# Patient Record
Sex: MALE | Race: WHITE | Employment: OTHER
[De-identification: names, ages, dates, MRNs, and addresses within clinical notes are randomized per-mention and may not be internally consistent; named-entity substitution may affect disease eponyms.]

---

## 2017-02-20 ENCOUNTER — OFFICE VISIT (OUTPATIENT)
Dept: FAMILY MEDICINE CLINIC | Facility: CLINIC | Age: 62
End: 2017-02-20

## 2017-02-20 VITALS
RESPIRATION RATE: 16 BRPM | SYSTOLIC BLOOD PRESSURE: 110 MMHG | BODY MASS INDEX: 39.49 KG/M2 | HEIGHT: 73 IN | HEART RATE: 64 BPM | WEIGHT: 298 LBS | DIASTOLIC BLOOD PRESSURE: 78 MMHG

## 2017-02-20 DIAGNOSIS — I10 ESSENTIAL HYPERTENSION: ICD-10-CM

## 2017-02-20 DIAGNOSIS — J44.9 CHRONIC OBSTRUCTIVE PULMONARY DISEASE, UNSPECIFIED COPD TYPE (HCC): ICD-10-CM

## 2017-02-20 DIAGNOSIS — E11.9 TYPE 2 DIABETES MELLITUS WITHOUT COMPLICATION, WITHOUT LONG-TERM CURRENT USE OF INSULIN (HCC): Primary | ICD-10-CM

## 2017-02-20 DIAGNOSIS — E78.2 MIXED HYPERLIPIDEMIA: ICD-10-CM

## 2017-02-20 DIAGNOSIS — M1A.9XX0 CHRONIC GOUT INVOLVING TOE OF RIGHT FOOT, UNSPECIFIED CAUSE: ICD-10-CM

## 2017-02-20 DIAGNOSIS — L30.9 ECZEMA OF FACE: ICD-10-CM

## 2017-02-20 DIAGNOSIS — E03.2 HYPOTHYROIDISM DUE TO MEDICATION: ICD-10-CM

## 2017-02-20 DIAGNOSIS — F34.1 DYSTHYMIA (OR DEPRESSIVE NEUROSIS): ICD-10-CM

## 2017-02-20 LAB — HBA1C MFR BLD: 5.3 %

## 2017-02-20 PROCEDURE — 99213 OFFICE O/P EST LOW 20 MIN: CPT | Performed by: FAMILY MEDICINE

## 2017-02-20 PROCEDURE — 83036 HEMOGLOBIN GLYCOSYLATED A1C: CPT | Performed by: FAMILY MEDICINE

## 2017-02-20 RX ORDER — ALLOPURINOL 300 MG/1
300 TABLET ORAL DAILY
Qty: 90 TABLET | Refills: 1 | Status: SHIPPED | OUTPATIENT
Start: 2017-02-20 | End: 2017-07-12 | Stop reason: SDUPTHER

## 2017-02-20 RX ORDER — LOSARTAN POTASSIUM 25 MG/1
12.5 TABLET ORAL DAILY
Qty: 90 TABLET | Refills: 1 | Status: CANCELLED | OUTPATIENT
Start: 2017-02-20

## 2017-02-20 RX ORDER — FLUTICASONE FUROATE AND VILANTEROL 100; 25 UG/1; UG/1
1 POWDER RESPIRATORY (INHALATION) DAILY
Qty: 3 EACH | Refills: 5 | Status: SHIPPED | OUTPATIENT
Start: 2017-02-20 | End: 2017-10-26

## 2017-02-20 RX ORDER — METOPROLOL SUCCINATE 50 MG/1
25 TABLET, EXTENDED RELEASE ORAL DAILY
Qty: 90 TABLET | Refills: 1 | Status: CANCELLED | OUTPATIENT
Start: 2017-02-20

## 2017-02-20 RX ORDER — METOPROLOL SUCCINATE 50 MG/1
25 TABLET, EXTENDED RELEASE ORAL DAILY
Qty: 90 TABLET | Refills: 1 | Status: SHIPPED | OUTPATIENT
Start: 2017-02-20 | End: 2017-07-12 | Stop reason: SDUPTHER

## 2017-02-20 RX ORDER — MOMETASONE FUROATE 1 MG/G
CREAM TOPICAL
Qty: 1 TUBE | Refills: 3 | Status: SHIPPED | OUTPATIENT
Start: 2017-02-20

## 2017-02-20 RX ORDER — PAROXETINE HYDROCHLORIDE 20 MG/1
TABLET, FILM COATED ORAL
Qty: 180 TABLET | Refills: 1 | Status: SHIPPED | OUTPATIENT
Start: 2017-02-20 | End: 2017-07-12 | Stop reason: SDUPTHER

## 2017-02-20 RX ORDER — LOSARTAN POTASSIUM 25 MG/1
12.5 TABLET ORAL DAILY
Qty: 90 TABLET | Refills: 1 | Status: SHIPPED | OUTPATIENT
Start: 2017-02-20 | End: 2017-07-12 | Stop reason: SDUPTHER

## 2017-02-20 RX ORDER — LEVOTHYROXINE SODIUM 175 UG/1
TABLET ORAL
Qty: 90 TABLET | Refills: 1 | Status: SHIPPED | OUTPATIENT
Start: 2017-02-20 | End: 2017-07-12 | Stop reason: SDUPTHER

## 2017-02-20 RX ORDER — LOVASTATIN 40 MG/1
40 TABLET ORAL DAILY
Qty: 90 TABLET | Refills: 1 | Status: SHIPPED | OUTPATIENT
Start: 2017-02-20 | End: 2017-07-12 | Stop reason: SDUPTHER

## 2017-02-20 ASSESSMENT — ENCOUNTER SYMPTOMS
EYE DISCHARGE: 0
RHINORRHEA: 0
VOICE CHANGE: 0
FACIAL SWELLING: 0
BLOOD IN STOOL: 0
RECTAL PAIN: 0
WHEEZING: 0
CHEST TIGHTNESS: 0
APNEA: 0
EYE ITCHING: 0
NAUSEA: 0
ALLERGIC/IMMUNOLOGIC NEGATIVE: 1
STRIDOR: 0
COUGH: 0
EYE PAIN: 0
SORE THROAT: 0
DIARRHEA: 0
EYES NEGATIVE: 1
GASTROINTESTINAL NEGATIVE: 1
TROUBLE SWALLOWING: 0
RESPIRATORY NEGATIVE: 1
PHOTOPHOBIA: 0
BACK PAIN: 0
ABDOMINAL DISTENTION: 0
SINUS PRESSURE: 0
COLOR CHANGE: 0
SHORTNESS OF BREATH: 0
ANAL BLEEDING: 0
VOMITING: 0
EYE REDNESS: 0
ABDOMINAL PAIN: 0
CHOKING: 0
CONSTIPATION: 0

## 2017-02-21 ENCOUNTER — TELEPHONE (OUTPATIENT)
Dept: FAMILY MEDICINE CLINIC | Facility: CLINIC | Age: 62
End: 2017-02-21

## 2017-02-21 DIAGNOSIS — F32.5 DEPRESSION, MAJOR, IN REMISSION (HCC): Primary | ICD-10-CM

## 2017-02-21 RX ORDER — FLUOXETINE HYDROCHLORIDE 40 MG/1
40 CAPSULE ORAL DAILY
Qty: 60 CAPSULE | Refills: 5 | Status: SHIPPED | OUTPATIENT
Start: 2017-02-21 | End: 2017-07-12

## 2017-06-15 ENCOUNTER — HOSPITAL ENCOUNTER (EMERGENCY)
Age: 62
Discharge: HOME | End: 2017-06-15
Attending: EMERGENCY MEDICINE
Payer: COMMERCIAL

## 2017-06-15 VITALS
SYSTOLIC BLOOD PRESSURE: 167 MMHG | OXYGEN SATURATION: 96 % | TEMPERATURE: 97.5 F | HEART RATE: 64 BPM | RESPIRATION RATE: 18 BRPM | DIASTOLIC BLOOD PRESSURE: 84 MMHG

## 2017-06-15 DIAGNOSIS — M10.9 ACUTE GOUT INVOLVING TOE OF RIGHT FOOT, UNSPECIFIED CAUSE: Primary | ICD-10-CM

## 2017-06-15 PROCEDURE — 99283 EMERGENCY DEPT VISIT LOW MDM: CPT

## 2017-06-15 RX ORDER — HYDROCODONE BITARTRATE AND ACETAMINOPHEN 5; 325 MG/1; MG/1
1 TABLET ORAL EVERY 6 HOURS PRN
Qty: 12 TABLET | Refills: 0 | Status: SHIPPED | OUTPATIENT
Start: 2017-06-15 | End: 2017-06-22

## 2017-06-15 RX ORDER — PREDNISONE 20 MG/1
TABLET ORAL
Qty: 18 TABLET | Refills: 0 | Status: SHIPPED | OUTPATIENT
Start: 2017-06-15 | End: 2017-10-23

## 2017-06-15 ASSESSMENT — PAIN DESCRIPTION - DESCRIPTORS: DESCRIPTORS: SHARP

## 2017-06-15 ASSESSMENT — PAIN SCALES - GENERAL: PAINLEVEL_OUTOF10: 8

## 2017-06-15 ASSESSMENT — PAIN DESCRIPTION - ORIENTATION: ORIENTATION: RIGHT

## 2017-06-15 ASSESSMENT — PAIN DESCRIPTION - FREQUENCY: FREQUENCY: CONTINUOUS

## 2017-06-15 ASSESSMENT — PAIN DESCRIPTION - LOCATION: LOCATION: TOE (COMMENT WHICH ONE)

## 2017-06-15 ASSESSMENT — PAIN DESCRIPTION - PAIN TYPE: TYPE: ACUTE PAIN

## 2017-07-12 ENCOUNTER — OFFICE VISIT (OUTPATIENT)
Dept: FAMILY MEDICINE CLINIC | Age: 62
End: 2017-07-12
Payer: COMMERCIAL

## 2017-07-12 ENCOUNTER — TELEPHONE (OUTPATIENT)
Dept: FAMILY MEDICINE CLINIC | Age: 62
End: 2017-07-12

## 2017-07-12 VITALS
HEIGHT: 73 IN | DIASTOLIC BLOOD PRESSURE: 72 MMHG | SYSTOLIC BLOOD PRESSURE: 122 MMHG | BODY MASS INDEX: 39.76 KG/M2 | WEIGHT: 300 LBS

## 2017-07-12 DIAGNOSIS — I10 ESSENTIAL HYPERTENSION: ICD-10-CM

## 2017-07-12 DIAGNOSIS — M10.9 ACUTE GOUT OF LEFT FOOT, UNSPECIFIED CAUSE: ICD-10-CM

## 2017-07-12 DIAGNOSIS — E03.9 ACQUIRED HYPOTHYROIDISM: ICD-10-CM

## 2017-07-12 DIAGNOSIS — F34.1 DYSTHYMIA (OR DEPRESSIVE NEUROSIS): ICD-10-CM

## 2017-07-12 DIAGNOSIS — R73.03 PRE-DIABETES: ICD-10-CM

## 2017-07-12 DIAGNOSIS — M10.9 ACUTE GOUT OF LEFT FOOT, UNSPECIFIED CAUSE: Primary | ICD-10-CM

## 2017-07-12 DIAGNOSIS — E78.2 MIXED HYPERLIPIDEMIA: ICD-10-CM

## 2017-07-12 DIAGNOSIS — Z23 NEED FOR PNEUMOCOCCAL VACCINATION: ICD-10-CM

## 2017-07-12 PROBLEM — M1A.9XX0 CHRONIC GOUT INVOLVING TOE: Status: ACTIVE | Noted: 2017-07-12

## 2017-07-12 PROCEDURE — 90670 PCV13 VACCINE IM: CPT | Performed by: FAMILY MEDICINE

## 2017-07-12 PROCEDURE — 99213 OFFICE O/P EST LOW 20 MIN: CPT | Performed by: FAMILY MEDICINE

## 2017-07-12 PROCEDURE — 90471 IMMUNIZATION ADMIN: CPT | Performed by: FAMILY MEDICINE

## 2017-07-12 RX ORDER — PAROXETINE HYDROCHLORIDE 20 MG/1
TABLET, FILM COATED ORAL
Qty: 180 TABLET | Refills: 1 | Status: SHIPPED | OUTPATIENT
Start: 2017-07-12 | End: 2018-01-09 | Stop reason: SDUPTHER

## 2017-07-12 RX ORDER — LOSARTAN POTASSIUM 25 MG/1
12.5 TABLET ORAL DAILY
Qty: 90 TABLET | Refills: 1 | Status: SHIPPED | OUTPATIENT
Start: 2017-07-12 | End: 2017-08-21 | Stop reason: SDUPTHER

## 2017-07-12 RX ORDER — INDOMETHACIN 50 MG/1
50 CAPSULE ORAL 3 TIMES DAILY
Qty: 30 CAPSULE | Refills: 3 | Status: SHIPPED | OUTPATIENT
Start: 2017-07-12 | End: 2017-10-23

## 2017-07-12 RX ORDER — COLCHICINE 0.6 MG/1
TABLET ORAL
Qty: 30 TABLET | Refills: 3 | Status: SHIPPED | OUTPATIENT
Start: 2017-07-12 | End: 2017-07-13 | Stop reason: SDUPTHER

## 2017-07-12 RX ORDER — METOPROLOL SUCCINATE 50 MG/1
25 TABLET, EXTENDED RELEASE ORAL DAILY
Qty: 90 TABLET | Refills: 1 | Status: SHIPPED | OUTPATIENT
Start: 2017-07-12 | End: 2017-08-21 | Stop reason: SDUPTHER

## 2017-07-12 RX ORDER — LEVOTHYROXINE SODIUM 175 UG/1
TABLET ORAL
Qty: 90 TABLET | Refills: 1 | Status: SHIPPED | OUTPATIENT
Start: 2017-07-12 | End: 2018-01-03 | Stop reason: SDUPTHER

## 2017-07-12 RX ORDER — LOVASTATIN 40 MG/1
40 TABLET ORAL DAILY
Qty: 90 TABLET | Refills: 1 | Status: SHIPPED | OUTPATIENT
Start: 2017-07-12 | End: 2018-01-09 | Stop reason: SDUPTHER

## 2017-07-12 RX ORDER — ALLOPURINOL 300 MG/1
300 TABLET ORAL DAILY
Qty: 90 TABLET | Refills: 1 | Status: SHIPPED | OUTPATIENT
Start: 2017-07-12 | End: 2018-01-09 | Stop reason: SDUPTHER

## 2017-07-12 ASSESSMENT — ENCOUNTER SYMPTOMS
EYE PAIN: 0
DIARRHEA: 0
EYE DISCHARGE: 0
STRIDOR: 0
WHEEZING: 0
BLURRED VISION: 0
ANAL BLEEDING: 0
ABDOMINAL DISTENTION: 0
VOMITING: 0
COUGH: 0
VOICE CHANGE: 0
SHORTNESS OF BREATH: 0
ORTHOPNEA: 0
GASTROINTESTINAL NEGATIVE: 1
COLOR CHANGE: 0
ABDOMINAL PAIN: 0
EYE ITCHING: 0
NAUSEA: 0
CONSTIPATION: 0
RESPIRATORY NEGATIVE: 1
SINUS PRESSURE: 0
RECTAL PAIN: 0
BLOOD IN STOOL: 0
RHINORRHEA: 0
CHOKING: 0
CHEST TIGHTNESS: 0
FACIAL SWELLING: 0
BACK PAIN: 0
EYE REDNESS: 0
SORE THROAT: 0
PHOTOPHOBIA: 0
EYES NEGATIVE: 1
APNEA: 0
TROUBLE SWALLOWING: 0
ALLERGIC/IMMUNOLOGIC NEGATIVE: 1

## 2017-07-12 ASSESSMENT — PATIENT HEALTH QUESTIONNAIRE - PHQ9
SUM OF ALL RESPONSES TO PHQ9 QUESTIONS 1 & 2: 1
1. LITTLE INTEREST OR PLEASURE IN DOING THINGS: 0
2. FEELING DOWN, DEPRESSED OR HOPELESS: 1
SUM OF ALL RESPONSES TO PHQ QUESTIONS 1-9: 1

## 2017-07-13 RX ORDER — COLCHICINE 0.6 MG/1
TABLET ORAL
Qty: 90 TABLET | Refills: 3 | Status: SHIPPED | OUTPATIENT
Start: 2017-07-13 | End: 2017-10-23

## 2017-08-21 DIAGNOSIS — I10 ESSENTIAL HYPERTENSION: ICD-10-CM

## 2017-08-22 RX ORDER — LOSARTAN POTASSIUM 25 MG/1
25 TABLET ORAL DAILY
Qty: 90 TABLET | Refills: 2 | Status: SHIPPED | OUTPATIENT
Start: 2017-08-22 | End: 2018-01-12 | Stop reason: SDUPTHER

## 2017-08-22 RX ORDER — METOPROLOL SUCCINATE 50 MG/1
50 TABLET, EXTENDED RELEASE ORAL DAILY
Qty: 90 TABLET | Refills: 2 | Status: SHIPPED | OUTPATIENT
Start: 2017-08-22 | End: 2018-01-12 | Stop reason: SDUPTHER

## 2017-08-25 ENCOUNTER — TELEPHONE (OUTPATIENT)
Dept: CARDIOLOGY CLINIC | Age: 62
End: 2017-08-25

## 2017-08-25 DIAGNOSIS — E55.9 VITAMIN D DEFICIENCY: ICD-10-CM

## 2017-08-25 DIAGNOSIS — I10 ESSENTIAL HYPERTENSION: ICD-10-CM

## 2017-08-25 DIAGNOSIS — I25.10 CORONARY ARTERY DISEASE INVOLVING NATIVE CORONARY ARTERY OF NATIVE HEART WITHOUT ANGINA PECTORIS: ICD-10-CM

## 2017-08-25 DIAGNOSIS — E03.9 ACQUIRED HYPOTHYROIDISM: ICD-10-CM

## 2017-08-25 DIAGNOSIS — E78.2 MIXED HYPERLIPIDEMIA: ICD-10-CM

## 2017-08-25 DIAGNOSIS — Z95.1 S/P CABG X 3: Primary | ICD-10-CM

## 2017-10-20 ENCOUNTER — HOSPITAL ENCOUNTER (OUTPATIENT)
Age: 62
Discharge: HOME | End: 2017-10-20
Payer: MEDICARE

## 2017-10-20 ENCOUNTER — HOSPITAL ENCOUNTER (OUTPATIENT)
Dept: GENERAL RADIOLOGY | Age: 62
Discharge: HOME | End: 2017-10-20
Payer: MEDICARE

## 2017-10-20 DIAGNOSIS — E78.2 MIXED HYPERLIPIDEMIA: ICD-10-CM

## 2017-10-20 DIAGNOSIS — I25.10 CORONARY ARTERY DISEASE INVOLVING NATIVE CORONARY ARTERY OF NATIVE HEART WITHOUT ANGINA PECTORIS: ICD-10-CM

## 2017-10-20 DIAGNOSIS — I10 ESSENTIAL HYPERTENSION: ICD-10-CM

## 2017-10-20 DIAGNOSIS — E03.9 ACQUIRED HYPOTHYROIDISM: ICD-10-CM

## 2017-10-20 DIAGNOSIS — Z95.1 S/P CABG X 3: ICD-10-CM

## 2017-10-20 DIAGNOSIS — E55.9 VITAMIN D DEFICIENCY: ICD-10-CM

## 2017-10-20 LAB
ABSOLUTE EOS #: 0.3 K/UL (ref 0–0.4)
ABSOLUTE IMMATURE GRANULOCYTE: NORMAL K/UL (ref 0–0.3)
ABSOLUTE LYMPH #: 2.2 K/UL (ref 1–4.8)
ABSOLUTE MONO #: 0.6 K/UL (ref 0–1)
ALBUMIN SERPL-MCNC: 4.3 G/DL (ref 3.5–5.2)
ALBUMIN/GLOBULIN RATIO: ABNORMAL (ref 1–2.5)
ALP BLD-CCNC: 70 U/L (ref 40–129)
ALT SERPL-CCNC: 21 U/L (ref 5–41)
ANION GAP SERPL CALCULATED.3IONS-SCNC: 11 MMOL/L (ref 9–17)
AST SERPL-CCNC: 22 U/L
BASOPHILS # BLD: 1 %
BASOPHILS ABSOLUTE: 0.1 K/UL (ref 0–0.2)
BILIRUB SERPL-MCNC: 0.29 MG/DL (ref 0.3–1.2)
BUN BLDV-MCNC: 9 MG/DL (ref 8–23)
BUN/CREAT BLD: 11 (ref 9–20)
CALCIUM SERPL-MCNC: 9.3 MG/DL (ref 8.6–10.4)
CHLORIDE BLD-SCNC: 102 MMOL/L (ref 98–107)
CHOLESTEROL/HDL RATIO: 5
CHOLESTEROL: 165 MG/DL
CO2: 27 MMOL/L (ref 20–31)
CREAT SERPL-MCNC: 0.81 MG/DL (ref 0.7–1.2)
DIFFERENTIAL TYPE: YES
EKG ATRIAL RATE: 52 BPM
EKG P AXIS: 55 DEGREES
EKG P-R INTERVAL: 178 MS
EKG Q-T INTERVAL: 440 MS
EKG QRS DURATION: 88 MS
EKG QTC CALCULATION (BAZETT): 409 MS
EKG R AXIS: 38 DEGREES
EKG T AXIS: 74 DEGREES
EKG VENTRICULAR RATE: 52 BPM
EOSINOPHILS RELATIVE PERCENT: 4 %
GFR AFRICAN AMERICAN: >60 ML/MIN
GFR NON-AFRICAN AMERICAN: >60 ML/MIN
GFR SERPL CREATININE-BSD FRML MDRD: ABNORMAL ML/MIN/{1.73_M2}
GFR SERPL CREATININE-BSD FRML MDRD: ABNORMAL ML/MIN/{1.73_M2}
GLUCOSE BLD-MCNC: 123 MG/DL (ref 70–99)
HCT VFR BLD CALC: 51.2 % (ref 41–53)
HDLC SERPL-MCNC: 33 MG/DL
HEMOGLOBIN: 16.6 G/DL (ref 13.5–17.5)
IMMATURE GRANULOCYTES: NORMAL %
LDL CHOLESTEROL: 57 MG/DL (ref 0–130)
LYMPHOCYTES # BLD: 29 %
MAGNESIUM: 2.2 MG/DL (ref 1.6–2.6)
MCH RBC QN AUTO: 30.6 PG (ref 26–34)
MCHC RBC AUTO-ENTMCNC: 32.4 G/DL (ref 31–37)
MCV RBC AUTO: 94.4 FL (ref 80–100)
MONOCYTES # BLD: 8 %
PATIENT FASTING?: YES
PDW BLD-RTO: 14.4 % (ref 12.1–15.2)
PLATELET # BLD: 229 K/UL (ref 140–450)
PLATELET ESTIMATE: NORMAL
PMV BLD AUTO: NORMAL FL (ref 6–12)
POTASSIUM SERPL-SCNC: 4.4 MMOL/L (ref 3.7–5.3)
RBC # BLD: 5.43 M/UL (ref 4.5–5.9)
RBC # BLD: NORMAL 10*6/UL
SEG NEUTROPHILS: 58 %
SEGMENTED NEUTROPHILS ABSOLUTE COUNT: 4.3 K/UL (ref 2.1–6.5)
SODIUM BLD-SCNC: 140 MMOL/L (ref 135–144)
TOTAL PROTEIN: 7.4 G/DL (ref 6.4–8.3)
TRIGL SERPL-MCNC: 373 MG/DL
TSH SERPL DL<=0.05 MIU/L-ACNC: 4.22 MIU/L (ref 0.3–5)
VITAMIN D 25-HYDROXY: 29.1 NG/ML (ref 30–100)
VLDLC SERPL CALC-MCNC: ABNORMAL MG/DL (ref 1–30)
WBC # BLD: 7.5 K/UL (ref 3.5–11)
WBC # BLD: NORMAL 10*3/UL

## 2017-10-20 PROCEDURE — 93005 ELECTROCARDIOGRAM TRACING: CPT

## 2017-10-20 PROCEDURE — 83036 HEMOGLOBIN GLYCOSYLATED A1C: CPT

## 2017-10-20 PROCEDURE — 80061 LIPID PANEL: CPT

## 2017-10-20 PROCEDURE — 71020 XR CHEST STANDARD TWO VW: CPT

## 2017-10-20 PROCEDURE — 84443 ASSAY THYROID STIM HORMONE: CPT

## 2017-10-20 PROCEDURE — 83735 ASSAY OF MAGNESIUM: CPT

## 2017-10-20 PROCEDURE — 80053 COMPREHEN METABOLIC PANEL: CPT

## 2017-10-20 PROCEDURE — 82306 VITAMIN D 25 HYDROXY: CPT

## 2017-10-20 PROCEDURE — 85025 COMPLETE CBC W/AUTO DIFF WBC: CPT

## 2017-10-20 PROCEDURE — 36415 COLL VENOUS BLD VENIPUNCTURE: CPT

## 2017-10-23 ENCOUNTER — OFFICE VISIT (OUTPATIENT)
Dept: CARDIOLOGY CLINIC | Age: 62
End: 2017-10-23
Payer: COMMERCIAL

## 2017-10-23 VITALS
SYSTOLIC BLOOD PRESSURE: 110 MMHG | WEIGHT: 294 LBS | HEART RATE: 68 BPM | OXYGEN SATURATION: 95 % | BODY MASS INDEX: 38.79 KG/M2 | DIASTOLIC BLOOD PRESSURE: 80 MMHG

## 2017-10-23 DIAGNOSIS — R94.31 ABNORMAL EKG: ICD-10-CM

## 2017-10-23 DIAGNOSIS — I10 ESSENTIAL HYPERTENSION: ICD-10-CM

## 2017-10-23 DIAGNOSIS — I25.10 CORONARY ARTERY DISEASE INVOLVING NATIVE CORONARY ARTERY OF NATIVE HEART WITHOUT ANGINA PECTORIS: ICD-10-CM

## 2017-10-23 DIAGNOSIS — Z95.1 S/P CABG X 3: ICD-10-CM

## 2017-10-23 DIAGNOSIS — R06.02 SOB (SHORTNESS OF BREATH): ICD-10-CM

## 2017-10-23 DIAGNOSIS — E78.2 MIXED HYPERLIPIDEMIA: ICD-10-CM

## 2017-10-23 DIAGNOSIS — E55.9 VITAMIN D DEFICIENCY DISEASE: ICD-10-CM

## 2017-10-23 DIAGNOSIS — R73.09 ELEVATED GLUCOSE: Primary | ICD-10-CM

## 2017-10-23 LAB
ESTIMATED AVERAGE GLUCOSE: 108 MG/DL
HBA1C MFR BLD: 5.4 % (ref 4.8–5.9)

## 2017-10-23 PROCEDURE — 99214 OFFICE O/P EST MOD 30 MIN: CPT | Performed by: INTERNAL MEDICINE

## 2017-10-23 RX ORDER — FENOFIBRATE 145 MG/1
145 TABLET, COATED ORAL DAILY
Qty: 30 TABLET | Refills: 11 | Status: SHIPPED | OUTPATIENT
Start: 2017-10-23 | End: 2018-05-29 | Stop reason: SDUPTHER

## 2017-10-23 NOTE — LETTER
Marshall Nicholas M.D. [address] [place] [address] Rd, [place] [address]  [phone]          2017          Candis Cain MD  [facility]  Jose Enrique, [address]      RE:  Nanci Huitron   : 1955    Dear Dr. José Miguel Cain:    CHIEF COMPLAINT:  Shortness of breath, loss of energy with some chest discomfort. HISTORY OF PRESENT ILLNESS:  I had the pleasure of seeing Mr. Forrest Miller in our office on 10/23/2017. He is a pleasant 72-year-old gentleman who had shortness of breath and chest pain in . He had a Cardiolite stress test on 2014, which did not show any ischemia; however, because of his severe shortness of breath and chest discomfort, I did a catheterization on 2015 at [facility] that showed 80% distal left main trunk in the ostium of the LAD, 60% circumflex, 40% RCA with an EF of 60%. He had open heart surgery by Dr. Luisa Vu on 2015 with LIMA to the LAD and a vein graft to the circumflex. He has done well over the past year. He did have an episode of gout, which was treated for which he is doing better. He has noticed an increase in his shortness of breath with activity. He works out 3 days a week at home gym doing treadmill for 20 minutes and a bike for 20 minutes and lifting weights. He has noticed that it is more difficult to walk his treadmill because of his shortness of breath. His energy level is also markedly decreased. He has some chest pain, although it is not necessarily occurring only with activity. He is on disability for back discomfort, which makes treadmill somewhat difficult, but he is still able to do a treadmill for 20 to 30 minutes at 3-1/2 to 4 miles per hour. He has had no syncope or near syncope. No lightheadedness or dizziness. His daughter is with him named Kanu Houston, who also states that he is exercising.   She has noticed loss of energy and more shortness of breath GENERAL:  He is a pleasant 73-year-old gentleman. Denied pain. He was oriented to person, place and time. Answered questions appropriately. SKIN:  No unusual skin changes. HEENT:  The pupils are equally round and reactive to light and accommodation. Extraocular movements were intact. Mucous membranes were dry. NECK:  No JVD. Good carotid pulses. No carotid bruits. No lymphadenopathy or thyromegaly. CARDIOVASCULAR EXAM:  S1 and S2 were normal.  No S3 or S4. Soft systolic blowing type murmur. No diastolic murmur. PMI was normal.  No lift, thrust, or pericardial friction rub. LUNGS:  Quite clear to auscultation and percussion. ABDOMEN:  Soft and nontender. Good bowel sounds. The aorta was not enlarged. No hepatomegaly, splenomegaly. EXTREMITIES:  Good femoral pulses. Good pedal pulses. No pedal edema. Skin was warm and dry. No calf tenderness. Nail beds pink. Good cap refill. PULSES:  Bilateral symmetrical radial, brachial and carotid pulses. No carotid bruits. Good femoral and pedal pulses. NEUROLOGIC EXAM:  Within normal limits. PSYCHIATRIC EXAM:  Within normal limits. LABORATORY DATA:  From 10/20/2017, sodium 140, potassium 4.4, BUN 9, creatinine 0.81, GFR greater than 60, magnesium 2.2, calcium 9.3. His cholesterol was 165 with an HDL of 33, LDL 57, triglycerides 273. ALT was 21, AST was 22. Hemoglobin A1c is pending. TSH was 4.22. Vitamin D is 29.1. White count 7.5, hemoglobin 16.6 with a platelet count of 806,121. EKG showed sinus bradycardia at a rate of 52 beats per minute with nonspecific ST changes with no change from his previous EKG, but it was abnormal.      Chest x-ray was unremarkable with no acute abnormalities found. IMPRESSION:  1. Increased shortness of breath with exertion as well as decreased energy level and chest pain, consider angina equivalent. 2.  History of severe CAD.   3.  Status post cardiac catheterization on 01/21/2015, showing 80% left main trunk proximal LAD with 60% circumflex, mild disease in the right coronary artery, EF of 60%. 4.  Open heart surgery by Dr. Soco Leiva on 02/04/2015 with LIMA to the LAD and a vein graft to the OM branch of the circumflex. 5.  Non-insulin-dependent diabetes with a hemoglobin A1c pending, but his last hemoglobin A1c was 5.3.  6.  COPD. 7.  Sleep apnea, on a CPAP mask, which he uses nightly. 8.  Hypertension, well controlled. 9.  Hyperlipidemia, well controlled except for his triglycerides, which are markedly elevated at 373, when they were 148 last year on Tricor (currently off Tricor). 10.  Status post partial thyroidectomy with TSH at goal.  11.  Abnormal EKG. 12.  Bradycardia on EKG with a heart rate of 52. PLAN:  1. Exercise Myoview stress test to evaluate his chronotropic response to exercise and also to see if he has any significant ischemia, which could result in angina-equivalent symptoms. 2.  Echocardiogram because of his shortness of breath with exertion. 3.  If the above tests are unremarkable, we will call him with the results. 4.  Start Tricor for his hyperlipidemia with his triglycerides markedly elevated. DISCUSSION:  Mr. Eileen Page overall is doing relatively well. He has developed marked fatigue along with shortness of breath with exertion and now chest pain. His symptoms are somewhat similar to what he had prior to his bypass surgery. This may be the result of general aging. However, these were exactly the symptoms of angina that he had prior to his bypass surgery. Therefore, I will do an exercise Myoview stress test.  He may not be able to complete the exercise portion, but we will be able to see whether he has an adequate chronotropic response. If he has an inadequate chronotropic response, then we would decrease his Lopressor to 1/2 a tablet at 25 mg daily.     If the stress test is unremarkable, then I will call him and I will see

## 2017-10-23 NOTE — PROGRESS NOTES
Ov DR Norma Rivero  Energy level down past year  Tries exercise but more sob  30 min treadmill at 3, 25min bike  Lift weights all home  No chest pain  No hospitalizations or procedures  Since seen  Uses cpap  Seen DR José Miguel Cain 2 mths ago  occ lightheadedness   Daughter here Lance Ibarra has 3and 10year olds  Will do A1C today  Will schedule myoview stress test and echo  Unsure why stopped tricor  Will send in new script  Call with results  See pt in 1 year

## 2017-10-26 ENCOUNTER — HOSPITAL ENCOUNTER (OUTPATIENT)
Dept: NON INVASIVE DIAGNOSTICS | Age: 62
Discharge: HOME | End: 2017-10-26
Payer: MEDICARE

## 2017-10-26 ENCOUNTER — HOSPITAL ENCOUNTER (OUTPATIENT)
Dept: NUCLEAR MEDICINE | Age: 62
Discharge: HOME | End: 2017-10-26
Payer: MEDICARE

## 2017-10-26 ENCOUNTER — TELEPHONE (OUTPATIENT)
Dept: CARDIOLOGY CLINIC | Age: 62
End: 2017-10-26

## 2017-10-26 VITALS — DIASTOLIC BLOOD PRESSURE: 78 MMHG | HEART RATE: 83 BPM | SYSTOLIC BLOOD PRESSURE: 112 MMHG

## 2017-10-26 DIAGNOSIS — R06.02 SOB (SHORTNESS OF BREATH): ICD-10-CM

## 2017-10-26 DIAGNOSIS — R94.31 ABNORMAL EKG: ICD-10-CM

## 2017-10-26 LAB
LV EF: 55 %
LVEF MODALITY: NORMAL

## 2017-10-26 PROCEDURE — 3430000000 HC RX DIAGNOSTIC RADIOPHARMACEUTICAL: Performed by: INTERNAL MEDICINE

## 2017-10-26 PROCEDURE — 78452 HT MUSCLE IMAGE SPECT MULT: CPT

## 2017-10-26 PROCEDURE — 6360000002 HC RX W HCPCS: Performed by: INTERNAL MEDICINE

## 2017-10-26 PROCEDURE — A9500 TC99M SESTAMIBI: HCPCS | Performed by: INTERNAL MEDICINE

## 2017-10-26 PROCEDURE — 93306 TTE W/DOPPLER COMPLETE: CPT

## 2017-10-26 PROCEDURE — 93017 CV STRESS TEST TRACING ONLY: CPT

## 2017-10-26 PROCEDURE — 2580000003 HC RX 258: Performed by: INTERNAL MEDICINE

## 2017-10-26 RX ORDER — AMINOPHYLLINE DIHYDRATE 25 MG/ML
75 INJECTION, SOLUTION INTRAVENOUS
Status: DISPENSED | OUTPATIENT
Start: 2017-10-26 | End: 2017-10-26

## 2017-10-26 RX ORDER — SODIUM CHLORIDE 0.9 % (FLUSH) 0.9 %
10 SYRINGE (ML) INJECTION PRN
Status: DISCONTINUED | OUTPATIENT
Start: 2017-10-26 | End: 2017-10-27 | Stop reason: HOSPADM

## 2017-10-26 RX ADMIN — REGADENOSON 0.4 MG: 0.08 INJECTION, SOLUTION INTRAVENOUS at 09:35

## 2017-10-26 RX ADMIN — TETRAKIS(2-METHOXYISOBUTYLISOCYANIDE)COPPER(I) TETRAFLUOROBORATE 11.5 MILLICURIE: 1 INJECTION, POWDER, LYOPHILIZED, FOR SOLUTION INTRAVENOUS at 07:45

## 2017-10-26 RX ADMIN — Medication 10 ML: at 09:35

## 2017-10-26 RX ADMIN — TETRAKIS(2-METHOXYISOBUTYLISOCYANIDE)COPPER(I) TETRAFLUOROBORATE 31.1 MILLICURIE: 1 INJECTION, POWDER, LYOPHILIZED, FOR SOLUTION INTRAVENOUS at 09:35

## 2017-10-26 NOTE — PROGRESS NOTES
Cyndee Harrington M.D. [address] [place] [address] Rd, [place] [address]  [phone]          2017          Paddy Mclaughlin. Gulshan Hector MD  [facility]  Jose Enrique, [address]      RE:  Kira Sykes   : 1955    Dear Dr. Gulshan Hector:    CHIEF COMPLAINT:  Shortness of breath, loss of energy with some chest discomfort. HISTORY OF PRESENT ILLNESS:  I had the pleasure of seeing Mr. Moiz Barnhart in our office on 10/23/2017. He is a pleasant 65-year-old gentleman who had shortness of breath and chest pain in . He had a Cardiolite stress test on 2014, which did not show any ischemia; however, because of his severe shortness of breath and chest discomfort, I did a catheterization on 2015 at [facility] that showed 80% distal left main trunk in the ostium of the LAD, 60% circumflex, 40% RCA with an EF of 60%. He had open heart surgery by Dr. Conor Szymanski on 2015 with LIMA to the LAD and a vein graft to the circumflex. He has done well over the past year. He did have an episode of gout, which was treated for which he is doing better. He has noticed an increase in his shortness of breath with activity. He works out 3 days a week at home gym doing treadmill for 20 minutes and a bike for 20 minutes and lifting weights. He has noticed that it is more difficult to walk his treadmill because of his shortness of breath. His energy level is also markedly decreased. He has some chest pain, although it is not necessarily occurring only with activity. He is on disability for back discomfort, which makes treadmill somewhat difficult, but he is still able to do a treadmill for 20 to 30 minutes at 3-1/2 to 4 miles per hour. He has had no syncope or near syncope. No lightheadedness or dizziness. His daughter is with him named Troy Ceja, who also states that he is exercising.   She has noticed loss of energy and more shortness of breath with activity. CARDIAC RISK FACTORS:  Hypertension:  Positive. Hyperlipidemia:  Positive. Other Family Members:  Positive. Smoking:  Negative. Diabetes:  Positive. Peripheral Vascular Disease:  Negative. MEDICATIONS AT HOME:  He is on Proventil inhaler 2 puffs q.4h p.r.n., Zyloprim 300 mg daily, aspirin 81 mg daily, Breo Ellipta daily, Synthroid 175 mcg daily, Cozaar 25 mg daily, Mevacor 40 mg daily, Toprol-XL 50 mg daily, Paxil 20 mg b.i.d. PAST MEDICAL HISTORY:  He has hypothyroidism since , on replacement. He has mild COPD on treatment. Status post thyroid lobectomy, is on thyroid replacement. Spine surgery many years ago. Chronic back pain, on disability. Non-insulin-dependent diabetes. Bypass surgery as stated previously. FAMILY HISTORY:  Mother alive at 78 with MI. Father  of MI.    SOCIAL HISTORY:  He is 63years old, . Quit smoking in 2014. He does not drink alcohol. He has been on disability since . He has 5 children with his daughter, Carlos Castelan with him today. She has 2 children ages 3 and 10. Mr. Lydia Gamez exercises 3 days a week for 30 minutes on a treadmill, 25 minutes on a bike and lifts weights afterwards, all at home. REVIEW OF SYSTEMS:  Cardiac as above. Other 10 systems reviewed, including neurologic, psychiatric, pulmonary, cardiac, GI, , renal, and musculoskeletal.  He had an episode of gout approximately 2 months ago, which has resolved. He does have severe sleep apnea, on a CPAP mask. He has had no syncope. He has occasional lightheadedness. He does have a history of hypertriglyceridemia. He was on Tricor last year, which was stopped as I believe it was too expensive, although he is not sure of the reason. He has had no PND or orthopnea. No fevers, sweats, or chills. PHYSICAL EXAMINATION:  VITAL SIGNS:  His blood pressure was 120/80 with a heart rate of 68 and regular. Respiratory rate 18. O2 saturation 95%.   Weight 294 80% left main trunk proximal LAD with 60% circumflex, mild disease in the right coronary artery, EF of 60%. 4.  Open heart surgery by Dr. Ashok David on 02/04/2015 with LIMA to the LAD and a vein graft to the OM branch of the circumflex. 5.  Non-insulin-dependent diabetes with a hemoglobin A1c pending, but his last hemoglobin A1c was 5.3.  6.  COPD. 7.  Sleep apnea, on a CPAP mask, which he uses nightly. 8.  Hypertension, well controlled. 9.  Hyperlipidemia, well controlled except for his triglycerides, which are markedly elevated at 373, when they were 148 last year on Tricor (currently off Tricor). 10.  Status post partial thyroidectomy with TSH at goal.  11.  Abnormal EKG. 12.  Bradycardia on EKG with a heart rate of 52. PLAN:  1. Exercise Myoview stress test to evaluate his chronotropic response to exercise and also to see if he has any significant ischemia, which could result in angina-equivalent symptoms. 2.  Echocardiogram because of his shortness of breath with exertion. 3.  If the above tests are unremarkable, we will call him with the results. 4.  Start Tricor for his hyperlipidemia with his triglycerides markedly elevated. DISCUSSION:  Mr. Margery Boas overall is doing relatively well. He has developed marked fatigue along with shortness of breath with exertion and now chest pain. His symptoms are somewhat similar to what he had prior to his bypass surgery. This may be the result of general aging. However, these were exactly the symptoms of angina that he had prior to his bypass surgery. Therefore, I will do an exercise Myoview stress test.  He may not be able to complete the exercise portion, but we will be able to see whether he has an adequate chronotropic response. If he has an inadequate chronotropic response, then we would decrease his Lopressor to 1/2 a tablet at 25 mg daily. If the stress test is unremarkable, then I will call him and I will see him in a year.   If there is

## 2017-10-26 NOTE — TELEPHONE ENCOUNTER
Pt daughter called wanting to know if pt can exercise had stress test done today talked with DR Norma Rivero to hold off until we get all the results, daughter informed

## 2017-10-26 NOTE — PROGRESS NOTES
Pt walked on treadmill. Dr. Shanelle Sampson in room. Pt unable to walk long enough to get HR where Dr. Shanelle Sampson needed for Targeted HR. Dr. Shanelle Sampson switched  to  [facility]. Nurse supervisor called and pharmacy.

## 2017-10-27 ENCOUNTER — TELEPHONE (OUTPATIENT)
Dept: CARDIOLOGY CLINIC | Age: 62
End: 2017-10-27

## 2017-10-27 NOTE — PROCEDURES
[facility]                           [address], [place] [address]                             CARDIAC STRESS TEST    PATIENT NAME: Jennifer Alejandra                      :             1955  MED REC NO:   [id number]                               ROOM:  ACCOUNT NO:   [de-identified]                            ADMISSION DATE:  10/26/2017  PROVIDER:     Juliano Albert    2017      Chidi Mckenzie, [address] [facility], [address] [facility]      RE:   Dave Soto  :  1955      Dear Dr. Bell Petite:     Stable TEST:    IMPRESSION:  1. We gave 0.4 mg of Lexiscan intravenously. 2.  This was followed in 20 seconds by Cardiolite infusion. 3.  There was no chest pain. 4.  There was no ST depression. 5.  It was an overall negative Lexiscan stress test.  6.  Cardiolite to follow. We attempted to do the stress test on a treadmill. However, he became  short of breath when his heart rate was 70% of maximum predicted heart  rate. Therefore, I felt that he could not complete the stress test  and we changed to Ria Colon. Cardiolite to follow.         Reese Almaraz    D: 10/27/2017 5:49:25       T: 10/27/2017 6:51:41     [id number]  Job#: [id number]     Doc#: [id number]
coronary artery disease. Depending on the patient's symptoms and level of clinical suspicion,  aggressive medical management vs.  additional testing by coronary  angiography may be indicated. The results of this test were discussed with Dr. Tian Ortega on 10/26/2017  at 14:30. CASSIE VENTURA    D: 10/27/2017 12:31:03       T: 10/27/2017 12:36:43     [email or web address]_EDIT  Job#: [id number]    Doc#: Unknown    CC:  Arian Velásquez  [facility]

## 2017-11-07 ENCOUNTER — OFFICE VISIT (OUTPATIENT)
Dept: CARDIOLOGY CLINIC | Age: 62
End: 2017-11-07
Payer: COMMERCIAL

## 2017-11-07 DIAGNOSIS — R94.39 ABNORMAL STRESS TEST: Primary | ICD-10-CM

## 2017-11-07 DIAGNOSIS — I10 ESSENTIAL HYPERTENSION: ICD-10-CM

## 2017-11-07 DIAGNOSIS — E78.2 MIXED HYPERLIPIDEMIA: ICD-10-CM

## 2017-11-07 DIAGNOSIS — I25.10 CORONARY ARTERY DISEASE INVOLVING NATIVE CORONARY ARTERY OF NATIVE HEART WITHOUT ANGINA PECTORIS: ICD-10-CM

## 2017-11-07 PROCEDURE — 99213 OFFICE O/P EST LOW 20 MIN: CPT | Performed by: INTERNAL MEDICINE

## 2017-11-07 RX ORDER — AMLODIPINE BESYLATE 2.5 MG/1
2.5 TABLET ORAL DAILY
COMMUNITY
End: 2017-11-07 | Stop reason: SDUPTHER

## 2017-11-07 RX ORDER — AMLODIPINE BESYLATE 2.5 MG/1
2.5 TABLET ORAL DAILY
Qty: 30 TABLET | Refills: 11 | Status: SHIPPED | OUTPATIENT
Start: 2017-11-07 | End: 2018-01-12 | Stop reason: SDUPTHER

## 2017-11-07 NOTE — LETTER
with him already being in full medical therapy. He and his daughters wished to proceed with cardiac catheterization to define his anatomy. He understands risks and benefits. I went over also angioplasty and the need to be on dual antiplatelet therapy. They understand and are agreeable to proceed with catheterization. We will schedule and proceed as discussed. Thank you very much for allowing me the privilege of seeing Mr. Army Dyer. If you have any questions on my thoughts, please do not hesitate to contact me.     Sincerely,        Honey Shaw    D: 11/09/2017 4:05:08       T: 11/09/2017 8:19:55     [id number]  Job#: [id number]     Doc#: [id number]

## 2017-11-13 NOTE — PROGRESS NOTES
Ov Dr. Isa Hooper for follow up  Here today with two dtg  Ruth Reynolds and Kathryn  Here to rev stress test  C/o still having some chest pain  With sob  Worse when walking and/or exercising   C/o dizziness and lightheadedness   Even when just walking to car or house   Prior to open heart had sob  Now about the same    Will add Norvasc 2.5mg one daily   Will proceed with heart cath on Nov 29
with him already being in full medical therapy. He and his daughters wished to proceed with cardiac catheterization to define his anatomy. He understands risks and benefits. I went over also angioplasty and the need to be on dual antiplatelet therapy. They understand and are agreeable to proceed with catheterization. We will schedule and proceed as discussed. Thank you very much for allowing me the privilege of seeing Mr. Jagdish Rosales. If you have any questions on my thoughts, please do not hesitate to contact me.     Sincerely,        Montse Cleveland    D: 11/09/2017 4:05:08       T: 11/09/2017 8:19:55     [id number]  Job#: [id number]     Doc#: [id number]

## 2017-11-22 ENCOUNTER — HOSPITAL ENCOUNTER (OUTPATIENT)
Age: 62
Discharge: HOME | End: 2017-11-22
Payer: MEDICARE

## 2017-11-22 ENCOUNTER — HOSPITAL ENCOUNTER (EMERGENCY)
Age: 62
Discharge: HOME | End: 2017-11-22
Attending: EMERGENCY MEDICINE
Payer: MEDICARE

## 2017-11-22 VITALS
OXYGEN SATURATION: 95 % | DIASTOLIC BLOOD PRESSURE: 59 MMHG | RESPIRATION RATE: 20 BRPM | SYSTOLIC BLOOD PRESSURE: 107 MMHG | BODY MASS INDEX: 38.52 KG/M2 | TEMPERATURE: 98.1 F | HEART RATE: 58 BPM | WEIGHT: 292 LBS

## 2017-11-22 DIAGNOSIS — E78.2 MIXED HYPERLIPIDEMIA: ICD-10-CM

## 2017-11-22 DIAGNOSIS — R94.39 ABNORMAL STRESS TEST: ICD-10-CM

## 2017-11-22 DIAGNOSIS — I10 ESSENTIAL HYPERTENSION: ICD-10-CM

## 2017-11-22 DIAGNOSIS — H81.10 BENIGN PAROXYSMAL POSITIONAL VERTIGO, UNSPECIFIED LATERALITY: Primary | ICD-10-CM

## 2017-11-22 DIAGNOSIS — I25.10 CORONARY ARTERY DISEASE INVOLVING NATIVE CORONARY ARTERY OF NATIVE HEART WITHOUT ANGINA PECTORIS: ICD-10-CM

## 2017-11-22 LAB
ABSOLUTE EOS #: 0.3 K/UL (ref 0–0.4)
ABSOLUTE IMMATURE GRANULOCYTE: ABNORMAL K/UL (ref 0–0.3)
ABSOLUTE LYMPH #: 2.2 K/UL (ref 1–4.8)
ABSOLUTE MONO #: 0.4 K/UL (ref 0–1)
ALBUMIN SERPL-MCNC: 4.2 G/DL (ref 3.5–5.2)
ALBUMIN/GLOBULIN RATIO: ABNORMAL (ref 1–2.5)
ALP BLD-CCNC: 51 U/L (ref 40–129)
ALT SERPL-CCNC: 29 U/L (ref 5–41)
ANION GAP SERPL CALCULATED.3IONS-SCNC: 15 MMOL/L (ref 9–17)
AST SERPL-CCNC: 31 U/L
BASOPHILS # BLD: 0 % (ref 0–2)
BASOPHILS ABSOLUTE: 0 K/UL (ref 0–0.2)
BILIRUB SERPL-MCNC: 0.55 MG/DL (ref 0.3–1.2)
BUN BLDV-MCNC: 13 MG/DL (ref 8–23)
BUN/CREAT BLD: 14 (ref 9–20)
CALCIUM SERPL-MCNC: 9.9 MG/DL (ref 8.6–10.4)
CHLORIDE BLD-SCNC: 102 MMOL/L (ref 98–107)
CHOLESTEROL/HDL RATIO: 4.7
CHOLESTEROL: 174 MG/DL
CO2: 20 MMOL/L (ref 20–31)
CREAT SERPL-MCNC: 0.96 MG/DL (ref 0.7–1.2)
DIFFERENTIAL TYPE: YES
EKG ATRIAL RATE: 59 BPM
EKG ATRIAL RATE: 64 BPM
EKG P AXIS: 56 DEGREES
EKG P AXIS: 60 DEGREES
EKG P-R INTERVAL: 164 MS
EKG P-R INTERVAL: 166 MS
EKG Q-T INTERVAL: 398 MS
EKG Q-T INTERVAL: 414 MS
EKG QRS DURATION: 92 MS
EKG QRS DURATION: 94 MS
EKG QTC CALCULATION (BAZETT): 394 MS
EKG QTC CALCULATION (BAZETT): 427 MS
EKG R AXIS: 44 DEGREES
EKG R AXIS: 51 DEGREES
EKG T AXIS: 82 DEGREES
EKG T AXIS: 83 DEGREES
EKG VENTRICULAR RATE: 59 BPM
EKG VENTRICULAR RATE: 64 BPM
EOSINOPHILS RELATIVE PERCENT: 3 % (ref 0–5)
GFR AFRICAN AMERICAN: >60 ML/MIN
GFR NON-AFRICAN AMERICAN: >60 ML/MIN
GFR SERPL CREATININE-BSD FRML MDRD: ABNORMAL ML/MIN/{1.73_M2}
GFR SERPL CREATININE-BSD FRML MDRD: ABNORMAL ML/MIN/{1.73_M2}
GLUCOSE BLD-MCNC: 115 MG/DL (ref 70–99)
HCT VFR BLD CALC: 53.2 % (ref 41–53)
HDLC SERPL-MCNC: 37 MG/DL
HEMOGLOBIN: 17.5 G/DL (ref 13.5–17.5)
IMMATURE GRANULOCYTES: ABNORMAL %
LDL CHOLESTEROL: 102 MG/DL (ref 0–130)
LYMPHOCYTES # BLD: 23 % (ref 13–44)
MCH RBC QN AUTO: 31.1 PG (ref 26–34)
MCHC RBC AUTO-ENTMCNC: 32.9 G/DL (ref 31–37)
MCV RBC AUTO: 94.8 FL (ref 80–100)
MONOCYTES # BLD: 4 % (ref 5–9)
PATIENT FASTING?: YES
PDW BLD-RTO: 14 % (ref 12.1–15.2)
PLATELET # BLD: 159 K/UL (ref 140–450)
PLATELET ESTIMATE: ABNORMAL
PMV BLD AUTO: ABNORMAL FL (ref 6–12)
POTASSIUM SERPL-SCNC: 4.5 MMOL/L (ref 3.7–5.3)
RBC # BLD: 5.61 M/UL (ref 4.5–5.9)
RBC # BLD: ABNORMAL 10*6/UL
SEG NEUTROPHILS: 70 % (ref 39–75)
SEGMENTED NEUTROPHILS ABSOLUTE COUNT: 6.7 K/UL (ref 2.1–6.5)
SODIUM BLD-SCNC: 137 MMOL/L (ref 135–144)
TOTAL PROTEIN: 7.3 G/DL (ref 6.4–8.3)
TRIGL SERPL-MCNC: 173 MG/DL
VLDLC SERPL CALC-MCNC: ABNORMAL MG/DL (ref 1–30)
WBC # BLD: 9.5 K/UL (ref 3.5–11)
WBC # BLD: ABNORMAL 10*3/UL

## 2017-11-22 PROCEDURE — 93005 ELECTROCARDIOGRAM TRACING: CPT

## 2017-11-22 PROCEDURE — 80053 COMPREHEN METABOLIC PANEL: CPT

## 2017-11-22 PROCEDURE — 36415 COLL VENOUS BLD VENIPUNCTURE: CPT

## 2017-11-22 PROCEDURE — 85025 COMPLETE CBC W/AUTO DIFF WBC: CPT

## 2017-11-22 PROCEDURE — 6370000000 HC RX 637 (ALT 250 FOR IP): Performed by: EMERGENCY MEDICINE

## 2017-11-22 PROCEDURE — 80061 LIPID PANEL: CPT

## 2017-11-22 PROCEDURE — 99284 EMERGENCY DEPT VISIT MOD MDM: CPT

## 2017-11-22 RX ORDER — MECLIZINE HYDROCHLORIDE 25 MG/1
25 TABLET ORAL 3 TIMES DAILY PRN
Qty: 15 TABLET | Refills: 0 | Status: SHIPPED | OUTPATIENT
Start: 2017-11-22 | End: 2017-11-28 | Stop reason: SDUPTHER

## 2017-11-22 RX ORDER — MECLIZINE HCL 12.5 MG/1
50 TABLET ORAL ONCE
Status: COMPLETED | OUTPATIENT
Start: 2017-11-22 | End: 2017-11-22

## 2017-11-22 RX ORDER — MECLIZINE HCL 12.5 MG/1
25 TABLET ORAL 3 TIMES DAILY PRN
Status: DISCONTINUED | OUTPATIENT
Start: 2017-11-22 | End: 2017-11-22 | Stop reason: HOSPADM

## 2017-11-22 RX ADMIN — MECLIZINE 50 MG: 12.5 TABLET ORAL at 19:02

## 2017-11-22 RX ADMIN — MECLIZINE 25 MG: 12.5 TABLET ORAL at 20:12

## 2017-11-22 NOTE — ED PROVIDER NOTES
eMERGENCY dEPARTMENT eNCOUnter        [address] [facility]    Chief Complaint   Patient presents with    Dizziness     x 2 days, worse if laying down \"everything spins\" and nausea, denies CP is in process of pre op for heart cath       HPI    Temo Pena is a 63 y.o. male who presents to ED from home. By car. With complaint of Dizziness, vertigo. Onset 2 days ago. Intensity of symptoms moderate. Patient states that he gets dizzy when he lays down. The dizziness gets better when she sits up. Patient denies chest pain denies shortness of breath denies lightheadedness. The symptoms appear when he lays down. Patient has some nausea no vomiting no ringing in the ears. Patient denies fever denies nasal congestion. Patient is able to walk without assistance. Patient had an EKG earlier today for preop, patient is scheduled for cardiac catheterization. Patient denies chest pain. REVIEW OF SYSTEMS    All systems reviewed and positives are in the HPI. PAST MEDICAL HISTORY    Past Medical History:   Diagnosis Date    CAD (coronary artery disease)     COPD (chronic obstructive pulmonary disease) ([facility] 8/12/2011    Depression 8/12/2011    Diabetes mellitus ([facility]     GERD (gastroesophageal reflux disease) 8/12/2011    Hyperlipidemia 8/12/2011    Hypertension     Hypothyroid 8/12/2011    Nicotine addiction 8/12/2011       SURGICAL HISTORY    Past Surgical History:   Procedure Laterality Date    CARDIAC CATHETERIZATION Left 01/21/15    Severe left main trunk disease extending into the ostium of the left anterior descending of 80% involving the ostium of the intermediate & atrioventricular branch of the circumflex. 60% disease in the atrioventricular branch of the circumflex in the midportion. 40% disease in the right coronary artery.   Normal left ventricular function, ejection fraction of 60%    CORONARY ARTERY BYPASS GRAFT  02/04/15    3 vessel CABG using left internal mammary artery to the left History     Social History    Marital status:      Spouse name: N/A    Number of children: N/A    Years of education: N/A     Social History Main Topics    Smoking status: Former Smoker     Packs/day: 1.00     Years: 45.00     Types: Cigarettes     Quit date: 12/1/2014    Smokeless tobacco: Never Used    Alcohol use No    Drug use: No    Sexual activity: Not Asked     Other Topics Concern    None     Social History Narrative    None       PHYSICAL EXAM    VITAL SIGNS: BP (!) 107/59   Pulse 58   Temp 98.1 °F (36.7 °C) (Oral)   Resp 20 Comment: audible wheeze, states he has COPD  Wt 292 lb (132.5 kg)   SpO2 95%   BMI 38.52 kg/m²    Constitutional:  Well developed, well nourished, no acute distress   HENT:  Atraumatic, external ears normal, nose normal, oropharynx moist. Neck- supple   Respiratory:  Lungs are clear to auscultation bilaterally Cardiovascular:  Heart is with regular rhythm   GI:  Soft, nondistended, normal bowel sounds, nontender, no organomegaly, no mass, no rebound, no guarding   Musculoskeletal:  No edema, no tenderness, no deformities. Back- no tenderness   Integument:  Well hydrated, no rash   Neurologic:  Alert & oriented x 3, no focal deficits noted no nystagmus. EKG    Sinus rhythm with some ST changes    RADIOLOGY/PROCEDURES    . wet    Labs  Labs Reviewed - No data to display        Summation      Patient Course: Patient was observed in ED. Patient is given meclizine 50 mg by mouth ×1. The symptoms improved. Patient is sent home with meclizine. The warning signs were discussed. The symptoms appear consistent with benign positional vertigo. For any of the warning signs and return to ED.     ED Medications administered this visit:    Medications   meclizine (ANTIVERT) tablet 50 mg (50 mg Oral Given 11/22/17 1902)       New Prescriptions from this visit:    Discharge Medication List as of 11/22/2017  8:13 PM          Follow-up:  [facility] ED  [address] [facility] [address] [place] [address]  [phone]    As needed, If symptoms worsen        Final Impression:   1.  Benign paroxysmal positional vertigo, unspecified laterality               (Please note that portions of this note were completed with a voice recognition program.  Efforts were made to edit the dictations but occasionally words are mis-transcribed.)        Ana Robins MD  11/23/17 0141

## 2017-11-27 ENCOUNTER — TELEPHONE (OUTPATIENT)
Dept: CARDIOLOGY CLINIC | Age: 62
End: 2017-11-27

## 2017-11-27 NOTE — TELEPHONE ENCOUNTER
Patient is on schedule for cath 11/29 and was recently seen in [facility] ED for Vertigo concerned that he should not have the procedure now. Please call and address his concern.

## 2017-11-28 ENCOUNTER — OFFICE VISIT (OUTPATIENT)
Dept: FAMILY MEDICINE CLINIC | Age: 62
End: 2017-11-28
Payer: COMMERCIAL

## 2017-11-28 VITALS
DIASTOLIC BLOOD PRESSURE: 80 MMHG | RESPIRATION RATE: 18 BRPM | SYSTOLIC BLOOD PRESSURE: 130 MMHG | HEART RATE: 68 BPM | WEIGHT: 300 LBS | BODY MASS INDEX: 39.76 KG/M2 | HEIGHT: 73 IN

## 2017-11-28 DIAGNOSIS — I10 ESSENTIAL HYPERTENSION: ICD-10-CM

## 2017-11-28 DIAGNOSIS — R42 DIZZINESS: Primary | ICD-10-CM

## 2017-11-28 DIAGNOSIS — I25.10 CORONARY ARTERY DISEASE INVOLVING NATIVE CORONARY ARTERY OF NATIVE HEART WITHOUT ANGINA PECTORIS: ICD-10-CM

## 2017-11-28 PROBLEM — R73.03 PRE-DIABETES: Status: RESOLVED | Noted: 2017-07-12 | Resolved: 2017-11-28

## 2017-11-28 PROCEDURE — 99213 OFFICE O/P EST LOW 20 MIN: CPT | Performed by: INTERNAL MEDICINE

## 2017-11-28 RX ORDER — MECLIZINE HYDROCHLORIDE 25 MG/1
25 TABLET ORAL 3 TIMES DAILY PRN
Qty: 15 TABLET | Refills: 0 | Status: SHIPPED | OUTPATIENT
Start: 2017-11-28 | End: 2017-11-30 | Stop reason: SDUPTHER

## 2017-11-28 ASSESSMENT — ENCOUNTER SYMPTOMS
BLURRED VISION: 0
ABDOMINAL PAIN: 0
SORE THROAT: 0
VOMITING: 0
NAUSEA: 1
HEARTBURN: 0
WHEEZING: 0
COUGH: 0
VISUAL CHANGE: 1
SHORTNESS OF BREATH: 1

## 2017-11-28 NOTE — PROGRESS NOTES
tablet Take 1 tablet by mouth daily  Patient taking differently: Take 12.5 mg by mouth daily  8/22/17  Yes Isa Rascon MD   metoprolol succinate (TOPROL XL) 50 MG extended release tablet Take 1 tablet by mouth daily 8/22/17  Yes Isa Rascon MD   allopurinol (ZYLOPRIM) 300 MG tablet Take 1 tablet by mouth daily 7/12/17  Yes Isa Rascon MD   lovastatin (MEVACOR) 40 MG tablet Take 1 tablet by mouth daily 7/12/17  Yes Isa Rascon MD   levothyroxine (SYNTHROID) 175 MCG tablet TAKE 1 TABLET BY MOUTH DAILY 7/12/17  Yes Isa Rascon MD   PARoxetine (PAXIL) 20 MG tablet TAKE 1 TABLET BY MOUTH 2 TIMES DAILY 7/12/17  Yes Isa Rascon MD   mometasone (ELOCON) 0.1 % cream Apply topically daily. 2/20/17  Yes Isa Rascon MD   albuterol (PROVENTIL) (2.5 MG/3ML) 0.083% nebulizer solution Take 3 mLs by nebulization 4 times daily 12/9/16  Yes Isa Rascon MD   aspirin 81 MG tablet Take 81 mg by mouth daily. Yes Historical Provider, MD   fluticasone-vilanterol (BREO ELLIPTA) 100-25 MCG/INH AEPB inhaler Inhale 2 puffs into the lungs daily for 28 days  Patient taking differently: Inhale 2 puffs into the lungs daily Still uses 2 puffs daily 2/20/17 3/20/17  Isa Rascon MD        Allergies:       Review of patient's allergies indicates no known allergies. Social History:     Tobacco:    reports that he quit smoking about 2 years ago. His smoking use included Cigarettes. He has a 45.00 pack-year smoking history. He has never used smokeless tobacco.  Alcohol:      reports that he does not drink alcohol. Drug Use:  reports that he does not use drugs. Family History:     Family History   Problem Relation Age of Onset    Diabetes Mother     Heart Disease Mother     Diabetes Father     Heart Disease Father        Review of Systems:         Review of Systems   Constitutional: Positive for fatigue. Negative for chills, diaphoresis and fever.    HENT: Negative for congestion and sore throat. Eyes: Negative for blurred vision. Respiratory: Positive for shortness of breath. Negative for cough and wheezing. Cardiovascular: Positive for chest pain and leg swelling. Negative for palpitations. Gastrointestinal: Positive for nausea. Negative for abdominal pain, anorexia, heartburn and vomiting. Genitourinary: Negative for dysuria. Skin: Negative for rash. Neurological: Positive for dizziness, vertigo, weakness and headaches. Psychiatric/Behavioral: Negative for depression. The patient is not nervous/anxious. Physical Exam:     Vitals:  /80 (Site: Left Arm, Position: Sitting, Cuff Size: Large Adult)   Pulse 68   Resp 18   Ht 6' 1\" (1.854 m)   Wt 300 lb (136.1 kg)   BMI 39.58 kg/m²       Physical Exam   Constitutional: He is oriented to person, place, and time. He appears well-developed and well-nourished. No distress. HENT:   Head: Normocephalic and atraumatic. Neck: No thyromegaly present. Cardiovascular: Normal rate, regular rhythm and normal heart sounds. No murmur heard. Pulmonary/Chest: Effort normal and breath sounds normal. He has no wheezes. He has no rales. Abdominal: Soft. Bowel sounds are normal. He exhibits no distension and no mass. There is no tenderness. Musculoskeletal: Normal range of motion. Lymphadenopathy:     He has no cervical adenopathy. Neurological: He is alert and oriented to person, place, and time. Skin: Skin is warm and dry. No rash noted. Psychiatric: He has a normal mood and affect. His behavior is normal. Judgment normal.   Vitals reviewed.             Data:     Lab Results   Component Value Date     11/22/2017    K 4.5 11/22/2017     11/22/2017    CO2 20 11/22/2017    BUN 13 11/22/2017    CREATININE 0.96 11/22/2017    GLUCOSE 115 11/22/2017    GLUCOSE 111 10/14/2011    PROT 7.3 11/22/2017    LABALBU 4.2 11/22/2017    BILITOT 0.55 11/22/2017    ALKPHOS 51 11/22/2017    AST 31 11/22/2017    ALT 29 11/22/2017     Lab Results   Component Value Date    WBC 9.5 11/22/2017    RBC 5.61 11/22/2017    HGB 17.5 11/22/2017    HCT 53.2 11/22/2017    MCV 94.8 11/22/2017    MCH 31.1 11/22/2017    MCHC 32.9 11/22/2017    RDW 14.0 11/22/2017     11/22/2017    MPV NOT REPORTED 11/22/2017     Lab Results   Component Value Date    TSH 4.22 10/20/2017     Lab Results   Component Value Date    CHOL 174 11/22/2017    HDL 37 11/22/2017    LABA1C 5.4 10/20/2017          Assessment & Plan       1. Dizziness  Possibly due to benign vertigo. Concerned about cardiac ethiology as well. Will discuss with Dr. Naya Rosales. May need neurological evaluation if symptoms persist.  Continue prn Antivert for now meclizine (ANTIVERT) 25 MG tablet   2. Essential hypertension   BP controlled, continue on current meds    3. Coronary artery disease involving native coronary artery of native heart without angina pectoris   Follow up with Dr. Naya Rosales. Anticipating cardiac cath                    Completed Refills   Requested Prescriptions     Signed Prescriptions Disp Refills    meclizine (ANTIVERT) 25 MG tablet 15 tablet 0     Sig: Take 1 tablet by mouth 3 times daily as needed for Dizziness     Return in about 6 months (around 5/28/2018) for HTN. Orders Placed This Encounter   Medications    meclizine (ANTIVERT) 25 MG tablet     Sig: Take 1 tablet by mouth 3 times daily as needed for Dizziness     Dispense:  15 tablet     Refill:  0     No orders of the defined types were placed in this encounter.       Electronically signed by Bc Bobby MD on 11/28/2017 at 3:13 PM            Completed Refills   Requested Prescriptions     Signed Prescriptions Disp Refills    meclizine (ANTIVERT) 25 MG tablet 15 tablet 0     Sig: Take 1 tablet by mouth 3 times daily as needed for Dizziness           Justice received counseling on the following healthy behaviors: nutrition  Reviewed prior labs and health maintenance  Continue current medications, diet and exercise. Discussed use, benefit, and side effects of prescribed medications. Barriers to medication compliance addressed. Patient given educational materials - see patient instructions  Was a self-tracking handout given in paper form or via [employer]? No:    Requested Prescriptions     Signed Prescriptions Disp Refills    meclizine (ANTIVERT) 25 MG tablet 15 tablet 0     Sig: Take 1 tablet by mouth 3 times daily as needed for Dizziness       All patient questions answered. Patient voiced understanding. Quality Measures    Body mass index is 39.58 kg/m². Elevated. Weight control planned discussed Healthy diet and regular exercise. BP: 130/80 Blood pressure is normal. Treatment plan consists of DASH Eating Plan and No treatment change needed.     Lab Results   Component Value Date    LDLCHOLESTEROL 102 11/22/2017    (goal LDL reduction with dx if diabetes is 50% LDL reduction)      PHQ Scores 7/12/2017   PHQ2 Score 1   PHQ9 Score 1     Interpretation of Total Score Depression Severity: 1-4 = Minimal depression, 5-9 = Mild depression, 10-14 = Moderate depression, 15-19 = Moderately severe depression, 20-27 = Severe depression

## 2017-11-29 ENCOUNTER — HOSPITAL ENCOUNTER (OUTPATIENT)
Dept: CT IMAGING | Age: 62
Discharge: HOME | End: 2017-11-29
Payer: MEDICARE

## 2017-11-29 ENCOUNTER — TELEPHONE (OUTPATIENT)
Dept: CARDIOLOGY CLINIC | Age: 62
End: 2017-11-29

## 2017-11-29 DIAGNOSIS — H53.9 VISUAL CHANGES: ICD-10-CM

## 2017-11-29 DIAGNOSIS — R42 DIZZINESS: ICD-10-CM

## 2017-11-29 DIAGNOSIS — R53.1 WEAKNESS: ICD-10-CM

## 2017-11-29 DIAGNOSIS — R42 DIZZINESS: Primary | ICD-10-CM

## 2017-11-29 PROCEDURE — 70470 CT HEAD/BRAIN W/O & W/DYE: CPT

## 2017-11-29 PROCEDURE — 6360000004 HC RX CONTRAST MEDICATION: Performed by: INTERNAL MEDICINE

## 2017-11-29 RX ADMIN — IOVERSOL 50 ML: 678 INJECTION INTRA-ARTERIAL; INTRAVENOUS at 14:56

## 2017-11-30 ENCOUNTER — OFFICE VISIT (OUTPATIENT)
Dept: CARDIOLOGY CLINIC | Age: 62
End: 2017-11-30
Payer: COMMERCIAL

## 2017-11-30 VITALS
OXYGEN SATURATION: 94 % | SYSTOLIC BLOOD PRESSURE: 130 MMHG | WEIGHT: 296 LBS | DIASTOLIC BLOOD PRESSURE: 80 MMHG | BODY MASS INDEX: 39.05 KG/M2 | HEART RATE: 76 BPM

## 2017-11-30 DIAGNOSIS — R42 DIZZINESS: ICD-10-CM

## 2017-11-30 PROCEDURE — 99213 OFFICE O/P EST LOW 20 MIN: CPT | Performed by: INTERNAL MEDICINE

## 2017-11-30 RX ORDER — MECLIZINE HYDROCHLORIDE 25 MG/1
25 TABLET ORAL 4 TIMES DAILY
Qty: 40 TABLET | Refills: 0 | Status: SHIPPED | OUTPATIENT
Start: 2017-11-30 | End: 2017-12-10

## 2017-12-05 NOTE — PROGRESS NOTES
Isak Mauricio M.D. [address] [place] [address]  [phone]          2017          Angel Vega MD  [facility], [address] [place] [address]    RE:  Uma Cardoza  : 1955    Dear Dr. Gurwinder Guillen:    CHIEF COMPLAINT:  Dizziness. HISTORY OF PRESENT ILLNESS:  Mr. Tati Berg is a pleasant 44-year-old gentleman who has chest pain consistent with class III angina. He had an abnormal Cardiolite stress test with a defect in the inferior region and a catheterization is pending. He was on the schedule to do a catheterization on ; however, he came to the emergency room on  with severe dizziness. It was felt that he possibly had benign paroxysmal positional vertigo. He saw Dr. Gurwinder Guillen on  and dizziness has continued. He has been on Antivert 25 mg t.i.d.  I was asked to see him in followup. His dizziness is fairly typical of BPPV. He has it mainly when he first lays down on his back. If he turns to either side, his dizziness will subside. He has no dizziness when standing or sitting. He denies any syncope or near syncope. I did do a CT scan yesterday of his head, which was negative for any acute event. I wanted to do an MRI, but he was unable to do the MRI because of claustrophobia. When I see him today, he is doing well. He denies chest pain. He has had no lightheadedness or dizziness unless he lays down on his back. MEDICATIONS:  His medications are albuterol p.r.n., Zyloprim 300 mg daily, Norvasc 2.5 mg daily, aspirin 81 mg daily, TriCor 145 mg daily, Breo Ellipta 2 puffs daily, Synthroid 175 mcg daily, Cozaar 25 mg half a tablet daily, Mevacor 40 mg daily, Antivert 25 mg t.i.d., Toprol-XL 50 mg daily, Paxil 20 mg b.i.d. PHYSICAL EXAMINATION:  VITAL SIGNS:  His blood pressure was 130/80 with no orthostatic changes. His heart rate was 76. O2 saturation was 94%.   Weight 296 pounds. HEENT:  The pupils are equally round and reactive to light and accommodation. Extraocular movements were intact. Mucous membranes were dry. NECK:  No JVD. Good carotid pulses. No carotid bruits. No lymphadenopathy or thyromegaly. CARDIOVASCULAR EXAM:  S1 and S2 were normal.  No S3 or S4. Soft systolic blowing type murmur. No diastolic murmur. PMI was normal.  No lifts, thrusts or pericardial friction rub. LUNGS:  Quite clear to auscultation and percussion. ABDOMEN:  Soft and nontender. Good bowel sounds. EXTREMITIES:  Good femoral pulses. Good pedal pulses. Mild pedal edema. Skin was warm and dry. No calf tenderness. Nail beds pink. Good cap refill. I did the Darren-Hallpike maneuver. He had mild lightheadedness when I laid him back down, but no nystagmus was elicited and no severe dizziness. Of note, he had taken an Antivert possibly an hour before I saw him today. He did have some dizziness when I laid him supine. IMPRESSION:  Benign paroxysmal positional vertigo, fairly typical with him developing his dizziness when he first lays down with marked vertigo, which will resolve if he turns on either side. PLAN:  1.  I had raised his Antivert to 4 times a day with 1 at breakfast, lunch and dinner and 2 an hour before he goes to bed. 2.  Instructed them on the Epley maneuver to do at home. 3.  We will plan on a cardiac catheterization on 12/20, when hopefully his dizziness will have resolved. 4.  If he would develop any weakness of either arm or had progression of his dizziness, then would have Neurology see him prior to the catheterization. DISCUSSION:  Mr. Emmanuel Garvey has fairly classic BPPV. We did elicit some of his dizziness with a Darren-Hallpike maneuver, although it was not severe and I think part of the reason was because he took Antivert an hour before I saw him today. I do not see any acute neurologic event.   There is no weakness of either arm or either leg and a CT scan was negative for any abnormalities. I instructed them on the Epley maneuver to be done at home. His daughters were with him today and they will ensure that he has done this. I have increased his Antivert to 2 before bedtime to help with his dizziness when he lies flat. I do want his dizziness to resolve before I do the catheterization to better monitor for any neurologic event during the cardiac catheterization. His angina appears stable at this point on full medical therapy. He is limiting his activity until we can do the heart cath. He is to call me if he has any worsening dizziness or any other neurologic symptoms. Thank you very much for allowing me the privilege of seeing Mr. Fly Husain. Any questions on my thoughts, please do not hesitate to contact me.     Sincerely,        Cecil Elizabeth    D: 12/01/2017 4:41:37       T: 12/02/2017 1:14:31     [email or web address]  Job#: [id number]     Doc#: [id number]

## 2017-12-21 ENCOUNTER — TELEPHONE (OUTPATIENT)
Dept: CARDIOLOGY CLINIC | Age: 62
End: 2017-12-21

## 2017-12-21 DIAGNOSIS — I20.9 ANGINA, CLASS III (HCC): Primary | ICD-10-CM

## 2018-01-03 DIAGNOSIS — E03.9 ACQUIRED HYPOTHYROIDISM: ICD-10-CM

## 2018-01-03 RX ORDER — LEVOTHYROXINE SODIUM 175 UG/1
TABLET ORAL
Qty: 90 TABLET | Refills: 1 | Status: SHIPPED | OUTPATIENT
Start: 2018-01-03 | End: 2018-01-09 | Stop reason: SDUPTHER

## 2018-01-09 DIAGNOSIS — M10.9 ACUTE GOUT OF LEFT FOOT, UNSPECIFIED CAUSE: ICD-10-CM

## 2018-01-09 DIAGNOSIS — E03.9 ACQUIRED HYPOTHYROIDISM: ICD-10-CM

## 2018-01-09 DIAGNOSIS — E78.2 MIXED HYPERLIPIDEMIA: ICD-10-CM

## 2018-01-09 DIAGNOSIS — F34.1 DYSTHYMIA (OR DEPRESSIVE NEUROSIS): ICD-10-CM

## 2018-01-09 RX ORDER — PAROXETINE HYDROCHLORIDE 20 MG/1
TABLET, FILM COATED ORAL
Qty: 180 TABLET | Refills: 1 | Status: SHIPPED | OUTPATIENT
Start: 2018-01-09 | End: 2018-01-12 | Stop reason: SDUPTHER

## 2018-01-09 RX ORDER — LEVOTHYROXINE SODIUM 175 UG/1
TABLET ORAL
Qty: 90 TABLET | Refills: 1 | Status: SHIPPED | OUTPATIENT
Start: 2018-01-09 | End: 2018-01-12 | Stop reason: SDUPTHER

## 2018-01-09 RX ORDER — ALLOPURINOL 300 MG/1
300 TABLET ORAL DAILY
Qty: 90 TABLET | Refills: 1 | Status: SHIPPED | OUTPATIENT
Start: 2018-01-09 | End: 2018-01-12 | Stop reason: SDUPTHER

## 2018-01-09 RX ORDER — LOVASTATIN 40 MG/1
40 TABLET ORAL DAILY
Qty: 90 TABLET | Refills: 1 | Status: SHIPPED | OUTPATIENT
Start: 2018-01-09 | End: 2018-01-12 | Stop reason: SDUPTHER

## 2018-01-12 ENCOUNTER — OFFICE VISIT (OUTPATIENT)
Dept: FAMILY MEDICINE CLINIC | Age: 63
End: 2018-01-12
Payer: COMMERCIAL

## 2018-01-12 VITALS
BODY MASS INDEX: 40.29 KG/M2 | OXYGEN SATURATION: 93 % | WEIGHT: 304 LBS | SYSTOLIC BLOOD PRESSURE: 120 MMHG | TEMPERATURE: 96.5 F | RESPIRATION RATE: 20 BRPM | HEIGHT: 73 IN | DIASTOLIC BLOOD PRESSURE: 76 MMHG | HEART RATE: 83 BPM

## 2018-01-12 DIAGNOSIS — F34.1 DYSTHYMIA (OR DEPRESSIVE NEUROSIS): ICD-10-CM

## 2018-01-12 DIAGNOSIS — M10.9 ACUTE GOUT OF LEFT FOOT, UNSPECIFIED CAUSE: ICD-10-CM

## 2018-01-12 DIAGNOSIS — E78.2 MIXED HYPERLIPIDEMIA: ICD-10-CM

## 2018-01-12 DIAGNOSIS — I10 ESSENTIAL HYPERTENSION: ICD-10-CM

## 2018-01-12 DIAGNOSIS — J44.1 COPD EXACERBATION (HCC): ICD-10-CM

## 2018-01-12 DIAGNOSIS — J20.9 ACUTE BRONCHITIS, UNSPECIFIED ORGANISM: Primary | ICD-10-CM

## 2018-01-12 DIAGNOSIS — E03.9 ACQUIRED HYPOTHYROIDISM: ICD-10-CM

## 2018-01-12 PROCEDURE — 99213 OFFICE O/P EST LOW 20 MIN: CPT | Performed by: INTERNAL MEDICINE

## 2018-01-12 RX ORDER — LOSARTAN POTASSIUM 25 MG/1
25 TABLET ORAL DAILY
Qty: 90 TABLET | Refills: 1 | Status: SHIPPED | OUTPATIENT
Start: 2018-01-12 | End: 2018-05-29 | Stop reason: SDUPTHER

## 2018-01-12 RX ORDER — PREDNISONE 20 MG/1
TABLET ORAL
Qty: 18 TABLET | Refills: 0 | Status: SHIPPED | OUTPATIENT
Start: 2018-01-12 | End: 2018-02-26 | Stop reason: CLARIF

## 2018-01-12 RX ORDER — LEVOTHYROXINE SODIUM 175 UG/1
TABLET ORAL
Qty: 90 TABLET | Refills: 1 | Status: SHIPPED | OUTPATIENT
Start: 2018-01-12 | End: 2018-05-29 | Stop reason: SDUPTHER

## 2018-01-12 RX ORDER — LOVASTATIN 40 MG/1
40 TABLET ORAL DAILY
Qty: 90 TABLET | Refills: 1 | Status: SHIPPED | OUTPATIENT
Start: 2018-01-12 | End: 2018-05-29 | Stop reason: SDUPTHER

## 2018-01-12 RX ORDER — ALLOPURINOL 300 MG/1
300 TABLET ORAL DAILY
Qty: 90 TABLET | Refills: 1 | Status: SHIPPED | OUTPATIENT
Start: 2018-01-12 | End: 2018-05-29 | Stop reason: SDUPTHER

## 2018-01-12 RX ORDER — PAROXETINE HYDROCHLORIDE 20 MG/1
TABLET, FILM COATED ORAL
Qty: 180 TABLET | Refills: 1 | Status: SHIPPED | OUTPATIENT
Start: 2018-01-12 | End: 2018-05-29 | Stop reason: SDUPTHER

## 2018-01-12 RX ORDER — LEVOFLOXACIN 500 MG/1
500 TABLET, FILM COATED ORAL DAILY
Qty: 5 TABLET | Refills: 0 | Status: SHIPPED | OUTPATIENT
Start: 2018-01-12 | End: 2018-01-17

## 2018-01-12 RX ORDER — METOPROLOL SUCCINATE 50 MG/1
50 TABLET, EXTENDED RELEASE ORAL DAILY
Qty: 90 TABLET | Refills: 1 | Status: SHIPPED | OUTPATIENT
Start: 2018-01-12 | End: 2018-05-29 | Stop reason: SDUPTHER

## 2018-01-12 RX ORDER — AMLODIPINE BESYLATE 2.5 MG/1
2.5 TABLET ORAL DAILY
Qty: 90 TABLET | Refills: 1 | Status: SHIPPED | OUTPATIENT
Start: 2018-01-12 | End: 2018-05-29 | Stop reason: SDUPTHER

## 2018-01-12 ASSESSMENT — ENCOUNTER SYMPTOMS
WHEEZING: 1
BLURRED VISION: 0
HEARTBURN: 0
NAUSEA: 0
COUGH: 1
SORE THROAT: 0
SHORTNESS OF BREATH: 1
ABDOMINAL PAIN: 0
RHINORRHEA: 1
HEMOPTYSIS: 0

## 2018-01-12 NOTE — PROGRESS NOTES
levofloxacin (LEVAQUIN) 500 MG tablet 5 tablet 0     Sig: Take 1 tablet by mouth daily for 5 days    PARoxetine (PAXIL) 20 MG tablet 180 tablet 1     Sig: TAKE 1 TABLET BY MOUTH 2 TIMES DAILY     No Follow-up on file. Orders Placed This Encounter   Medications    levothyroxine (SYNTHROID) 175 MCG tablet     Sig: TAKE 1 TABLET BY MOUTH DAILY     Dispense:  90 tablet     Refill:  1    allopurinol (ZYLOPRIM) 300 MG tablet     Sig: Take 1 tablet by mouth daily     Dispense:  90 tablet     Refill:  1    lovastatin (MEVACOR) 40 MG tablet     Sig: Take 1 tablet by mouth daily     Dispense:  90 tablet     Refill:  1    amLODIPine (NORVASC) 2.5 MG tablet     Sig: Take 1 tablet by mouth daily     Dispense:  90 tablet     Refill:  1    losartan (COZAAR) 25 MG tablet     Sig: Take 1 tablet by mouth daily     Dispense:  90 tablet     Refill:  1    metoprolol succinate (TOPROL XL) 50 MG extended release tablet     Sig: Take 1 tablet by mouth daily     Dispense:  90 tablet     Refill:  1    predniSONE (DELTASONE) 20 MG tablet     Sig: 3 tabs po daily x 3 days, 2 tabs x 3 days, 1 tab x 3 days     Dispense:  18 tablet     Refill:  0    levofloxacin (LEVAQUIN) 500 MG tablet     Sig: Take 1 tablet by mouth daily for 5 days     Dispense:  5 tablet     Refill:  0    PARoxetine (PAXIL) 20 MG tablet     Sig: TAKE 1 TABLET BY MOUTH 2 TIMES DAILY     Dispense:  180 tablet     Refill:  1     No orders of the defined types were placed in this encounter. There are no Patient Instructions on file for this visit.     Electronically signed by Angel Mercado MD on 1/12/2018 at 2:20 PM           Completed Refills   Requested Prescriptions     Signed Prescriptions Disp Refills    levothyroxine (SYNTHROID) 175 MCG tablet 90 tablet 1     Sig: TAKE 1 TABLET BY MOUTH DAILY    allopurinol (ZYLOPRIM) 300 MG tablet 90 tablet 1     Sig: Take 1 tablet by mouth daily    lovastatin (MEVACOR) 40 MG tablet 90 tablet 1     Sig: Take 1 tablet by mouth daily    amLODIPine (NORVASC) 2.5 MG tablet 90 tablet 1     Sig: Take 1 tablet by mouth daily    losartan (COZAAR) 25 MG tablet 90 tablet 1     Sig: Take 1 tablet by mouth daily    metoprolol succinate (TOPROL XL) 50 MG extended release tablet 90 tablet 1     Sig: Take 1 tablet by mouth daily    predniSONE (DELTASONE) 20 MG tablet 18 tablet 0     Sig: 3 tabs po daily x 3 days, 2 tabs x 3 days, 1 tab x 3 days    levofloxacin (LEVAQUIN) 500 MG tablet 5 tablet 0     Sig: Take 1 tablet by mouth daily for 5 days    PARoxetine (PAXIL) 20 MG tablet 180 tablet 1     Sig: TAKE 1 TABLET BY MOUTH 2 TIMES DAILY

## 2018-01-16 ENCOUNTER — HOSPITAL ENCOUNTER (OUTPATIENT)
Dept: CARDIAC REHAB | Age: 63
Setting detail: THERAPIES SERIES
Discharge: HOME | End: 2018-01-16
Payer: MEDICARE

## 2018-01-16 VITALS
OXYGEN SATURATION: 96 % | BODY MASS INDEX: 39.99 KG/M2 | SYSTOLIC BLOOD PRESSURE: 112 MMHG | WEIGHT: 301.7 LBS | HEIGHT: 73 IN | DIASTOLIC BLOOD PRESSURE: 60 MMHG

## 2018-01-16 NOTE — PROGRESS NOTES
THYROID LOBECTOMY         Family History  Family History   Problem Relation Age of Onset    Diabetes Mother     Heart Disease Mother     Diabetes Father     Heart Disease Father     Heart Disease Brother          Symptoms:  1. Angina   [] None   [x] Tightness   [x] Shortness of Breath   [] Pressure    [] Nausea   [] Sharp, Stabbing  [] Pallor   [] Indigestion, Heartburn [] Sweaty    Where was discomfort located? Middle of chest  Precipitating Factors? Cold weather  Relieved by: get out of cold, rest, inhalers    2. Arrhythmia   [x] None   [] Irregular Beats (skips) [] Pacer    [] Atrial Fibrillation  [] AICD    On any Medications? TOPROL XL 50 MG, ONCE DAILY      3. Congestive Heart Failure   [x] None   [] Pedal Edema  [] Unusual weight gain   [] SOB with mild exertion [] Fatigue    4. Vascular   [x] None   [] Carotid Narrowing  [] R [] L   [] Peripheral claudication [] R [] L    5. Musculoskeletal    [] None   [x] Back Pain  Where? lumbar   [] Joint discomfort Where? Socio-Economic    Marital Status:     Nutrition    Appetite:  [x]  Too Good    []  Good  []  Poor    Diet: \"MOST OF THE TIME\" EATING WELL  Eating out 0 times/wk  Alcohol Consumption: [] Yes [x] No   Type:  Frequency:    Caffeine: [x] Yes [] No  Type: TEA  Amount: 3 CUPS PER DAY    Water intake per day: 8-10 BOTTLES  Vitamins/Natural herbal products: NO    Psychological    [] Depression  [] Tearful  [] Fearful  [x] Cheerful  [] Anxious  [x] Motivated  [] Overwhelmed    Treatment: PAXIL 20 MG QD    Diabetes    [x] Yes  [] No  How lon-6 YR  Latest BS: 104  Frequency of Checks: 3 TIMES DAILY  Medication: \"NONE NOW--DIET CONTROLLED\"    Stress    Source: ILLNESS  Relaxation techniques: WORK ON CARS  Hobbies: SAME AS ABOVE    Level of Education    [] 8th Grade  [] Associates  [] Masters  [] High School [] Bachelor  [x]  Other: TO GRADE 11    Depression Screening:  [] Have you been feeling sad. ..down in the dumps?   [] Have you lost

## 2018-01-17 ENCOUNTER — HOSPITAL ENCOUNTER (OUTPATIENT)
Dept: CARDIAC REHAB | Age: 63
Setting detail: THERAPIES SERIES
Discharge: HOME | End: 2018-01-17
Payer: MEDICARE

## 2018-01-17 PROCEDURE — 93798 PHYS/QHP OP CAR RHAB W/ECG: CPT

## 2018-01-19 ENCOUNTER — HOSPITAL ENCOUNTER (OUTPATIENT)
Dept: CARDIAC REHAB | Age: 63
Setting detail: THERAPIES SERIES
Discharge: HOME | End: 2018-01-19
Payer: MEDICARE

## 2018-01-19 PROCEDURE — 93798 PHYS/QHP OP CAR RHAB W/ECG: CPT

## 2018-01-22 ENCOUNTER — HOSPITAL ENCOUNTER (OUTPATIENT)
Dept: CARDIAC REHAB | Age: 63
Setting detail: THERAPIES SERIES
Discharge: HOME | End: 2018-01-22
Payer: MEDICARE

## 2018-01-22 PROCEDURE — 93798 PHYS/QHP OP CAR RHAB W/ECG: CPT

## 2018-01-24 ENCOUNTER — HOSPITAL ENCOUNTER (OUTPATIENT)
Dept: CARDIAC REHAB | Age: 63
Setting detail: THERAPIES SERIES
Discharge: HOME | End: 2018-01-24
Payer: MEDICARE

## 2018-01-24 PROCEDURE — 93798 PHYS/QHP OP CAR RHAB W/ECG: CPT

## 2018-01-26 ENCOUNTER — HOSPITAL ENCOUNTER (OUTPATIENT)
Dept: CARDIAC REHAB | Age: 63
Setting detail: THERAPIES SERIES
Discharge: HOME | End: 2018-01-26
Payer: MEDICARE

## 2018-01-26 PROCEDURE — 93798 PHYS/QHP OP CAR RHAB W/ECG: CPT

## 2018-01-29 ENCOUNTER — HOSPITAL ENCOUNTER (OUTPATIENT)
Dept: CARDIAC REHAB | Age: 63
Setting detail: THERAPIES SERIES
Discharge: HOME | End: 2018-01-29
Payer: MEDICARE

## 2018-01-29 PROCEDURE — 93798 PHYS/QHP OP CAR RHAB W/ECG: CPT

## 2018-01-31 ENCOUNTER — HOSPITAL ENCOUNTER (OUTPATIENT)
Dept: CARDIAC REHAB | Age: 63
Setting detail: THERAPIES SERIES
Discharge: HOME | End: 2018-01-31
Payer: MEDICARE

## 2018-01-31 PROCEDURE — 93798 PHYS/QHP OP CAR RHAB W/ECG: CPT

## 2018-02-02 ENCOUNTER — HOSPITAL ENCOUNTER (OUTPATIENT)
Dept: CARDIAC REHAB | Age: 63
Setting detail: THERAPIES SERIES
Discharge: HOME | End: 2018-02-02
Payer: MEDICARE

## 2018-02-02 PROCEDURE — 93798 PHYS/QHP OP CAR RHAB W/ECG: CPT

## 2018-02-05 ENCOUNTER — HOSPITAL ENCOUNTER (OUTPATIENT)
Dept: CARDIAC REHAB | Age: 63
Setting detail: THERAPIES SERIES
Discharge: HOME | End: 2018-02-05
Payer: MEDICARE

## 2018-02-05 PROCEDURE — 93798 PHYS/QHP OP CAR RHAB W/ECG: CPT

## 2018-02-07 ENCOUNTER — HOSPITAL ENCOUNTER (OUTPATIENT)
Dept: CARDIAC REHAB | Age: 63
Setting detail: THERAPIES SERIES
Discharge: HOME | End: 2018-02-07
Payer: MEDICARE

## 2018-02-07 PROCEDURE — 93798 PHYS/QHP OP CAR RHAB W/ECG: CPT

## 2018-02-08 ENCOUNTER — HOSPITAL ENCOUNTER (OUTPATIENT)
Age: 63
Discharge: HOME | End: 2018-02-10
Payer: MEDICARE

## 2018-02-08 ENCOUNTER — OFFICE VISIT (OUTPATIENT)
Dept: CARDIOLOGY CLINIC | Age: 63
End: 2018-02-08
Payer: COMMERCIAL

## 2018-02-08 ENCOUNTER — HOSPITAL ENCOUNTER (OUTPATIENT)
Dept: GENERAL RADIOLOGY | Age: 63
Discharge: HOME | End: 2018-02-10
Payer: MEDICARE

## 2018-02-08 VITALS
OXYGEN SATURATION: 96 % | BODY MASS INDEX: 40.37 KG/M2 | HEART RATE: 86 BPM | SYSTOLIC BLOOD PRESSURE: 120 MMHG | WEIGHT: 306 LBS | DIASTOLIC BLOOD PRESSURE: 80 MMHG

## 2018-02-08 DIAGNOSIS — R06.02 SOB (SHORTNESS OF BREATH): Primary | ICD-10-CM

## 2018-02-08 DIAGNOSIS — R06.02 SOB (SHORTNESS OF BREATH): ICD-10-CM

## 2018-02-08 PROCEDURE — 71046 X-RAY EXAM CHEST 2 VIEWS: CPT

## 2018-02-08 PROCEDURE — 99213 OFFICE O/P EST LOW 20 MIN: CPT | Performed by: INTERNAL MEDICINE

## 2018-02-08 RX ORDER — BUDESONIDE AND FORMOTEROL FUMARATE DIHYDRATE 160; 4.5 UG/1; UG/1
2 AEROSOL RESPIRATORY (INHALATION) 2 TIMES DAILY
Qty: 1 INHALER | Refills: 3
Start: 2018-02-08 | End: 2018-08-28 | Stop reason: SDUPTHER

## 2018-02-08 RX ORDER — BUDESONIDE AND FORMOTEROL FUMARATE DIHYDRATE 160; 4.5 UG/1; UG/1
2 AEROSOL RESPIRATORY (INHALATION) 2 TIMES DAILY
Qty: 1 INHALER | Refills: 3 | Status: SHIPPED | OUTPATIENT
Start: 2018-02-08 | End: 2018-02-08 | Stop reason: SDUPTHER

## 2018-02-08 NOTE — PROGRESS NOTES
Ov Dr. Jason Jones for follow up  S/p cath   Currently in rehab  C/o sob is worse  Especially when walking  And carrying things  Even after getting a shower  Will have to sit down to get  His breath   No chest pain heaviness   No palpitations  C/o dizziness and lightheadedness  No cough  Was on steroid about three weeks ago  Helped a little  Has not been back to Dr. Belen Uribe since  Being put on steroid  No edema    Will order PFT-will stop Advair/albuterol inhaler and nebulizer  Three days prior to PFT  Will order CXR today   Will have CPAP checked to make sure working good  Continue rehab   Will start ADVAIR 500/50 twice daily    Pt called back advair is still 300.00   Called in symbicort and still 300  He called Dr. Belen Uribe and they have  Samples and they are going to give  Him symbicort sample in am.

## 2018-02-09 ENCOUNTER — HOSPITAL ENCOUNTER (OUTPATIENT)
Dept: CARDIAC REHAB | Age: 63
Setting detail: THERAPIES SERIES
Discharge: HOME | End: 2018-02-09
Payer: MEDICARE

## 2018-02-09 PROCEDURE — 93798 PHYS/QHP OP CAR RHAB W/ECG: CPT

## 2018-02-12 ENCOUNTER — HOSPITAL ENCOUNTER (OUTPATIENT)
Dept: CARDIAC REHAB | Age: 63
Setting detail: THERAPIES SERIES
Discharge: HOME | End: 2018-02-12
Payer: MEDICARE

## 2018-02-12 PROCEDURE — 93798 PHYS/QHP OP CAR RHAB W/ECG: CPT

## 2018-02-14 ENCOUNTER — HOSPITAL ENCOUNTER (OUTPATIENT)
Dept: CARDIAC REHAB | Age: 63
Setting detail: THERAPIES SERIES
Discharge: HOME | End: 2018-02-14
Payer: MEDICARE

## 2018-02-14 PROCEDURE — 93798 PHYS/QHP OP CAR RHAB W/ECG: CPT

## 2018-02-15 ENCOUNTER — HOSPITAL ENCOUNTER (OUTPATIENT)
Dept: PULMONOLOGY | Age: 63
Discharge: HOME | End: 2018-02-15
Payer: COMMERCIAL

## 2018-02-15 VITALS — RESPIRATION RATE: 16 BRPM | OXYGEN SATURATION: 97 %

## 2018-02-15 DIAGNOSIS — R06.02 SOB (SHORTNESS OF BREATH): ICD-10-CM

## 2018-02-15 PROCEDURE — 94729 DIFFUSING CAPACITY: CPT

## 2018-02-15 PROCEDURE — 94060 EVALUATION OF WHEEZING: CPT

## 2018-02-15 PROCEDURE — 6360000002 HC RX W HCPCS: Performed by: INTERNAL MEDICINE

## 2018-02-15 PROCEDURE — 94726 PLETHYSMOGRAPHY LUNG VOLUMES: CPT

## 2018-02-15 RX ORDER — ALBUTEROL SULFATE 2.5 MG/3ML
2.5 SOLUTION RESPIRATORY (INHALATION) ONCE
Status: COMPLETED | OUTPATIENT
Start: 2018-02-15 | End: 2018-02-15

## 2018-02-15 RX ADMIN — ALBUTEROL SULFATE 2.5 MG: 2.5 SOLUTION RESPIRATORY (INHALATION) at 12:52

## 2018-02-16 ENCOUNTER — HOSPITAL ENCOUNTER (OUTPATIENT)
Dept: CARDIAC REHAB | Age: 63
Setting detail: THERAPIES SERIES
Discharge: HOME | End: 2018-02-16
Payer: MEDICARE

## 2018-02-16 ENCOUNTER — TELEPHONE (OUTPATIENT)
Dept: CARDIOLOGY CLINIC | Age: 63
End: 2018-02-16

## 2018-02-16 DIAGNOSIS — R42 DIZZINESS: Primary | ICD-10-CM

## 2018-02-16 PROCEDURE — 93798 PHYS/QHP OP CAR RHAB W/ECG: CPT

## 2018-02-16 RX ORDER — MECLIZINE HCL 12.5 MG/1
12.5 TABLET ORAL 3 TIMES DAILY PRN
Qty: 90 TABLET | Refills: 0 | Status: SHIPPED | OUTPATIENT
Start: 2018-02-16 | End: 2018-03-18

## 2018-02-19 ENCOUNTER — HOSPITAL ENCOUNTER (OUTPATIENT)
Dept: CARDIAC REHAB | Age: 63
Setting detail: THERAPIES SERIES
Discharge: HOME | End: 2018-02-19
Payer: MEDICARE

## 2018-02-19 PROCEDURE — 93798 PHYS/QHP OP CAR RHAB W/ECG: CPT

## 2018-02-21 ENCOUNTER — HOSPITAL ENCOUNTER (OUTPATIENT)
Dept: CARDIAC REHAB | Age: 63
Setting detail: THERAPIES SERIES
Discharge: HOME | End: 2018-02-21
Payer: MEDICARE

## 2018-02-21 PROCEDURE — 93798 PHYS/QHP OP CAR RHAB W/ECG: CPT

## 2018-02-23 ENCOUNTER — HOSPITAL ENCOUNTER (OUTPATIENT)
Dept: CARDIAC REHAB | Age: 63
Setting detail: THERAPIES SERIES
Discharge: HOME | End: 2018-02-23
Payer: MEDICARE

## 2018-02-23 PROCEDURE — 93798 PHYS/QHP OP CAR RHAB W/ECG: CPT

## 2018-02-26 ENCOUNTER — HOSPITAL ENCOUNTER (OUTPATIENT)
Dept: CARDIAC REHAB | Age: 63
Setting detail: THERAPIES SERIES
Discharge: HOME | End: 2018-02-26
Payer: MEDICARE

## 2018-02-26 ENCOUNTER — TELEPHONE (OUTPATIENT)
Dept: CARDIOLOGY CLINIC | Age: 63
End: 2018-02-26

## 2018-02-26 ENCOUNTER — OFFICE VISIT (OUTPATIENT)
Dept: CARDIOLOGY CLINIC | Age: 63
End: 2018-02-26
Payer: COMMERCIAL

## 2018-02-26 VITALS
DIASTOLIC BLOOD PRESSURE: 80 MMHG | BODY MASS INDEX: 40.11 KG/M2 | OXYGEN SATURATION: 97 % | SYSTOLIC BLOOD PRESSURE: 120 MMHG | WEIGHT: 304 LBS | HEART RATE: 80 BPM

## 2018-02-26 DIAGNOSIS — E55.9 VITAMIN D DEFICIENCY DISEASE: ICD-10-CM

## 2018-02-26 DIAGNOSIS — I25.10 CORONARY ARTERY DISEASE INVOLVING NATIVE CORONARY ARTERY OF NATIVE HEART WITHOUT ANGINA PECTORIS: ICD-10-CM

## 2018-02-26 DIAGNOSIS — E78.2 MIXED HYPERTRIGLYCERIDEMIA: ICD-10-CM

## 2018-02-26 DIAGNOSIS — R06.02 SOB (SHORTNESS OF BREATH): Primary | ICD-10-CM

## 2018-02-26 PROCEDURE — 99213 OFFICE O/P EST LOW 20 MIN: CPT | Performed by: INTERNAL MEDICINE

## 2018-02-26 PROCEDURE — 93798 PHYS/QHP OP CAR RHAB W/ECG: CPT

## 2018-02-26 NOTE — LETTER
1.  We will have him check the cost of both Advair Diskus and Breo Ellipta and let us know which one he can afford, which we will prescribe. 2.  We will see him in six months in followup. 3.  He will finish cardiac rehab and continue exercising in a home program.    DISCUSSION:  Mr. Jose Cervantes overall is doing well. He is in cardiac rehab and I think an exercise program is important for him long-term, which he understands. It appears that there was a significant pulmonary component to his shortness of breath. The Symbicort has helped him as far as his exercise capacity. I am delighted on his compliance with cardiac rehab and hopefully, he can continue exercising at home. Again, he will check the cost of both Breo Ellipta and Advair Diskus and let us know, which one he can afford, which we will prescribe. I will see him in 6 months unless a problem would develop. If he is doing well at that time, then I will see him on a yearly basis. Thank you very much for allowing me the privilege of seeing Mr. Jose Cervantes. If you have any questions on my thoughts, please do not hesitate to contact me.     Sincerely,          Hussain Borden    D: 02/26/2018 11:08:11       T: 02/26/2018 22:44:23     [email or web address]  Job#: [id number]     Doc#: [id number]

## 2018-02-26 NOTE — TELEPHONE ENCOUNTER
Jen Reynoso called questioning Jacinto  Pt called ran out had pt check bottle   Does have refill informed to get it refilled  Until we see pt in March

## 2018-02-28 ENCOUNTER — HOSPITAL ENCOUNTER (OUTPATIENT)
Dept: CARDIAC REHAB | Age: 63
Setting detail: THERAPIES SERIES
Discharge: HOME | End: 2018-02-28
Payer: MEDICARE

## 2018-02-28 PROCEDURE — 93798 PHYS/QHP OP CAR RHAB W/ECG: CPT

## 2018-02-28 NOTE — PROGRESS NOTES
Breo Ellipta and let us know which one he can afford, which we will prescribe. 2.  We will see him in six months in followup. 3.  He will finish cardiac rehab and continue exercising in a home program.    DISCUSSION:  Mr. Marianne Bowers overall is doing well. He is in cardiac rehab and I think an exercise program is important for him long-term, which he understands. It appears that there was a significant pulmonary component to his shortness of breath. The Symbicort has helped him as far as his exercise capacity. I am delighted on his compliance with cardiac rehab and hopefully, he can continue exercising at home. Again, he will check the cost of both Breo Ellipta and Advair Diskus and let us know, which one he can afford, which we will prescribe. I will see him in 6 months unless a problem would develop. If he is doing well at that time, then I will see him on a yearly basis. Thank you very much for allowing me the privilege of seeing Mr. Marianne Bowers. If you have any questions on my thoughts, please do not hesitate to contact me.     Sincerely,          Plainmark Crimes    D: 02/26/2018 11:08:11       T: 02/26/2018 22:44:23     [email or web address]  Job#: [id number]     Doc#: [id number]

## 2018-03-02 ENCOUNTER — HOSPITAL ENCOUNTER (OUTPATIENT)
Dept: CARDIAC REHAB | Age: 63
Setting detail: THERAPIES SERIES
Discharge: HOME | End: 2018-03-02
Payer: MEDICARE

## 2018-03-02 PROCEDURE — 93798 PHYS/QHP OP CAR RHAB W/ECG: CPT

## 2018-03-05 ENCOUNTER — HOSPITAL ENCOUNTER (OUTPATIENT)
Dept: CARDIAC REHAB | Age: 63
Setting detail: THERAPIES SERIES
Discharge: HOME | End: 2018-03-05
Payer: MEDICARE

## 2018-03-05 PROCEDURE — 93798 PHYS/QHP OP CAR RHAB W/ECG: CPT

## 2018-03-07 ENCOUNTER — HOSPITAL ENCOUNTER (OUTPATIENT)
Dept: CARDIAC REHAB | Age: 63
Setting detail: THERAPIES SERIES
Discharge: HOME | End: 2018-03-07
Payer: MEDICARE

## 2018-03-07 PROCEDURE — 93798 PHYS/QHP OP CAR RHAB W/ECG: CPT

## 2018-03-09 ENCOUNTER — HOSPITAL ENCOUNTER (OUTPATIENT)
Dept: CARDIAC REHAB | Age: 63
Setting detail: THERAPIES SERIES
Discharge: HOME | End: 2018-03-09
Payer: MEDICARE

## 2018-03-09 PROCEDURE — 93798 PHYS/QHP OP CAR RHAB W/ECG: CPT

## 2018-03-12 ENCOUNTER — HOSPITAL ENCOUNTER (OUTPATIENT)
Dept: CARDIAC REHAB | Age: 63
Setting detail: THERAPIES SERIES
Discharge: HOME | End: 2018-03-12
Payer: MEDICARE

## 2018-03-12 PROCEDURE — 93798 PHYS/QHP OP CAR RHAB W/ECG: CPT

## 2018-03-14 ENCOUNTER — HOSPITAL ENCOUNTER (OUTPATIENT)
Dept: CARDIAC REHAB | Age: 63
Setting detail: THERAPIES SERIES
Discharge: HOME | End: 2018-03-14
Payer: MEDICARE

## 2018-03-14 PROCEDURE — 93798 PHYS/QHP OP CAR RHAB W/ECG: CPT

## 2018-03-16 ENCOUNTER — HOSPITAL ENCOUNTER (OUTPATIENT)
Dept: CARDIAC REHAB | Age: 63
Setting detail: THERAPIES SERIES
Discharge: HOME | End: 2018-03-16
Payer: MEDICARE

## 2018-03-16 PROCEDURE — 93798 PHYS/QHP OP CAR RHAB W/ECG: CPT

## 2018-03-19 ENCOUNTER — HOSPITAL ENCOUNTER (OUTPATIENT)
Dept: CARDIAC REHAB | Age: 63
Setting detail: THERAPIES SERIES
Discharge: HOME | End: 2018-03-19
Payer: MEDICARE

## 2018-03-19 PROCEDURE — 93798 PHYS/QHP OP CAR RHAB W/ECG: CPT

## 2018-03-21 ENCOUNTER — HOSPITAL ENCOUNTER (OUTPATIENT)
Dept: CARDIAC REHAB | Age: 63
Setting detail: THERAPIES SERIES
Discharge: HOME | End: 2018-03-21
Payer: MEDICARE

## 2018-03-21 PROCEDURE — 93798 PHYS/QHP OP CAR RHAB W/ECG: CPT

## 2018-03-23 ENCOUNTER — HOSPITAL ENCOUNTER (OUTPATIENT)
Dept: CARDIAC REHAB | Age: 63
Setting detail: THERAPIES SERIES
Discharge: HOME | End: 2018-03-23
Payer: MEDICARE

## 2018-03-23 PROCEDURE — 93798 PHYS/QHP OP CAR RHAB W/ECG: CPT

## 2018-03-26 ENCOUNTER — HOSPITAL ENCOUNTER (OUTPATIENT)
Dept: CARDIAC REHAB | Age: 63
Setting detail: THERAPIES SERIES
Discharge: HOME | End: 2018-03-26
Payer: MEDICARE

## 2018-03-26 PROCEDURE — 93798 PHYS/QHP OP CAR RHAB W/ECG: CPT

## 2018-03-28 ENCOUNTER — HOSPITAL ENCOUNTER (OUTPATIENT)
Dept: CARDIAC REHAB | Age: 63
Setting detail: THERAPIES SERIES
Discharge: HOME | End: 2018-03-28
Payer: MEDICARE

## 2018-03-28 PROCEDURE — 93798 PHYS/QHP OP CAR RHAB W/ECG: CPT

## 2018-03-30 ENCOUNTER — HOSPITAL ENCOUNTER (OUTPATIENT)
Dept: CARDIAC REHAB | Age: 63
Setting detail: THERAPIES SERIES
Discharge: HOME | End: 2018-03-30
Payer: MEDICARE

## 2018-03-30 PROCEDURE — 93798 PHYS/QHP OP CAR RHAB W/ECG: CPT

## 2018-04-04 ENCOUNTER — HOSPITAL ENCOUNTER (OUTPATIENT)
Dept: CARDIAC REHAB | Age: 63
Setting detail: THERAPIES SERIES
Discharge: HOME | End: 2018-04-04
Payer: MEDICARE

## 2018-04-04 PROCEDURE — 93798 PHYS/QHP OP CAR RHAB W/ECG: CPT

## 2018-04-06 ENCOUNTER — HOSPITAL ENCOUNTER (OUTPATIENT)
Dept: CARDIAC REHAB | Age: 63
Setting detail: THERAPIES SERIES
Discharge: HOME | End: 2018-04-06
Payer: MEDICARE

## 2018-04-06 PROCEDURE — 93798 PHYS/QHP OP CAR RHAB W/ECG: CPT

## 2018-04-09 ENCOUNTER — HOSPITAL ENCOUNTER (OUTPATIENT)
Dept: CARDIAC REHAB | Age: 63
Setting detail: THERAPIES SERIES
Discharge: HOME | End: 2018-04-09
Payer: MEDICARE

## 2018-04-09 PROCEDURE — 93798 PHYS/QHP OP CAR RHAB W/ECG: CPT

## 2018-04-11 ENCOUNTER — HOSPITAL ENCOUNTER (OUTPATIENT)
Dept: CARDIAC REHAB | Age: 63
Setting detail: THERAPIES SERIES
Discharge: HOME | End: 2018-04-11
Payer: MEDICARE

## 2018-04-11 PROCEDURE — 93798 PHYS/QHP OP CAR RHAB W/ECG: CPT

## 2018-04-13 ENCOUNTER — APPOINTMENT (OUTPATIENT)
Dept: CARDIAC REHAB | Age: 63
End: 2018-04-13
Payer: MEDICARE

## 2018-05-29 ENCOUNTER — OFFICE VISIT (OUTPATIENT)
Dept: FAMILY MEDICINE CLINIC | Age: 63
End: 2018-05-29
Payer: COMMERCIAL

## 2018-05-29 VITALS
DIASTOLIC BLOOD PRESSURE: 74 MMHG | BODY MASS INDEX: 41.35 KG/M2 | SYSTOLIC BLOOD PRESSURE: 120 MMHG | HEART RATE: 65 BPM | WEIGHT: 312 LBS | HEIGHT: 73 IN | OXYGEN SATURATION: 96 % | RESPIRATION RATE: 20 BRPM

## 2018-05-29 DIAGNOSIS — Z99.89 OSA ON CPAP: ICD-10-CM

## 2018-05-29 DIAGNOSIS — I10 ESSENTIAL HYPERTENSION: ICD-10-CM

## 2018-05-29 DIAGNOSIS — M10.9 ACUTE GOUT OF LEFT FOOT, UNSPECIFIED CAUSE: ICD-10-CM

## 2018-05-29 DIAGNOSIS — E78.2 MIXED HYPERLIPIDEMIA: ICD-10-CM

## 2018-05-29 DIAGNOSIS — E03.9 ACQUIRED HYPOTHYROIDISM: ICD-10-CM

## 2018-05-29 DIAGNOSIS — G89.29 CHRONIC LOW BACK PAIN WITH LEFT-SIDED SCIATICA, UNSPECIFIED BACK PAIN LATERALITY: Primary | ICD-10-CM

## 2018-05-29 DIAGNOSIS — J44.9 CHRONIC OBSTRUCTIVE PULMONARY DISEASE, UNSPECIFIED COPD TYPE (HCC): ICD-10-CM

## 2018-05-29 DIAGNOSIS — G47.33 OSA ON CPAP: ICD-10-CM

## 2018-05-29 DIAGNOSIS — M54.42 CHRONIC LOW BACK PAIN WITH LEFT-SIDED SCIATICA, UNSPECIFIED BACK PAIN LATERALITY: Primary | ICD-10-CM

## 2018-05-29 DIAGNOSIS — F34.1 DYSTHYMIA (OR DEPRESSIVE NEUROSIS): ICD-10-CM

## 2018-05-29 PROBLEM — R06.02 SOB (SHORTNESS OF BREATH): Status: RESOLVED | Noted: 2018-02-08 | Resolved: 2018-05-29

## 2018-05-29 PROCEDURE — 99214 OFFICE O/P EST MOD 30 MIN: CPT | Performed by: INTERNAL MEDICINE

## 2018-05-29 RX ORDER — PAROXETINE HYDROCHLORIDE 20 MG/1
TABLET, FILM COATED ORAL
Qty: 180 TABLET | Refills: 1 | Status: SHIPPED | OUTPATIENT
Start: 2018-05-29 | End: 2018-08-13 | Stop reason: SDUPTHER

## 2018-05-29 RX ORDER — AMLODIPINE BESYLATE 2.5 MG/1
2.5 TABLET ORAL DAILY
Qty: 90 TABLET | Refills: 1 | Status: SHIPPED | OUTPATIENT
Start: 2018-05-29 | End: 2018-10-08 | Stop reason: SDUPTHER

## 2018-05-29 RX ORDER — LOSARTAN POTASSIUM 25 MG/1
25 TABLET ORAL DAILY
Qty: 90 TABLET | Refills: 1 | Status: SHIPPED | OUTPATIENT
Start: 2018-05-29 | End: 2018-10-08 | Stop reason: SDUPTHER

## 2018-05-29 RX ORDER — LEVOTHYROXINE SODIUM 175 UG/1
TABLET ORAL
Qty: 90 TABLET | Refills: 1 | Status: SHIPPED | OUTPATIENT
Start: 2018-05-29 | End: 2018-12-04 | Stop reason: SDUPTHER

## 2018-05-29 RX ORDER — FENOFIBRATE 145 MG/1
145 TABLET, COATED ORAL DAILY
Qty: 30 TABLET | Refills: 11 | Status: SHIPPED | OUTPATIENT
Start: 2018-05-29 | End: 2018-12-10 | Stop reason: SDUPTHER

## 2018-05-29 RX ORDER — LOVASTATIN 40 MG/1
40 TABLET ORAL DAILY
Qty: 90 TABLET | Refills: 1 | Status: SHIPPED | OUTPATIENT
Start: 2018-05-29 | End: 2018-10-08 | Stop reason: SDUPTHER

## 2018-05-29 RX ORDER — METOPROLOL SUCCINATE 50 MG/1
50 TABLET, EXTENDED RELEASE ORAL DAILY
Qty: 90 TABLET | Refills: 1 | Status: SHIPPED | OUTPATIENT
Start: 2018-05-29 | End: 2018-10-08 | Stop reason: SDUPTHER

## 2018-05-29 RX ORDER — FLUTICASONE FUROATE AND VILANTEROL 100; 25 UG/1; UG/1
POWDER RESPIRATORY (INHALATION)
Qty: 1 EACH | Refills: 3
Start: 2018-05-29 | End: 2018-08-28 | Stop reason: ALTCHOICE

## 2018-05-29 RX ORDER — ALLOPURINOL 300 MG/1
300 TABLET ORAL DAILY
Qty: 90 TABLET | Refills: 1 | Status: SHIPPED | OUTPATIENT
Start: 2018-05-29 | End: 2018-10-08 | Stop reason: SDUPTHER

## 2018-05-29 ASSESSMENT — ENCOUNTER SYMPTOMS
NAUSEA: 0
SHORTNESS OF BREATH: 0
BACK PAIN: 1
COUGH: 1
BOWEL INCONTINENCE: 0
VOMITING: 0
BLURRED VISION: 0
ABDOMINAL PAIN: 0
WHEEZING: 1

## 2018-05-29 ASSESSMENT — COPD QUESTIONNAIRES: COPD: 1

## 2018-08-13 DIAGNOSIS — F34.1 DYSTHYMIA (OR DEPRESSIVE NEUROSIS): ICD-10-CM

## 2018-08-13 RX ORDER — PAROXETINE HYDROCHLORIDE 20 MG/1
TABLET, FILM COATED ORAL
Qty: 180 TABLET | Refills: 1 | Status: SHIPPED | OUTPATIENT
Start: 2018-08-13 | End: 2018-12-10 | Stop reason: SDUPTHER

## 2018-08-28 ENCOUNTER — OFFICE VISIT (OUTPATIENT)
Dept: FAMILY MEDICINE CLINIC | Age: 63
End: 2018-08-28
Payer: COMMERCIAL

## 2018-08-28 VITALS
DIASTOLIC BLOOD PRESSURE: 78 MMHG | BODY MASS INDEX: 41.75 KG/M2 | SYSTOLIC BLOOD PRESSURE: 128 MMHG | WEIGHT: 315 LBS | HEIGHT: 73 IN

## 2018-08-28 DIAGNOSIS — I10 ESSENTIAL HYPERTENSION: ICD-10-CM

## 2018-08-28 DIAGNOSIS — G47.33 OSA ON CPAP: ICD-10-CM

## 2018-08-28 DIAGNOSIS — E78.2 MIXED HYPERLIPIDEMIA: ICD-10-CM

## 2018-08-28 DIAGNOSIS — Z99.89 OSA ON CPAP: ICD-10-CM

## 2018-08-28 DIAGNOSIS — E03.9 ACQUIRED HYPOTHYROIDISM: ICD-10-CM

## 2018-08-28 DIAGNOSIS — J44.9 CHRONIC OBSTRUCTIVE PULMONARY DISEASE, UNSPECIFIED COPD TYPE (HCC): Primary | ICD-10-CM

## 2018-08-28 DIAGNOSIS — Z12.11 SCREENING FOR COLON CANCER: ICD-10-CM

## 2018-08-28 PROCEDURE — 99214 OFFICE O/P EST MOD 30 MIN: CPT | Performed by: INTERNAL MEDICINE

## 2018-08-28 RX ORDER — ALBUTEROL SULFATE 90 UG/1
2 AEROSOL, METERED RESPIRATORY (INHALATION) EVERY 6 HOURS PRN
Qty: 1 INHALER | Refills: 3 | Status: SHIPPED | OUTPATIENT
Start: 2018-08-28 | End: 2018-12-10 | Stop reason: SDUPTHER

## 2018-08-28 RX ORDER — BUDESONIDE AND FORMOTEROL FUMARATE DIHYDRATE 160; 4.5 UG/1; UG/1
2 AEROSOL RESPIRATORY (INHALATION) 2 TIMES DAILY
Qty: 1 INHALER | Refills: 3 | Status: SHIPPED | OUTPATIENT
Start: 2018-08-28 | End: 2018-12-10 | Stop reason: SDUPTHER

## 2018-08-28 ASSESSMENT — ENCOUNTER SYMPTOMS
SPUTUM PRODUCTION: 0
ORTHOPNEA: 0
WHEEZING: 1
NAUSEA: 0
SHORTNESS OF BREATH: 1
HEARTBURN: 0
SORE THROAT: 0
ABDOMINAL PAIN: 0
DIFFICULTY BREATHING: 1
CHEST TIGHTNESS: 0
TROUBLE SWALLOWING: 0
COUGH: 1
BLURRED VISION: 0

## 2018-08-28 ASSESSMENT — COPD QUESTIONNAIRES: COPD: 1

## 2018-08-28 NOTE — PROGRESS NOTES
significant for COPD. Hyperlipidemia   This is a chronic problem. The current episode started more than 1 year ago. The problem is controlled. Recent lipid tests were reviewed and are variable. Exacerbating diseases include hypothyroidism and obesity. He has no history of diabetes. Factors aggravating his hyperlipidemia include beta blockers and fatty foods. Associated symptoms include shortness of breath. Pertinent negatives include no chest pain or myalgias. Current antihyperlipidemic treatment includes statins and fibric acid derivatives. The current treatment provides significant improvement of lipids. There are no compliance problems. Risk factors for coronary artery disease include dyslipidemia, family history, male sex, obesity and hypertension. Past Medical History:     Past Medical History:   Diagnosis Date    Asthma     Bronchitis     CAD (coronary artery disease)     COPD (chronic obstructive pulmonary disease) ([facility] [address] 8/12/2011    Depression 8/12/2011    Diabetes mellitus ([facility] [address]     GERD (gastroesophageal reflux disease) 8/12/2011    Hyperlipidemia 8/12/2011    Hypertension     Hypothyroid 8/12/2011    Lower back pain     Nicotine addiction 8/12/2011      Reviewed all health maintenance requirements and ordered appropriate tests  Health Maintenance Due   Topic Date Due    DTaP/Tdap/Td vaccine (1 - Tdap) 07/29/1976    Shingles Vaccine (1 of 2 - 2 Dose Series) 07/29/2007    Low dose CT lung screening  07/29/2012    Colon Cancer Screen FIT/FOBT  07/27/2017       Past Surgical History:     Past Surgical History:   Procedure Laterality Date    CARDIAC CATHETERIZATION Left 01/21/15    Severe left main trunk disease extending into the ostium of the left anterior descending of 80% involving the ostium of the intermediate & atrioventricular branch of the circumflex. 60% disease in the atrioventricular branch of the circumflex in the midportion.   40% disease in the right coronary artery. Normal left ventricular function, ejection fraction of 60%    CARDIAC CATHETERIZATION Left 12/20/2017    Dr. Gerald White @ [facility]--70% left main trunck & ostial LAD & high lateral circumflex disease. Patent LIMA to the LAD. Patent vein graft to the high lateral circumflex. 60% disease in the AV branch of the circumflex that terminates in a large posterolateral branch with an FFR of 1.0, indicating no significant stenosis. 40% disease in the right coronary artery. Ejection fraction 50-55%.  CORONARY ARTERY BYPASS GRAFT  02/04/15    3 vessel CABG using left internal mammary artery to the left anterior descending coronary artery with a reverse saphenous vein aortocoronary sequential graft side to side to the ramus intermedius coronary artery & end to side to the first obtuse marginal coronary artery.  OTHER SURGICAL HISTORY  03-21-16    I & D- cyst- posterior neck    SPINE SURGERY      THYROID LOBECTOMY          Medications:       Prior to Admission medications    Medication Sig Start Date End Date Taking?  Authorizing Provider   budesonide-formoterol (SYMBICORT) 160-4.5 MCG/ACT AERO Inhale 2 puffs into the lungs 2 times daily Sample given to patient-Lot [id number]  Exp 11/2018 8/28/18  Yes Linda Gruber MD   albuterol sulfate  (90 Base) MCG/ACT inhaler Inhale 2 puffs into the lungs every 6 hours as needed for Wheezing 8/28/18  Yes Linda Gruber MD   PARoxetine (PAXIL) 20 MG tablet TAKE 1 TABLET BY MOUTH 2 TIMES DAILY 8/13/18   Linda Gruber MD   levothyroxine (SYNTHROID) 175 MCG tablet TAKE 1 TABLET BY MOUTH DAILY 5/29/18   Linda Gruber MD   allopurinol (ZYLOPRIM) 300 MG tablet Take 1 tablet by mouth daily 5/29/18   Linda Gruber MD   lovastatin (MEVACOR) 40 MG tablet Take 1 tablet by mouth daily 5/29/18   Linda Gruber MD   amLODIPine (NORVASC) 2.5 MG tablet Take 1 tablet by mouth daily 5/29/18   Linda Gruber MD   losartan (COZAAR) 25 MG tablet Take 1 tablet by

## 2018-09-04 ENCOUNTER — TELEPHONE (OUTPATIENT)
Dept: FAMILY MEDICINE CLINIC | Age: 63
End: 2018-09-04

## 2018-09-04 DIAGNOSIS — Z12.11 SCREENING FOR COLON CANCER: Primary | ICD-10-CM

## 2018-09-04 LAB
CONTROL: PRESENT
HEMOCCULT STL QL: NORMAL

## 2018-09-04 PROCEDURE — 82274 ASSAY TEST FOR BLOOD FECAL: CPT | Performed by: INTERNAL MEDICINE

## 2018-10-08 DIAGNOSIS — M10.9 ACUTE GOUT OF LEFT FOOT, UNSPECIFIED CAUSE: ICD-10-CM

## 2018-10-08 DIAGNOSIS — I10 ESSENTIAL HYPERTENSION: ICD-10-CM

## 2018-10-08 DIAGNOSIS — E78.2 MIXED HYPERLIPIDEMIA: ICD-10-CM

## 2018-10-09 RX ORDER — LOSARTAN POTASSIUM 25 MG/1
TABLET ORAL
Qty: 90 TABLET | Refills: 1 | Status: SHIPPED | OUTPATIENT
Start: 2018-10-09 | End: 2019-02-25 | Stop reason: SDUPTHER

## 2018-10-09 RX ORDER — LOVASTATIN 40 MG/1
TABLET ORAL
Qty: 90 TABLET | Refills: 1 | Status: SHIPPED | OUTPATIENT
Start: 2018-10-09 | End: 2018-10-22 | Stop reason: SDUPTHER

## 2018-10-09 RX ORDER — METOPROLOL SUCCINATE 50 MG/1
TABLET, EXTENDED RELEASE ORAL
Qty: 90 TABLET | Refills: 1 | Status: SHIPPED | OUTPATIENT
Start: 2018-10-09 | End: 2019-02-25 | Stop reason: SDUPTHER

## 2018-10-09 RX ORDER — AMLODIPINE BESYLATE 2.5 MG/1
TABLET ORAL
Qty: 90 TABLET | Refills: 1 | Status: SHIPPED | OUTPATIENT
Start: 2018-10-09 | End: 2019-02-25 | Stop reason: SDUPTHER

## 2018-10-09 RX ORDER — ALLOPURINOL 300 MG/1
TABLET ORAL
Qty: 90 TABLET | Refills: 1 | Status: SHIPPED | OUTPATIENT
Start: 2018-10-09 | End: 2019-02-25 | Stop reason: SDUPTHER

## 2018-10-22 ENCOUNTER — OFFICE VISIT (OUTPATIENT)
Dept: CARDIOLOGY CLINIC | Age: 63
End: 2018-10-22
Payer: COMMERCIAL

## 2018-10-22 ENCOUNTER — HOSPITAL ENCOUNTER (OUTPATIENT)
Age: 63
Discharge: HOME | End: 2018-10-22
Payer: MEDICARE

## 2018-10-22 VITALS
SYSTOLIC BLOOD PRESSURE: 120 MMHG | HEART RATE: 73 BPM | OXYGEN SATURATION: 94 % | WEIGHT: 315 LBS | BODY MASS INDEX: 42.09 KG/M2 | DIASTOLIC BLOOD PRESSURE: 80 MMHG

## 2018-10-22 DIAGNOSIS — E78.2 MIXED HYPERTRIGLYCERIDEMIA: ICD-10-CM

## 2018-10-22 DIAGNOSIS — E55.9 VITAMIN D DEFICIENCY DISEASE: ICD-10-CM

## 2018-10-22 DIAGNOSIS — I25.10 CORONARY ARTERY DISEASE INVOLVING NATIVE CORONARY ARTERY OF NATIVE HEART WITHOUT ANGINA PECTORIS: ICD-10-CM

## 2018-10-22 DIAGNOSIS — E03.9 ACQUIRED HYPOTHYROIDISM: ICD-10-CM

## 2018-10-22 DIAGNOSIS — I10 ESSENTIAL HYPERTENSION: ICD-10-CM

## 2018-10-22 DIAGNOSIS — E78.2 MIXED HYPERLIPIDEMIA: ICD-10-CM

## 2018-10-22 DIAGNOSIS — R06.02 SOB (SHORTNESS OF BREATH): ICD-10-CM

## 2018-10-22 DIAGNOSIS — I25.10 CORONARY ARTERY DISEASE INVOLVING NATIVE CORONARY ARTERY OF NATIVE HEART WITHOUT ANGINA PECTORIS: Primary | ICD-10-CM

## 2018-10-22 LAB
ABSOLUTE BANDS #: 0.06 K/UL (ref 0–1)
ABSOLUTE EOS #: 0.19 K/UL (ref 0–0.4)
ABSOLUTE IMMATURE GRANULOCYTE: NORMAL K/UL (ref 0–0.3)
ABSOLUTE LYMPH #: 1.7 K/UL (ref 1–4.8)
ABSOLUTE MONO #: 0.44 K/UL (ref 0–1)
ALBUMIN SERPL-MCNC: 4.4 G/DL (ref 3.5–5.2)
ALBUMIN/GLOBULIN RATIO: ABNORMAL (ref 1–2.5)
ALP BLD-CCNC: 47 U/L (ref 40–129)
ALT SERPL-CCNC: 39 U/L (ref 5–41)
ANION GAP SERPL CALCULATED.3IONS-SCNC: 11 MMOL/L (ref 9–17)
AST SERPL-CCNC: 37 U/L
BANDS: 1 % (ref 0–10)
BASOPHILS # BLD: NORMAL % (ref 0–2)
BASOPHILS ABSOLUTE: NORMAL K/UL (ref 0–0.2)
BILIRUB SERPL-MCNC: 0.32 MG/DL (ref 0.3–1.2)
BUN BLDV-MCNC: 10 MG/DL (ref 8–23)
BUN/CREAT BLD: 11 (ref 9–20)
CALCIUM SERPL-MCNC: 9.4 MG/DL (ref 8.6–10.4)
CHLORIDE BLD-SCNC: 102 MMOL/L (ref 98–107)
CHOLESTEROL/HDL RATIO: 4.6
CHOLESTEROL: 160 MG/DL
CO2: 23 MMOL/L (ref 20–31)
CREAT SERPL-MCNC: 0.89 MG/DL (ref 0.7–1.2)
DIFFERENTIAL TYPE: NORMAL
EKG ATRIAL RATE: 54 BPM
EKG P AXIS: 46 DEGREES
EKG P-R INTERVAL: 160 MS
EKG Q-T INTERVAL: 408 MS
EKG QRS DURATION: 90 MS
EKG QTC CALCULATION (BAZETT): 386 MS
EKG R AXIS: 42 DEGREES
EKG T AXIS: 81 DEGREES
EKG VENTRICULAR RATE: 54 BPM
EOSINOPHILS RELATIVE PERCENT: 3 % (ref 0–5)
GFR AFRICAN AMERICAN: >60 ML/MIN
GFR NON-AFRICAN AMERICAN: >60 ML/MIN
GFR SERPL CREATININE-BSD FRML MDRD: ABNORMAL ML/MIN/{1.73_M2}
GFR SERPL CREATININE-BSD FRML MDRD: ABNORMAL ML/MIN/{1.73_M2}
GLUCOSE BLD-MCNC: 160 MG/DL (ref 70–99)
HCT VFR BLD CALC: 44.7 % (ref 41–53)
HDLC SERPL-MCNC: 35 MG/DL
HEMOGLOBIN: 14.6 G/DL (ref 13.5–17.5)
IMMATURE GRANULOCYTES: NORMAL %
LDL CHOLESTEROL: 79 MG/DL (ref 0–130)
LYMPHOCYTES # BLD: 27 % (ref 13–44)
MAGNESIUM: 2.1 MG/DL (ref 1.6–2.6)
MCH RBC QN AUTO: 32 PG (ref 26–34)
MCHC RBC AUTO-ENTMCNC: 32.7 G/DL (ref 31–37)
MCV RBC AUTO: 98 FL (ref 80–100)
MONOCYTES # BLD: 7 % (ref 5–9)
MORPHOLOGY: NORMAL
NRBC AUTOMATED: NORMAL PER 100 WBC
PATIENT FASTING?: YES
PDW BLD-RTO: 12.4 % (ref 12.1–15.2)
PLATELET # BLD: 215 K/UL (ref 140–450)
PLATELET ESTIMATE: NORMAL
PMV BLD AUTO: NORMAL FL
POTASSIUM SERPL-SCNC: 4.3 MMOL/L (ref 3.7–5.3)
RBC # BLD: 4.56 M/UL (ref 4.5–5.9)
RBC # BLD: NORMAL 10*6/UL
SEG NEUTROPHILS: 62 % (ref 39–75)
SEGMENTED NEUTROPHILS ABSOLUTE COUNT: 3.91 K/UL (ref 2.1–6.5)
SODIUM BLD-SCNC: 136 MMOL/L (ref 135–144)
TOTAL PROTEIN: 7 G/DL (ref 6.4–8.3)
TRIGL SERPL-MCNC: 230 MG/DL
TSH SERPL DL<=0.05 MIU/L-ACNC: 3.08 MIU/L (ref 0.3–5)
VITAMIN D 25-HYDROXY: 32.7 NG/ML (ref 30–100)
VLDLC SERPL CALC-MCNC: ABNORMAL MG/DL (ref 1–30)
WBC # BLD: 6.3 K/UL (ref 3.5–11)
WBC # BLD: NORMAL 10*3/UL

## 2018-10-22 PROCEDURE — 83735 ASSAY OF MAGNESIUM: CPT

## 2018-10-22 PROCEDURE — 80061 LIPID PANEL: CPT

## 2018-10-22 PROCEDURE — 99214 OFFICE O/P EST MOD 30 MIN: CPT | Performed by: INTERNAL MEDICINE

## 2018-10-22 PROCEDURE — 80053 COMPREHEN METABOLIC PANEL: CPT

## 2018-10-22 PROCEDURE — 93005 ELECTROCARDIOGRAM TRACING: CPT

## 2018-10-22 PROCEDURE — 82306 VITAMIN D 25 HYDROXY: CPT

## 2018-10-22 PROCEDURE — 84443 ASSAY THYROID STIM HORMONE: CPT

## 2018-10-22 PROCEDURE — 36415 COLL VENOUS BLD VENIPUNCTURE: CPT

## 2018-10-22 PROCEDURE — 85025 COMPLETE CBC W/AUTO DIFF WBC: CPT

## 2018-10-22 RX ORDER — LOVASTATIN 40 MG/1
TABLET ORAL
Qty: 180 TABLET | Refills: 3 | Status: SHIPPED | OUTPATIENT
Start: 2018-10-22 | End: 2019-08-26 | Stop reason: SDUPTHER

## 2018-10-22 NOTE — LETTER
enlarged. No hepatomegaly, splenomegaly. EXTREMITIES:  Good femoral pulses. Good pedal pulses. No pedal edema. Skin was warm and dry. No calf tenderness. Nail beds pink. Good cap refill. PULSES:  Bilateral symmetrical radial, brachial and carotid pulses. No carotid bruits. Good femoral and pedal pulses. NEUROLOGIC EXAM:  Within normal limits. PSYCHIATRIC EXAM:  Within normal limits. LABORATORY DATA:  Pending. IMPRESSION:  1. Coronary artery disease. 2.  History of severe CAD. 3.  Catheterization on 01/21/2015, showed 80% left main trunk and proximal LAD with 60% circumflex, mild disease in the right coronary artery, EF of 60%. 4.  Open heart surgery by Dr. Nadine Mata on 02/04/2015, with a LIMA to the LAD and a vein graft to the OM branch of the circumflex. 5.  Last cardiac catheterization on 12/20/2017, after he developed more chest discomfort, which showed a patent LIMA to the LAD and a patent vein graft to the high lateral branch of the circumflex. He had 60% disease in the AV branch of the circumflex with an FFR of 1.0 indicating no significant obstruction and 40% disease of the large dominant right coronary artery with an EF of 50% to 55% with medical therapy recommended. 6.  COPD with significant reversibility, consistent with asthma for which he is on treatment. 7.  Hypertension, well controlled. 8.  Non-insulin-dependent diabetes. 9.  Sleep apnea, on a CPAP mask. 10.  Hyperlipidemia, well controlled with repeat blood work pending. 11.  Status post partial thyroidectomy in 2001 on thyroid replacement. PLAN:  1. Increase Mevacor to 80 mg daily. 2.  Have him draw his labs on his way out today and we will call him with the results. 3.  We will see him in 1 year in followup. DISCUSSION:  Mr. Storm Duggan overall is doing well. His catheterization look good with patent bypass grafts.   His native AV branch of the circumflex had a 60% lesion, but did not have any significant flow-limiting obstruction. Right coronary artery is very large and only has 40% disease. Therefore, we will continue medical therapy with aggressive risk modification. He is on 40 mg of Mevacor. He evidently had a problem with Lipitor before. I have increased his Mevacor to 80 mg daily to try to drive his LDL as low as possible. I will see him in 1 year in followup. I would be glad to see him earlier if deemed necessary, but at this point he is doing very well with his home exercise program and his risk modification. Thank you very much for allowing me the privilege of seeing Mr. Keenan Baugh. If you have any questions on my thoughts, please do not hesitate to contact me.      Sincerely,        Viktor Chol    D: 10/22/2018 9:29:54     T: 10/23/2018 3:41:23     [email or web address]  Job#: [id number]   Doc#: [id number]

## 2018-11-05 NOTE — PROGRESS NOTES
did excellent in the cardiac rehab, extending his exercise capacity. He still had shortness of breath. Therefore, we ordered pulmonary function tests done on 02/20/2018. This showed mild obstructive disease, more severe in the small airways with a significant reversible defect. His DLCO was severely decreased. He was placed on bronchodilators. He finished phase 2 cardiac rehab and has gone to phase 3 cardiac rehab, where he goes 3 days a week for 1-hour sessions. He denies any chest pain. He has good days and bad days, but in general, feels his exercise capacity is fairly good. He is having some difficulty with weight gain and is wondering whether he has a thyroid issue. He unfortunately did not do his blood test yet before I saw him. He is getting his blood test on his way out today. He denies any syncope or near syncope. Denies any palpitations. He has had no hospitalizations. He did have ablation because of back pain done in [facility] within the last month. CARDIAC RISK FACTORS:  Known CAD:  Positive. Bypass Surgery:  Positive. Hypertension:  Positive. Hyperlipidemia:  Positive. Other Family Members:  Positive. Smoking:  Negative. Diabetes:  Positive. MEDICATIONS AT HOME:  He is currently on Proventil inhaler q.4 hours p.r.n., Zyloprim 300 mg daily, Norvasc 2.5 mg daily, aspirin 81 mg daily, Symbicort 2 puffs b.i.d., TriCor 145 mg daily, Synthroid 175 mcg daily, Cozaar 25 mg daily, Mevacor 40 mg daily, Toprol-XL 50 mg daily, Paxil 20 mg b.i.d. PAST MEDICAL HISTORY:  1. He is status post thyroid lobectomy since 2001 and is on thyroid replacement. 2.  Spine surgery many years ago. 3.  Chronic back pain, on disability. 4.  Non-insulin-dependent diabetes. 5.  Bypass surgery as stated previously. 6.  He has had a history of continued lower back pain. He had ablation approximately a month ago. 7.  He has a history of depression.     FAMILY HISTORY:  Mother alive at [de-identified], had an MI.  Father  of MI.    SOCIAL HISTORY:  He is 66years old, been  twice. He has 5 children. Quit smoking in 2014. Does not drink alcohol. Been on disability since . 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 He exercises 3 days a week on equipment at home. His back limits his activity somewhat. He does have a [employer] [place], which he gets to ride during the summer. REVIEW OF SYSTEMS:  Cardiac as above. Other systems reviewed including constitutional, eyes, ears, nose and throat, cardiovascular, respiratory, GI, , musculoskeletal, integumentary, neurologic, psychiatric, endocrine, hematologic and allergic/immunologic are negative except for what is described above. He has had a slight increase in his weight. He has been treated for hypothyroidism, although he did not get his blood work today. He will get blood work on his way out. He does have a history of sleep apnea and is on a CPAP mask. PHYSICAL EXAMINATION:   VITAL SIGNS:  His blood pressure was 120/80, with a heart rate of 70 and regular. Respiratory rate 18. O2 saturation 94%. Weight 319 pounds. GENERAL:  He is a pleasant 60-year-old gentleman. Denied pain. He was oriented to person, place and time. Answered questions appropriately. SKIN:  No unusual skin changes. HEENT:  The pupils are equally round and intact. Mucous membranes were dry. NECK:  No JVD. Good carotid pulses. No carotid bruits. No lymphadenopathy or thyromegaly. CARDIOVASCULAR EXAM:  S1 and S2 were normal.  No S3 or S4. Soft systolic blowing type murmur. No diastolic murmur. PMI was normal.  No lift, thrust, or pericardial friction rub. LUNGS:  Quite clear to auscultation and percussion. ABDOMEN:  Soft and nontender. Good bowel sounds. The aorta was not enlarged. No hepatomegaly, splenomegaly. EXTREMITIES:  Good femoral pulses.   Good pedal disease. Therefore, we will continue medical therapy with aggressive risk modification. He is on 40 mg of Mevacor. He evidently had a problem with Lipitor before. I have increased his Mevacor to 80 mg daily to try to drive his LDL as low as possible. I will see him in 1 year in followup. I would be glad to see him earlier if deemed necessary, but at this point he is doing very well with his home exercise program and his risk modification. Thank you very much for allowing me the privilege of seeing Mr. Rina Doe. If you have any questions on my thoughts, please do not hesitate to contact me.      Sincerely,        Courtney Amato    D: 10/22/2018 9:29:54     T: 10/23/2018 3:41:23     [email or web address]  Job#: [id number]   Doc#: [id number]

## 2018-12-04 DIAGNOSIS — E03.9 ACQUIRED HYPOTHYROIDISM: ICD-10-CM

## 2018-12-05 RX ORDER — LEVOTHYROXINE SODIUM 175 UG/1
TABLET ORAL
Qty: 90 TABLET | Refills: 1 | Status: SHIPPED | OUTPATIENT
Start: 2018-12-05 | End: 2019-02-19 | Stop reason: SDUPTHER

## 2018-12-10 ENCOUNTER — OFFICE VISIT (OUTPATIENT)
Dept: FAMILY MEDICINE CLINIC | Age: 63
End: 2018-12-10
Payer: MEDICARE

## 2018-12-10 VITALS
BODY MASS INDEX: 41.75 KG/M2 | SYSTOLIC BLOOD PRESSURE: 130 MMHG | WEIGHT: 315 LBS | HEIGHT: 73 IN | DIASTOLIC BLOOD PRESSURE: 84 MMHG

## 2018-12-10 DIAGNOSIS — E78.2 MIXED HYPERLIPIDEMIA: ICD-10-CM

## 2018-12-10 DIAGNOSIS — R35.0 BENIGN PROSTATIC HYPERPLASIA WITH URINARY FREQUENCY: Primary | ICD-10-CM

## 2018-12-10 DIAGNOSIS — N40.1 BENIGN PROSTATIC HYPERPLASIA WITH URINARY FREQUENCY: Primary | ICD-10-CM

## 2018-12-10 DIAGNOSIS — F34.1 DYSTHYMIA (OR DEPRESSIVE NEUROSIS): ICD-10-CM

## 2018-12-10 DIAGNOSIS — Z12.5 PROSTATE CANCER SCREENING: ICD-10-CM

## 2018-12-10 DIAGNOSIS — J44.9 CHRONIC OBSTRUCTIVE PULMONARY DISEASE, UNSPECIFIED COPD TYPE (HCC): ICD-10-CM

## 2018-12-10 DIAGNOSIS — K21.9 GASTROESOPHAGEAL REFLUX DISEASE WITHOUT ESOPHAGITIS: ICD-10-CM

## 2018-12-10 PROCEDURE — G8598 ASA/ANTIPLAT THER USED: HCPCS | Performed by: INTERNAL MEDICINE

## 2018-12-10 PROCEDURE — 1036F TOBACCO NON-USER: CPT | Performed by: INTERNAL MEDICINE

## 2018-12-10 PROCEDURE — 3023F SPIROM DOC REV: CPT | Performed by: INTERNAL MEDICINE

## 2018-12-10 PROCEDURE — 99214 OFFICE O/P EST MOD 30 MIN: CPT | Performed by: INTERNAL MEDICINE

## 2018-12-10 PROCEDURE — 81003 URINALYSIS AUTO W/O SCOPE: CPT | Performed by: INTERNAL MEDICINE

## 2018-12-10 PROCEDURE — G8417 CALC BMI ABV UP PARAM F/U: HCPCS | Performed by: INTERNAL MEDICINE

## 2018-12-10 PROCEDURE — G8427 DOCREV CUR MEDS BY ELIG CLIN: HCPCS | Performed by: INTERNAL MEDICINE

## 2018-12-10 PROCEDURE — G8926 SPIRO NO PERF OR DOC: HCPCS | Performed by: INTERNAL MEDICINE

## 2018-12-10 PROCEDURE — 3017F COLORECTAL CA SCREEN DOC REV: CPT | Performed by: INTERNAL MEDICINE

## 2018-12-10 PROCEDURE — G8484 FLU IMMUNIZE NO ADMIN: HCPCS | Performed by: INTERNAL MEDICINE

## 2018-12-10 RX ORDER — TAMSULOSIN HYDROCHLORIDE 0.4 MG/1
0.4 CAPSULE ORAL DAILY
Qty: 90 CAPSULE | Refills: 1 | Status: ON HOLD | OUTPATIENT
Start: 2018-12-10 | End: 2019-03-05

## 2018-12-10 RX ORDER — RANITIDINE 150 MG/1
150 TABLET ORAL 2 TIMES DAILY
Qty: 60 TABLET | Refills: 3 | Status: SHIPPED | OUTPATIENT
Start: 2018-12-10 | End: 2019-03-16 | Stop reason: ALTCHOICE

## 2018-12-10 RX ORDER — BUDESONIDE AND FORMOTEROL FUMARATE DIHYDRATE 160; 4.5 UG/1; UG/1
2 AEROSOL RESPIRATORY (INHALATION) 2 TIMES DAILY
Qty: 1 INHALER | Refills: 3 | Status: SHIPPED | OUTPATIENT
Start: 2018-12-10 | End: 2019-11-12

## 2018-12-10 RX ORDER — PAROXETINE HYDROCHLORIDE 20 MG/1
TABLET, FILM COATED ORAL
Qty: 180 TABLET | Refills: 1 | Status: SHIPPED | OUTPATIENT
Start: 2018-12-10 | End: 2019-05-13 | Stop reason: SDUPTHER

## 2018-12-10 RX ORDER — FENOFIBRATE 145 MG/1
145 TABLET, COATED ORAL DAILY
Qty: 90 TABLET | Refills: 1 | Status: SHIPPED | OUTPATIENT
Start: 2018-12-10 | End: 2019-03-15

## 2018-12-10 RX ORDER — ALBUTEROL SULFATE 90 UG/1
2 AEROSOL, METERED RESPIRATORY (INHALATION) EVERY 6 HOURS PRN
Qty: 1 INHALER | Refills: 5 | Status: SHIPPED | OUTPATIENT
Start: 2018-12-10 | End: 2019-03-15

## 2018-12-10 ASSESSMENT — ENCOUNTER SYMPTOMS
SHORTNESS OF BREATH: 0
HEMATOCHEZIA: 0
ALLERGIC/IMMUNOLOGIC NEGATIVE: 1
ABDOMINAL PAIN: 1
BELCHING: 1
NAUSEA: 0
DIARRHEA: 0
BACK PAIN: 1
CONSTIPATION: 0
SORE THROAT: 0
WHEEZING: 0
BLOOD IN STOOL: 0
VOMITING: 0
COUGH: 0

## 2018-12-10 NOTE — PROGRESS NOTES
Visit Information    Have you changed or started any medications since your last visit including any over-the-counter medicines, vitamins, or herbal medicines? no   Are you having any side effects from any of your medications? -  no  Have you stopped taking any of your medications? Is so, why? -  no    Have you seen any other physician or provider since your last visit? No  Have you had any other diagnostic tests since your last visit? No  Have you been seen in the emergency room and/or had an admission to a hospital since we last saw you? No  Have you had your routine dental cleaning in the past 6 months? no    Have you activated your [employer] account? If not, what are your barriers?  Yes     Patient Care Team:  Karime Hooks MD as PCP - [facility])    Medical History Review  Past Medical, Family, and Social History reviewed and does contribute to the patient presenting condition    Health Maintenance   Topic Date Due    DTaP/Tdap/Td vaccine (1 - Tdap) 07/29/1976    Shingles Vaccine (1 of 2 - 2 Dose Series) 07/29/2007    Low dose CT lung screening  07/29/2012    Colon Cancer Screen FIT/FOBT  09/04/2019    TSH testing  10/22/2019    Potassium monitoring  10/22/2019    Creatinine monitoring  10/22/2019    Diabetes screen  10/20/2020    Lipid screen  10/22/2023    Flu vaccine  Completed    Pneumococcal med risk  Completed    Hepatitis C screen  Completed    HIV screen  Completed

## 2018-12-10 NOTE — PROGRESS NOTES
 Hyperlipidemia 8/12/2011    Hypertension     Hypothyroid 8/12/2011    Lower back pain     Nicotine addiction 8/12/2011      Reviewed all health maintenance requirements and orderedappropriate tests  Health Maintenance Due   Topic Date Due    DTaP/Tdap/Td vaccine (1 - Tdap) 07/29/1976    Shingles Vaccine (1 of 2 - 2 Dose Series) 07/29/2007    Low dose CT lung screening  07/29/2012       Past Surgical History:     Past Surgical History:   Procedure Laterality Date    CARDIAC CATHETERIZATION Left 01/21/15    Severe left main trunk disease extending into the ostium of the left anterior descending of 80% involving the ostium of the intermediate & atrioventricular branch of the circumflex. 60% disease in the atrioventricular branch of the circumflex in the midportion. 40% disease in the right coronary artery. Normal left ventricular function, ejection fraction of 60%    CARDIAC CATHETERIZATION Left 12/20/2017    Dr. Priti Araujo @ [facility]--70% left main trunck & ostial LAD & high lateral circumflex disease. Patent LIMA to the LAD. Patent vein graft to the high lateral circumflex. 60% disease in the AV branch of the circumflex that terminates in a large posterolateral branch with an FFR of 1.0, indicating no significant stenosis. 40% disease in the right coronary artery. Ejection fraction 50-55%.  CORONARY ARTERY BYPASS GRAFT  02/04/15    3 vessel CABG using left internal mammary artery to the left anterior descending coronary artery with a reverse saphenous vein aortocoronary sequential graft side to side to the ramus intermedius coronary artery & end to side to the first obtuse marginal coronary artery.  OTHER SURGICAL HISTORY  03-21-16    I & D- cyst- posterior neck    SPINE SURGERY      THYROID LOBECTOMY          Medications:       Prior to Admission medications    Medication Sig Start Date End Date Taking?  Authorizing Provider   tamsulosin (FLOMAX) 0.4 MG capsule Take 1 capsule by mouth Refill:  5     Orders Placed This Encounter   Procedures    PSA Screening     Standing Status:   Future     Standing Expiration Date:   12/10/2019    Urinalysis         Patient Instructions     SURVEY:    You may be receiving a survey from [employer] regarding your visit today. Please complete the survey to enable us to provide the highest quality of care to you and your family. If you cannot score us a very good on any question, please call the office to discuss how we could have made your experience a very good one. Thank you.       Electronically signed by Cheyanne Diaz MD on 12/10/2018 at 9:25 AM           Completed Refills   Requested Prescriptions     Signed Prescriptions Disp Refills    tamsulosin (FLOMAX) 0.4 MG capsule 90 capsule 1     Sig: Take 1 capsule by mouth daily    ranitidine (ZANTAC) 150 MG tablet 60 tablet 3     Sig: Take 1 tablet by mouth 2 times daily    fenofibrate (TRICOR) 145 MG tablet 90 tablet 1     Sig: Take 1 tablet by mouth daily    PARoxetine (PAXIL) 20 MG tablet 180 tablet 1     Sig: TAKE 1 TABLET BY MOUTH 2 TIMES DAILY    budesonide-formoterol (SYMBICORT) 160-4.5 MCG/ACT AERO 1 Inhaler 3     Sig: Inhale 2 puffs into the lungs 2 times daily Sample given to patient-Lot [id number]  Exp 11/2018    albuterol sulfate  (90 Base) MCG/ACT inhaler 1 Inhaler 5     Sig: Inhale 2 puffs into the lungs every 6 hours as needed for Wheezing

## 2018-12-17 ENCOUNTER — HOSPITAL ENCOUNTER (OUTPATIENT)
Age: 63
Discharge: HOME | End: 2018-12-17
Payer: MEDICARE

## 2018-12-17 DIAGNOSIS — Z12.5 PROSTATE CANCER SCREENING: ICD-10-CM

## 2018-12-17 LAB — PROSTATE SPECIFIC ANTIGEN: 0.35 UG/L

## 2018-12-17 PROCEDURE — 36415 COLL VENOUS BLD VENIPUNCTURE: CPT

## 2018-12-17 PROCEDURE — G0103 PSA SCREENING: HCPCS

## 2019-01-15 ENCOUNTER — OFFICE VISIT (OUTPATIENT)
Dept: FAMILY MEDICINE CLINIC | Age: 64
End: 2019-01-15
Payer: MEDICARE

## 2019-01-15 VITALS
WEIGHT: 315 LBS | DIASTOLIC BLOOD PRESSURE: 76 MMHG | BODY MASS INDEX: 41.75 KG/M2 | HEIGHT: 73 IN | SYSTOLIC BLOOD PRESSURE: 126 MMHG | HEART RATE: 82 BPM

## 2019-01-15 DIAGNOSIS — J44.9 CHRONIC OBSTRUCTIVE PULMONARY DISEASE, UNSPECIFIED COPD TYPE (HCC): ICD-10-CM

## 2019-01-15 DIAGNOSIS — J20.9 ACUTE BRONCHITIS, UNSPECIFIED ORGANISM: Primary | ICD-10-CM

## 2019-01-15 PROCEDURE — G8417 CALC BMI ABV UP PARAM F/U: HCPCS | Performed by: INTERNAL MEDICINE

## 2019-01-15 PROCEDURE — 1036F TOBACCO NON-USER: CPT | Performed by: INTERNAL MEDICINE

## 2019-01-15 PROCEDURE — G8926 SPIRO NO PERF OR DOC: HCPCS | Performed by: INTERNAL MEDICINE

## 2019-01-15 PROCEDURE — G8598 ASA/ANTIPLAT THER USED: HCPCS | Performed by: INTERNAL MEDICINE

## 2019-01-15 PROCEDURE — 3023F SPIROM DOC REV: CPT | Performed by: INTERNAL MEDICINE

## 2019-01-15 PROCEDURE — 99213 OFFICE O/P EST LOW 20 MIN: CPT | Performed by: INTERNAL MEDICINE

## 2019-01-15 PROCEDURE — G8427 DOCREV CUR MEDS BY ELIG CLIN: HCPCS | Performed by: INTERNAL MEDICINE

## 2019-01-15 PROCEDURE — 3017F COLORECTAL CA SCREEN DOC REV: CPT | Performed by: INTERNAL MEDICINE

## 2019-01-15 PROCEDURE — G8484 FLU IMMUNIZE NO ADMIN: HCPCS | Performed by: INTERNAL MEDICINE

## 2019-01-15 RX ORDER — LEVOFLOXACIN 750 MG/1
750 TABLET ORAL DAILY
Qty: 5 TABLET | Refills: 0 | Status: SHIPPED | OUTPATIENT
Start: 2019-01-15 | End: 2019-01-15 | Stop reason: CLARIF

## 2019-01-15 RX ORDER — PREDNISONE 20 MG/1
40 TABLET ORAL DAILY
Qty: 10 TABLET | Refills: 0 | Status: SHIPPED | OUTPATIENT
Start: 2019-01-15 | End: 2019-01-20

## 2019-01-15 RX ORDER — LEVOFLOXACIN 500 MG/1
500 TABLET, FILM COATED ORAL DAILY
Qty: 5 TABLET | Refills: 0 | Status: SHIPPED | OUTPATIENT
Start: 2019-01-15 | End: 2019-01-20

## 2019-01-15 ASSESSMENT — ENCOUNTER SYMPTOMS
NAUSEA: 0
ALLERGIC/IMMUNOLOGIC NEGATIVE: 1
SHORTNESS OF BREATH: 0
CONSTIPATION: 0
BLOOD IN STOOL: 0
ABDOMINAL PAIN: 0
WHEEZING: 0
SORE THROAT: 0
COUGH: 1

## 2019-01-15 ASSESSMENT — PATIENT HEALTH QUESTIONNAIRE - PHQ9
SUM OF ALL RESPONSES TO PHQ QUESTIONS 1-9: 2
2. FEELING DOWN, DEPRESSED OR HOPELESS: 1
SUM OF ALL RESPONSES TO PHQ QUESTIONS 1-9: 2
1. LITTLE INTEREST OR PLEASURE IN DOING THINGS: 1
SUM OF ALL RESPONSES TO PHQ9 QUESTIONS 1 & 2: 2

## 2019-01-22 ENCOUNTER — OFFICE VISIT (OUTPATIENT)
Dept: FAMILY MEDICINE CLINIC | Age: 64
End: 2019-01-22
Payer: MEDICARE

## 2019-01-22 VITALS
SYSTOLIC BLOOD PRESSURE: 126 MMHG | BODY MASS INDEX: 41.75 KG/M2 | DIASTOLIC BLOOD PRESSURE: 76 MMHG | HEART RATE: 68 BPM | WEIGHT: 315 LBS | HEIGHT: 73 IN

## 2019-01-22 DIAGNOSIS — R10.9 ABDOMINAL PAIN, UNSPECIFIED ABDOMINAL LOCATION: Primary | ICD-10-CM

## 2019-01-22 PROCEDURE — 3017F COLORECTAL CA SCREEN DOC REV: CPT | Performed by: INTERNAL MEDICINE

## 2019-01-22 PROCEDURE — G8484 FLU IMMUNIZE NO ADMIN: HCPCS | Performed by: INTERNAL MEDICINE

## 2019-01-22 PROCEDURE — G8427 DOCREV CUR MEDS BY ELIG CLIN: HCPCS | Performed by: INTERNAL MEDICINE

## 2019-01-22 PROCEDURE — 99213 OFFICE O/P EST LOW 20 MIN: CPT | Performed by: INTERNAL MEDICINE

## 2019-01-22 PROCEDURE — G8598 ASA/ANTIPLAT THER USED: HCPCS | Performed by: INTERNAL MEDICINE

## 2019-01-22 PROCEDURE — G8417 CALC BMI ABV UP PARAM F/U: HCPCS | Performed by: INTERNAL MEDICINE

## 2019-01-22 PROCEDURE — 1036F TOBACCO NON-USER: CPT | Performed by: INTERNAL MEDICINE

## 2019-01-22 ASSESSMENT — ENCOUNTER SYMPTOMS
DIARRHEA: 0
BLOOD IN STOOL: 0
HEMATOCHEZIA: 0
CONSTIPATION: 0
ABDOMINAL PAIN: 1
BELCHING: 0
WHEEZING: 1
NAUSEA: 0
BACK PAIN: 1
COUGH: 1
FLATUS: 0
ALLERGIC/IMMUNOLOGIC NEGATIVE: 1
SHORTNESS OF BREATH: 0
SORE THROAT: 0
VOMITING: 0

## 2019-01-23 ENCOUNTER — HOSPITAL ENCOUNTER (OUTPATIENT)
Age: 64
Discharge: HOME | End: 2019-01-23
Payer: MEDICARE

## 2019-01-23 DIAGNOSIS — I25.10 CORONARY ARTERY DISEASE INVOLVING NATIVE CORONARY ARTERY OF NATIVE HEART WITHOUT ANGINA PECTORIS: ICD-10-CM

## 2019-01-23 DIAGNOSIS — E55.9 VITAMIN D DEFICIENCY DISEASE: ICD-10-CM

## 2019-01-23 DIAGNOSIS — E78.2 MIXED HYPERLIPIDEMIA: ICD-10-CM

## 2019-01-23 DIAGNOSIS — R10.9 ABDOMINAL PAIN, UNSPECIFIED ABDOMINAL LOCATION: ICD-10-CM

## 2019-01-23 DIAGNOSIS — I10 ESSENTIAL HYPERTENSION: ICD-10-CM

## 2019-01-23 LAB
ABSOLUTE EOS #: 0.2 K/UL (ref 0–0.4)
ABSOLUTE IMMATURE GRANULOCYTE: ABNORMAL K/UL (ref 0–0.3)
ABSOLUTE LYMPH #: 3 K/UL (ref 1–4.8)
ABSOLUTE MONO #: 0.7 K/UL (ref 0–1)
ALBUMIN SERPL-MCNC: 4.4 G/DL (ref 3.5–5.2)
ALBUMIN/GLOBULIN RATIO: NORMAL (ref 1–2.5)
ALP BLD-CCNC: 50 U/L (ref 40–129)
ALT SERPL-CCNC: 36 U/L (ref 5–41)
ANION GAP SERPL CALCULATED.3IONS-SCNC: 14 MMOL/L (ref 9–17)
AST SERPL-CCNC: 34 U/L
BASOPHILS # BLD: 1 % (ref 0–2)
BASOPHILS ABSOLUTE: 0.1 K/UL (ref 0–0.2)
BILIRUB SERPL-MCNC: 0.43 MG/DL (ref 0.3–1.2)
BILIRUBIN DIRECT: <0.08 MG/DL
BILIRUBIN, INDIRECT: NORMAL MG/DL (ref 0–1)
BUN BLDV-MCNC: 14 MG/DL (ref 8–23)
BUN/CREAT BLD: 13 (ref 9–20)
CALCIUM SERPL-MCNC: 10 MG/DL (ref 8.6–10.4)
CHLORIDE BLD-SCNC: 102 MMOL/L (ref 98–107)
CO2: 24 MMOL/L (ref 20–31)
CREAT SERPL-MCNC: 1.08 MG/DL (ref 0.7–1.2)
DIFFERENTIAL TYPE: YES
EOSINOPHILS RELATIVE PERCENT: 2 % (ref 0–5)
GFR AFRICAN AMERICAN: >60 ML/MIN
GFR NON-AFRICAN AMERICAN: >60 ML/MIN
GFR SERPL CREATININE-BSD FRML MDRD: ABNORMAL ML/MIN/{1.73_M2}
GFR SERPL CREATININE-BSD FRML MDRD: ABNORMAL ML/MIN/{1.73_M2}
GLOBULIN: NORMAL G/DL (ref 1.5–3.8)
GLUCOSE BLD-MCNC: 140 MG/DL (ref 70–99)
HCT VFR BLD CALC: 51.7 % (ref 41–53)
HEMOGLOBIN: 16.6 G/DL (ref 13.5–17.5)
IMMATURE GRANULOCYTES: ABNORMAL %
LIPASE: 72 U/L (ref 13–60)
LYMPHOCYTES # BLD: 27 % (ref 13–44)
MCH RBC QN AUTO: 30.9 PG (ref 26–34)
MCHC RBC AUTO-ENTMCNC: 32.2 G/DL (ref 31–37)
MCV RBC AUTO: 96 FL (ref 80–100)
MONOCYTES # BLD: 7 % (ref 5–9)
NRBC AUTOMATED: ABNORMAL PER 100 WBC
PDW BLD-RTO: 14.3 % (ref 12.1–15.2)
PLATELET # BLD: 293 K/UL (ref 140–450)
PLATELET ESTIMATE: ABNORMAL
PMV BLD AUTO: ABNORMAL FL (ref 6–12)
POTASSIUM SERPL-SCNC: 4.6 MMOL/L (ref 3.7–5.3)
RBC # BLD: 5.38 M/UL (ref 4.5–5.9)
RBC # BLD: ABNORMAL 10*6/UL
SEG NEUTROPHILS: 63 % (ref 39–75)
SEGMENTED NEUTROPHILS ABSOLUTE COUNT: 7 K/UL (ref 2.1–6.5)
SODIUM BLD-SCNC: 140 MMOL/L (ref 135–144)
TOTAL PROTEIN: 7.3 G/DL (ref 6.4–8.3)
WBC # BLD: 11.1 K/UL (ref 3.5–11)
WBC # BLD: ABNORMAL 10*3/UL

## 2019-01-23 PROCEDURE — 80076 HEPATIC FUNCTION PANEL: CPT

## 2019-01-23 PROCEDURE — 83690 ASSAY OF LIPASE: CPT

## 2019-01-23 PROCEDURE — 85025 COMPLETE CBC W/AUTO DIFF WBC: CPT

## 2019-01-23 PROCEDURE — 36415 COLL VENOUS BLD VENIPUNCTURE: CPT

## 2019-01-23 PROCEDURE — 80048 BASIC METABOLIC PNL TOTAL CA: CPT

## 2019-01-25 ENCOUNTER — HOSPITAL ENCOUNTER (OUTPATIENT)
Dept: CT IMAGING | Age: 64
Discharge: HOME | End: 2019-01-27
Payer: MEDICARE

## 2019-01-25 DIAGNOSIS — R10.9 ABDOMINAL PAIN, UNSPECIFIED ABDOMINAL LOCATION: ICD-10-CM

## 2019-01-25 PROCEDURE — 74177 CT ABD & PELVIS W/CONTRAST: CPT

## 2019-01-25 PROCEDURE — 6360000004 HC RX CONTRAST MEDICATION: Performed by: INTERNAL MEDICINE

## 2019-01-25 RX ADMIN — IOPAMIDOL 75 ML: 755 INJECTION, SOLUTION INTRAVENOUS at 13:48

## 2019-01-25 RX ADMIN — IOHEXOL 25 ML: 240 INJECTION, SOLUTION INTRATHECAL; INTRAVASCULAR; INTRAVENOUS; ORAL at 13:48

## 2019-01-28 DIAGNOSIS — R10.9 ABDOMINAL PAIN, UNSPECIFIED ABDOMINAL LOCATION: Primary | ICD-10-CM

## 2019-02-05 ENCOUNTER — OFFICE VISIT (OUTPATIENT)
Dept: FAMILY MEDICINE CLINIC | Age: 64
End: 2019-02-05
Payer: MEDICARE

## 2019-02-05 VITALS
SYSTOLIC BLOOD PRESSURE: 118 MMHG | HEIGHT: 73 IN | OXYGEN SATURATION: 96 % | BODY MASS INDEX: 41.75 KG/M2 | WEIGHT: 315 LBS | DIASTOLIC BLOOD PRESSURE: 86 MMHG | HEART RATE: 64 BPM

## 2019-02-05 DIAGNOSIS — E66.01 CLASS 3 SEVERE OBESITY DUE TO EXCESS CALORIES WITHOUT SERIOUS COMORBIDITY WITH BODY MASS INDEX (BMI) OF 40.0 TO 44.9 IN ADULT (HCC): ICD-10-CM

## 2019-02-05 DIAGNOSIS — R10.10 PAIN OF UPPER ABDOMEN: Primary | ICD-10-CM

## 2019-02-05 PROBLEM — E66.813 CLASS 3 SEVERE OBESITY DUE TO EXCESS CALORIES WITHOUT SERIOUS COMORBIDITY WITH BODY MASS INDEX (BMI) OF 40.0 TO 44.9 IN ADULT: Status: ACTIVE | Noted: 2019-02-05

## 2019-02-05 PROCEDURE — G8427 DOCREV CUR MEDS BY ELIG CLIN: HCPCS | Performed by: INTERNAL MEDICINE

## 2019-02-05 PROCEDURE — 3017F COLORECTAL CA SCREEN DOC REV: CPT | Performed by: INTERNAL MEDICINE

## 2019-02-05 PROCEDURE — 99213 OFFICE O/P EST LOW 20 MIN: CPT | Performed by: INTERNAL MEDICINE

## 2019-02-05 PROCEDURE — G8484 FLU IMMUNIZE NO ADMIN: HCPCS | Performed by: INTERNAL MEDICINE

## 2019-02-05 PROCEDURE — G8417 CALC BMI ABV UP PARAM F/U: HCPCS | Performed by: INTERNAL MEDICINE

## 2019-02-05 PROCEDURE — 1036F TOBACCO NON-USER: CPT | Performed by: INTERNAL MEDICINE

## 2019-02-05 PROCEDURE — G8598 ASA/ANTIPLAT THER USED: HCPCS | Performed by: INTERNAL MEDICINE

## 2019-02-05 ASSESSMENT — ENCOUNTER SYMPTOMS
NAUSEA: 0
SHORTNESS OF BREATH: 0
SORE THROAT: 0
ABDOMINAL PAIN: 0
COUGH: 0

## 2019-02-07 ENCOUNTER — OFFICE VISIT (OUTPATIENT)
Dept: SURGERY | Age: 64
End: 2019-02-07
Payer: MEDICARE

## 2019-02-07 VITALS
BODY MASS INDEX: 41.75 KG/M2 | WEIGHT: 315 LBS | RESPIRATION RATE: 20 BRPM | SYSTOLIC BLOOD PRESSURE: 120 MMHG | DIASTOLIC BLOOD PRESSURE: 84 MMHG | HEART RATE: 90 BPM | HEIGHT: 73 IN

## 2019-02-07 DIAGNOSIS — R10.12 ABDOMINAL PAIN, LUQ: Primary | ICD-10-CM

## 2019-02-07 DIAGNOSIS — R14.0 ABDOMINAL BLOATING: ICD-10-CM

## 2019-02-07 DIAGNOSIS — Z01.818 PREOPERATIVE CLEARANCE: Primary | ICD-10-CM

## 2019-02-07 PROCEDURE — G8484 FLU IMMUNIZE NO ADMIN: HCPCS | Performed by: SURGERY

## 2019-02-07 PROCEDURE — 3017F COLORECTAL CA SCREEN DOC REV: CPT | Performed by: SURGERY

## 2019-02-07 PROCEDURE — G8427 DOCREV CUR MEDS BY ELIG CLIN: HCPCS | Performed by: SURGERY

## 2019-02-07 PROCEDURE — 99203 OFFICE O/P NEW LOW 30 MIN: CPT | Performed by: SURGERY

## 2019-02-07 PROCEDURE — G8417 CALC BMI ABV UP PARAM F/U: HCPCS | Performed by: SURGERY

## 2019-02-07 PROCEDURE — G8598 ASA/ANTIPLAT THER USED: HCPCS | Performed by: SURGERY

## 2019-02-07 PROCEDURE — 1036F TOBACCO NON-USER: CPT | Performed by: SURGERY

## 2019-02-07 RX ORDER — SODIUM, POTASSIUM,MAG SULFATES 17.5-3.13G
1 SOLUTION, RECONSTITUTED, ORAL ORAL ONCE
Qty: 2 BOTTLE | Refills: 0 | Status: SHIPPED | OUTPATIENT
Start: 2019-02-07 | End: 2019-02-07

## 2019-02-14 ENCOUNTER — HOSPITAL ENCOUNTER (OUTPATIENT)
Dept: ULTRASOUND IMAGING | Age: 64
Discharge: HOME | End: 2019-02-16
Payer: MEDICARE

## 2019-02-14 DIAGNOSIS — R10.12 ABDOMINAL PAIN, LUQ: ICD-10-CM

## 2019-02-14 DIAGNOSIS — R14.0 ABDOMINAL BLOATING: ICD-10-CM

## 2019-02-14 PROCEDURE — 76705 ECHO EXAM OF ABDOMEN: CPT

## 2019-02-17 ASSESSMENT — ENCOUNTER SYMPTOMS
ABDOMINAL DISTENTION: 1
ABDOMINAL PAIN: 1
TROUBLE SWALLOWING: 0
CHOKING: 0
BLOOD IN STOOL: 0
SHORTNESS OF BREATH: 1
VOMITING: 0
COUGH: 0
SORE THROAT: 0
NAUSEA: 0
BACK PAIN: 0

## 2019-02-19 DIAGNOSIS — E03.9 ACQUIRED HYPOTHYROIDISM: ICD-10-CM

## 2019-02-20 RX ORDER — LEVOTHYROXINE SODIUM 175 UG/1
TABLET ORAL
Qty: 90 TABLET | Refills: 1 | Status: SHIPPED | OUTPATIENT
Start: 2019-02-20 | End: 2019-09-02 | Stop reason: SDUPTHER

## 2019-02-25 DIAGNOSIS — I10 ESSENTIAL HYPERTENSION: ICD-10-CM

## 2019-02-25 DIAGNOSIS — M10.9 ACUTE GOUT OF LEFT FOOT, UNSPECIFIED CAUSE: ICD-10-CM

## 2019-02-26 RX ORDER — LOSARTAN POTASSIUM 25 MG/1
TABLET ORAL
Qty: 90 TABLET | Refills: 1 | Status: SHIPPED | OUTPATIENT
Start: 2019-02-26 | End: 2019-07-15 | Stop reason: SDUPTHER

## 2019-02-26 RX ORDER — METOPROLOL SUCCINATE 50 MG/1
TABLET, EXTENDED RELEASE ORAL
Qty: 90 TABLET | Refills: 1 | Status: SHIPPED | OUTPATIENT
Start: 2019-02-26 | End: 2019-07-15 | Stop reason: SDUPTHER

## 2019-02-26 RX ORDER — AMLODIPINE BESYLATE 2.5 MG/1
TABLET ORAL
Qty: 90 TABLET | Refills: 1 | Status: SHIPPED | OUTPATIENT
Start: 2019-02-26 | End: 2019-07-15 | Stop reason: SDUPTHER

## 2019-02-26 RX ORDER — ALLOPURINOL 300 MG/1
TABLET ORAL
Qty: 90 TABLET | Refills: 1 | Status: SHIPPED | OUTPATIENT
Start: 2019-02-26 | End: 2019-03-16 | Stop reason: ALTCHOICE

## 2019-03-05 ENCOUNTER — ANESTHESIA (OUTPATIENT)
Dept: OPERATING ROOM | Age: 64
End: 2019-03-05
Payer: MEDICARE

## 2019-03-05 ENCOUNTER — HOSPITAL ENCOUNTER (OUTPATIENT)
Age: 64
Setting detail: OUTPATIENT SURGERY
Discharge: HOME | End: 2019-03-05
Attending: SURGERY | Admitting: SURGERY
Payer: MEDICARE

## 2019-03-05 ENCOUNTER — ANESTHESIA EVENT (OUTPATIENT)
Dept: OPERATING ROOM | Age: 64
End: 2019-03-05
Payer: MEDICARE

## 2019-03-05 VITALS
BODY MASS INDEX: 41.75 KG/M2 | HEART RATE: 53 BPM | SYSTOLIC BLOOD PRESSURE: 118 MMHG | OXYGEN SATURATION: 99 % | HEIGHT: 73 IN | WEIGHT: 315 LBS | RESPIRATION RATE: 20 BRPM | TEMPERATURE: 98.2 F | DIASTOLIC BLOOD PRESSURE: 84 MMHG

## 2019-03-05 VITALS — SYSTOLIC BLOOD PRESSURE: 100 MMHG | OXYGEN SATURATION: 97 % | TEMPERATURE: 97.7 F | DIASTOLIC BLOOD PRESSURE: 64 MMHG

## 2019-03-05 PROBLEM — R14.0 ABDOMINAL BLOATING: Status: ACTIVE | Noted: 2019-03-05

## 2019-03-05 PROBLEM — R10.32 LLQ PAIN: Status: ACTIVE | Noted: 2019-03-05

## 2019-03-05 LAB — GLUCOSE BLD-MCNC: 122 MG/DL (ref 65–99)

## 2019-03-05 PROCEDURE — 6360000002 HC RX W HCPCS: Performed by: NURSE ANESTHETIST, CERTIFIED REGISTERED

## 2019-03-05 PROCEDURE — 2500000003 HC RX 250 WO HCPCS: Performed by: NURSE ANESTHETIST, CERTIFIED REGISTERED

## 2019-03-05 PROCEDURE — 3700000000 HC ANESTHESIA ATTENDED CARE: Performed by: SURGERY

## 2019-03-05 PROCEDURE — 3609012400 HC EGD TRANSORAL BIOPSY SINGLE/MULTIPLE: Performed by: SURGERY

## 2019-03-05 PROCEDURE — 2580000003 HC RX 258: Performed by: SURGERY

## 2019-03-05 PROCEDURE — 87077 CULTURE AEROBIC IDENTIFY: CPT

## 2019-03-05 PROCEDURE — 82947 ASSAY GLUCOSE BLOOD QUANT: CPT

## 2019-03-05 PROCEDURE — C1773 RET DEV, INSERTABLE: HCPCS | Performed by: SURGERY

## 2019-03-05 PROCEDURE — 7100000011 HC PHASE II RECOVERY - ADDTL 15 MIN: Performed by: SURGERY

## 2019-03-05 PROCEDURE — 2709999900 HC NON-CHARGEABLE SUPPLY: Performed by: SURGERY

## 2019-03-05 PROCEDURE — 3609010400 HC COLONOSCOPY POLYPECTOMY HOT BIOPSY: Performed by: SURGERY

## 2019-03-05 PROCEDURE — 88305 TISSUE EXAM BY PATHOLOGIST: CPT

## 2019-03-05 PROCEDURE — 3700000001 HC ADD 15 MINUTES (ANESTHESIA): Performed by: SURGERY

## 2019-03-05 PROCEDURE — 6370000000 HC RX 637 (ALT 250 FOR IP): Performed by: NURSE ANESTHETIST, CERTIFIED REGISTERED

## 2019-03-05 PROCEDURE — 7100000010 HC PHASE II RECOVERY - FIRST 15 MIN: Performed by: SURGERY

## 2019-03-05 RX ORDER — LIDOCAINE HYDROCHLORIDE 10 MG/ML
INJECTION, SOLUTION EPIDURAL; INFILTRATION; INTRACAUDAL; PERINEURAL PRN
Status: DISCONTINUED | OUTPATIENT
Start: 2019-03-05 | End: 2019-03-05 | Stop reason: SDUPTHER

## 2019-03-05 RX ORDER — SODIUM CHLORIDE, SODIUM LACTATE, POTASSIUM CHLORIDE, CALCIUM CHLORIDE 600; 310; 30; 20 MG/100ML; MG/100ML; MG/100ML; MG/100ML
INJECTION, SOLUTION INTRAVENOUS CONTINUOUS
Status: DISCONTINUED | OUTPATIENT
Start: 2019-03-05 | End: 2019-03-05 | Stop reason: HOSPADM

## 2019-03-05 RX ORDER — GLYCOPYRROLATE 1 MG/5 ML
SYRINGE (ML) INTRAVENOUS PRN
Status: DISCONTINUED | OUTPATIENT
Start: 2019-03-05 | End: 2019-03-05 | Stop reason: SDUPTHER

## 2019-03-05 RX ORDER — 0.9 % SODIUM CHLORIDE 0.9 %
10 VIAL (ML) INJECTION PRN
Status: DISCONTINUED | OUTPATIENT
Start: 2019-03-05 | End: 2019-03-05 | Stop reason: HOSPADM

## 2019-03-05 RX ORDER — SODIUM CHLORIDE 0.9 % (FLUSH) 0.9 %
10 SYRINGE (ML) INJECTION PRN
Status: DISCONTINUED | OUTPATIENT
Start: 2019-03-05 | End: 2019-03-05 | Stop reason: HOSPADM

## 2019-03-05 RX ORDER — PANTOPRAZOLE SODIUM 40 MG/1
40 TABLET, DELAYED RELEASE ORAL DAILY
Qty: 30 TABLET | Refills: 3 | Status: SHIPPED | OUTPATIENT
Start: 2019-03-05 | End: 2019-10-17 | Stop reason: CLARIF

## 2019-03-05 RX ORDER — MIDAZOLAM HYDROCHLORIDE 1 MG/ML
INJECTION INTRAMUSCULAR; INTRAVENOUS PRN
Status: DISCONTINUED | OUTPATIENT
Start: 2019-03-05 | End: 2019-03-05 | Stop reason: SDUPTHER

## 2019-03-05 RX ORDER — PROPOFOL 10 MG/ML
INJECTION, EMULSION INTRAVENOUS CONTINUOUS PRN
Status: DISCONTINUED | OUTPATIENT
Start: 2019-03-05 | End: 2019-03-05 | Stop reason: SDUPTHER

## 2019-03-05 RX ORDER — FENTANYL CITRATE 50 UG/ML
INJECTION, SOLUTION INTRAMUSCULAR; INTRAVENOUS PRN
Status: DISCONTINUED | OUTPATIENT
Start: 2019-03-05 | End: 2019-03-05 | Stop reason: SDUPTHER

## 2019-03-05 RX ORDER — 0.9 % SODIUM CHLORIDE 0.9 %
10 VIAL (ML) INJECTION EVERY 12 HOURS SCHEDULED
Status: DISCONTINUED | OUTPATIENT
Start: 2019-03-05 | End: 2019-03-05 | Stop reason: HOSPADM

## 2019-03-05 RX ORDER — SODIUM CHLORIDE 0.9 % (FLUSH) 0.9 %
10 SYRINGE (ML) INJECTION EVERY 12 HOURS SCHEDULED
Status: DISCONTINUED | OUTPATIENT
Start: 2019-03-05 | End: 2019-03-05 | Stop reason: HOSPADM

## 2019-03-05 RX ORDER — ONDANSETRON 2 MG/ML
4 INJECTION INTRAMUSCULAR; INTRAVENOUS EVERY 6 HOURS PRN
Status: DISCONTINUED | OUTPATIENT
Start: 2019-03-05 | End: 2019-03-05 | Stop reason: HOSPADM

## 2019-03-05 RX ORDER — SODIUM CHLORIDE, SODIUM LACTATE, POTASSIUM CHLORIDE, CALCIUM CHLORIDE 600; 310; 30; 20 MG/100ML; MG/100ML; MG/100ML; MG/100ML
INJECTION, SOLUTION INTRAVENOUS CONTINUOUS
Status: DISCONTINUED | OUTPATIENT
Start: 2019-03-05 | End: 2019-03-05 | Stop reason: SDUPTHER

## 2019-03-05 RX ORDER — SUCRALFATE ORAL 1 G/10ML
1 SUSPENSION ORAL 4 TIMES DAILY
Qty: 420 ML | Refills: 1 | Status: SHIPPED | OUTPATIENT
Start: 2019-03-05 | End: 2019-10-17 | Stop reason: CLARIF

## 2019-03-05 RX ADMIN — Medication 0.2 MG: at 08:57

## 2019-03-05 RX ADMIN — SODIUM CHLORIDE, POTASSIUM CHLORIDE, SODIUM LACTATE AND CALCIUM CHLORIDE: 600; 310; 30; 20 INJECTION, SOLUTION INTRAVENOUS at 07:49

## 2019-03-05 RX ADMIN — PROPOFOL 75 MCG/KG/MIN: 10 INJECTION, EMULSION INTRAVENOUS at 09:09

## 2019-03-05 RX ADMIN — MIDAZOLAM HYDROCHLORIDE 2 MG: 2 INJECTION, SOLUTION INTRAMUSCULAR; INTRAVENOUS at 08:57

## 2019-03-05 RX ADMIN — LIDOCAINE HYDROCHLORIDE 5 ML: 10 INJECTION, SOLUTION EPIDURAL; INFILTRATION; INTRACAUDAL; PERINEURAL at 09:07

## 2019-03-05 RX ADMIN — LIDOCAINE HYDROCHLORIDE 15 ML: 20 SOLUTION ORAL; TOPICAL at 09:05

## 2019-03-05 RX ADMIN — SODIUM CHLORIDE, POTASSIUM CHLORIDE, SODIUM LACTATE AND CALCIUM CHLORIDE: 600; 310; 30; 20 INJECTION, SOLUTION INTRAVENOUS at 09:48

## 2019-03-05 RX ADMIN — FENTANYL CITRATE 50 MCG: 50 INJECTION INTRAMUSCULAR; INTRAVENOUS at 09:07

## 2019-03-05 ASSESSMENT — PAIN SCALES - GENERAL
PAINLEVEL_OUTOF10: 0
PAINLEVEL_OUTOF10: 0

## 2019-03-05 ASSESSMENT — PAIN - FUNCTIONAL ASSESSMENT: PAIN_FUNCTIONAL_ASSESSMENT: 0-10

## 2019-03-05 ASSESSMENT — COPD QUESTIONNAIRES: CAT_SEVERITY: MODERATE

## 2019-03-06 LAB
DIRECT EXAM: NEGATIVE
Lab: NORMAL
SPECIMEN DESCRIPTION: NORMAL
SURGICAL PATHOLOGY REPORT: NORMAL

## 2019-03-14 ENCOUNTER — OFFICE VISIT (OUTPATIENT)
Dept: SURGERY | Age: 64
End: 2019-03-14
Payer: MEDICARE

## 2019-03-14 VITALS
RESPIRATION RATE: 20 BRPM | BODY MASS INDEX: 41.75 KG/M2 | SYSTOLIC BLOOD PRESSURE: 123 MMHG | WEIGHT: 315 LBS | HEIGHT: 73 IN | DIASTOLIC BLOOD PRESSURE: 73 MMHG | HEART RATE: 60 BPM

## 2019-03-14 DIAGNOSIS — K80.20 CHOLELITHIASIS WITHOUT CHOLECYSTITIS: ICD-10-CM

## 2019-03-14 DIAGNOSIS — Z98.890 S/P COLONOSCOPY WITH POLYPECTOMY: Primary | ICD-10-CM

## 2019-03-14 DIAGNOSIS — K29.90 GASTRITIS AND DUODENITIS: ICD-10-CM

## 2019-03-14 DIAGNOSIS — D12.3 ADENOMATOUS POLYP OF TRANSVERSE COLON: ICD-10-CM

## 2019-03-14 PROCEDURE — G8427 DOCREV CUR MEDS BY ELIG CLIN: HCPCS | Performed by: SURGERY

## 2019-03-14 PROCEDURE — G8484 FLU IMMUNIZE NO ADMIN: HCPCS | Performed by: SURGERY

## 2019-03-14 PROCEDURE — 99213 OFFICE O/P EST LOW 20 MIN: CPT | Performed by: SURGERY

## 2019-03-14 PROCEDURE — 1036F TOBACCO NON-USER: CPT | Performed by: SURGERY

## 2019-03-14 PROCEDURE — G8598 ASA/ANTIPLAT THER USED: HCPCS | Performed by: SURGERY

## 2019-03-14 PROCEDURE — G8417 CALC BMI ABV UP PARAM F/U: HCPCS | Performed by: SURGERY

## 2019-03-14 PROCEDURE — 3017F COLORECTAL CA SCREEN DOC REV: CPT | Performed by: SURGERY

## 2019-03-15 ENCOUNTER — OFFICE VISIT (OUTPATIENT)
Dept: PRIMARY CARE CLINIC | Age: 64
End: 2019-03-15
Payer: MEDICARE

## 2019-03-15 VITALS
DIASTOLIC BLOOD PRESSURE: 65 MMHG | TEMPERATURE: 98.6 F | HEART RATE: 67 BPM | BODY MASS INDEX: 43.14 KG/M2 | SYSTOLIC BLOOD PRESSURE: 112 MMHG | OXYGEN SATURATION: 95 % | WEIGHT: 315 LBS

## 2019-03-15 DIAGNOSIS — J44.0 COPD WITH ACUTE BRONCHITIS (HCC): Primary | ICD-10-CM

## 2019-03-15 DIAGNOSIS — R05.9 COUGH: ICD-10-CM

## 2019-03-15 DIAGNOSIS — J20.9 COPD WITH ACUTE BRONCHITIS (HCC): Primary | ICD-10-CM

## 2019-03-15 LAB
INFLUENZA A ANTIBODY: NORMAL
INFLUENZA B ANTIBODY: NORMAL

## 2019-03-15 PROCEDURE — 87804 INFLUENZA ASSAY W/OPTIC: CPT | Performed by: NURSE PRACTITIONER

## 2019-03-15 PROCEDURE — G8484 FLU IMMUNIZE NO ADMIN: HCPCS | Performed by: NURSE PRACTITIONER

## 2019-03-15 PROCEDURE — 3023F SPIROM DOC REV: CPT | Performed by: NURSE PRACTITIONER

## 2019-03-15 PROCEDURE — G8926 SPIRO NO PERF OR DOC: HCPCS | Performed by: NURSE PRACTITIONER

## 2019-03-15 PROCEDURE — G8417 CALC BMI ABV UP PARAM F/U: HCPCS | Performed by: NURSE PRACTITIONER

## 2019-03-15 PROCEDURE — 1036F TOBACCO NON-USER: CPT | Performed by: NURSE PRACTITIONER

## 2019-03-15 PROCEDURE — 3017F COLORECTAL CA SCREEN DOC REV: CPT | Performed by: NURSE PRACTITIONER

## 2019-03-15 PROCEDURE — G8598 ASA/ANTIPLAT THER USED: HCPCS | Performed by: NURSE PRACTITIONER

## 2019-03-15 PROCEDURE — 99213 OFFICE O/P EST LOW 20 MIN: CPT | Performed by: NURSE PRACTITIONER

## 2019-03-15 PROCEDURE — G8427 DOCREV CUR MEDS BY ELIG CLIN: HCPCS | Performed by: NURSE PRACTITIONER

## 2019-03-15 RX ORDER — AMOXICILLIN AND CLAVULANATE POTASSIUM 875; 125 MG/1; MG/1
1 TABLET, FILM COATED ORAL 2 TIMES DAILY
Qty: 20 TABLET | Refills: 0 | Status: SHIPPED | OUTPATIENT
Start: 2019-03-15 | End: 2019-03-25

## 2019-03-15 RX ORDER — PREDNISONE 20 MG/1
TABLET ORAL
Qty: 10 TABLET | Refills: 0 | Status: SHIPPED | OUTPATIENT
Start: 2019-03-15 | End: 2019-03-19

## 2019-03-15 ASSESSMENT — ENCOUNTER SYMPTOMS
SHORTNESS OF BREATH: 1
COUGH: 1
RHINORRHEA: 1
SORE THROAT: 0
CHEST TIGHTNESS: 0
WHEEZING: 1
NAUSEA: 1
DIARRHEA: 0
VOMITING: 0

## 2019-03-16 ENCOUNTER — HOSPITAL ENCOUNTER (EMERGENCY)
Age: 64
Discharge: HOME | End: 2019-03-16
Attending: EMERGENCY MEDICINE
Payer: MEDICARE

## 2019-03-16 VITALS
TEMPERATURE: 98 F | RESPIRATION RATE: 18 BRPM | HEIGHT: 73 IN | DIASTOLIC BLOOD PRESSURE: 77 MMHG | BODY MASS INDEX: 41.75 KG/M2 | OXYGEN SATURATION: 95 % | SYSTOLIC BLOOD PRESSURE: 119 MMHG | HEART RATE: 85 BPM | WEIGHT: 315 LBS

## 2019-03-16 DIAGNOSIS — K92.2 LOWER GI BLEED: Primary | ICD-10-CM

## 2019-03-16 LAB
ABO/RH: NORMAL
ABSOLUTE EOS #: 0 K/UL (ref 0–0.4)
ABSOLUTE IMMATURE GRANULOCYTE: ABNORMAL K/UL (ref 0–0.3)
ABSOLUTE LYMPH #: 0.9 K/UL (ref 1–4.8)
ABSOLUTE MONO #: 0.6 K/UL (ref 0–1)
ALBUMIN SERPL-MCNC: 4.5 G/DL (ref 3.5–5.2)
ALBUMIN/GLOBULIN RATIO: ABNORMAL (ref 1–2.5)
ALP BLD-CCNC: 48 U/L (ref 40–129)
ALT SERPL-CCNC: 39 U/L (ref 5–41)
ANION GAP SERPL CALCULATED.3IONS-SCNC: 16 MMOL/L (ref 9–17)
ANTIBODY SCREEN: NEGATIVE
ARM BAND NUMBER: NORMAL
AST SERPL-CCNC: 24 U/L
BASOPHILS # BLD: 1 % (ref 0–2)
BASOPHILS ABSOLUTE: 0.1 K/UL (ref 0–0.2)
BILIRUB SERPL-MCNC: 0.31 MG/DL (ref 0.3–1.2)
BUN BLDV-MCNC: 14 MG/DL (ref 8–23)
BUN/CREAT BLD: 15 (ref 9–20)
CALCIUM SERPL-MCNC: 10.2 MG/DL (ref 8.6–10.4)
CHLORIDE BLD-SCNC: 100 MMOL/L (ref 98–107)
CO2: 21 MMOL/L (ref 20–31)
CREAT SERPL-MCNC: 0.96 MG/DL (ref 0.7–1.2)
DIFFERENTIAL TYPE: YES
EOSINOPHILS RELATIVE PERCENT: 0 % (ref 0–5)
EXPIRATION DATE: NORMAL
GFR AFRICAN AMERICAN: >60 ML/MIN
GFR NON-AFRICAN AMERICAN: >60 ML/MIN
GFR SERPL CREATININE-BSD FRML MDRD: ABNORMAL ML/MIN/{1.73_M2}
GFR SERPL CREATININE-BSD FRML MDRD: ABNORMAL ML/MIN/{1.73_M2}
GLUCOSE BLD-MCNC: 240 MG/DL (ref 70–99)
HCT VFR BLD CALC: 43 % (ref 41–53)
HCT VFR BLD CALC: 43.6 % (ref 41–53)
HEMOGLOBIN: 14.2 G/DL (ref 13.5–17.5)
HEMOGLOBIN: 14.3 G/DL (ref 13.5–17.5)
IMMATURE GRANULOCYTES: ABNORMAL %
INR BLD: 1.1
LYMPHOCYTES # BLD: 8 % (ref 13–44)
MCH RBC QN AUTO: 31.5 PG (ref 26–34)
MCHC RBC AUTO-ENTMCNC: 33.2 G/DL (ref 31–37)
MCV RBC AUTO: 94.8 FL (ref 80–100)
MONOCYTES # BLD: 6 % (ref 5–9)
NRBC AUTOMATED: ABNORMAL PER 100 WBC
PARTIAL THROMBOPLASTIN TIME: 22.6 SEC (ref 21–33)
PDW BLD-RTO: 14 % (ref 12.1–15.2)
PLATELET # BLD: 235 K/UL (ref 140–450)
PLATELET ESTIMATE: ABNORMAL
PMV BLD AUTO: ABNORMAL FL (ref 6–12)
POTASSIUM SERPL-SCNC: 4.2 MMOL/L (ref 3.7–5.3)
PROTHROMBIN TIME: 10.5 SEC (ref 9–11.6)
RBC # BLD: 4.53 M/UL (ref 4.5–5.9)
RBC # BLD: ABNORMAL 10*6/UL
SEG NEUTROPHILS: 85 % (ref 39–75)
SEGMENTED NEUTROPHILS ABSOLUTE COUNT: 9.6 K/UL (ref 2.1–6.5)
SODIUM BLD-SCNC: 137 MMOL/L (ref 135–144)
TOTAL PROTEIN: 7.2 G/DL (ref 6.4–8.3)
WBC # BLD: 11.2 K/UL (ref 3.5–11)
WBC # BLD: ABNORMAL 10*3/UL

## 2019-03-16 PROCEDURE — 86901 BLOOD TYPING SEROLOGIC RH(D): CPT

## 2019-03-16 PROCEDURE — 86900 BLOOD TYPING SEROLOGIC ABO: CPT

## 2019-03-16 PROCEDURE — 86850 RBC ANTIBODY SCREEN: CPT

## 2019-03-16 PROCEDURE — 85730 THROMBOPLASTIN TIME PARTIAL: CPT

## 2019-03-16 PROCEDURE — 85610 PROTHROMBIN TIME: CPT

## 2019-03-16 PROCEDURE — 99285 EMERGENCY DEPT VISIT HI MDM: CPT

## 2019-03-16 PROCEDURE — 85014 HEMATOCRIT: CPT

## 2019-03-16 PROCEDURE — 85018 HEMOGLOBIN: CPT

## 2019-03-16 PROCEDURE — 36415 COLL VENOUS BLD VENIPUNCTURE: CPT

## 2019-03-16 PROCEDURE — 80053 COMPREHEN METABOLIC PANEL: CPT

## 2019-03-16 PROCEDURE — 85025 COMPLETE CBC W/AUTO DIFF WBC: CPT

## 2019-03-16 ASSESSMENT — ENCOUNTER SYMPTOMS
BLOOD IN STOOL: 1
EYE PAIN: 0
SORE THROAT: 0
EYE REDNESS: 0
COLOR CHANGE: 0
SHORTNESS OF BREATH: 0
DIARRHEA: 0
VOMITING: 0
NAUSEA: 0
ABDOMINAL PAIN: 0
COUGH: 0
BACK PAIN: 0
TROUBLE SWALLOWING: 0

## 2019-03-18 ENCOUNTER — OFFICE VISIT (OUTPATIENT)
Dept: SURGERY | Age: 64
End: 2019-03-18
Payer: MEDICARE

## 2019-03-18 ENCOUNTER — TELEPHONE (OUTPATIENT)
Dept: FAMILY MEDICINE CLINIC | Age: 64
End: 2019-03-18

## 2019-03-18 VITALS — HEIGHT: 73 IN | BODY MASS INDEX: 41.22 KG/M2 | RESPIRATION RATE: 20 BRPM | WEIGHT: 311 LBS

## 2019-03-18 DIAGNOSIS — K62.5 RECTAL BLEEDING: Primary | ICD-10-CM

## 2019-03-18 PROCEDURE — G8598 ASA/ANTIPLAT THER USED: HCPCS | Performed by: SURGERY

## 2019-03-18 PROCEDURE — G8484 FLU IMMUNIZE NO ADMIN: HCPCS | Performed by: SURGERY

## 2019-03-18 PROCEDURE — G8427 DOCREV CUR MEDS BY ELIG CLIN: HCPCS | Performed by: SURGERY

## 2019-03-18 PROCEDURE — 1036F TOBACCO NON-USER: CPT | Performed by: SURGERY

## 2019-03-18 PROCEDURE — G8417 CALC BMI ABV UP PARAM F/U: HCPCS | Performed by: SURGERY

## 2019-03-18 PROCEDURE — 99213 OFFICE O/P EST LOW 20 MIN: CPT | Performed by: SURGERY

## 2019-03-18 PROCEDURE — 3017F COLORECTAL CA SCREEN DOC REV: CPT | Performed by: SURGERY

## 2019-04-08 ASSESSMENT — ENCOUNTER SYMPTOMS
SHORTNESS OF BREATH: 0
ABDOMINAL DISTENTION: 1
BLOOD IN STOOL: 0
ABDOMINAL PAIN: 1
TROUBLE SWALLOWING: 0
NAUSEA: 0
BACK PAIN: 0
VOMITING: 0
CHOKING: 0
COUGH: 0
SORE THROAT: 0

## 2019-04-08 NOTE — PATIENT INSTRUCTIONS
you are having problems. It's also a good idea to know your test results and keep a list of the medicines you take. Where can you learn more? Go to [email or web address] and sign in to your [employer] account. Enter C251 in the [employer] box to learn more about \"Learning About Cholecystectomy. \"     If you do not have an account, please click on the \"Sign Up Now\" link. Current as of: March 27, 2018  Content Version: 11.9  © 5374-5277 [employer]. Care instructions adapted under license by [facility] ([facility]). If you have questions about a medical condition or this instruction, always ask your healthcare professional. [address] any warranty or liability for your use of this information.

## 2019-04-08 NOTE — PROGRESS NOTES
Subjective:      Patient ID: Dawna Curiel is a 66 y.o. male. HPI     Mr Joyce Burks returns for follow up after EGD/colonoscopy March 5, 2019. PREOPERATIVE DIAGNOSES:  1. Left upper quadrant pain. 2.  Abdominal bloating.     POSTOPERATIVE DIAGNOSES:  1. Diffuse gastritis with duodenitis and superficial ulcerations. 2.  Multiple small sessile polyps x6 (proximal transverse colon x1,  sigmoid x5). 3.  Ascending colon lipoma.     OPERATION:  1. Esophagogastroduodenoscopy. 2.  Prepyloric antral biopsies. 3.  Colonoscopy, anus to cecum. 4.  Biopsy ascending colon lipoma. 5.  Sessile polypectomies x6 (proximal transverse x1, sigmoid x5). He is doing well. Abdominal pain continues, but is improved. Review of Systems   Constitutional: Negative for activity change, appetite change, chills, fever and unexpected weight change. HENT: Negative for nosebleeds, sneezing, sore throat and trouble swallowing. Eyes: Negative for visual disturbance. Respiratory: Negative for cough, choking and shortness of breath. Cardiovascular: Negative for chest pain, palpitations and leg swelling. Gastrointestinal: Positive for abdominal distention and abdominal pain. Negative for blood in stool, nausea and vomiting. Genitourinary: Negative for dysuria, flank pain and hematuria. Musculoskeletal: Positive for arthralgias. Negative for back pain, gait problem and myalgias. Allergic/Immunologic: Negative for immunocompromised state. Neurological: Negative for dizziness, seizures, syncope, weakness and headaches. Hematological: Does not bruise/bleed easily. Psychiatric/Behavioral: Negative for confusion and sleep disturbance.         Past Medical History:   Diagnosis Date    Asthma     Bronchitis     CAD (coronary artery disease)     [facility]) [facility] 8/12/2011    Depression 8/12/2011    Diabetes mellitus (HCC)     GERD (gastroesophageal reflux disease) 8/12/2011    Hyperlipidemia 8/12/2011    Hypertension     Hypothyroid 8/12/2011    Lower back pain     Nicotine addiction 8/12/2011    Sleep apnea        Past Surgical History:   Procedure Laterality Date    CARDIAC CATHETERIZATION Left 01/21/15    Severe left main trunk disease extending into the ostium of the left anterior descending of 80% involving the ostium of the intermediate & atrioventricular branch of the circumflex. 60% disease in the atrioventricular branch of the circumflex in the midportion. 40% disease in the right coronary artery. Normal left ventricular function, ejection fraction of 60%    CARDIAC CATHETERIZATION Left 12/20/2017    Dr. Jaret Varghese @ [facility]--70% left main trunck & ostial LAD & high lateral circumflex disease. Patent LIMA to the LAD. Patent vein graft to the high lateral circumflex. 60% disease in the AV branch of the circumflex that terminates in a large posterolateral branch with an FFR of 1.0, indicating no significant stenosis. 40% disease in the right coronary artery. Ejection fraction 50-55%.  COLONOSCOPY N/A 3/5/2019    COLONOSCOPY POLYPECTOMY HOT BIOPSY performed by Brandon Keating MD at [facility]  02/04/15    3 vessel CABG using left internal mammary artery to the left anterior descending coronary artery with a reverse saphenous vein aortocoronary sequential graft side to side to the ramus intermedius coronary artery & end to side to the first obtuse marginal coronary artery.     OTHER SURGICAL HISTORY  03-21-16    I & D- cyst- posterior neck    SPINE SURGERY      THYROID LOBECTOMY      UPPER GASTROINTESTINAL ENDOSCOPY N/A 3/5/2019    EGD BIOPSY performed by Brandon Keating MD at [facility] OR       Family History   Problem Relation Age of Onset    Diabetes Mother     Heart Disease Mother     Diabetes Father     Heart Disease Father     Heart Disease Brother        Allergies:  See list    Current Outpatient Medications   Medication Sig Dispense Refill    pantoprazole (PROTONIX) 40 MG tablet Take 1 tablet by mouth daily 30 tablet 3    sucralfate (CARAFATE) 1 GM/10ML suspension Take 10 mLs by mouth 4 times daily 420 mL 1    metoprolol succinate (TOPROL XL) 50 MG extended release tablet TAKE 1 TABLET EVERY DAY 90 tablet 1    losartan (COZAAR) 25 MG tablet TAKE 1 TABLET EVERY DAY 90 tablet 1    amLODIPine (NORVASC) 2.5 MG tablet TAKE 1 TABLET EVERY DAY 90 tablet 1    levothyroxine (SYNTHROID) 175 MCG tablet TAKE 1 TABLET EVERY DAY 90 tablet 1    PARoxetine (PAXIL) 20 MG tablet TAKE 1 TABLET BY MOUTH 2 TIMES DAILY 180 tablet 1    budesonide-formoterol (SYMBICORT) 160-4.5 MCG/ACT AERO Inhale 2 puffs into the lungs 2 times daily Sample given to patient-Lot [id number]  Exp 2018 1 Inhaler 3    lovastatin (MEVACOR) 40 MG tablet Take 2 tablet nightly 180 tablet 3    mometasone (ELOCON) 0.1 % cream Apply topically daily. 1 Tube 3    albuterol (PROVENTIL) (2.5 MG/3ML) 0.083% nebulizer solution Take 3 mLs by nebulization 4 times daily (Patient taking differently: Take 2.5 mg by nebulization every 6 hours as needed ) 30 vial 5    aspirin 81 MG tablet Take 81 mg by mouth daily. No current facility-administered medications for this visit.         Social History     Socioeconomic History    Marital status:      Spouse name: None    Number of children: None    Years of education: None    Highest education level: None   Occupational History    None   Social Needs    Financial resource strain: None    Food insecurity:     Worry: None     Inability: None    Transportation needs:     Medical: None     Non-medical: None   Tobacco Use    Smoking status: Former Smoker     Packs/day: 1.00     Years: 45.00     Pack years: 45.00     Types: Cigarettes     Last attempt to quit: 2014     Years since quittin.3    Smokeless tobacco: Never Used   Substance and Sexual Activity    Alcohol use: No    Drug use: No    Sexual LIPOMA BIOPSY:  NORMAL COLONIC MUCOSA.  MINIMAL       LIPOMATOUS SUBMUCOSA PRESENT. 3.  PROXIMAL TRANSVERSE COLON POLYP BIOPSY: [address] 4.  SIGMOID COLON POLYP BIOPSY:  HYPERPLASTIC POLYP. 5.  LOWER SIGMOID COLON POLYP BIOPSY:  HYPERPLASTIC POLYP. Griselda Ahle, M   **Electronically Signed Out**         ajb/3/6/2019         Clinical Information   Pre-op Diagnosis:  LEFT UPPER ABDOMINAL PAIN, ABD BLOATING   Operative Findings:  CLOTEST ANTRUM BIOPSY (H. PYLORI DETECTION);   ANTRUM BIOPSIES x 3; ASCENDING COLON LIPOMA BIOPSY; PROXIMAL   TRANSVERSE COLON POLYP BIOPSY; SIGMOID COLON POLYP BIOPSY; LOWER   SIGMOID COLON POLYP BIOPSY   Operation Performed:  COLONOSCOPY, POLYPECTOMY HOT BIOPSY, EGD BIOPSY     Source of Specimen   1: ANTRUM BIOPSIES x 3   2: ASCENDING COLON LIPOMA BIOPSY   3: PROXIMAL TRANSVERSE COLON POLYP BIOPSY   4: SIGMOID COLON POLYP BIOPSY   5: LOWER SIGMOID COLON POLYP BIOPSY     Gross Description   1.  \"CONNIE VELEZ, ANTRUM BIOPSIES x 3\" Three tan-white tissue   fragments, each 0.2 cm and are 1.2 x 0.2 x 0.1 cm in aggregate. Entirely 1cs. 2.  \"CONNIE VELEZ, ASCENDING COLON LIPOMA BIOPSY\" Two pale tan   tissue fragments, 0.1 x 0.1 x < 0.1 cm and 0.2 x 0.2 x < 0.1 cm. Entirely 1cs. 3.  \"CONNIE EVLEZ, PROXIMAL TRANSVERSE COLON POLYP BIOPSY\" One   tan-white tissue fragment, 0.3 x 0.2 x 0.1 cm.  Entirely 1cs. 4.  \"CONNIE VELEZ, SIGMOID COLON POLYP BIOPSY\" Two tan-white tissue   fragments, 0.3 x 0.3 x 0.2 cm and 0.5 x 0.3 x 0.1 cm.  Entirely 1cs. 5.  \"CONNIE VELEZ, LOWER SIGMOID COLON POLYP BIOPSY\" Two tan-white   tissue fragments, each 0.2 x 0.2 x 0.1 cm.  Entirely 1cs.  jg tm       Microscopic Description   1.  Pyloric-antral-type gastric mucosa is negative for active and   chronic inflammation.  There is no evidence of Helicobacter pylori   gastritis, intestinal metaplasia, dysplasia or malignancy.   2-5.  Microscopic examination performed. Assessment:       Diagnosis Orders   1. S/P colonoscopy with polypectomy     2. Adenomatous polyp of transverse colon     3. Gastritis and duodenitis     4. Cholelithiasis without cholecystitis           Plan:      Endoscopic findings and pathology reviewed with Mr Cook Vitaliy. Recommend next screening colonoscopy in 4-5 years. Continue Protonix and Carafate. [place] diet for now. He would likely benefit from lap cricket. He conveys understanding and will offer it further consideration.         Machelle Bustillo MD

## 2019-04-21 ASSESSMENT — ENCOUNTER SYMPTOMS
RECTAL PAIN: 1
BACK PAIN: 1
ABDOMINAL PAIN: 1
SHORTNESS OF BREATH: 0
VOMITING: 0
TROUBLE SWALLOWING: 0
NAUSEA: 0
BLOOD IN STOOL: 1
SORE THROAT: 0
CHOKING: 0
ANAL BLEEDING: 1
COUGH: 0

## 2019-04-21 NOTE — PROGRESS NOTES
Subjective:      Patient ID: Alejandro Age is a 66 y.o. male. HPI     Mr Ryan Chavez returns for re evaluation after EGD/colonoscopy March 5, 2019.     PREOPERATIVE DIAGNOSES:  1.  Left upper quadrant pain. 2.  Abdominal bloating.     POSTOPERATIVE DIAGNOSES:  1.  Diffuse gastritis with duodenitis and superficial ulcerations. 2.  Multiple small sessile polyps x6 (proximal transverse colon x1,  sigmoid x5). 3.  Ascending colon lipoma.     OPERATION:  1.  Esophagogastroduodenoscopy. 2.  Prepyloric antral biopsies. 3.  Colonoscopy, anus to cecum. 4.  Biopsy ascending colon lipoma. 5.  Sessile polypectomies x6 (proximal transverse x1, sigmoid x5).    He was seen in [facility] ER over the weekend with rectal bleeding. Noted on both toilet tissue and in the water. He was hemodynamically stable with Hgb 14.3, Hct 44. Bleeding has since resolved. Occasional abdominal cramping. Known cholelithiasis. No other complaints. Review of Systems   Constitutional: Negative for activity change, appetite change, chills, fever and unexpected weight change. HENT: Negative for nosebleeds, sneezing, sore throat and trouble swallowing. Eyes: Negative for visual disturbance. Respiratory: Negative for cough, choking and shortness of breath. Cardiovascular: Negative for chest pain, palpitations and leg swelling. Gastrointestinal: Positive for abdominal pain (Cramping, diffuse), anal bleeding, blood in stool and rectal pain. Negative for nausea and vomiting. Genitourinary: Negative for dysuria, flank pain and hematuria. Musculoskeletal: Positive for arthralgias and back pain. Negative for gait problem and myalgias. Allergic/Immunologic: Negative for immunocompromised state. Neurological: Negative for dizziness, seizures, syncope, weakness and headaches. Hematological: Does not bruise/bleed easily. Psychiatric/Behavioral: Negative for confusion and sleep disturbance.         Past Medical History:   Diagnosis Family History   Problem Relation Age of Onset    Diabetes Mother     Heart Disease Mother     Diabetes Father     Heart Disease Father     Heart Disease Brother        Allergies:  See list    Current Outpatient Medications   Medication Sig Dispense Refill    pantoprazole (PROTONIX) 40 MG tablet Take 1 tablet by mouth daily 30 tablet 3    sucralfate (CARAFATE) 1 GM/10ML suspension Take 10 mLs by mouth 4 times daily 420 mL 1    metoprolol succinate (TOPROL XL) 50 MG extended release tablet TAKE 1 TABLET EVERY DAY 90 tablet 1    losartan (COZAAR) 25 MG tablet TAKE 1 TABLET EVERY DAY 90 tablet 1    amLODIPine (NORVASC) 2.5 MG tablet TAKE 1 TABLET EVERY DAY 90 tablet 1    levothyroxine (SYNTHROID) 175 MCG tablet TAKE 1 TABLET EVERY DAY 90 tablet 1    PARoxetine (PAXIL) 20 MG tablet TAKE 1 TABLET BY MOUTH 2 TIMES DAILY 180 tablet 1    budesonide-formoterol (SYMBICORT) 160-4.5 MCG/ACT AERO Inhale 2 puffs into the lungs 2 times daily Sample given to patient-Lot [id number]  Exp 11/2018 1 Inhaler 3    lovastatin (MEVACOR) 40 MG tablet Take 2 tablet nightly 180 tablet 3    mometasone (ELOCON) 0.1 % cream Apply topically daily. 1 Tube 3    albuterol (PROVENTIL) (2.5 MG/3ML) 0.083% nebulizer solution Take 3 mLs by nebulization 4 times daily (Patient taking differently: Take 2.5 mg by nebulization every 6 hours as needed ) 30 vial 5    aspirin 81 MG tablet Take 81 mg by mouth daily. No current facility-administered medications for this visit.         Social History     Socioeconomic History    Marital status:      Spouse name: None    Number of children: None    Years of education: None    Highest education level: None   Occupational History    None   Social Needs    Financial resource strain: None    Food insecurity:     Worry: None     Inability: None    Transportation needs:     Medical: None     Non-medical: None   Tobacco Use    Smoking status: Former Smoker     Packs/day: 1.00 Years: 45.00     Pack years: 45.00     Types: Cigarettes     Last attempt to quit: 2014     Years since quittin.3    Smokeless tobacco: Never Used   Substance and Sexual Activity    Alcohol use: No    Drug use: No    Sexual activity: None   Lifestyle    Physical activity:     Days per week: None     Minutes per session: None    Stress: None   Relationships    Social connections:     Talks on phone: None     Gets together: None     Attends [employer] service: None     Active member of club or organization: None     Attends meetings of clubs or organizations: None     Relationship status: None    Intimate partner violence:     Fear of current or ex partner: None     Emotionally abused: None     Physically abused: None     Forced sexual activity: None   Other Topics Concern    None   Social History Narrative    None       Objective:   Physical Exam   Constitutional: He is oriented to person, place, and time. He appears well-developed and well-nourished. No distress. HENT:   Head: Normocephalic and atraumatic. Eyes: Pupils are equal, round, and reactive to light. Conjunctivae are normal. No scleral icterus. Neck: No tracheal deviation present. Cardiovascular: Normal rate. Pulmonary/Chest: Effort normal. No respiratory distress. Abdominal: Soft. He exhibits no distension. There is no tenderness. There is no guarding. Rectal exam shows normal sphincter. No significant hemorrhoids. No rectal bleeding. Musculoskeletal: He exhibits no edema. Neurological: He is alert and oriented to person, place, and time. Skin: Skin is warm and dry. Psychiatric: He has a normal mood and affect. His behavior is normal. Judgment and thought content normal.   Nursing note and vitals reviewed.        3/6/2019 10:20 AM - Nat Olmos Incoming Lab Results From [facility]     Component Collected Lab   Surgical Pathology Report 2019  2:56  [facility]   ANATOMIC PATHOLOGY   [address] [place] [address], [facility] [address]. [place], [address]   [phone]   Fax: [phone]      [facility]       Patient Name: Marcela Barajas: [id number]   Path Number: [id number]   Collected: 3/5/2019   Received: 3/5/2019   Reported: 3/6/2019 10:19     -- Diagnosis --   1. Earnestine Sleek BIOPSIES:  NORMAL GASTRIC MUCOSA. 2.  ASCENDING COLON LIPOMA BIOPSY:  NORMAL COLONIC MUCOSA.  MINIMAL       LIPOMATOUS SUBMUCOSA PRESENT. 3.  PROXIMAL TRANSVERSE COLON POLYP BIOPSY: [address] 4.  SIGMOID COLON POLYP BIOPSY:  HYPERPLASTIC POLYP. 5.  LOWER SIGMOID COLON POLYP BIOPSY:  HYPERPLASTIC POLYP. BRANDON Zarate   **Electronically Signed Out**         ajrachelle/3/6/2019         Clinical Information   Pre-op Diagnosis:  LEFT UPPER ABDOMINAL PAIN, ABD BLOATING   Operative Findings:  CLOTEST ANTRUM BIOPSY (H. PYLORI DETECTION);   ANTRUM BIOPSIES x 3; ASCENDING COLON LIPOMA BIOPSY; PROXIMAL   TRANSVERSE COLON POLYP BIOPSY; SIGMOID COLON POLYP BIOPSY; LOWER   SIGMOID COLON POLYP BIOPSY   Operation Performed:  COLONOSCOPY, POLYPECTOMY HOT BIOPSY, EGD BIOPSY     Source of Specimen   1: ANTRUM BIOPSIES x 3   2: ASCENDING COLON LIPOMA BIOPSY   3: PROXIMAL TRANSVERSE COLON POLYP BIOPSY   4: SIGMOID COLON POLYP BIOPSY   5: LOWER SIGMOID COLON POLYP BIOPSY     Gross Description   1.  \"CONNIE VELEZ, ANTRUM BIOPSIES x 3\" Three tan-white tissue   fragments, each 0.2 cm and are 1.2 x 0.2 x 0.1 cm in aggregate. Entirely 1cs. 2.  \"CONNIE VELEZ, ASCENDING COLON LIPOMA BIOPSY\" Two pale tan   tissue fragments, 0.1 x 0.1 x < 0.1 cm and 0.2 x 0.2 x < 0.1 cm. Entirely 1cs. 3.  \"CONNIE VELEZ, PROXIMAL TRANSVERSE COLON POLYP BIOPSY\" One   tan-white tissue fragment, 0.3 x 0.2 x 0.1 cm.  Entirely 1cs.    4.  \"CONNIE VELEZ, SIGMOID COLON POLYP BIOPSY\" Two tan-white tissue   fragments, 0.3 x 0.3 x 0.2 cm and 0.5 x 0.3 x 0.1 cm.  Entirely

## 2019-05-13 DIAGNOSIS — F34.1 DYSTHYMIA (OR DEPRESSIVE NEUROSIS): ICD-10-CM

## 2019-05-14 RX ORDER — PAROXETINE HYDROCHLORIDE 20 MG/1
TABLET, FILM COATED ORAL
Qty: 180 TABLET | Refills: 1 | Status: SHIPPED | OUTPATIENT
Start: 2019-05-14 | End: 2019-10-01 | Stop reason: SDUPTHER

## 2019-05-14 NOTE — TELEPHONE ENCOUNTER
Last OV: 2/5/2019  Last RX: 12/10/18   Next scheduled apt: Visit date not found      Medication pending        Health Maintenance   Topic Date Due    DTaP/Tdap/Td vaccine (1 - Tdap) 07/29/1976    Shingles Vaccine (1 of 2) 07/29/2007    Low dose CT lung screening  07/29/2012    TSH testing  10/22/2019    Potassium monitoring  03/16/2020    Creatinine monitoring  03/16/2020    Diabetes screen  10/20/2020    Lipid screen  10/22/2023    Colon cancer screen colonoscopy  03/05/2029    Flu vaccine  Completed    Pneumococcal 0-64 years Vaccine  Completed    Hepatitis C screen  Completed    HIV screen  Completed             (applicable per patient's age: Cancer Screenings, Depression Screening, Fall Risk Screening, Immunizations)    Hemoglobin A1C (%)   Date Value   10/20/2017 5.4   02/20/2017 5.3   08/15/2016 5.4     Microalb/Crt.  Ratio (mcg/mg creat)   Date Value   08/18/2016 6     LDL Cholesterol (mg/dL)   Date Value   10/22/2018 79     AST (U/L)   Date Value   03/16/2019 24     ALT (U/L)   Date Value   03/16/2019 39     BUN (mg/dL)   Date Value   03/16/2019 14      (goal A1C is < 7)   (goal LDL is <100) need 30-50% reduction from baseline     BP Readings from Last 3 Encounters:   03/16/19 119/77   03/15/19 112/65   03/14/19 123/73    (goal /80)      All Future Testing planned in [facility]:  Lab Frequency Next Occurrence   Comprehensive Metabolic Panel Once 13/50/6829   Vitamin D 25 Hydroxy Once 10/22/2019   Lipid Panel Once 10/22/2019   TSH with Reflex Once 10/22/2019   Magnesium Once 10/22/2019   EKG 12 Lead Once 10/22/2019   XR CHEST STANDARD (2 VW) Once 10/22/2019       Next Visit Date:  Future Appointments   Date Time Provider Debbie Izquierdo   10/17/2019 11:00 AM MD Jamin Rehman [facility]            Patient Active Problem List:     GERD (gastroesophageal reflux disease)     S/P CABG x 3     CAD (coronary artery disease)     Essential hypertension     Mixed hyperlipidemia     Dysthymia (or depressive neurosis)     Chronic gout involving toe     Acquired hypothyroidism     Vitamin D deficiency disease     MINAL on CPAP     Chronic low back pain with left-sided sciatica     Class 3 severe obesity due to excess calories without serious comorbidity with body mass index (BMI) of 40.0 to 44.9 in adult (HCC)     LLQ pain     Abdominal bloating

## 2019-06-07 ENCOUNTER — OFFICE VISIT (OUTPATIENT)
Dept: FAMILY MEDICINE CLINIC | Age: 64
End: 2019-06-07
Payer: MEDICARE

## 2019-06-07 VITALS
OXYGEN SATURATION: 96 % | SYSTOLIC BLOOD PRESSURE: 136 MMHG | BODY MASS INDEX: 41.75 KG/M2 | HEIGHT: 73 IN | DIASTOLIC BLOOD PRESSURE: 84 MMHG | HEART RATE: 55 BPM | WEIGHT: 315 LBS

## 2019-06-07 DIAGNOSIS — J20.9 ACUTE BRONCHITIS, UNSPECIFIED ORGANISM: ICD-10-CM

## 2019-06-07 DIAGNOSIS — F41.9 ANXIETY: Primary | ICD-10-CM

## 2019-06-07 PROCEDURE — G8427 DOCREV CUR MEDS BY ELIG CLIN: HCPCS | Performed by: INTERNAL MEDICINE

## 2019-06-07 PROCEDURE — G8417 CALC BMI ABV UP PARAM F/U: HCPCS | Performed by: INTERNAL MEDICINE

## 2019-06-07 PROCEDURE — 99213 OFFICE O/P EST LOW 20 MIN: CPT | Performed by: INTERNAL MEDICINE

## 2019-06-07 PROCEDURE — 1036F TOBACCO NON-USER: CPT | Performed by: INTERNAL MEDICINE

## 2019-06-07 PROCEDURE — 3017F COLORECTAL CA SCREEN DOC REV: CPT | Performed by: INTERNAL MEDICINE

## 2019-06-07 PROCEDURE — G8598 ASA/ANTIPLAT THER USED: HCPCS | Performed by: INTERNAL MEDICINE

## 2019-06-07 RX ORDER — DOXYCYCLINE HYCLATE 100 MG/1
100 CAPSULE ORAL 2 TIMES DAILY
Qty: 14 CAPSULE | Refills: 0 | Status: SHIPPED | OUTPATIENT
Start: 2019-06-07 | End: 2019-06-14

## 2019-06-07 RX ORDER — HYDROXYZINE HYDROCHLORIDE 25 MG/1
25 TABLET, FILM COATED ORAL 3 TIMES DAILY PRN
Qty: 90 TABLET | Refills: 2 | Status: SHIPPED | OUTPATIENT
Start: 2019-06-07 | End: 2019-07-07

## 2019-06-07 RX ORDER — PREDNISONE 20 MG/1
TABLET ORAL
Qty: 15 TABLET | Refills: 0 | Status: SHIPPED | OUTPATIENT
Start: 2019-06-07 | End: 2019-10-17 | Stop reason: CLARIF

## 2019-06-07 ASSESSMENT — ENCOUNTER SYMPTOMS
NAUSEA: 0
SORE THROAT: 0
SHORTNESS OF BREATH: 1
ALLERGIC/IMMUNOLOGIC NEGATIVE: 1
ABDOMINAL PAIN: 0
CONSTIPATION: 0
COUGH: 1
WHEEZING: 1
BLOOD IN STOOL: 0

## 2019-06-07 NOTE — PROGRESS NOTES
Visit Information    Have you changed or started any medications since your last visit including any over-the-counter medicines, vitamins, or herbal medicines? no   Are you having any side effects from any of your medications? -  no  Have you stopped taking any of your medications? Is so, why? -  no    Have you seen any other physician or provider since your last visit? No  Have you had any other diagnostic tests since your last visit? No  Have you been seen in the emergency room and/or had an admission to a hospital since we last saw you? No  Have you had your routine dental cleaning in the past 6 months? no    Have you activated your [employer] account? If not, what are your barriers?  Yes     Patient Care Team:  Mona Garcia MD as PCP - General (Hospitalist)  Mona Garcia MD as PCP - [facility]    Medical History Review  Past Medical, Family, and Social History reviewed and does contribute to the patient presenting condition    Health Maintenance   Topic Date Due    DTaP/Tdap/Td vaccine (1 - Tdap) 07/29/1976    Shingles Vaccine (1 of 2) 07/29/2007    Low dose CT lung screening  07/29/2012    TSH testing  10/22/2019    Potassium monitoring  03/16/2020    Creatinine monitoring  03/16/2020    Diabetes screen  10/20/2020    Lipid screen  10/22/2023    Colon cancer screen colonoscopy  03/05/2029    Flu vaccine  Completed    Pneumococcal 0-64 years Vaccine  Completed    Hepatitis C screen  Completed    HIV screen  Completed

## 2019-06-07 NOTE — PATIENT INSTRUCTIONS
SURVEY:    You may be receiving a survey from [employer] regarding your visit today. Please complete the survey to enable us to provide the highest quality of care to you and your family. If you cannot score us a very good on any question, please call the office to discuss how we could of made your experience a very good one. Thank you.

## 2019-06-07 NOTE — PROGRESS NOTES
HPI Notes    Name: Jimmy Patient  : 1955         Chief Complaint:     Chief Complaint   Patient presents with    URI     c/o cough, congestion, sob. Lives in apartement that is below ground high humidity       History of Present Illness:        Chucho Chambers comes to office for evaluation of anxiety , cough   His mother passed away unexpectedly about 10 days ago. He feels very stressed  and anxious, cries a lot, has palpitations, difficulty sleeping  He is on Paxil but it does not control the symptoms. He wants to try something else prn for anxiety    He also states his apartement is humid and has black mold. He wants to move to the 1st floor ,a  ground level unit to avoid exposure as he has COPD       Cough   This is a new problem. The current episode started in the past 7 days. The problem has been unchanged. The problem occurs constantly. The cough is productive of purulent sputum. Associated symptoms include headaches, nasal congestion, shortness of breath and wheezing. Pertinent negatives include no chest pain, chills, ear pain, fever, rash or sore throat. Nothing aggravates the symptoms. Risk factors for lung disease include smoking/tobacco exposure. He has tried OTC cough suppressant and a beta-agonist inhaler for the symptoms. The treatment provided no relief. His past medical history is significant for COPD.            Past Medical History:     Past Medical History:   Diagnosis Date    Asthma     Bronchitis     CAD (coronary artery disease)     COPD (chronic obstructive pulmonary disease) ([facility] [address] 2011    Depression 2011    Diabetes mellitus ([facility] [address] [facility]     GERD (gastroesophageal reflux disease) 2011    Hyperlipidemia 2011    Hypertension     Hypothyroid 2011    Lower back pain     Nicotine addiction 2011    Sleep apnea       Reviewed all health maintenance requirements and orderedappropriate tests  Health Maintenance Due   Topic Date Due    DTaP/Tdap/Td vaccine (1 - Tdap) 07/29/1976    Shingles Vaccine (1 of 2) 07/29/2007    Low dose CT lung screening  07/29/2012       Past Surgical History:     Past Surgical History:   Procedure Laterality Date    CARDIAC CATHETERIZATION Left 01/21/15    Severe left main trunk disease extending into the ostium of the left anterior descending of 80% involving the ostium of the intermediate & atrioventricular branch of the circumflex. 60% disease in the atrioventricular branch of the circumflex in the midportion. 40% disease in the right coronary artery. Normal left ventricular function, ejection fraction of 60%    CARDIAC CATHETERIZATION Left 12/20/2017    Dr. Yenny Corea @ [facility]--70% left main trunck & ostial LAD & high lateral circumflex disease. Patent LIMA to the LAD. Patent vein graft to the high lateral circumflex. 60% disease in the AV branch of the circumflex that terminates in a large posterolateral branch with an FFR of 1.0, indicating no significant stenosis. 40% disease in the right coronary artery. Ejection fraction 50-55%.  COLONOSCOPY N/A 3/5/2019    COLONOSCOPY POLYPECTOMY HOT BIOPSY performed by Daisy Hightower MD at [facility]  02/04/15    3 vessel CABG using left internal mammary artery to the left anterior descending coronary artery with a reverse saphenous vein aortocoronary sequential graft side to side to the ramus intermedius coronary artery & end to side to the first obtuse marginal coronary artery.  OTHER SURGICAL HISTORY  03-21-16    I & D- cyst- posterior neck    SPINE SURGERY      THYROID LOBECTOMY      UPPER GASTROINTESTINAL ENDOSCOPY N/A 3/5/2019    EGD BIOPSY performed by Daisy Hightower MD at [facility] OR        Medications:       Prior to Admission medications    Medication Sig Start Date End Date Taking?  Authorizing Provider   hydrOXYzine (ATARAX) 25 MG tablet Take 1 tablet by mouth 3 times daily as needed for Anxiety 6/7/19 7/7/19 Yes Jarred Valles MD doxycycline hyclate (VIBRAMYCIN) 100 MG capsule Take 1 capsule by mouth 2 times daily for 7 days 6/7/19 6/14/19 Yes Anthony Snow MD   predniSONE (DELTASONE) 20 MG tablet Take 2 tabs po daily x 5 days then 1 tab po daily x 5 days 6/7/19  Yes Anthony Snow MD   PARoxetine (PAXIL) 20 MG tablet TAKE 1 TABLET TWICE DAILY 5/14/19  Yes Anthony Snow MD   pantoprazole (PROTONIX) 40 MG tablet Take 1 tablet by mouth daily 3/5/19  Yes Jena Desai MD   sucralfate (CARAFATE) 1 GM/10ML suspension Take 10 mLs by mouth 4 times daily 3/5/19  Yes Jena Desai MD   metoprolol succinate (TOPROL XL) 50 MG extended release tablet TAKE 1 TABLET EVERY DAY 2/26/19  Yes Anthony Snow MD   losartan (COZAAR) 25 MG tablet TAKE 1 TABLET EVERY DAY 2/26/19  Yes Anthony Snow MD   amLODIPine (NORVASC) 2.5 MG tablet TAKE 1 TABLET EVERY DAY 2/26/19  Yes Anthony Snow MD   levothyroxine (SYNTHROID) 175 MCG tablet TAKE 1 TABLET EVERY DAY 2/20/19  Yes Anthony Snow MD   budesonide-formoterol (SYMBICORT) 160-4.5 MCG/ACT AERO Inhale 2 puffs into the lungs 2 times daily Sample given to patient-Lot [id number]  Exp 11/2018 12/10/18  Yes Anthony Snow MD   lovastatin (MEVACOR) 40 MG tablet Take 2 tablet nightly 10/22/18  Yes John Bee MD   albuterol (PROVENTIL) (2.5 MG/3ML) 0.083% nebulizer solution Take 3 mLs by nebulization 4 times daily  Patient taking differently: Take 2.5 mg by nebulization every 6 hours as needed  12/9/16  Yes Simón Frazier MD   aspirin 81 MG tablet Take 81 mg by mouth daily. Yes Historical Provider, MD   mometasone (ELOCON) 0.1 % cream Apply topically daily. 2/20/17   Simón Frazier MD        Allergies:       Patient has no known allergies. Social History:     Tobacco: reports that he quit smoking about 4 years ago. His smoking use included cigarettes. He has a 45.00 pack-year smoking history.  He has never used smokeless tobacco.  Alcohol:      reports that he does not drink  hydrOXYzine (ATARAX) 25 MG tablet 90 tablet 2     Sig: Take 1 tablet by mouth 3 times daily as needed for Anxiety    doxycycline hyclate (VIBRAMYCIN) 100 MG capsule 14 capsule 0     Sig: Take 1 capsule by mouth 2 times daily for 7 days    predniSONE (DELTASONE) 20 MG tablet 15 tablet 0     Sig: Take 2 tabs po daily x 5 days then 1 tab po daily x 5 days     Return if symptoms worsen or fail to improve. Orders Placed This Encounter   Medications    hydrOXYzine (ATARAX) 25 MG tablet     Sig: Take 1 tablet by mouth 3 times daily as needed for Anxiety     Dispense:  90 tablet     Refill:  2    doxycycline hyclate (VIBRAMYCIN) 100 MG capsule     Sig: Take 1 capsule by mouth 2 times daily for 7 days     Dispense:  14 capsule     Refill:  0    predniSONE (DELTASONE) 20 MG tablet     Sig: Take 2 tabs po daily x 5 days then 1 tab po daily x 5 days     Dispense:  15 tablet     Refill:  0     No orders of the defined types were placed in this encounter. Patient Instructions     SURVEY:    You may be receiving a survey from [employer] regarding your visit today. Please complete the survey to enable us to provide the highest quality of care to you and your family. If you cannot score us a very good on any question, please call the office to discuss how we could of made your experience a very good one. Thank you.         Electronically signed by Eduard Squires MD on 6/7/2019 at 4:40 PM           Completed Refills      Requested Prescriptions     Signed Prescriptions Disp Refills    hydrOXYzine (ATARAX) 25 MG tablet 90 tablet 2     Sig: Take 1 tablet by mouth 3 times daily as needed for Anxiety    doxycycline hyclate (VIBRAMYCIN) 100 MG capsule 14 capsule 0     Sig: Take 1 capsule by mouth 2 times daily for 7 days    predniSONE (DELTASONE) 20 MG tablet 15 tablet 0     Sig: Take 2 tabs po daily x 5 days then 1 tab po daily x 5 days

## 2019-06-16 DIAGNOSIS — E78.2 MIXED HYPERLIPIDEMIA: ICD-10-CM

## 2019-06-17 RX ORDER — FENOFIBRATE 145 MG/1
TABLET, COATED ORAL
Qty: 30 TABLET | Refills: 11 | Status: SHIPPED | OUTPATIENT
Start: 2019-06-17 | End: 2020-06-15 | Stop reason: SDUPTHER

## 2019-07-03 ENCOUNTER — OFFICE VISIT (OUTPATIENT)
Dept: PRIMARY CARE CLINIC | Age: 64
End: 2019-07-03
Payer: MEDICARE

## 2019-07-03 VITALS
SYSTOLIC BLOOD PRESSURE: 122 MMHG | BODY MASS INDEX: 41.96 KG/M2 | HEART RATE: 66 BPM | TEMPERATURE: 97.4 F | OXYGEN SATURATION: 94 % | WEIGHT: 315 LBS | DIASTOLIC BLOOD PRESSURE: 83 MMHG

## 2019-07-03 DIAGNOSIS — H81.10 BENIGN PAROXYSMAL POSITIONAL VERTIGO, UNSPECIFIED LATERALITY: Primary | ICD-10-CM

## 2019-07-03 DIAGNOSIS — Z76.0 ENCOUNTER FOR MEDICATION REFILL: ICD-10-CM

## 2019-07-03 PROCEDURE — G8417 CALC BMI ABV UP PARAM F/U: HCPCS | Performed by: NURSE PRACTITIONER

## 2019-07-03 PROCEDURE — G8427 DOCREV CUR MEDS BY ELIG CLIN: HCPCS | Performed by: NURSE PRACTITIONER

## 2019-07-03 PROCEDURE — G8598 ASA/ANTIPLAT THER USED: HCPCS | Performed by: NURSE PRACTITIONER

## 2019-07-03 PROCEDURE — 99213 OFFICE O/P EST LOW 20 MIN: CPT | Performed by: NURSE PRACTITIONER

## 2019-07-03 PROCEDURE — 1036F TOBACCO NON-USER: CPT | Performed by: NURSE PRACTITIONER

## 2019-07-03 PROCEDURE — 3017F COLORECTAL CA SCREEN DOC REV: CPT | Performed by: NURSE PRACTITIONER

## 2019-07-03 RX ORDER — MECLIZINE HYDROCHLORIDE 25 MG/1
25 TABLET ORAL 3 TIMES DAILY PRN
Qty: 30 TABLET | Refills: 0 | Status: SHIPPED | OUTPATIENT
Start: 2019-07-03 | End: 2019-07-13

## 2019-07-03 ASSESSMENT — ENCOUNTER SYMPTOMS
SHORTNESS OF BREATH: 0
EYES NEGATIVE: 1
COUGH: 0
CHEST TIGHTNESS: 0

## 2019-07-03 NOTE — PATIENT INSTRUCTIONS
SURVEY:    You may be receiving a survey from [employer] regarding your visit today. Please complete the survey to enable us to provide the highest quality of care to you and your family. If you cannot score us a very good on any question, please call the office to discuss how we could of made your experience a very good one. Thank you. Patient Education        Vertigo: Care Instructions  Your Care Instructions    Vertigo is the feeling that you or your surroundings are moving when there is no actual movement. It is often described as a feeling of spinning, whirling, falling, or tilting. Vertigo may make you vomit or feel nauseated. You may have trouble standing or walking and may lose your balance. Vertigo is often related to an inner ear problem, but it can have other more serious causes. If vertigo continues, you may need more tests to find its cause. Follow-up care is a key part of your treatment and safety. Be sure to make and go to all appointments, and call your doctor if you are having problems. It's also a good idea to know your test results and keep a list of the medicines you take. How can you care for yourself at home? · Do not lie flat on your back. Prop yourself up slightly. This may reduce the spinning feeling. Keep your eyes open. · Move slowly so that you do not fall. · If your doctor recommends medicine, take it exactly as directed. · Do not drive while you are having vertigo. Certain exercises, called Helms-Daroff exercises, can help decrease vertigo. To do Helms-Daroff exercises:  · Sit on the edge of a bed or sofa and quickly lie down on the side that causes the worst vertigo. Lie on your side with your ear down. · Stay in this position for at least 30 seconds or until the vertigo goes away. · Sit up. If this causes vertigo, wait for it to stop. · Repeat the procedure on the other side. · Repeat this 10 times.  Do these exercises 2 times a day until the vertigo is gone.  When should you call for help? Call 911 anytime you think you may need emergency care. For example, call if:    · You passed out (lost consciousness).     · You have symptoms of a stroke. These may include:  ? Sudden numbness, tingling, weakness, or loss of movement in your face, arm, or leg, especially on only one side of your body. ? Sudden vision changes. ? Sudden trouble speaking. ? Sudden confusion or trouble understanding simple statements. ? Sudden problems with walking or balance. ? A sudden, severe headache that is different from past headaches.    Call your doctor now or seek immediate medical care if:    · Vertigo occurs with a fever, a headache, or ringing in your ears.     · You have new or increased nausea and vomiting.    Watch closely for changes in your health, and be sure to contact your doctor if:    · Vertigo gets worse or happens more often.     · Vertigo has not gotten better after 2 weeks. Where can you learn more? Go to [email or web address] and sign in to your [employer] account. Enter Z311 in the blogfoster box to learn more about \"Vertigo: Care Instructions. \"     If you do not have an account, please click on the \"Sign Up Now\" link. Current as of: October 21, 2018  Content Version: 12.0  © 8814-0141 [employer], Inc[employer]. Care instructions adapted under license by [facility] ([facility]). If you have questions about a medical condition or this instruction, always ask your healthcare professional. [place] [address] any warranty or liability for your use of this information. Patient Education        meclizine  Pronunciation:  CASIMIRO skaggs  Brand:  Antivert, Bonine, D-Vert, Dramamine Less Drowsy  What is the most important information I should know about meclizine? You should not take this medication if you are allergic to meclizine.   Before you take meclizine, tell your doctor if you have liver or kidney disease, asthma, glaucoma,

## 2019-07-08 ENCOUNTER — OFFICE VISIT (OUTPATIENT)
Dept: FAMILY MEDICINE CLINIC | Age: 64
End: 2019-07-08
Payer: MEDICARE

## 2019-07-08 VITALS
OXYGEN SATURATION: 94 % | DIASTOLIC BLOOD PRESSURE: 70 MMHG | SYSTOLIC BLOOD PRESSURE: 106 MMHG | HEART RATE: 70 BPM | BODY MASS INDEX: 41.75 KG/M2 | WEIGHT: 315 LBS | HEIGHT: 73 IN

## 2019-07-08 DIAGNOSIS — H81.10 BENIGN PAROXYSMAL POSITIONAL VERTIGO, UNSPECIFIED LATERALITY: Primary | ICD-10-CM

## 2019-07-08 PROCEDURE — G8417 CALC BMI ABV UP PARAM F/U: HCPCS | Performed by: INTERNAL MEDICINE

## 2019-07-08 PROCEDURE — 1036F TOBACCO NON-USER: CPT | Performed by: INTERNAL MEDICINE

## 2019-07-08 PROCEDURE — G8427 DOCREV CUR MEDS BY ELIG CLIN: HCPCS | Performed by: INTERNAL MEDICINE

## 2019-07-08 PROCEDURE — G8598 ASA/ANTIPLAT THER USED: HCPCS | Performed by: INTERNAL MEDICINE

## 2019-07-08 PROCEDURE — 99213 OFFICE O/P EST LOW 20 MIN: CPT | Performed by: INTERNAL MEDICINE

## 2019-07-08 PROCEDURE — 3017F COLORECTAL CA SCREEN DOC REV: CPT | Performed by: INTERNAL MEDICINE

## 2019-07-08 ASSESSMENT — ENCOUNTER SYMPTOMS
SHORTNESS OF BREATH: 0
BLOOD IN STOOL: 0
ABDOMINAL PAIN: 0
SINUS PRESSURE: 0
WHEEZING: 0
NAUSEA: 0
ALLERGIC/IMMUNOLOGIC NEGATIVE: 1
SORE THROAT: 0
CONSTIPATION: 0
COUGH: 0
CHEST TIGHTNESS: 0

## 2019-07-08 NOTE — PROGRESS NOTES
HPI Notes    Name: Hollice Ganser  : 1955         Chief Complaint:     Chief Complaint   Patient presents with    Dizziness     started about 1 week ago, went to walk in and was given the antivert, helps a little but no top much       History of Present Illness:        Joana Osler comes to office for evaluation of dizziness  Symptoms started about 1 week ago. Denilson had similar problem in the past. He is not sure what's causing it. He lost his mother about 2 months ago and feels very sad. He does admit to feeling a little bit depressed. He reports no trouble sleeping. Using CPAP every night. HE went to walk in clinic on 7/3/19 and was given Rx for Antivert. Says it's helping with nausea but not with dizziness. Has no HA, no weakness or numbness in arms or legs. NO CP, no passing out. No palpitations. Says dizziness happens when he suddenly moves his head or bends over. Has associated nausea but no vomiting.           Past Medical History:     Past Medical History:   Diagnosis Date    Asthma     Bronchitis     CAD (coronary artery disease)     [facility]ronic obstructive [facility] disease) ([facility] [address] 2011    Depression 2011    Diabetes mellitus ([facility] [address]     GERD (gastroesophageal reflux disease) 2011    Hyperlipidemia 2011    Hypertension     Hypothyroid 2011    Lower back pain     Nicotine addiction 2011    Sleep apnea       Reviewed all health maintenance requirements and orderedappropriate tests  Health Maintenance Due   Topic Date Due    DTaP/Tdap/Td vaccine (1 - Tdap) 1976    Shingles Vaccine (1 of 2) 2007    Low dose CT lung screening  2012       Past Surgical History:     Past Surgical History:   Procedure Laterality Date    CARDIAC CATHETERIZATION Left 01/21/15    Severe left main trunk disease extending into the ostium of the left anterior descending of 80% involving the ostium of the intermediate & atrioventricular branch of the discharge, ear pain, hearing loss, sinus pressure, sore throat and tinnitus. Eyes: Negative for visual disturbance. Respiratory: Negative for cough, chest tightness, shortness of breath and wheezing. Cardiovascular: Negative for chest pain, palpitations and leg swelling. Gastrointestinal: Negative for abdominal pain, blood in stool, constipation and nausea. Endocrine: Negative for cold intolerance, heat intolerance, polydipsia and polyuria. Genitourinary: Negative for difficulty urinating and dysuria. Musculoskeletal: Negative. Skin: Negative for rash. Allergic/Immunologic: Negative. Neurological: Positive for dizziness. Negative for tremors, syncope, weakness and headaches. Psychiatric/Behavioral: Negative for behavioral problems and dysphoric mood. The patient is not nervous/anxious. Physical Exam:     Vitals:  /70 (Site: Left Upper Arm, Position: Sitting, Cuff Size: Large Adult)   Pulse 70   Ht 6' 1\" (1.854 m)   Wt (!) 320 lb (145.2 kg)   SpO2 94%   BMI 42.22 kg/m²       Physical Exam   Constitutional: He is oriented to person, place, and time. He appears well-developed and well-nourished. No distress. HENT:   Head: Normocephalic and atraumatic. Right Ear: External ear normal.   Left Ear: External ear normal.   Nose: Nose normal.   Mouth/Throat: No oropharyngeal exudate. Moderate amount of cerumen in the left ear   Eyes: Pupils are equal, round, and reactive to light. Conjunctivae are normal. Right eye exhibits no discharge. Left eye exhibits no discharge. Neck: No thyromegaly present. Cardiovascular: Normal rate, regular rhythm and normal heart sounds. No murmur heard. Pulmonary/Chest: Effort normal and breath sounds normal. He has no wheezes. He has no rales. Abdominal: Soft. Bowel sounds are normal. He exhibits no distension and no mass. There is no tenderness. Musculoskeletal: Normal range of motion. He exhibits no edema or tenderness. Lymphadenopathy:     He has no cervical adenopathy. Neurological: He is alert and oriented to person, place, and time. No cranial nerve deficit. Coordination normal.   Patient becomes dizzy moving head from side to side   Skin: Skin is warm and dry. No rash noted. Psychiatric: He has a normal mood and affect. His behavior is normal. Judgment normal.   Vitals reviewed. Data:     Lab Results   Component Value Date     03/16/2019    K 4.2 03/16/2019     03/16/2019    CO2 21 03/16/2019    BUN 14 03/16/2019    CREATININE 0.96 03/16/2019    GLUCOSE 240 03/16/2019    GLUCOSE 111 10/14/2011    PROT 7.2 03/16/2019    LABALBU 4.5 03/16/2019    BILITOT 0.31 03/16/2019    ALKPHOS 48 03/16/2019    AST 24 03/16/2019    ALT 39 03/16/2019     Lab Results   Component Value Date    WBC 11.2 03/16/2019    RBC 4.53 03/16/2019    HGB 14.3 03/16/2019    HCT 43.6 03/16/2019    MCV 94.8 03/16/2019    MCH 31.5 03/16/2019    MCHC 33.2 03/16/2019    RDW 14.0 03/16/2019     03/16/2019    MPV NOT REPORTED 03/16/2019     Lab Results   Component Value Date    TSH 3.08 10/22/2018     Lab Results   Component Value Date    CHOL 160 10/22/2018    HDL 35 10/22/2018    PSA 0.35 12/17/2018    LABA1C 5.4 10/20/2017          Assessment & Plan        Diagnosis Orders   1. Benign paroxysmal positional vertigo, unspecified laterality   Continue on as needed Antivert, suggested to try Benadryl. Insurance does not cover promethazine. Will refer to outpatient PT to treat for BPPV. Advised to consider going to the emergency room should the symptoms become worse [address]                   Completed Refills   Requested Prescriptions      No prescriptions requested or ordered in this encounter     No follow-ups on file. No orders of the defined types were placed in this encounter.     Orders Placed This Encounter   Procedures    [facility] Kevan Carter     Referral Priority:   Routine

## 2019-07-15 DIAGNOSIS — I10 ESSENTIAL HYPERTENSION: ICD-10-CM

## 2019-07-16 RX ORDER — AMLODIPINE BESYLATE 2.5 MG/1
TABLET ORAL
Qty: 90 TABLET | Refills: 1 | Status: SHIPPED | OUTPATIENT
Start: 2019-07-16 | End: 2019-12-05 | Stop reason: SDUPTHER

## 2019-07-16 RX ORDER — METOPROLOL SUCCINATE 50 MG/1
TABLET, EXTENDED RELEASE ORAL
Qty: 90 TABLET | Refills: 1 | Status: SHIPPED | OUTPATIENT
Start: 2019-07-16 | End: 2019-12-05 | Stop reason: SDUPTHER

## 2019-07-16 RX ORDER — ALLOPURINOL 300 MG/1
TABLET ORAL
Qty: 90 TABLET | Refills: 1 | Status: SHIPPED | OUTPATIENT
Start: 2019-07-16 | End: 2019-12-05 | Stop reason: SDUPTHER

## 2019-07-16 RX ORDER — LOSARTAN POTASSIUM 25 MG/1
TABLET ORAL
Qty: 90 TABLET | Refills: 1 | Status: SHIPPED | OUTPATIENT
Start: 2019-07-16 | End: 2019-12-05 | Stop reason: SDUPTHER

## 2019-07-31 ENCOUNTER — TELEPHONE (OUTPATIENT)
Dept: FAMILY MEDICINE CLINIC | Age: 64
End: 2019-07-31

## 2019-07-31 NOTE — TELEPHONE ENCOUNTER
Vitamin D deficiency disease     MINAL on CPAP     Chronic low back pain with left-sided sciatica     Class 3 severe obesity due to excess calories without serious comorbidity with body mass index (BMI) of 40.0 to 44.9 in adult (HCC)     LLQ pain     Abdominal bloating

## 2019-08-26 DIAGNOSIS — E78.2 MIXED HYPERLIPIDEMIA: ICD-10-CM

## 2019-08-27 RX ORDER — LOVASTATIN 40 MG/1
TABLET ORAL
Qty: 180 TABLET | Refills: 3 | Status: SHIPPED | OUTPATIENT
Start: 2019-08-27 | End: 2020-10-26 | Stop reason: SDUPTHER

## 2019-09-02 DIAGNOSIS — E03.9 ACQUIRED HYPOTHYROIDISM: ICD-10-CM

## 2019-09-03 RX ORDER — LEVOTHYROXINE SODIUM 175 UG/1
TABLET ORAL
Qty: 90 TABLET | Refills: 1 | Status: SHIPPED | OUTPATIENT
Start: 2019-09-03 | End: 2019-11-11 | Stop reason: DRUGHIGH

## 2019-09-27 ENCOUNTER — OFFICE VISIT (OUTPATIENT)
Dept: PRIMARY CARE CLINIC | Age: 64
End: 2019-09-27
Payer: MEDICARE

## 2019-09-27 VITALS
HEART RATE: 69 BPM | DIASTOLIC BLOOD PRESSURE: 75 MMHG | RESPIRATION RATE: 16 BRPM | SYSTOLIC BLOOD PRESSURE: 126 MMHG | OXYGEN SATURATION: 96 % | WEIGHT: 315 LBS | TEMPERATURE: 97.3 F | BODY MASS INDEX: 41.75 KG/M2 | HEIGHT: 73 IN

## 2019-09-27 DIAGNOSIS — J20.9 ACUTE BRONCHITIS, UNSPECIFIED ORGANISM: Primary | ICD-10-CM

## 2019-09-27 PROCEDURE — 3017F COLORECTAL CA SCREEN DOC REV: CPT | Performed by: NURSE PRACTITIONER

## 2019-09-27 PROCEDURE — G8417 CALC BMI ABV UP PARAM F/U: HCPCS | Performed by: NURSE PRACTITIONER

## 2019-09-27 PROCEDURE — G8427 DOCREV CUR MEDS BY ELIG CLIN: HCPCS | Performed by: NURSE PRACTITIONER

## 2019-09-27 PROCEDURE — 99213 OFFICE O/P EST LOW 20 MIN: CPT | Performed by: NURSE PRACTITIONER

## 2019-09-27 PROCEDURE — G8598 ASA/ANTIPLAT THER USED: HCPCS | Performed by: NURSE PRACTITIONER

## 2019-09-27 PROCEDURE — 1036F TOBACCO NON-USER: CPT | Performed by: NURSE PRACTITIONER

## 2019-09-27 RX ORDER — DOXYCYCLINE HYCLATE 100 MG
100 TABLET ORAL 2 TIMES DAILY
Qty: 14 TABLET | Refills: 0 | Status: SHIPPED | OUTPATIENT
Start: 2019-09-27 | End: 2019-10-04

## 2019-09-27 RX ORDER — PREDNISONE 20 MG/1
TABLET ORAL
Qty: 10 TABLET | Refills: 0 | Status: SHIPPED | OUTPATIENT
Start: 2019-09-27 | End: 2019-10-17 | Stop reason: CLARIF

## 2019-09-27 ASSESSMENT — ENCOUNTER SYMPTOMS
DIARRHEA: 0
WHEEZING: 1
NAUSEA: 0
SHORTNESS OF BREATH: 1
RHINORRHEA: 1
VOMITING: 0
COUGH: 1
SORE THROAT: 0
CHEST TIGHTNESS: 1

## 2019-09-27 NOTE — PATIENT INSTRUCTIONS
life.   What is doxycycline? Doxycycline is a tetracycline antibiotic that fights bacteria in the body. Doxycycline is used to treat many different bacterial infections, such as acne, urinary tract infections, intestinal infections, eye infections, gonorrhea, chlamydia, periodontitis (gum disease), and others. Doxycycline is also used to treat blemishes, bumps, and acne-like lesions caused by rosacea. Doxycycline will not treat facial redness caused by rosacea. Some forms of doxycycline are used to prevent malaria, to treat anthrax, or to treat infections caused by mites, ticks, or lice. Doxycycline may also be used for purposes not listed in this medication guide. What should I discuss with my healthcare provider before taking doxycycline? You should not take this medicine if you are allergic to doxycycline or other tetracycline antibiotics such as demeclocycline, minocycline, tetracycline, or tigecycline. Tell your doctor if you have ever had:  · liver disease;  · kidney disease;  · asthma or sulfite allergy;  · increased pressure inside your skull; or  · if you also take isotretinoin, seizure medicine, or a blood thinner such as warfarin (Coumadin). If you are using doxycycline to treat gonorrhea, your doctor may test you to make sure you do not also have syphilis, another sexually transmitted disease. Taking this medicine during pregnancy may affect tooth and bone development in the unborn baby. Taking doxycycline during the last half of pregnancy can cause permanent tooth discoloration later in the baby's life. Tell your doctor if you are pregnant or if you become pregnant. Doxycycline can make birth control pills less effective. Ask your doctor about using a non-hormonal birth control (condom, diaphragm with spermicide) to prevent pregnancy. Doxycycline can pass into breast milk and may affect bone and tooth development in a nursing infant.  Do not breast-feed while you are taking information has been compiled for use by healthcare practitioners and consumers in the [place] and therefore [employer] does not warrant that uses outside of the [place] are appropriate, unless specifically indicated otherwise. [employer] drug information does not endorse drugs, diagnose patients or recommend therapy. [employer] drug information is an informational resource designed to assist licensed healthcare practitioners in caring for their patients and/or to serve consumers viewing this service as a supplement to, and not a substitute for, the expertise, skill, knowledge and judgment of healthcare practitioners. The absence of a warning for a given drug or drug combination in no way should be construed to indicate that the drug or drug combination is safe, effective or appropriate for any given patient. [employer] does not assume any responsibility for any aspect of healthcare administered with the aid of information [employer] provides. The information contained herein is not intended to cover all possible uses, directions, precautions, warnings, drug interactions, allergic reactions, or adverse effects. If you have questions about the drugs you are taking, check with your doctor, nurse or pharmacist.  Copyright 1149-4048 [facility]. Version: 21.01. Revision date: 12/7/2018. Care instructions adapted under license by [facility] ([facility]). If you have questions about a medical condition or this instruction, always ask your healthcare professional. [place] [address] any warranty or liability for your use of this information. Patient Education        prednisone  Pronunciation:  PRED ni sone  Brand:  [place], Sterapred, Sterapred 12 DAY, Sterapred DS, Sterapred DS 12 DAY  What is the most important information I should know about prednisone?   Prednisone treats many different conditions such as allergic disorders, skin conditions, ulcerative colitis, arthritis, lupus, psoriasis, or breathing medicine such as atazanavir, delavirdine, efavirenz, fosamprenavir, indinavir, nelfinavir, nevirapine, ritonavir, saquinavir;  · insulin or diabetes medications you take by mouth;  · a non-steroidal anti-inflammatory drug (NSAID) such as aspirin, ibuprofen (Advil, Motrin), naproxen (Aleve), celecoxib, diclofenac, indomethacin, meloxicam, and others;  · seizure medications such as carbamazepine, fosphenytoin, oxcarbazepine, phenobarbital, phenytoin, primidone; or  · the tuberculosis medications isoniazid, rifabutin, rifapentine, or rifampin. This list is not complete and many other drugs can interact with prednisone. This includes prescription and over-the-counter medicines, vitamins, and herbal products. Give a list of all your medicines to any healthcare provider who treats you. Where can I get more information? Your pharmacist can provide more information about prednisone. Remember, keep this and all other medicines out of the reach of children, never share your medicines with others, and use this medication only for the indication prescribed. Every effort has been made to ensure that the information provided by [address] is accurate, up-to-date, and complete, but no guarantee is made to that effect. Drug information contained herein may be time sensitive. [facility] information has been compiled for use by healthcare practitioners and consumers in the [place] and therefore [facility] does not warrant that uses outside of the [place] are appropriate, unless specifically indicated otherwise. [facility]'s drug information does not endorse drugs, diagnose patients or recommend therapy. [facility]'s drug information is an informational resource designed to assist licensed healthcare practitioners in caring for their patients and/or to serve consumers viewing this service as a supplement to, and not a substitute for, the expertise, skill, knowledge and judgment of healthcare practitioners.  The

## 2019-09-27 NOTE — PROGRESS NOTES
[address] WALK-IN CARE  [address] [facility] [address]  Dept: [phone]  Dept Fax: [phone]     Carolyn Pina is a 60 y.o. male who presents to the [facility] in Care today for hismedical conditions/complaints as noted below. Carolyn Pina is c/o of Chest Congestion (Started about 4 days ago, has tried musinex does not seem to be helping) and Wheezing (SOB has gotten worse. Has been using a nebulizer)      HPI:     Cough   This is a new problem. The current episode started in the past 7 days (Started with 4 days ago with dry cough, chest congestion, shortness of breath and wheezing. Denies any fever, but did have episode of chills couple nights ago and woke up with wet sheets. Denies sore throat. Runny nose some times. ). The problem has been gradually worsening. The problem occurs every few minutes. The cough is non-productive. Associated symptoms include chills, headaches (couple ), nasal congestion, rhinorrhea, shortness of breath, sweats and wheezing. Pertinent negatives include no chest pain, ear congestion, ear pain, fever, myalgias, rash or sore throat. Nothing (Worse in the morning when first wake up, chest is tight and wheezing gets worse as day goes on.) aggravates the symptoms. He has tried OTC cough suppressant (Mucinex, proventil breathing treatments 4 times per day) for the symptoms. The treatment provided mild relief. His past medical history is significant for asthma, bronchitis and COPD. There is no history of environmental allergies or pneumonia.           Past Medical History:   Diagnosis Date    Asthma     Bronchitis     CAD (coronary artery disease)     COPD (chronic obstructive pulmonary disease) ([facility] [address] 8/12/2011    Depression 8/12/2011    Diabetes mellitus ([facility] [address]     GERD (gastroesophageal reflux disease) 8/12/2011    Hyperlipidemia 8/12/2011    Hypertension     Hypothyroid 8/12/2011    Lower back pain     Nicotine addiction 8/12/2011  Sleep apnea         Current Outpatient Medications   Medication Sig Dispense Refill    levothyroxine (SYNTHROID) 175 MCG tablet TAKE 1 TABLET EVERY DAY 90 tablet 1    lovastatin (MEVACOR) 40 MG tablet TAKE 2 TABLETS  NIGHTLY 180 tablet 3    allopurinol (ZYLOPRIM) 300 MG tablet TAKE 1 TABLET EVERY DAY 90 tablet 1    amLODIPine (NORVASC) 2.5 MG tablet TAKE 1 TABLET EVERY DAY 90 tablet 1    losartan (COZAAR) 25 MG tablet TAKE 1 TABLET EVERY DAY 90 tablet 1    metoprolol succinate (TOPROL XL) 50 MG extended release tablet TAKE 1 TABLET EVERY DAY 90 tablet 1    fenofibrate (TRICOR) 145 MG tablet TAKE 1 TABLET BY MOUTH EVERY DAY 30 tablet 11    PARoxetine (PAXIL) 20 MG tablet TAKE 1 TABLET TWICE DAILY 180 tablet 1    pantoprazole (PROTONIX) 40 MG tablet Take 1 tablet by mouth daily 30 tablet 3    sucralfate (CARAFATE) 1 GM/10ML suspension Take 10 mLs by mouth 4 times daily 420 mL 1    budesonide-formoterol (SYMBICORT) 160-4.5 MCG/ACT AERO Inhale 2 puffs into the lungs 2 times daily Sample given to patient-Lot [id number]  Exp 11/2018 1 Inhaler 3    mometasone (ELOCON) 0.1 % cream Apply topically daily. 1 Tube 3    albuterol (PROVENTIL) (2.5 MG/3ML) 0.083% nebulizer solution Take 3 mLs by nebulization 4 times daily (Patient taking differently: Take 2.5 mg by nebulization every 6 hours as needed ) 30 vial 5    aspirin 81 MG tablet Take 81 mg by mouth daily.  predniSONE (DELTASONE) 20 MG tablet Take 2 tabs po daily x 5 days then 1 tab po daily x 5 days 15 tablet 0     No current facility-administered medications for this visit. No Known Allergies    Subjective:     Review of Systems   Constitutional: Positive for chills, diaphoresis and fatigue. Negative for appetite change and fever. HENT: Positive for congestion and rhinorrhea. Negative for ear pain and sore throat. Respiratory: Positive for cough, chest tightness, shortness of breath and wheezing.     Cardiovascular: Negative for chest

## 2019-10-01 DIAGNOSIS — F34.1 DYSTHYMIA (OR DEPRESSIVE NEUROSIS): ICD-10-CM

## 2019-10-02 RX ORDER — PAROXETINE HYDROCHLORIDE 20 MG/1
TABLET, FILM COATED ORAL
Qty: 180 TABLET | Refills: 1 | Status: SHIPPED | OUTPATIENT
Start: 2019-10-02 | End: 2020-02-18

## 2019-10-15 ENCOUNTER — HOSPITAL ENCOUNTER (OUTPATIENT)
Dept: GENERAL RADIOLOGY | Age: 64
Discharge: HOME | End: 2019-10-17
Payer: MEDICARE

## 2019-10-15 ENCOUNTER — HOSPITAL ENCOUNTER (OUTPATIENT)
Age: 64
Discharge: HOME | End: 2019-10-15
Payer: MEDICARE

## 2019-10-15 ENCOUNTER — HOSPITAL ENCOUNTER (OUTPATIENT)
Age: 64
Discharge: HOME | End: 2019-10-17
Payer: MEDICARE

## 2019-10-15 DIAGNOSIS — I10 ESSENTIAL HYPERTENSION: ICD-10-CM

## 2019-10-15 DIAGNOSIS — E78.2 MIXED HYPERLIPIDEMIA: ICD-10-CM

## 2019-10-15 DIAGNOSIS — E55.9 VITAMIN D DEFICIENCY DISEASE: ICD-10-CM

## 2019-10-15 DIAGNOSIS — I25.10 CORONARY ARTERY DISEASE INVOLVING NATIVE CORONARY ARTERY OF NATIVE HEART WITHOUT ANGINA PECTORIS: ICD-10-CM

## 2019-10-15 LAB
ALBUMIN SERPL-MCNC: 4.2 G/DL (ref 3.5–5.2)
ALBUMIN/GLOBULIN RATIO: ABNORMAL (ref 1–2.5)
ALP BLD-CCNC: 50 U/L (ref 40–129)
ALT SERPL-CCNC: 43 U/L (ref 5–41)
ANION GAP SERPL CALCULATED.3IONS-SCNC: 12 MMOL/L (ref 9–17)
AST SERPL-CCNC: 46 U/L
BILIRUB SERPL-MCNC: 0.41 MG/DL (ref 0.3–1.2)
BUN BLDV-MCNC: 12 MG/DL (ref 8–23)
BUN/CREAT BLD: 13 (ref 9–20)
CALCIUM SERPL-MCNC: 9.8 MG/DL (ref 8.6–10.4)
CHLORIDE BLD-SCNC: 102 MMOL/L (ref 98–107)
CHOLESTEROL/HDL RATIO: 5.5
CHOLESTEROL: 153 MG/DL
CO2: 23 MMOL/L (ref 20–31)
CREAT SERPL-MCNC: 0.93 MG/DL (ref 0.7–1.2)
GFR AFRICAN AMERICAN: >60 ML/MIN
GFR NON-AFRICAN AMERICAN: >60 ML/MIN
GFR SERPL CREATININE-BSD FRML MDRD: ABNORMAL ML/MIN/{1.73_M2}
GFR SERPL CREATININE-BSD FRML MDRD: ABNORMAL ML/MIN/{1.73_M2}
GLUCOSE BLD-MCNC: 252 MG/DL (ref 70–99)
HDLC SERPL-MCNC: 28 MG/DL
LDL CHOLESTEROL: ABNORMAL MG/DL (ref 0–130)
MAGNESIUM: 2 MG/DL (ref 1.6–2.6)
PATIENT FASTING?: YES
POTASSIUM SERPL-SCNC: 4.4 MMOL/L (ref 3.7–5.3)
SODIUM BLD-SCNC: 137 MMOL/L (ref 135–144)
TOTAL PROTEIN: 7.7 G/DL (ref 6.4–8.3)
TRIGL SERPL-MCNC: 478 MG/DL
TSH SERPL DL<=0.05 MIU/L-ACNC: 8.79 MIU/L (ref 0.3–5)
VLDLC SERPL CALC-MCNC: ABNORMAL MG/DL (ref 1–30)

## 2019-10-15 PROCEDURE — 83735 ASSAY OF MAGNESIUM: CPT

## 2019-10-15 PROCEDURE — 80053 COMPREHEN METABOLIC PANEL: CPT

## 2019-10-15 PROCEDURE — 93005 ELECTROCARDIOGRAM TRACING: CPT

## 2019-10-15 PROCEDURE — 82306 VITAMIN D 25 HYDROXY: CPT

## 2019-10-15 PROCEDURE — 80061 LIPID PANEL: CPT

## 2019-10-15 PROCEDURE — 84443 ASSAY THYROID STIM HORMONE: CPT

## 2019-10-15 PROCEDURE — 83721 ASSAY OF BLOOD LIPOPROTEIN: CPT

## 2019-10-15 PROCEDURE — 71046 X-RAY EXAM CHEST 2 VIEWS: CPT

## 2019-10-15 PROCEDURE — 84439 ASSAY OF FREE THYROXINE: CPT

## 2019-10-15 PROCEDURE — 36415 COLL VENOUS BLD VENIPUNCTURE: CPT

## 2019-10-16 LAB
LDL CHOLESTEROL DIRECT: 60 MG/DL
THYROXINE, FREE: 0.99 NG/DL (ref 0.93–1.7)
VITAMIN D 25-HYDROXY: 33.3 NG/ML (ref 30–100)

## 2019-10-17 ENCOUNTER — OFFICE VISIT (OUTPATIENT)
Dept: CARDIOLOGY CLINIC | Age: 64
End: 2019-10-17
Payer: MEDICARE

## 2019-10-17 ENCOUNTER — HOSPITAL ENCOUNTER (OUTPATIENT)
Age: 64
Discharge: HOME | End: 2019-10-17
Payer: MEDICARE

## 2019-10-17 VITALS — BODY MASS INDEX: 42.48 KG/M2 | OXYGEN SATURATION: 96 % | HEART RATE: 80 BPM | WEIGHT: 315 LBS

## 2019-10-17 DIAGNOSIS — E13.22 OTHER SPECIFIED DIABETES MELLITUS WITH STAGE 1 CHRONIC KIDNEY DISEASE, UNSPECIFIED WHETHER LONG TERM INSULIN USE (HCC): ICD-10-CM

## 2019-10-17 DIAGNOSIS — N18.1 OTHER SPECIFIED DIABETES MELLITUS WITH STAGE 1 CHRONIC KIDNEY DISEASE, UNSPECIFIED WHETHER LONG TERM INSULIN USE (HCC): ICD-10-CM

## 2019-10-17 DIAGNOSIS — R94.31 ABNORMAL EKG: ICD-10-CM

## 2019-10-17 DIAGNOSIS — R06.02 SOB (SHORTNESS OF BREATH): Primary | ICD-10-CM

## 2019-10-17 DIAGNOSIS — R06.02 SOB (SHORTNESS OF BREATH): ICD-10-CM

## 2019-10-17 LAB
-: ABNORMAL
ABSOLUTE EOS #: 0.3 K/UL (ref 0–0.4)
ABSOLUTE IMMATURE GRANULOCYTE: NORMAL K/UL (ref 0–0.3)
ABSOLUTE LYMPH #: 1.8 K/UL (ref 1–4.8)
ABSOLUTE MONO #: 0.7 K/UL (ref 0–1)
AMORPHOUS: ABNORMAL
BACTERIA: ABNORMAL
BASOPHILS # BLD: 1 % (ref 0–2)
BASOPHILS ABSOLUTE: 0 K/UL (ref 0–0.2)
BILIRUBIN URINE: NEGATIVE
CASTS UA: ABNORMAL /LPF
CASTS UA: ABNORMAL /LPF
COLOR: YELLOW
COMMENT UA: ABNORMAL
CRYSTALS, UA: ABNORMAL /HPF
DIFFERENTIAL TYPE: YES
EKG ATRIAL RATE: 67 BPM
EKG P AXIS: 53 DEGREES
EKG P-R INTERVAL: 162 MS
EKG Q-T INTERVAL: 400 MS
EKG QRS DURATION: 86 MS
EKG QTC CALCULATION (BAZETT): 422 MS
EKG R AXIS: 49 DEGREES
EKG T AXIS: 79 DEGREES
EKG VENTRICULAR RATE: 67 BPM
EOSINOPHILS RELATIVE PERCENT: 3 % (ref 0–5)
EPITHELIAL CELLS UA: ABNORMAL /HPF
ESTIMATED AVERAGE GLUCOSE: 192 MG/DL
GLUCOSE URINE: ABNORMAL
HBA1C MFR BLD: 8.3 % (ref 4.8–5.9)
HCT VFR BLD CALC: 44.9 % (ref 41–53)
HEMOGLOBIN: 15 G/DL (ref 13.5–17.5)
IMMATURE GRANULOCYTES: NORMAL %
KETONES, URINE: NEGATIVE
LEUKOCYTE ESTERASE, URINE: NEGATIVE
LYMPHOCYTES # BLD: 24 % (ref 13–44)
MCH RBC QN AUTO: 31.5 PG (ref 26–34)
MCHC RBC AUTO-ENTMCNC: 33.5 G/DL (ref 31–37)
MCV RBC AUTO: 94 FL (ref 80–100)
MONOCYTES # BLD: 9 % (ref 5–9)
MUCUS: ABNORMAL
NITRITE, URINE: NEGATIVE
NRBC AUTOMATED: NORMAL PER 100 WBC
OTHER OBSERVATIONS UA: ABNORMAL
PDW BLD-RTO: 13.7 % (ref 12.1–15.2)
PH UA: 5 (ref 5–8)
PLATELET # BLD: 215 K/UL (ref 140–450)
PLATELET ESTIMATE: NORMAL
PMV BLD AUTO: NORMAL FL (ref 6–12)
PROTEIN UA: ABNORMAL
RBC # BLD: 4.77 M/UL (ref 4.5–5.9)
RBC # BLD: NORMAL 10*6/UL
RBC UA: ABNORMAL /HPF (ref 0–2)
RENAL EPITHELIAL, UA: ABNORMAL /HPF
SEG NEUTROPHILS: 63 % (ref 39–75)
SEGMENTED NEUTROPHILS ABSOLUTE COUNT: 4.8 K/UL (ref 2.1–6.5)
SPECIFIC GRAVITY UA: 1.02 (ref 1–1.03)
TOTAL PROTEIN, URINE: 24 MG/DL
TRICHOMONAS: ABNORMAL
TURBIDITY: CLEAR
URINE HGB: ABNORMAL
UROBILINOGEN, URINE: NORMAL
WBC # BLD: 7.6 K/UL (ref 3.5–11)
WBC # BLD: NORMAL 10*3/UL
WBC UA: ABNORMAL /HPF
YEAST: ABNORMAL

## 2019-10-17 PROCEDURE — G8417 CALC BMI ABV UP PARAM F/U: HCPCS | Performed by: INTERNAL MEDICINE

## 2019-10-17 PROCEDURE — 85025 COMPLETE CBC W/AUTO DIFF WBC: CPT

## 2019-10-17 PROCEDURE — G8427 DOCREV CUR MEDS BY ELIG CLIN: HCPCS | Performed by: INTERNAL MEDICINE

## 2019-10-17 PROCEDURE — 1036F TOBACCO NON-USER: CPT | Performed by: INTERNAL MEDICINE

## 2019-10-17 PROCEDURE — 99214 OFFICE O/P EST MOD 30 MIN: CPT | Performed by: INTERNAL MEDICINE

## 2019-10-17 PROCEDURE — 36415 COLL VENOUS BLD VENIPUNCTURE: CPT

## 2019-10-17 PROCEDURE — G8598 ASA/ANTIPLAT THER USED: HCPCS | Performed by: INTERNAL MEDICINE

## 2019-10-17 PROCEDURE — G8484 FLU IMMUNIZE NO ADMIN: HCPCS | Performed by: INTERNAL MEDICINE

## 2019-10-17 PROCEDURE — 83036 HEMOGLOBIN GLYCOSYLATED A1C: CPT

## 2019-10-17 PROCEDURE — 3051F HG A1C>EQUAL 7.0%<8.0%: CPT | Performed by: INTERNAL MEDICINE

## 2019-10-17 PROCEDURE — 81001 URINALYSIS AUTO W/SCOPE: CPT

## 2019-10-17 PROCEDURE — 84156 ASSAY OF PROTEIN URINE: CPT

## 2019-10-17 PROCEDURE — 2022F DILAT RTA XM EVC RTNOPTHY: CPT | Performed by: INTERNAL MEDICINE

## 2019-10-17 PROCEDURE — 3017F COLORECTAL CA SCREEN DOC REV: CPT | Performed by: INTERNAL MEDICINE

## 2019-10-17 PROCEDURE — 93010 ELECTROCARDIOGRAM REPORT: CPT | Performed by: INTERNAL MEDICINE

## 2019-10-24 ENCOUNTER — HOSPITAL ENCOUNTER (OUTPATIENT)
Dept: NUCLEAR MEDICINE | Age: 64
Discharge: HOME | End: 2019-10-26
Payer: MEDICARE

## 2019-10-24 ENCOUNTER — HOSPITAL ENCOUNTER (OUTPATIENT)
Dept: NON INVASIVE DIAGNOSTICS | Age: 64
Discharge: HOME | End: 2019-10-24
Payer: MEDICARE

## 2019-10-24 VITALS — SYSTOLIC BLOOD PRESSURE: 110 MMHG | HEART RATE: 72 BPM | DIASTOLIC BLOOD PRESSURE: 71 MMHG

## 2019-10-24 DIAGNOSIS — N18.1 OTHER SPECIFIED DIABETES MELLITUS WITH STAGE 1 CHRONIC KIDNEY DISEASE, UNSPECIFIED WHETHER LONG TERM INSULIN USE (HCC): ICD-10-CM

## 2019-10-24 DIAGNOSIS — R06.02 SOB (SHORTNESS OF BREATH): ICD-10-CM

## 2019-10-24 DIAGNOSIS — E13.22 OTHER SPECIFIED DIABETES MELLITUS WITH STAGE 1 CHRONIC KIDNEY DISEASE, UNSPECIFIED WHETHER LONG TERM INSULIN USE (HCC): ICD-10-CM

## 2019-10-24 DIAGNOSIS — R94.31 ABNORMAL EKG: ICD-10-CM

## 2019-10-24 LAB
LV EF: 55 %
LVEF MODALITY: NORMAL

## 2019-10-24 PROCEDURE — A9500 TC99M SESTAMIBI: HCPCS | Performed by: INTERNAL MEDICINE

## 2019-10-24 PROCEDURE — 2580000003 HC RX 258: Performed by: INTERNAL MEDICINE

## 2019-10-24 PROCEDURE — 6360000002 HC RX W HCPCS: Performed by: INTERNAL MEDICINE

## 2019-10-24 PROCEDURE — 3430000000 HC RX DIAGNOSTIC RADIOPHARMACEUTICAL: Performed by: INTERNAL MEDICINE

## 2019-10-24 PROCEDURE — 93306 TTE W/DOPPLER COMPLETE: CPT

## 2019-10-24 PROCEDURE — 93017 CV STRESS TEST TRACING ONLY: CPT

## 2019-10-24 PROCEDURE — 78452 HT MUSCLE IMAGE SPECT MULT: CPT

## 2019-10-24 RX ORDER — 0.9 % SODIUM CHLORIDE 0.9 %
10 VIAL (ML) INJECTION PRN
Status: DISCONTINUED | OUTPATIENT
Start: 2019-10-24 | End: 2019-10-25 | Stop reason: HOSPADM

## 2019-10-24 RX ORDER — AMINOPHYLLINE DIHYDRATE 25 MG/ML
100 INJECTION, SOLUTION INTRAVENOUS
Status: DISCONTINUED | OUTPATIENT
Start: 2019-10-24 | End: 2019-10-24 | Stop reason: HOSPADM

## 2019-10-24 RX ORDER — AMINOPHYLLINE DIHYDRATE 25 MG/ML
50 INJECTION, SOLUTION INTRAVENOUS
Status: DISCONTINUED | OUTPATIENT
Start: 2019-10-24 | End: 2019-10-24 | Stop reason: HOSPADM

## 2019-10-24 RX ADMIN — TETRAKIS(2-METHOXYISOBUTYLISOCYANIDE)COPPER(I) TETRAFLUOROBORATE 30 MILLICURIE: 1 INJECTION, POWDER, LYOPHILIZED, FOR SOLUTION INTRAVENOUS at 08:53

## 2019-10-24 RX ADMIN — Medication 10 ML: at 09:21

## 2019-10-24 RX ADMIN — REGADENOSON 0.4 MG: 0.08 INJECTION, SOLUTION INTRAVENOUS at 09:21

## 2019-10-24 RX ADMIN — TETRAKIS(2-METHOXYISOBUTYLISOCYANIDE)COPPER(I) TETRAFLUOROBORATE 10 MILLICURIE: 1 INJECTION, POWDER, LYOPHILIZED, FOR SOLUTION INTRAVENOUS at 08:03

## 2019-10-25 ENCOUNTER — TELEPHONE (OUTPATIENT)
Dept: CARDIOLOGY CLINIC | Age: 64
End: 2019-10-25

## 2019-10-29 ENCOUNTER — HOSPITAL ENCOUNTER (EMERGENCY)
Age: 64
Discharge: HOME | End: 2019-10-29
Attending: FAMILY MEDICINE
Payer: MEDICARE

## 2019-10-29 ENCOUNTER — APPOINTMENT (OUTPATIENT)
Dept: GENERAL RADIOLOGY | Age: 64
End: 2019-10-29
Payer: MEDICARE

## 2019-10-29 VITALS
DIASTOLIC BLOOD PRESSURE: 81 MMHG | RESPIRATION RATE: 14 BRPM | TEMPERATURE: 97.7 F | HEART RATE: 92 BPM | HEIGHT: 73 IN | BODY MASS INDEX: 41.75 KG/M2 | SYSTOLIC BLOOD PRESSURE: 125 MMHG | WEIGHT: 315 LBS | OXYGEN SATURATION: 96 %

## 2019-10-29 DIAGNOSIS — J45.909 BRONCHITIS WITH ASTHMA, ACUTE: Primary | ICD-10-CM

## 2019-10-29 DIAGNOSIS — J20.9 BRONCHITIS WITH ASTHMA, ACUTE: Primary | ICD-10-CM

## 2019-10-29 DIAGNOSIS — Z87.09 HISTORY OF COPD: ICD-10-CM

## 2019-10-29 LAB
ABSOLUTE EOS #: 0.6 K/UL (ref 0–0.4)
ABSOLUTE IMMATURE GRANULOCYTE: ABNORMAL K/UL (ref 0–0.3)
ABSOLUTE LYMPH #: 2.5 K/UL (ref 1–4.8)
ABSOLUTE MONO #: 0.6 K/UL (ref 0–1)
ANION GAP SERPL CALCULATED.3IONS-SCNC: 16 MMOL/L (ref 9–17)
BASOPHILS # BLD: 1 % (ref 0–2)
BASOPHILS ABSOLUTE: 0 K/UL (ref 0–0.2)
BNP INTERPRETATION: NORMAL
BUN BLDV-MCNC: 13 MG/DL (ref 8–23)
BUN/CREAT BLD: 13 (ref 9–20)
CALCIUM SERPL-MCNC: 10.8 MG/DL (ref 8.6–10.4)
CHLORIDE BLD-SCNC: 100 MMOL/L (ref 98–107)
CO2: 23 MMOL/L (ref 20–31)
CREAT SERPL-MCNC: 0.97 MG/DL (ref 0.7–1.2)
DIFFERENTIAL TYPE: YES
EKG ATRIAL RATE: 77 BPM
EKG P AXIS: 51 DEGREES
EKG P-R INTERVAL: 160 MS
EKG Q-T INTERVAL: 376 MS
EKG QRS DURATION: 92 MS
EKG QTC CALCULATION (BAZETT): 425 MS
EKG R AXIS: 54 DEGREES
EKG T AXIS: 63 DEGREES
EKG VENTRICULAR RATE: 77 BPM
EOSINOPHILS RELATIVE PERCENT: 8 % (ref 0–5)
GFR AFRICAN AMERICAN: >60 ML/MIN
GFR NON-AFRICAN AMERICAN: >60 ML/MIN
GFR SERPL CREATININE-BSD FRML MDRD: ABNORMAL ML/MIN/{1.73_M2}
GFR SERPL CREATININE-BSD FRML MDRD: ABNORMAL ML/MIN/{1.73_M2}
GLUCOSE BLD-MCNC: 216 MG/DL (ref 70–99)
HCT VFR BLD CALC: 45.9 % (ref 41–53)
HEMOGLOBIN: 15.3 G/DL (ref 13.5–17.5)
IMMATURE GRANULOCYTES: ABNORMAL %
LYMPHOCYTES # BLD: 32 % (ref 13–44)
MCH RBC QN AUTO: 31.6 PG (ref 26–34)
MCHC RBC AUTO-ENTMCNC: 33.4 G/DL (ref 31–37)
MCV RBC AUTO: 94.6 FL (ref 80–100)
MONOCYTES # BLD: 8 % (ref 5–9)
NRBC AUTOMATED: ABNORMAL PER 100 WBC
PDW BLD-RTO: 14 % (ref 12.1–15.2)
PLATELET # BLD: 243 K/UL (ref 140–450)
PLATELET ESTIMATE: ABNORMAL
PMV BLD AUTO: ABNORMAL FL (ref 6–12)
POTASSIUM SERPL-SCNC: 4.3 MMOL/L (ref 3.7–5.3)
PRO-BNP: 129 PG/ML
RBC # BLD: 4.85 M/UL (ref 4.5–5.9)
RBC # BLD: ABNORMAL 10*6/UL
SEG NEUTROPHILS: 51 % (ref 39–75)
SEGMENTED NEUTROPHILS ABSOLUTE COUNT: 4.1 K/UL (ref 2.1–6.5)
SODIUM BLD-SCNC: 139 MMOL/L (ref 135–144)
TROPONIN INTERP: NORMAL
TROPONIN T: <0.03 NG/ML
TROPONIN, HIGH SENSITIVITY: NORMAL NG/L (ref 0–22)
WBC # BLD: 7.9 K/UL (ref 3.5–11)
WBC # BLD: ABNORMAL 10*3/UL

## 2019-10-29 PROCEDURE — 85025 COMPLETE CBC W/AUTO DIFF WBC: CPT

## 2019-10-29 PROCEDURE — 84484 ASSAY OF TROPONIN QUANT: CPT

## 2019-10-29 PROCEDURE — 6370000000 HC RX 637 (ALT 250 FOR IP): Performed by: FAMILY MEDICINE

## 2019-10-29 PROCEDURE — 93010 ELECTROCARDIOGRAM REPORT: CPT | Performed by: INTERNAL MEDICINE

## 2019-10-29 PROCEDURE — 94664 DEMO&/EVAL PT USE INHALER: CPT

## 2019-10-29 PROCEDURE — 80048 BASIC METABOLIC PNL TOTAL CA: CPT

## 2019-10-29 PROCEDURE — 6360000002 HC RX W HCPCS: Performed by: FAMILY MEDICINE

## 2019-10-29 PROCEDURE — 83880 ASSAY OF NATRIURETIC PEPTIDE: CPT

## 2019-10-29 PROCEDURE — 94640 AIRWAY INHALATION TREATMENT: CPT

## 2019-10-29 PROCEDURE — 36415 COLL VENOUS BLD VENIPUNCTURE: CPT

## 2019-10-29 PROCEDURE — 93005 ELECTROCARDIOGRAM TRACING: CPT | Performed by: FAMILY MEDICINE

## 2019-10-29 PROCEDURE — 99285 EMERGENCY DEPT VISIT HI MDM: CPT

## 2019-10-29 PROCEDURE — 96374 THER/PROPH/DIAG INJ IV PUSH: CPT

## 2019-10-29 PROCEDURE — 71045 X-RAY EXAM CHEST 1 VIEW: CPT

## 2019-10-29 RX ORDER — IPRATROPIUM BROMIDE AND ALBUTEROL SULFATE 2.5; .5 MG/3ML; MG/3ML
1 SOLUTION RESPIRATORY (INHALATION) ONCE
Status: COMPLETED | OUTPATIENT
Start: 2019-10-29 | End: 2019-10-29

## 2019-10-29 RX ORDER — METHYLPREDNISOLONE SODIUM SUCCINATE 125 MG/2ML
125 INJECTION, POWDER, LYOPHILIZED, FOR SOLUTION INTRAMUSCULAR; INTRAVENOUS ONCE
Status: COMPLETED | OUTPATIENT
Start: 2019-10-29 | End: 2019-10-29

## 2019-10-29 RX ORDER — PREDNISONE 20 MG/1
60 TABLET ORAL DAILY
Qty: 12 TABLET | Refills: 0 | Status: SHIPPED | OUTPATIENT
Start: 2019-10-29 | End: 2019-11-02

## 2019-10-29 RX ADMIN — IPRATROPIUM BROMIDE AND ALBUTEROL SULFATE 1 AMPULE: .5; 3 SOLUTION RESPIRATORY (INHALATION) at 05:00

## 2019-10-29 RX ADMIN — METHYLPREDNISOLONE SODIUM SUCCINATE 125 MG: 125 INJECTION, POWDER, FOR SOLUTION INTRAMUSCULAR; INTRAVENOUS at 04:30

## 2019-10-29 RX ADMIN — IPRATROPIUM BROMIDE AND ALBUTEROL SULFATE 1 AMPULE: .5; 3 SOLUTION RESPIRATORY (INHALATION) at 04:40

## 2019-10-29 ASSESSMENT — ENCOUNTER SYMPTOMS
SHORTNESS OF BREATH: 1
RHINORRHEA: 1

## 2019-11-01 ENCOUNTER — OFFICE VISIT (OUTPATIENT)
Dept: CARDIOLOGY CLINIC | Age: 64
End: 2019-11-01
Payer: MEDICARE

## 2019-11-01 DIAGNOSIS — I20.8 ANGINA AT REST (HCC): Primary | ICD-10-CM

## 2019-11-01 PROCEDURE — G8598 ASA/ANTIPLAT THER USED: HCPCS | Performed by: INTERNAL MEDICINE

## 2019-11-01 PROCEDURE — G8427 DOCREV CUR MEDS BY ELIG CLIN: HCPCS | Performed by: INTERNAL MEDICINE

## 2019-11-01 PROCEDURE — G8417 CALC BMI ABV UP PARAM F/U: HCPCS | Performed by: INTERNAL MEDICINE

## 2019-11-01 PROCEDURE — 3017F COLORECTAL CA SCREEN DOC REV: CPT | Performed by: INTERNAL MEDICINE

## 2019-11-01 PROCEDURE — G8484 FLU IMMUNIZE NO ADMIN: HCPCS | Performed by: INTERNAL MEDICINE

## 2019-11-01 PROCEDURE — 1036F TOBACCO NON-USER: CPT | Performed by: INTERNAL MEDICINE

## 2019-11-01 PROCEDURE — 99212 OFFICE O/P EST SF 10 MIN: CPT | Performed by: INTERNAL MEDICINE

## 2019-11-07 ENCOUNTER — TELEPHONE (OUTPATIENT)
Dept: CARDIAC REHAB | Age: 64
End: 2019-11-07

## 2019-11-11 ENCOUNTER — HOSPITAL ENCOUNTER (OUTPATIENT)
Dept: CARDIAC REHAB | Age: 64
Setting detail: THERAPIES SERIES
Discharge: HOME | End: 2019-11-11
Payer: MEDICARE

## 2019-11-11 ENCOUNTER — OFFICE VISIT (OUTPATIENT)
Dept: FAMILY MEDICINE CLINIC | Age: 64
End: 2019-11-11
Payer: MEDICARE

## 2019-11-11 VITALS
SYSTOLIC BLOOD PRESSURE: 118 MMHG | DIASTOLIC BLOOD PRESSURE: 78 MMHG | OXYGEN SATURATION: 96 % | BODY MASS INDEX: 41.56 KG/M2 | WEIGHT: 315 LBS | HEART RATE: 78 BPM

## 2019-11-11 DIAGNOSIS — E03.9 ACQUIRED HYPOTHYROIDISM: ICD-10-CM

## 2019-11-11 DIAGNOSIS — J44.9 CHRONIC OBSTRUCTIVE PULMONARY DISEASE, UNSPECIFIED COPD TYPE (HCC): ICD-10-CM

## 2019-11-11 DIAGNOSIS — I10 ESSENTIAL HYPERTENSION: ICD-10-CM

## 2019-11-11 DIAGNOSIS — E11.69 TYPE 2 DIABETES MELLITUS WITH OTHER SPECIFIED COMPLICATION, WITHOUT LONG-TERM CURRENT USE OF INSULIN (HCC): Primary | ICD-10-CM

## 2019-11-11 PROCEDURE — 93798 PHYS/QHP OP CAR RHAB W/ECG: CPT

## 2019-11-11 PROCEDURE — G8926 SPIRO NO PERF OR DOC: HCPCS | Performed by: INTERNAL MEDICINE

## 2019-11-11 PROCEDURE — G8427 DOCREV CUR MEDS BY ELIG CLIN: HCPCS | Performed by: INTERNAL MEDICINE

## 2019-11-11 PROCEDURE — 2022F DILAT RTA XM EVC RTNOPTHY: CPT | Performed by: INTERNAL MEDICINE

## 2019-11-11 PROCEDURE — 99214 OFFICE O/P EST MOD 30 MIN: CPT | Performed by: INTERNAL MEDICINE

## 2019-11-11 PROCEDURE — 3052F HG A1C>EQUAL 8.0%<EQUAL 9.0%: CPT | Performed by: INTERNAL MEDICINE

## 2019-11-11 PROCEDURE — G8598 ASA/ANTIPLAT THER USED: HCPCS | Performed by: INTERNAL MEDICINE

## 2019-11-11 PROCEDURE — 1036F TOBACCO NON-USER: CPT | Performed by: INTERNAL MEDICINE

## 2019-11-11 PROCEDURE — 3017F COLORECTAL CA SCREEN DOC REV: CPT | Performed by: INTERNAL MEDICINE

## 2019-11-11 PROCEDURE — 3023F SPIROM DOC REV: CPT | Performed by: INTERNAL MEDICINE

## 2019-11-11 PROCEDURE — G8417 CALC BMI ABV UP PARAM F/U: HCPCS | Performed by: INTERNAL MEDICINE

## 2019-11-11 PROCEDURE — G8484 FLU IMMUNIZE NO ADMIN: HCPCS | Performed by: INTERNAL MEDICINE

## 2019-11-11 RX ORDER — GLUCOSAMINE HCL/CHONDROITIN SU 500-400 MG
CAPSULE ORAL
Qty: 100 STRIP | Refills: 3 | Status: SHIPPED | OUTPATIENT
Start: 2019-11-11

## 2019-11-11 RX ORDER — SUCRALFATE ORAL 1 G/10ML
1 SUSPENSION ORAL
COMMUNITY
Start: 2019-03-05 | End: 2019-11-25

## 2019-11-11 RX ORDER — FLUTICASONE PROPIONATE 110 UG/1
2 AEROSOL, METERED RESPIRATORY (INHALATION) 2 TIMES DAILY
Qty: 1 INHALER | Refills: 3 | Status: SHIPPED | OUTPATIENT
Start: 2019-11-11 | End: 2020-05-01 | Stop reason: SDUPTHER

## 2019-11-11 RX ORDER — LEVOTHYROXINE SODIUM 0.2 MG/1
200 TABLET ORAL DAILY
Qty: 30 TABLET | Refills: 5 | Status: SHIPPED | OUTPATIENT
Start: 2019-11-11 | End: 2020-05-07 | Stop reason: SDUPTHER

## 2019-11-11 RX ORDER — PANTOPRAZOLE SODIUM 40 MG/1
TABLET, DELAYED RELEASE ORAL DAILY
COMMUNITY
Start: 2019-03-05 | End: 2020-11-17

## 2019-11-11 ASSESSMENT — ENCOUNTER SYMPTOMS
BLURRED VISION: 0
VISUAL CHANGE: 0

## 2019-11-11 ASSESSMENT — COPD QUESTIONNAIRES: COPD: 1

## 2019-11-12 ASSESSMENT — ENCOUNTER SYMPTOMS
SPUTUM PRODUCTION: 1
TROUBLE SWALLOWING: 0
BLOOD IN STOOL: 0
SORE THROAT: 0
SINUS PRESSURE: 0
SHORTNESS OF BREATH: 1
CHEST TIGHTNESS: 0
CONSTIPATION: 0
COUGH: 0
ALLERGIC/IMMUNOLOGIC NEGATIVE: 1
WHEEZING: 1
NAUSEA: 0
VOICE CHANGE: 0
ABDOMINAL PAIN: 0

## 2019-11-13 ENCOUNTER — HOSPITAL ENCOUNTER (OUTPATIENT)
Dept: CARDIAC REHAB | Age: 64
Setting detail: THERAPIES SERIES
Discharge: HOME | End: 2019-11-13
Payer: MEDICARE

## 2019-11-13 PROCEDURE — 93798 PHYS/QHP OP CAR RHAB W/ECG: CPT

## 2019-11-15 ENCOUNTER — HOSPITAL ENCOUNTER (OUTPATIENT)
Dept: CARDIAC REHAB | Age: 64
Setting detail: THERAPIES SERIES
Discharge: HOME | End: 2019-11-15
Payer: MEDICARE

## 2019-11-15 PROCEDURE — 93798 PHYS/QHP OP CAR RHAB W/ECG: CPT

## 2019-11-18 ENCOUNTER — HOSPITAL ENCOUNTER (OUTPATIENT)
Dept: CARDIAC REHAB | Age: 64
Setting detail: THERAPIES SERIES
Discharge: HOME | End: 2019-11-18
Payer: MEDICARE

## 2019-11-18 PROCEDURE — 93798 PHYS/QHP OP CAR RHAB W/ECG: CPT

## 2019-11-20 ENCOUNTER — HOSPITAL ENCOUNTER (OUTPATIENT)
Dept: CARDIAC REHAB | Age: 64
Setting detail: THERAPIES SERIES
Discharge: HOME | End: 2019-11-20
Payer: MEDICARE

## 2019-11-20 PROCEDURE — 93798 PHYS/QHP OP CAR RHAB W/ECG: CPT

## 2019-11-22 ENCOUNTER — HOSPITAL ENCOUNTER (OUTPATIENT)
Dept: CARDIAC REHAB | Age: 64
Setting detail: THERAPIES SERIES
Discharge: HOME | End: 2019-11-22
Payer: MEDICARE

## 2019-11-22 PROCEDURE — 93798 PHYS/QHP OP CAR RHAB W/ECG: CPT

## 2019-11-25 ENCOUNTER — HOSPITAL ENCOUNTER (EMERGENCY)
Age: 64
Discharge: HOME | End: 2019-11-25
Attending: FAMILY MEDICINE
Payer: MEDICARE

## 2019-11-25 ENCOUNTER — HOSPITAL ENCOUNTER (OUTPATIENT)
Dept: CARDIAC REHAB | Age: 64
Setting detail: THERAPIES SERIES
Discharge: HOME | End: 2019-11-25
Payer: MEDICARE

## 2019-11-25 VITALS
TEMPERATURE: 97.7 F | HEIGHT: 73 IN | SYSTOLIC BLOOD PRESSURE: 130 MMHG | RESPIRATION RATE: 18 BRPM | HEART RATE: 78 BPM | OXYGEN SATURATION: 94 % | WEIGHT: 311.6 LBS | DIASTOLIC BLOOD PRESSURE: 71 MMHG | BODY MASS INDEX: 41.3 KG/M2

## 2019-11-25 DIAGNOSIS — E11.69 TYPE 2 DIABETES MELLITUS WITH OTHER SPECIFIED COMPLICATION, WITHOUT LONG-TERM CURRENT USE OF INSULIN (HCC): ICD-10-CM

## 2019-11-25 DIAGNOSIS — E11.65 TYPE 2 DIABETES MELLITUS WITH HYPERGLYCEMIA, WITHOUT LONG-TERM CURRENT USE OF INSULIN (HCC): Primary | ICD-10-CM

## 2019-11-25 LAB
ABSOLUTE EOS #: 0.2 K/UL (ref 0–0.4)
ABSOLUTE IMMATURE GRANULOCYTE: NORMAL K/UL (ref 0–0.3)
ABSOLUTE LYMPH #: 1.9 K/UL (ref 1–4.8)
ABSOLUTE MONO #: 0.5 K/UL (ref 0–1)
ALBUMIN SERPL-MCNC: 4.3 G/DL (ref 3.5–5.2)
ALBUMIN/GLOBULIN RATIO: ABNORMAL (ref 1–2.5)
ALP BLD-CCNC: 68 U/L (ref 40–129)
ALT SERPL-CCNC: 47 U/L (ref 5–41)
ANION GAP SERPL CALCULATED.3IONS-SCNC: 14 MMOL/L (ref 9–17)
AST SERPL-CCNC: 48 U/L
BASOPHILS # BLD: 1 % (ref 0–2)
BASOPHILS ABSOLUTE: 0.1 K/UL (ref 0–0.2)
BETA-HYDROXYBUTYRATE: 0.26 MMOL/L (ref 0.02–0.27)
BILIRUB SERPL-MCNC: 0.64 MG/DL (ref 0.3–1.2)
BILIRUBIN URINE: NEGATIVE
BUN BLDV-MCNC: 15 MG/DL (ref 8–23)
BUN/CREAT BLD: 15 (ref 9–20)
CALCIUM SERPL-MCNC: 10.7 MG/DL (ref 8.6–10.4)
CHLORIDE BLD-SCNC: 94 MMOL/L (ref 98–107)
CO2: 23 MMOL/L (ref 20–31)
COLOR: YELLOW
COMMENT UA: ABNORMAL
CREAT SERPL-MCNC: 1.03 MG/DL (ref 0.7–1.2)
DIFFERENTIAL TYPE: YES
EOSINOPHILS RELATIVE PERCENT: 3 % (ref 0–5)
GFR AFRICAN AMERICAN: >60 ML/MIN
GFR NON-AFRICAN AMERICAN: >60 ML/MIN
GFR SERPL CREATININE-BSD FRML MDRD: ABNORMAL ML/MIN/{1.73_M2}
GFR SERPL CREATININE-BSD FRML MDRD: ABNORMAL ML/MIN/{1.73_M2}
GLUCOSE BLD-MCNC: 421 MG/DL (ref 65–99)
GLUCOSE BLD-MCNC: 460 MG/DL (ref 70–99)
GLUCOSE URINE: ABNORMAL
HCT VFR BLD CALC: 44.7 % (ref 41–53)
HEMOGLOBIN: 15.1 G/DL (ref 13.5–17.5)
IMMATURE GRANULOCYTES: NORMAL %
KETONES, URINE: NEGATIVE
LEUKOCYTE ESTERASE, URINE: NEGATIVE
LYMPHOCYTES # BLD: 25 % (ref 13–44)
MCH RBC QN AUTO: 31.8 PG (ref 26–34)
MCHC RBC AUTO-ENTMCNC: 33.8 G/DL (ref 31–37)
MCV RBC AUTO: 94 FL (ref 80–100)
MONOCYTES # BLD: 7 % (ref 5–9)
NITRITE, URINE: NEGATIVE
NRBC AUTOMATED: NORMAL PER 100 WBC
PDW BLD-RTO: 14 % (ref 12.1–15.2)
PH UA: 5 (ref 5–8)
PLATELET # BLD: 227 K/UL (ref 140–450)
PLATELET ESTIMATE: NORMAL
PMV BLD AUTO: NORMAL FL (ref 6–12)
POTASSIUM SERPL-SCNC: 4.3 MMOL/L (ref 3.7–5.3)
PROTEIN UA: NEGATIVE
RBC # BLD: 4.75 M/UL (ref 4.5–5.9)
RBC # BLD: NORMAL 10*6/UL
SEG NEUTROPHILS: 64 % (ref 39–75)
SEGMENTED NEUTROPHILS ABSOLUTE COUNT: 4.8 K/UL (ref 2.1–6.5)
SODIUM BLD-SCNC: 131 MMOL/L (ref 135–144)
SPECIFIC GRAVITY UA: 1.02 (ref 1–1.03)
TOTAL PROTEIN: 7.7 G/DL (ref 6.4–8.3)
TURBIDITY: CLEAR
URINE HGB: NEGATIVE
UROBILINOGEN, URINE: NORMAL
WBC # BLD: 7.5 K/UL (ref 3.5–11)
WBC # BLD: NORMAL 10*3/UL

## 2019-11-25 PROCEDURE — 99284 EMERGENCY DEPT VISIT MOD MDM: CPT

## 2019-11-25 PROCEDURE — 82947 ASSAY GLUCOSE BLOOD QUANT: CPT

## 2019-11-25 PROCEDURE — 81003 URINALYSIS AUTO W/O SCOPE: CPT

## 2019-11-25 PROCEDURE — 80053 COMPREHEN METABOLIC PANEL: CPT

## 2019-11-25 PROCEDURE — 85025 COMPLETE CBC W/AUTO DIFF WBC: CPT

## 2019-11-25 PROCEDURE — 82010 KETONE BODYS QUAN: CPT

## 2019-11-25 PROCEDURE — 93798 PHYS/QHP OP CAR RHAB W/ECG: CPT

## 2019-11-25 RX ORDER — GLIPIZIDE 5 MG/1
5 TABLET ORAL
Qty: 60 TABLET | Refills: 0 | Status: SHIPPED | OUTPATIENT
Start: 2019-11-25 | End: 2020-02-07

## 2019-11-27 ENCOUNTER — HOSPITAL ENCOUNTER (OUTPATIENT)
Dept: CARDIAC REHAB | Age: 64
Setting detail: THERAPIES SERIES
Discharge: HOME | End: 2019-11-27
Payer: MEDICARE

## 2019-11-27 PROCEDURE — 93798 PHYS/QHP OP CAR RHAB W/ECG: CPT

## 2019-11-29 ENCOUNTER — HOSPITAL ENCOUNTER (OUTPATIENT)
Dept: CARDIAC REHAB | Age: 64
Setting detail: THERAPIES SERIES
Discharge: HOME | End: 2019-11-29
Payer: MEDICARE

## 2019-11-29 PROCEDURE — 93798 PHYS/QHP OP CAR RHAB W/ECG: CPT

## 2019-12-02 ENCOUNTER — HOSPITAL ENCOUNTER (OUTPATIENT)
Dept: CARDIAC REHAB | Age: 64
Setting detail: THERAPIES SERIES
Discharge: HOME | End: 2019-12-02
Payer: MEDICARE

## 2019-12-02 PROCEDURE — 93798 PHYS/QHP OP CAR RHAB W/ECG: CPT

## 2019-12-04 ENCOUNTER — HOSPITAL ENCOUNTER (OUTPATIENT)
Dept: CARDIAC REHAB | Age: 64
Setting detail: THERAPIES SERIES
Discharge: HOME | End: 2019-12-04
Payer: MEDICARE

## 2019-12-04 PROCEDURE — 93798 PHYS/QHP OP CAR RHAB W/ECG: CPT

## 2019-12-05 DIAGNOSIS — I10 ESSENTIAL HYPERTENSION: ICD-10-CM

## 2019-12-06 ENCOUNTER — HOSPITAL ENCOUNTER (OUTPATIENT)
Dept: CARDIAC REHAB | Age: 64
Setting detail: THERAPIES SERIES
Discharge: HOME | End: 2019-12-06
Payer: MEDICARE

## 2019-12-06 PROCEDURE — 93798 PHYS/QHP OP CAR RHAB W/ECG: CPT

## 2019-12-06 RX ORDER — LOSARTAN POTASSIUM 25 MG/1
TABLET ORAL
Qty: 90 TABLET | Refills: 0 | Status: SHIPPED | OUTPATIENT
Start: 2019-12-06 | End: 2020-10-26 | Stop reason: SDUPTHER

## 2019-12-06 RX ORDER — METOPROLOL SUCCINATE 50 MG/1
TABLET, EXTENDED RELEASE ORAL
Qty: 90 TABLET | Refills: 0 | Status: SHIPPED | OUTPATIENT
Start: 2019-12-06 | End: 2020-10-26 | Stop reason: SDUPTHER

## 2019-12-06 RX ORDER — AMLODIPINE BESYLATE 2.5 MG/1
TABLET ORAL
Qty: 90 TABLET | Refills: 0 | Status: SHIPPED | OUTPATIENT
Start: 2019-12-06 | End: 2020-10-26 | Stop reason: SDUPTHER

## 2019-12-06 RX ORDER — ALLOPURINOL 300 MG/1
TABLET ORAL
Qty: 90 TABLET | Refills: 0 | Status: SHIPPED | OUTPATIENT
Start: 2019-12-06 | End: 2021-02-19 | Stop reason: SDUPTHER

## 2019-12-09 ENCOUNTER — HOSPITAL ENCOUNTER (OUTPATIENT)
Dept: CARDIAC REHAB | Age: 64
Setting detail: THERAPIES SERIES
Discharge: HOME | End: 2019-12-09
Payer: MEDICARE

## 2019-12-09 PROCEDURE — 93798 PHYS/QHP OP CAR RHAB W/ECG: CPT

## 2019-12-11 ENCOUNTER — HOSPITAL ENCOUNTER (OUTPATIENT)
Dept: CARDIAC REHAB | Age: 64
Setting detail: THERAPIES SERIES
Discharge: HOME | End: 2019-12-11
Payer: MEDICARE

## 2019-12-11 PROCEDURE — 93798 PHYS/QHP OP CAR RHAB W/ECG: CPT

## 2019-12-13 ENCOUNTER — HOSPITAL ENCOUNTER (OUTPATIENT)
Dept: CARDIAC REHAB | Age: 64
Setting detail: THERAPIES SERIES
Discharge: HOME | End: 2019-12-13
Payer: MEDICARE

## 2019-12-13 PROCEDURE — 93798 PHYS/QHP OP CAR RHAB W/ECG: CPT

## 2019-12-16 ENCOUNTER — HOSPITAL ENCOUNTER (OUTPATIENT)
Dept: CARDIAC REHAB | Age: 64
Setting detail: THERAPIES SERIES
Discharge: HOME | End: 2019-12-16
Payer: MEDICARE

## 2019-12-16 PROCEDURE — 93798 PHYS/QHP OP CAR RHAB W/ECG: CPT

## 2019-12-18 ENCOUNTER — HOSPITAL ENCOUNTER (OUTPATIENT)
Dept: CARDIAC REHAB | Age: 64
Setting detail: THERAPIES SERIES
Discharge: HOME | End: 2019-12-18
Payer: MEDICARE

## 2019-12-18 PROCEDURE — 93798 PHYS/QHP OP CAR RHAB W/ECG: CPT

## 2019-12-20 ENCOUNTER — APPOINTMENT (OUTPATIENT)
Dept: CARDIAC REHAB | Age: 64
End: 2019-12-20
Payer: MEDICARE

## 2019-12-23 ENCOUNTER — HOSPITAL ENCOUNTER (OUTPATIENT)
Dept: CARDIAC REHAB | Age: 64
Setting detail: THERAPIES SERIES
Discharge: HOME | End: 2019-12-23
Payer: MEDICARE

## 2019-12-23 PROCEDURE — 93798 PHYS/QHP OP CAR RHAB W/ECG: CPT

## 2019-12-27 ENCOUNTER — HOSPITAL ENCOUNTER (OUTPATIENT)
Dept: CARDIAC REHAB | Age: 64
Setting detail: THERAPIES SERIES
End: 2019-12-27
Payer: MEDICARE

## 2019-12-30 ENCOUNTER — HOSPITAL ENCOUNTER (OUTPATIENT)
Dept: CARDIAC REHAB | Age: 64
Setting detail: THERAPIES SERIES
Discharge: HOME | End: 2019-12-30
Payer: MEDICARE

## 2019-12-30 PROCEDURE — 93798 PHYS/QHP OP CAR RHAB W/ECG: CPT

## 2020-01-03 ENCOUNTER — HOSPITAL ENCOUNTER (OUTPATIENT)
Dept: CARDIAC REHAB | Age: 65
Setting detail: THERAPIES SERIES
Discharge: HOME | End: 2020-01-03
Payer: MEDICARE

## 2020-01-03 PROCEDURE — 93798 PHYS/QHP OP CAR RHAB W/ECG: CPT

## 2020-01-06 ENCOUNTER — HOSPITAL ENCOUNTER (OUTPATIENT)
Dept: CARDIAC REHAB | Age: 65
Setting detail: THERAPIES SERIES
Discharge: HOME | End: 2020-01-06
Payer: MEDICARE

## 2020-01-06 PROCEDURE — 93798 PHYS/QHP OP CAR RHAB W/ECG: CPT

## 2020-01-08 ENCOUNTER — HOSPITAL ENCOUNTER (OUTPATIENT)
Dept: CARDIAC REHAB | Age: 65
Setting detail: THERAPIES SERIES
Discharge: HOME | End: 2020-01-08
Payer: MEDICARE

## 2020-01-08 PROCEDURE — 93798 PHYS/QHP OP CAR RHAB W/ECG: CPT

## 2020-01-10 ENCOUNTER — HOSPITAL ENCOUNTER (OUTPATIENT)
Dept: CARDIAC REHAB | Age: 65
Setting detail: THERAPIES SERIES
Discharge: HOME | End: 2020-01-10
Payer: MEDICARE

## 2020-01-10 PROCEDURE — 93798 PHYS/QHP OP CAR RHAB W/ECG: CPT

## 2020-01-13 ENCOUNTER — HOSPITAL ENCOUNTER (OUTPATIENT)
Dept: CARDIAC REHAB | Age: 65
Setting detail: THERAPIES SERIES
Discharge: HOME | End: 2020-01-13
Payer: MEDICARE

## 2020-01-13 PROCEDURE — 93798 PHYS/QHP OP CAR RHAB W/ECG: CPT

## 2020-01-14 NOTE — PROGRESS NOTES
Phase II Cardiac Rehab Individualized Treatment Plan-60 Day      Patient Name: Demian Barahona  ACCOUNT #: [de-identified]   Date of Initial Assessment: 11/11/19  Diagnosis: STABLE ANGINA PECTORIS  Onset Date: 10/24/19  Referring Keisha Reyes MD  Risk [id number]  Session Number: 24   EXERCISE    Stages of Change:   [] pre-contemplation  [x] Action   [] Contemplate   [] Maintainence   [] Prep   [] Relapse          Exercise Prescription:  Mode: [x]? ? TM [x]? ? UBE [x]? ? STP []?? EL [x]? ? R  Frequency: 3 DAYS PER WEEK  Duration: 31-60 MINUTES  Intensity: 5.4 - 6.0 METS  Target HR 90 - 108  Progression: INCREASE DURATION PER ABOVE F.I.T.T. Rx  PARAMETERS ON AVG OF 5-10 MIN / 1-2 WEEKS FOR THE FIRST 4-6 WEEKS.  AFTER 3-4 WEEKS ARE COMPLETED, CONTINUE TO GRADUALLY INCREASE F. I.T. PARAMETERS GRADUALLY UPWARD AT THE ESTABLISHED DURATION ON AVERAGE 0.5-1.0 METS PER 30 DAYS OVER THE COARSE OF REMAINING PROGRAM AS ESTABLISHED BY PT-CENTERED GOALS AND GUIDELINES. Plan/Goal: Increase 1-2 levels/week or 1-2 min/week to achieve target HR and RPE   12-16.   [x]? ? Angina with Exertion         THR:  <120 MSHR   [x]? ? Resistance Training  PROGRESS 8-12 bilateral UE and LE progressive resistance exercises at 1-3 sets per lift, on 2-3 non-consecutive days using weights/ BLUE therabands AND WTS TO 8-24 # for 8-15 reps to progressive overload by increasing resistance once reps progressed to at least 15 reps on at least 2 occasions    Hypertension:  [x] Yes  [] No  Resting BP: 122/68  Peak Exercise BP: 122/70  [] Med change? NO    Intervention:  Home Exercise:  Type: WALKING AND TM  Duration: 20 - 60 MIN  Frequency: AT LEAST 2 NO REHAB DAYS PER WEEK   [x]? ? Resistance Training   PROGRESS 8-12 bilateral UE and LE progressive resistance exercises at 1-3 sets per lift, on 2-3 non-consecutive days using weights/ BLUE therabands AND WTS TO 8-24 # for 8-15 reps to progressive overload by increasing resistance once reps progressed to at least 15 reps on at least 2 occasions      Education:   [x] Equipment [place]  [x] Self pulse   [x] Proper use weights/therabands   [x] S/S to report  [x] Low Na Diet    [x] Warm up/ Cool down  [x] BP Medication    [x] RPE Scale   [x] Understand BP   [x] Ex Safety   [x] Exercise specialist class-Home Exercise       Target Goal:   -Individual Exercise Plan  -Bp<130/80  -Aerobic active 30 + minutes 5-7 days per week    Nutrition    Stages of Change:   [] pre-contemplation  [x] Action   [] Contemplate   [] Maintainence   [] Prep   [] Relapse    Lipids:   NO NEW VALUES  [] Med Change? Diabetes:  [x] Yes  [] No  Random Bs: 285  HbA1c: 8.3 ON 10/17/19  [] Med Change?  NO    Weight Management:  Current Wt 309.6 LB  Wt Goal: 1-2 lbs/wk / LOSS    Intervention:   [] Dietitian Consult       [x] Nurse/Patient Discussion     [x] Diet Class           [] Referred to Diabetes Education     Education:  [x] S&S hypo/hyperglycemia  [x] Low fat/low cholesterol diet  [x] Weight loss methods      [x] Relate Diabetes/CAD     [x] Eating heart healthy handout    Target Goal:  -LDL-C<100 if triglycerides are > 200  -LDL-C < 70 for high risk patients  -HbA1c < 7%  -BMI < 25   Education    Stages of Change:    [] pre-contemplation  [x] Action   [] Contemplate   [] Maintainence   [] Prep   [] Relapse    Family support: [x] Yes  [] No    Tobacco use: [] Yes  [x] No    Intervention:  [] Referred to smoking cessation counselor     [] Individual education and counseling  [] Tobacco adjunct  [] Informed of education class schedule     Education:   [x] Risk Factors/Modifications  [x] Psychological aspects  [x] Angina    [x] Medications  [] CHF      [x] Cardiac A&P    Target Goal:  -Complete cessation of tobacco use (if applicable)  -Continued risk factor modifications  -Recognizing signs/symptoms to report  -Proper use of meds    Psychosocial  Stages of Change:    [] pre-contemplation  [x] Action   [] Contemplate   [] Maintainence   [] Prep   [] Relapse      Intervention:   [] Psych Consult/  [x] Uses stress management skills    [] Physician Referral    [x] Stress management class   [] Med Change? N/A     Education:    [x] Coping Techniques   [x] Relaxation techniques   [x] S/S of Depression    Target Goal:  -Assess presence or absence of depression using a valid screening tool. -Maximize coping skills.  -Positive support system. Preventative Medication:   [x] Aspirin       [x] Beta Blockade      [x] Statin or other lipid lowering agent     [] Clopidogrel   [x] ACE Inhibitor   [] Other anticoagulation medications     Fall Risk assess: [x] Yes  [] No / REMAINS LOW RISK  Assistive Device:   [] Cane  [] Walker [] Wheel Chair  [] Gait belt    Patient/Program goal:     \"WOULD LIKE TO BREATH BETTER AND EAT HEALTHIER\" / PROGRESSING TOWARD GOAL PER PT SELF-REPORT / CONTINUE PROGRESSING GOAL AS WRITTEN THE NEXT 30 DAYS     - PROGRESS stamina/strength to 30-50 total exercise by increasing 1-2 level/wk and 1-2   min/wk  to achieve THR and RPE 12-16 / INCREASE AVG CARDIO FC BY AT LEAST 0.5-1.0 MET IN THE NEXT 30 DAYS AND TOLERATE >31-45 MIN W/IN EX RX TARGETS     - LOSE 2-4 LB BODY WT IN NEXT 30 DAYS / MEETING GOAL THIS PERIOD W/ 2.4 LB WT LOSS / CONTINUE PROGRESSING GOAL AS WRITTEN THE NEXT 30 DAYS     - PROGRESS 8-12 bilateral UE and LE progressive resistance exercises at 1-3 sets per lift, on 2-3 non-consecutive days using weights/ BLUE AND GREY therabands AND WTS TO 8-24 # for 8 -15 reps to progressive overload by increasing resistance once reps progressed to at least 15 reps on at least 2 occasions     - MAINTAIN OPTIMAL.  BP / MEETING GOAL / CONTINUE PROGRESSING GOAL AS WRITTEN THE NEXT 30 DAYS     - Improved cholesterol and  Triglycerides / NO NEW VALUES / CONTINUE PROGRESSING GOAL AS WRITTEN THE NEXT 30 DAYS      - Develop regular exercise 30 min daily, AT LEAST 3-5 DAYS PER WEEK CONSISTENTLY W/IN THE NEXT 30 DAYS / MEETING GOAL / CONTINUE PROGRESSING GOAL AS WRITTEN THE NEXT 30 DAYS     - Lower daily fasting blood glucose score to <131 and random after meal scores to <181 at home / INCONSISTENTLY MEETING GOAL / CONTINUE TO EDUCATE AND REINFORCE SMART GOALS     -IMPROVE A1C TO <7.1 % / NO NEW VALUE / CONTINUE PROGRESSING GOAL AS WRITTEN THE NEXT 30 DAYS     - To reduce self-reported psycho-social feelings of stress in next 30 days / MEETING GOAL / Camryn Lee PROGRESSING GOAL AS WRITTEN THE NEXT 30 DAYS     - To eat at least 2 servings of fruit per day and at least 4-5 servings of vegetables per day / [address] / [facility] NEXT 27 DAYS      Physician Changes/Comments:      Cardiac Rehab Staff:  KATHRYN MASSEY RN, CEP

## 2020-01-15 ENCOUNTER — HOSPITAL ENCOUNTER (OUTPATIENT)
Dept: CARDIAC REHAB | Age: 65
Setting detail: THERAPIES SERIES
Discharge: HOME | End: 2020-01-15
Payer: MEDICARE

## 2020-01-17 ENCOUNTER — HOSPITAL ENCOUNTER (OUTPATIENT)
Dept: CARDIAC REHAB | Age: 65
Setting detail: THERAPIES SERIES
Discharge: HOME | End: 2020-01-17
Payer: MEDICARE

## 2020-01-17 PROCEDURE — 93798 PHYS/QHP OP CAR RHAB W/ECG: CPT

## 2020-01-20 ENCOUNTER — HOSPITAL ENCOUNTER (OUTPATIENT)
Dept: CARDIAC REHAB | Age: 65
Setting detail: THERAPIES SERIES
Discharge: HOME | End: 2020-01-20
Payer: MEDICARE

## 2020-01-20 PROCEDURE — 93798 PHYS/QHP OP CAR RHAB W/ECG: CPT

## 2020-01-21 RX ORDER — LEVOTHYROXINE SODIUM 175 UG/1
TABLET ORAL
Qty: 90 TABLET | Refills: 1 | Status: SHIPPED | OUTPATIENT
Start: 2020-01-21 | End: 2020-02-07

## 2020-01-22 ENCOUNTER — HOSPITAL ENCOUNTER (OUTPATIENT)
Dept: CARDIAC REHAB | Age: 65
Setting detail: THERAPIES SERIES
Discharge: HOME | End: 2020-01-22
Payer: MEDICARE

## 2020-01-22 PROCEDURE — 93798 PHYS/QHP OP CAR RHAB W/ECG: CPT

## 2020-01-24 ENCOUNTER — HOSPITAL ENCOUNTER (OUTPATIENT)
Dept: CARDIAC REHAB | Age: 65
Setting detail: THERAPIES SERIES
Discharge: HOME | End: 2020-01-24
Payer: MEDICARE

## 2020-01-24 PROCEDURE — 93798 PHYS/QHP OP CAR RHAB W/ECG: CPT

## 2020-01-27 ENCOUNTER — HOSPITAL ENCOUNTER (OUTPATIENT)
Dept: CARDIAC REHAB | Age: 65
Setting detail: THERAPIES SERIES
Discharge: HOME | End: 2020-01-27
Payer: MEDICARE

## 2020-01-27 PROCEDURE — 93798 PHYS/QHP OP CAR RHAB W/ECG: CPT

## 2020-01-29 ENCOUNTER — HOSPITAL ENCOUNTER (OUTPATIENT)
Dept: CARDIAC REHAB | Age: 65
Setting detail: THERAPIES SERIES
Discharge: HOME | End: 2020-01-29
Payer: MEDICARE

## 2020-01-29 PROCEDURE — 93798 PHYS/QHP OP CAR RHAB W/ECG: CPT

## 2020-01-31 ENCOUNTER — OFFICE VISIT (OUTPATIENT)
Dept: CARDIOLOGY CLINIC | Age: 65
End: 2020-01-31
Payer: MEDICARE

## 2020-01-31 ENCOUNTER — HOSPITAL ENCOUNTER (OUTPATIENT)
Dept: CARDIAC REHAB | Age: 65
Setting detail: THERAPIES SERIES
Discharge: HOME | End: 2020-01-31
Payer: MEDICARE

## 2020-01-31 VITALS
SYSTOLIC BLOOD PRESSURE: 110 MMHG | WEIGHT: 312 LBS | BODY MASS INDEX: 41.16 KG/M2 | DIASTOLIC BLOOD PRESSURE: 60 MMHG | OXYGEN SATURATION: 96 % | HEART RATE: 70 BPM

## 2020-01-31 PROCEDURE — 99212 OFFICE O/P EST SF 10 MIN: CPT | Performed by: INTERNAL MEDICINE

## 2020-01-31 PROCEDURE — G8417 CALC BMI ABV UP PARAM F/U: HCPCS | Performed by: INTERNAL MEDICINE

## 2020-01-31 PROCEDURE — G8427 DOCREV CUR MEDS BY ELIG CLIN: HCPCS | Performed by: INTERNAL MEDICINE

## 2020-01-31 PROCEDURE — 3017F COLORECTAL CA SCREEN DOC REV: CPT | Performed by: INTERNAL MEDICINE

## 2020-01-31 PROCEDURE — 1036F TOBACCO NON-USER: CPT | Performed by: INTERNAL MEDICINE

## 2020-01-31 PROCEDURE — G8484 FLU IMMUNIZE NO ADMIN: HCPCS | Performed by: INTERNAL MEDICINE

## 2020-01-31 PROCEDURE — 93798 PHYS/QHP OP CAR RHAB W/ECG: CPT

## 2020-01-31 NOTE — PROGRESS NOTES
Ov Dr. Gulshan Maxwell for 3 month follow up   With no testing   Still in cardiac rehab   No chest pain   No sob   When in cardiac rehab   No new issues  Was in er nov for sob/hyperglyceima   Feels back to baseline    No changes  Follow up in 6 months

## 2020-01-31 NOTE — LETTER
Ana Maria Victor M.D. [address] [place], [place] [address]Fatimahjose   [phone]        2020        Aurea Hall MD   [address] [facility], [address] [facility]    RE:   Cathleen Leventhal  :  1955    Dear Dr. Hart Gather:    CHIEF COMPLAINT:  History of angina, which has resolved. HISTORY OF PRESENT ILLNESS:  I met with Mr. Giorgi Neely in our office on 2020. He has a history of bypass surgery and coronary artery disease. He had been struggling with shortness of breath and chest pain and decreased energy level. I had done a stress test and an echocardiogram.  The stress test was abnormal showing a small area of inferior wall ischemia with an intermediate risk of coronary artery disease. I had a long discussion on , with Mr. Giorgi Neely and went over catheterization versus conservative measures with cardiac rehab. We opted for cardiac rehab, which he has been doing. He has actually done very well in cardiac rehab. He has advanced his exercise capacity and feels that he is back to his baseline. He has had no further chest pain or shortness of breath. He really has no complaints as I see him today. MEDICATIONS:  His medications are Proventil inhaler p.r.n., Zyloprim 300 mg daily, Norvasc 2.5 mg daily, aspirin 81 mg daily, TriCor 145 mg daily, Flovent daily, Glucotrol 5 mg b.i.d., Synthroid 175 mcg daily with 200 mcg daily, Cozaar 25 mg daily, Mevacor 40 mg daily, Glucophage 500 mg 2 tablets b.i.d., Toprol-XL 50 mg daily, Protonix 40 mg daily, and Paxil 20 mg b.i.d. PHYSICAL EXAMINATION:   VITAL SIGNS:  His blood pressure was 120/64 with a heart rate of 70 and regular. Respiratory rate 18. O2 saturation 96%. Weight 112 pounds. GENERAL:  He is a pleasant 43-year-old gentleman. Denied pain. He was oriented to person, place and time. Answered questions appropriately. SKIN:  No unusual skin changes.

## 2020-02-03 ENCOUNTER — HOSPITAL ENCOUNTER (OUTPATIENT)
Dept: CARDIAC REHAB | Age: 65
Setting detail: THERAPIES SERIES
Discharge: HOME | End: 2020-02-03
Payer: MEDICARE

## 2020-02-03 PROCEDURE — 93798 PHYS/QHP OP CAR RHAB W/ECG: CPT

## 2020-02-04 ENCOUNTER — TELEPHONE (OUTPATIENT)
Dept: FAMILY MEDICINE CLINIC | Age: 65
End: 2020-02-04

## 2020-02-04 NOTE — TELEPHONE ENCOUNTER
Pt notified and scheduled 2/7/2020
[employer] insurance will need f2f for MINAL to document compliance and improvement with use. Will also need download through whoever his CPAP is through for at least 30 days to prove compliance.
calories without serious comorbidity with body mass index (BMI) of 40.0 to 44.9 in adult (HCC)     LLQ pain     Abdominal bloating

## 2020-02-04 NOTE — PROGRESS NOTES
Bonnie Castro M.D. [address] [place] [address], [place] [address]  [phone]        2020        Brittani Cooney MD   [address] [facility], [address] [facility]    RE:   Daisy Loving  :  1955    Dear Dr. Fouzia Mark:    CHIEF COMPLAINT:  History of angina, which has resolved. HISTORY OF PRESENT ILLNESS:  I met with Mr. Piotr Wu in our office on 2020. He has a history of bypass surgery and coronary artery disease. He had been struggling with shortness of breath and chest pain and decreased energy level. I had done a stress test and an echocardiogram.  The stress test was abnormal showing a small area of inferior wall ischemia with an intermediate risk of coronary artery disease. I had a long discussion on , with Mr. Piotr Wu and went over catheterization versus conservative measures with cardiac rehab. We opted for cardiac rehab, which he has been doing. He has actually done very well in cardiac rehab. He has advanced his exercise capacity and feels that he is back to his baseline. He has had no further chest pain or shortness of breath. He really has no complaints as I see him today. MEDICATIONS:  His medications are Proventil inhaler p.r.n., Zyloprim 300 mg daily, Norvasc 2.5 mg daily, aspirin 81 mg daily, TriCor 145 mg daily, Flovent daily, Glucotrol 5 mg b.i.d., Synthroid 175 mcg daily with 200 mcg daily, Cozaar 25 mg daily, Mevacor 40 mg daily, Glucophage 500 mg 2 tablets b.i.d., Toprol-XL 50 mg daily, Protonix 40 mg daily, and Paxil 20 mg b.i.d. PHYSICAL EXAMINATION:   VITAL SIGNS:  His blood pressure was 120/64 with a heart rate of 70 and regular. Respiratory rate 18. O2 saturation 96%. Weight 112 pounds. GENERAL:  He is a pleasant 42-year-old gentleman. Denied pain. He was oriented to person, place and time. Answered questions appropriately. SKIN:  No unusual skin changes.   HEENT:  The pupils are equally round and intact. Mucous membranes were dry. NECK:  No JVD. Good carotid pulses. No carotid bruits. No lymphadenopathy or thyromegaly. CARDIOVASCULAR EXAM:  S1 and S2 were normal.  No S3 or S4. Soft systolic blowing type murmur. No diastolic murmur. PMI was normal.  No lift, thrust, or pericardial friction rub. LUNGS:  Quite clear to auscultation and percussion. ABDOMEN:  Soft and nontender. Good bowel sounds. EXTREMITIES:  Good femoral pulses. Good pedal pulses. No pedal edema. Skin was warm and dry. No calf tenderness. Nail beds pink. Good cap refill. PULSES:  Bilateral symmetrical radial, brachial and carotid pulses. No carotid bruits. Good femoral and pedal pulses. NEUROLOGIC EXAM:  Within normal limits. PSYCHIATRIC EXAM:  Within normal limits. IMPRESSION:  1. Coronary artery disease, functional class I at this time. 2.  Hypertension, well controlled. DISCUSSION:  Mr. Carlos Sauceda has done excellent in cardiac rehab. His symptoms have resolved and he feels essentially back to his baseline. There is no indication to do any catheterization at this time with him symptom-free. It is reasonable to see him in 6 months. If he starts developing chest pain, shortness of breath or loss of energy again, then I would want to see him and we would proceed with a cardiac catheterization to define his anatomy. Thank you very much for allowing me the privilege of seeing Mr. Carlos Sauceda. If you have any questions on my thoughts, please do not hesitate to contact me.      Sincerely,        Yonis Goss    D: 01/31/2020 9:48:43     T: 02/01/2020 1:51:00     [email or web address]  Job#: [id number]   Doc#: [id number]

## 2020-02-05 ENCOUNTER — HOSPITAL ENCOUNTER (OUTPATIENT)
Dept: CARDIAC REHAB | Age: 65
Setting detail: THERAPIES SERIES
Discharge: HOME | End: 2020-02-05
Payer: MEDICARE

## 2020-02-05 PROCEDURE — 93798 PHYS/QHP OP CAR RHAB W/ECG: CPT

## 2020-02-05 PROCEDURE — 93668 PERIPHERAL VASCULAR REHAB: CPT

## 2020-02-07 ENCOUNTER — OFFICE VISIT (OUTPATIENT)
Dept: FAMILY MEDICINE CLINIC | Age: 65
End: 2020-02-07
Payer: MEDICARE

## 2020-02-07 ENCOUNTER — APPOINTMENT (OUTPATIENT)
Dept: CARDIAC REHAB | Age: 65
End: 2020-02-07
Payer: MEDICARE

## 2020-02-07 VITALS
HEART RATE: 64 BPM | OXYGEN SATURATION: 96 % | DIASTOLIC BLOOD PRESSURE: 80 MMHG | BODY MASS INDEX: 41.62 KG/M2 | WEIGHT: 314 LBS | SYSTOLIC BLOOD PRESSURE: 122 MMHG | HEIGHT: 73 IN

## 2020-02-07 LAB — HBA1C MFR BLD: 6.5 %

## 2020-02-07 PROCEDURE — G8417 CALC BMI ABV UP PARAM F/U: HCPCS | Performed by: INTERNAL MEDICINE

## 2020-02-07 PROCEDURE — 3044F HG A1C LEVEL LT 7.0%: CPT | Performed by: INTERNAL MEDICINE

## 2020-02-07 PROCEDURE — 2022F DILAT RTA XM EVC RTNOPTHY: CPT | Performed by: INTERNAL MEDICINE

## 2020-02-07 PROCEDURE — 99213 OFFICE O/P EST LOW 20 MIN: CPT | Performed by: INTERNAL MEDICINE

## 2020-02-07 PROCEDURE — G8427 DOCREV CUR MEDS BY ELIG CLIN: HCPCS | Performed by: INTERNAL MEDICINE

## 2020-02-07 PROCEDURE — 1036F TOBACCO NON-USER: CPT | Performed by: INTERNAL MEDICINE

## 2020-02-07 PROCEDURE — 83036 HEMOGLOBIN GLYCOSYLATED A1C: CPT | Performed by: INTERNAL MEDICINE

## 2020-02-07 PROCEDURE — 3017F COLORECTAL CA SCREEN DOC REV: CPT | Performed by: INTERNAL MEDICINE

## 2020-02-07 PROCEDURE — G0296 VISIT TO DETERM LDCT ELIG: HCPCS | Performed by: INTERNAL MEDICINE

## 2020-02-07 PROCEDURE — G8484 FLU IMMUNIZE NO ADMIN: HCPCS | Performed by: INTERNAL MEDICINE

## 2020-02-07 ASSESSMENT — ENCOUNTER SYMPTOMS
SHORTNESS OF BREATH: 0
NAUSEA: 0
SORE THROAT: 0
WHEEZING: 0
ALLERGIC/IMMUNOLOGIC NEGATIVE: 1
TROUBLE SWALLOWING: 0
ABDOMINAL PAIN: 0
BLURRED VISION: 0
CHEST TIGHTNESS: 0
ASSOCIATED PULMONARY SYMPTOMS: WHEEZING
BLOOD IN STOOL: 0
CONSTIPATION: 0
COUGH: 0

## 2020-02-07 ASSESSMENT — COPD QUESTIONNAIRES: COPD: 1

## 2020-02-07 NOTE — PROGRESS NOTES
Chronic Disease Visit Information    BP Readings from Last 3 Encounters:   01/31/20 110/60   11/25/19 130/71   11/11/19 118/78          Hemoglobin A1C (%)   Date Value   10/17/2019 8.3 (H)   10/20/2017 5.4   02/20/2017 5.3     Microalb/Crt. Ratio (mcg/mg creat)   Date Value   08/18/2016 6     LDL Cholesterol (mg/dL)   Date Value   10/15/2019          HDL (mg/dL)   Date Value   10/15/2019 28 (L)     BUN (mg/dL)   Date Value   11/25/2019 15     CREATININE (mg/dL)   Date Value   11/25/2019 1.03     Glucose (mg/dL)   Date Value   11/25/2019 460 (HH)   10/14/2011 111 (H)            Have you changed or started any medications since your last visit including any over-the-counter medicines, vitamins, or herbal medicines? no   Are you having any side effects from any of your medications? -  no  Have you stopped taking any of your medications? Is so, why? -  no    Have you seen any other physician or provider since your last visit? No  Have you had any other diagnostic tests since your last visit? No  Have you been seen in the emergency room and/or had an admission to a hospital since we last saw you? No  Have you had your annual diabetic retinal (eye) exam? Yes - Records Requested  Have you had your routine dental cleaning in the past 6 months? no    Have you activated your [employer] account? If not, what are your barriers?  Yes     Patient Care Team:  Sam Hernandez MD as PCP - General (Hospitalist)  Sam Hernandez MD as PCP - [facility]         Medical History Review  Past Medical, Family, and Social History reviewed and does contribute to the patient presenting condition    Health Maintenance   Topic Date Due    Diabetic foot exam  07/29/1967    DTaP/Tdap/Td vaccine (1 - Tdap) 07/29/1968    Hepatitis B vaccine (1 of 3 - Risk 3-dose series) 07/29/1976    Shingles Vaccine (1 of 2) 07/29/2007    Low dose CT lung screening  07/29/2012    Diabetic microalbuminuria test  08/18/2017    Annual Wellness Visit

## 2020-02-07 NOTE — LETTER
[address] [facility] [address]  Phone: [phone]  Fax: [phone]    Emma Arellano MD        February 7, 2020     Patient: Afia Nam   YOB: 1955   Date of Visit: 2/7/2020       To Whom It May Concern: It is my professional opinion that Mr. Afia Nam, who is currently under my care, will benefit greatly  from moving out from his current residence/apartment due to worsening symptoms of COPD related to exposure to black mold. If you have any questions or concerns, please don't hesitate to call.     Sincerely,        Emma Arellano MD

## 2020-02-07 NOTE — PATIENT INSTRUCTIONS
SURVEY:    You may be receiving a survey from [employer] regarding your visit today. Please complete the survey to enable us to provide the highest quality of care to you and your family. If you cannot score us a very good on any question, please call the office to discuss how we could of made your experience a very good one. Thank you. What is lung cancer screening? Lung cancer screening is a way in which doctors check the lungs for early signs of cancer in people who have no symptoms of lung cancer. A low-dose CT scan uses much less radiation than a normal CT scan and shows a more detailed image of the lungs than a standard X-ray. The goal of lung cancer screening is to find cancer early, before it has a chance to grow, spread, or cause problems. One large study found that smokers who were screened with low-dose CT scans were less likely to die of lung cancer than those who were screened with standard X-ray. Below is a summary of the things you need to know regarding screening for lung cancer with low-dose computed tomography (LDCT). This is a screening program that involves routine annual screening with LDCT studies of the lung. The LDCTs are done using low-dose radiation that is not thought to increase your cancer risk. If you have other serious medical conditions (other cancers, congestive heart failure) that limit your life expectancy to less than 10 years, you should not undergo lung cancer screening with LDCT. The chance is 20%-60% that the LDCT result will show abnormalities. This would require additional testing which could include repeat imaging or even invasive procedures. Most (about 95%) of \"abnormal\" LDCT results are false in the sense that no lung cancer is ultimately found. Additionally, some (about 10%) of the cancers found would not affect your life expectancy, even if undetected and untreated.   If you are still smoking, the single most important thing that you can do

## 2020-02-07 NOTE — PROGRESS NOTES
HPI Notes    Name: Sudhir Pope  : 1955         Chief Complaint:     Chief Complaint   Patient presents with    Sleep Apnea     face to face for cpap supplies       History of Present Illness:        Mr. Norman Randle presents to office for Face to face evaluation for CPAP supplies  He also would like to follow up for DM. Last HbA1C was 8.3%5 on 10/17/19. He was started on Metformin 500 mg bid on 19. Rom Aguiar has a h/o MINAL fpr about 6-7 yrs. He has been using CPAP every night. Tolerates well. States some parts of CPAP equipment need to be replaced because they are broke. Needs to replace the Hose, full face mask with the head gear and the filters. Andreas also c/o worsening SOB due to black mold in his appartment         Diabetes   He presents for his follow-up diabetic visit. He has type 2 diabetes mellitus. His disease course has been stable. There are no hypoglycemic associated symptoms. Pertinent negatives for hypoglycemia include no dizziness, headaches or nervousness/anxiousness. There are no diabetic associated symptoms. Pertinent negatives for diabetes include no blurred vision, no chest pain, no fatigue, no polydipsia, no polyuria and no weakness. Symptoms are improving. Diabetic complications include heart disease and peripheral neuropathy. Pertinent negatives for diabetic complications include no CVA or nephropathy. Risk factors for coronary artery disease include diabetes mellitus, male sex, hypertension and obesity. Current diabetic treatment includes oral agent (monotherapy). He is compliant with treatment all of the time. He is following a generally unhealthy diet. His overall blood glucose range is 180-200 mg/dl. An ACE inhibitor/angiotensin II receptor blocker is being taken. Eye exam is current. COPD   There is no cough, shortness of breath or wheezing. This is a chronic problem. The current episode started more than 1 year ago. The problem occurs 2 to 4 times per day.  The ventricular function, ejection fraction of 60%    CARDIAC CATHETERIZATION Left 12/20/2017    Dr. Halle Chun @ [facility]--70% left main trunck & ostial LAD & high lateral circumflex disease. Patent LIMA to the LAD. Patent vein graft to the high lateral circumflex. 60% disease in the AV branch of the circumflex that terminates in a large posterolateral branch with an FFR of 1.0, indicating no significant stenosis. 40% disease in the right coronary artery. Ejection fraction 50-55%.  COLONOSCOPY N/A 3/5/2019    COLONOSCOPY POLYPECTOMY HOT BIOPSY performed by Vikki Tang MD at [facility]  02/04/15    3 vessel CABG using left internal mammary artery to the left anterior descending coronary artery with a reverse saphenous vein aortocoronary sequential graft side to side to the ramus intermedius coronary artery & end to side to the first obtuse marginal coronary artery.  OTHER SURGICAL HISTORY  03-21-16    I & D- cyst- posterior neck    SPINE SURGERY      THYROID LOBECTOMY      UPPER GASTROINTESTINAL ENDOSCOPY N/A 3/5/2019    EGD BIOPSY performed by Vikki Tang MD at [facility] OR        Medications:       Prior to Admission medications    Medication Sig Start Date End Date Taking?  Authorizing Provider   Respiratory Therapy Supplies MISC CPAP full face mask, hose, head gear and  filter 2/7/20  Yes Rob Bender MD   allopurinol (ZYLOPRIM) 300 MG tablet TAKE 1 TABLET EVERY DAY 12/6/19  Yes Rob Bender MD   metoprolol succinate (TOPROL XL) 50 MG extended release tablet TAKE 1 TABLET EVERY DAY 12/6/19  Yes Rob Bender MD   losartan (COZAAR) 25 MG tablet TAKE 1 TABLET EVERY DAY 12/6/19  Yes Rob Bender MD   amLODIPine (NORVASC) 2.5 MG tablet TAKE 1 TABLET EVERY DAY 12/6/19  Yes Rob Bender MD   metFORMIN (GLUCOPHAGE) 500 MG tablet Take 2 tablets by mouth 2 times daily (with meals) 11/25/19  Yes Sally Chi MD   pantoprazole (PROTONIX) 40 MG tablet disturbance. Respiratory: Negative for cough, chest tightness, shortness of breath and wheezing. Cardiovascular: Positive for dyspnea on exertion. Negative for chest pain, palpitations and leg swelling. Gastrointestinal: Negative for abdominal pain, blood in stool, constipation and nausea. Endocrine: Negative for cold intolerance, heat intolerance, polydipsia and polyuria. Genitourinary: Negative for difficulty urinating and dysuria. Musculoskeletal: Negative. Skin: Negative for rash. Allergic/Immunologic: Negative. Neurological: Negative for dizziness, weakness and headaches. Psychiatric/Behavioral: Negative for behavioral problems and dysphoric mood. The patient is not nervous/anxious. Physical Exam:     Vitals:  /80 (Site: Left Upper Arm, Position: Sitting, Cuff Size: Large Adult)   Pulse 64   Ht 6' 1\" (1.854 m)   Wt (!) 314 lb (142.4 kg)   SpO2 96%   BMI 41.43 kg/m²       Physical Exam  Vitals signs reviewed. Constitutional:       General: He is not in acute distress. Appearance: He is well-developed. HENT:      Head: Normocephalic and atraumatic. Right Ear: Tympanic membrane and ear canal normal.      Left Ear: Tympanic membrane and ear canal normal.      Nose: Nose normal. No rhinorrhea. Mouth/Throat:      Mouth: Mucous membranes are moist.      Pharynx: Oropharynx is clear. No posterior oropharyngeal erythema. Eyes:      General: No scleral icterus. Right eye: No discharge. Left eye: No discharge. Conjunctiva/sclera: Conjunctivae normal.   Neck:      Thyroid: No thyromegaly. Cardiovascular:      Rate and Rhythm: Normal rate and regular rhythm. Heart sounds: Normal heart sounds. No murmur. Pulmonary:      Effort: Pulmonary effort is normal.      Breath sounds: Wheezing (few scattered expiratory wheezes bilaterally) present. No rales.       Comments: Diminished breath sounds bilaterally  Abdominal:      General: Bowel sounds are normal. There is no distension. Palpations: Abdomen is soft. There is no mass. Tenderness: There is no abdominal tenderness. Musculoskeletal: Normal range of motion. Right lower leg: No edema. Left lower leg: No edema. Lymphadenopathy:      Cervical: No cervical adenopathy. Skin:     General: Skin is warm and dry. Coloration: Skin is not pale. Findings: No rash. Neurological:      General: No focal deficit present. Mental Status: He is alert and oriented to person, place, and time. Psychiatric:         Behavior: Behavior normal.         Judgment: Judgment normal.         Diabetic Foot Exam    Pulses:  normal,   Edema: negative  Skin: normal    Sensory exam  Monofilament Sensation:  abnormal - mildly impaired  (minimum of 5 random plantar locations tested, avoid callous areas - > 1 area with absence of sensation is + for neuropathy)      Plus one of the following  Pinprick:  Intact        Data:     Lab Results   Component Value Date     11/25/2019    K 4.3 11/25/2019    CL 94 11/25/2019    CO2 23 11/25/2019    BUN 15 11/25/2019    CREATININE 1.03 11/25/2019    GLUCOSE 460 11/25/2019    GLUCOSE 111 10/14/2011    PROT 7.7 11/25/2019    LABALBU 4.3 11/25/2019    BILITOT 0.64 11/25/2019    ALKPHOS 68 11/25/2019    AST 48 11/25/2019    ALT 47 11/25/2019     Lab Results   Component Value Date    WBC 7.5 11/25/2019    RBC 4.75 11/25/2019    HGB 15.1 11/25/2019    HCT 44.7 11/25/2019    MCV 94.0 11/25/2019    MCH 31.8 11/25/2019    MCHC 33.8 11/25/2019    RDW 14.0 11/25/2019     11/25/2019    MPV NOT REPORTED 11/25/2019     Lab Results   Component Value Date    TSH 8.79 10/15/2019     Lab Results   Component Value Date    CHOL 153 10/15/2019    HDL 28 10/15/2019    PSA 0.35 12/17/2018    LABA1C 8.3 10/17/2019          Assessment & Plan        Diagnosis Orders   1. MINAL on CPAP   Patient is compliant with his CPAP and his benefit from using it.   Will order

## 2020-02-10 ENCOUNTER — HOSPITAL ENCOUNTER (OUTPATIENT)
Dept: CARDIAC REHAB | Age: 65
Setting detail: THERAPIES SERIES
Discharge: HOME | End: 2020-02-10
Payer: MEDICARE

## 2020-02-10 PROCEDURE — 93798 PHYS/QHP OP CAR RHAB W/ECG: CPT

## 2020-02-11 NOTE — PROGRESS NOTES
Phase II Cardiac Rehab Individualized Treatment Plan-90 Day      Patient Name: Jacques Pringle #: [de-identified]   Date of Initial Assessment: 11/11/19  Diagnosis: STABLE ANGINA PECTORIS  Onset Date: 10/24/19  Referring Kristine Cope MD  Risk [address] Case [phone]  Session Number: 24    EXERCISE    Stages of Change:   [] pre-contemplation  [x] Action   [] Contemplate   [] Maintainence   [] Prep   [] Relapse          Exercise Prescription:  Mode: [x]??? TM [x]? ?? UBE [x]??? STP []??? EL [x]? ?? R  Frequency: 3 DAYS PER WEEK  Duration: 31-60 MINUTES  Intensity: 5.4 - 6.0 METS  Target HR 90 - 108  Progression: INCREASE DURATION PER ABOVE F.I.T.T. Rx  PARAMETERS ON AVG OF 5-10 MIN / 1-2 WEEKS FOR THE FIRST 4-6 WEEKS.  AFTER 3-4 WEEKS ARE COMPLETED, CONTINUE TO GRADUALLY INCREASE F. I.T. PARAMETERS GRADUALLY UPWARD AT THE ESTABLISHED DURATION ON AVERAGE 0.5-1.0 METS PER 30 DAYS OVER THE COARSE OF REMAINING PROGRAM AS ESTABLISHED BY PT-CENTERED GOALS AND GUIDELINES. Plan/Goal: Increase 1-2 levels/week or 1-2 min/week to achieve target HR and RPE   12-16.   [x]? ?? Angina with Exertion         THR:  <120 MSHR   [x]? ?? Resistance Training  PROGRESS 8-12 bilateral UE and LE progressive resistance exercises at 1-3 sets per lift, on 2-3 non-consecutive days using weights/ BLUE therabands AND WTS TO 8-24 # for 8-15 reps to progressive overload by increasing resistance once reps progressed to at least 15 reps on at least 2 occasions       Hypertension:  [x] Yes  [] No  Resting BP: 120/68  Peak Exercise BP: 142/80  [] Med change? NO    Intervention:  Home Exercise:  Type: WALKING AND TM  Duration: 20 - 60 MIN  Frequency: AT LEAST 2 NO REHAB DAYS PER WEEK   [x]? ?? Resistance Training   PROGRESS 8-12 bilateral UE and LE progressive resistance exercises at 1-3 sets per lift, on 2-3 non-consecutive days using weights/ BLUE therabands AND WTS TO 8-24 # for 8-15 reps to progressive overload by increasing resistance once reps progressed to at least 15 reps on at least 2 occasions    Education:   [x] Equipment [place]  [x] Self pulse   [x] Proper use weights/therabands   [x] S/S to report  [x] Low Na Diet    [x] Warm up/ Cool down  [x] BP Medication    [x] RPE Scale   [x] Understand BP   [x] Ex Safety   [x] Exercise specialist class-Home Exercise       Target Goal:   -Individual Exercise Plan  -Bp<130/80  -Aerobic active 30 + minutes 5-7 days per week    Nutrition    Stages of Change:   [] pre-contemplation  [x] Action   [] Contemplate   [] Maintainence   [] Prep   [] Relapse    Lipids:   NO NEW VALUES  [] Med Change? NO     Diabetes:  [x] Yes  [] No  Random Bs: 188  HbA1c: 6.5 ON 2/7/2020  [] Med Change?  NO    Weight Management:  Current Wt 305.4  Wt Goal: 1-2 lbs/wk / LOSS    Intervention:   [] Dietitian Consult       [x] Nurse/Patient Discussion     [x] Diet Class           [] Referred to Diabetes Education     Education:  [x] S&S hypo/hyperglycemia  [x] Low fat/low cholesterol diet  [x] Weight loss methods      [x] Relate Diabetes/CAD     [x] Eating heart healthy handout    Target Goal:  -LDL-C<100 if triglycerides are > 200  -LDL-C < 70 for high risk patients  -HbA1c < 7%  -BMI < 25   Education    Stages of Change:    [] pre-contemplation  [x] Action   [] Contemplate   [] Maintainence   [] Prep   [] Relapse    Family support: [x] Yes  [] No    Tobacco use: [] Yes  [x] No    Intervention:  [] Referred to smoking cessation counselor     [] Individual education and counseling  [] Tobacco adjunct  [] Informed of education class schedule     Education:   [x] Risk Factors/Modifications  [x] Psychological aspects  [x] Angina    [x] Medications  [] CHF      [x] Cardiac A&P    Target Goal:  -Complete cessation of tobacco use (if applicable)  -Continued risk factor modifications  -Recognizing signs/symptoms to report  -Proper use of meds    Psychosocial  Stages of Change:    [] pre-contemplation  [x] Action   [] Contemplate   [] Maintainence   [] Prep   [] Relapse      Intervention:   [] Psych Consult/  [x] Uses stress management skills    [] Physician Referral    [x] Stress management class   [] Med Change? N/A    Education:    [x] Coping Techniques   [x] Relaxation techniques   [x] S/S of Depression    Target Goal:  -Assess presence or absence of depression using a valid screening tool. -Maximize coping skills.  -Positive support system. Preventative Medication:   [x] Aspirin       [x] Beta Blockade      [x] Statin or other lipid lowering agent     [] Clopidogrel   [x] ACE Inhibitor   [] Other anticoagulation medications     Fall Risk assess: [x] Yes  [] No / REMAINS LOW RISK  Assistive Device:   [] Cane  [] Walker [] Wheel Chair  [] Gait belt    Patient/Program goal:     \"WOULD LIKE TO BREATH BETTER AND EAT HEALTHIER\" / PROGRESSING TOWARD GOAL PER PT SELF-REPORT / CONTINUE PROGRESSING GOAL AS WRITTEN THE NEXT 30 DAYS     - PROGRESS stamina/strength to 30-50 total exercise by increasing 1-2 level/wk and 1-2   min/wk  to achieve THR and RPE 12-16 / INCREASE AVG CARDIO FC BY AT LEAST 0.5-1.0 MET IN THE NEXT 30 DAYS AND TOLERATE >31-45 MIN W/IN EX RX TARGETS / AVG CARDIO FC IMPROVED TO 5.5 METS / CONTINUE PROGRESSING GOAL AS WRITTEN THE NEXT 30 DAYS     - LOSE 2-4 LB BODY WT IN NEXT 30 DAYS / MEETING GOAL THIS PERIOD W/ 2.4 LB WT LOSS / CONTINUE PROGRESSING GOAL AS WRITTEN THE NEXT 30 DAYS     - PROGRESS 8-12 bilateral UE and LE progressive resistance exercises at 1-3 sets per lift, on 2-3 non-consecutive days using weights/ BLUE AND GREY therabands AND WTS TO 8-24 # for 8 -15 reps to progressive overload by increasing resistance once reps progressed to at least 15 reps on at least 2 occasions / Roxy Ramirez / CONTINUE PROGRESSING GOAL AS WRITTEN THE NEXT 30 [address] [facility].  BP / MEETING GOAL / CONTINUE PROGRESSING GOAL AS WRITTEN THE NEXT 30 DAYS     - Improved cholesterol and  Triglycerides / NO NEW VALUES /

## 2020-02-12 ENCOUNTER — TELEPHONE (OUTPATIENT)
Dept: ONCOLOGY | Age: 65
End: 2020-02-12

## 2020-02-12 ENCOUNTER — HOSPITAL ENCOUNTER (OUTPATIENT)
Dept: CARDIAC REHAB | Age: 65
Setting detail: THERAPIES SERIES
Discharge: HOME | End: 2020-02-12
Payer: MEDICARE

## 2020-02-12 PROCEDURE — 93798 PHYS/QHP OP CAR RHAB W/ECG: CPT

## 2020-02-12 NOTE — LETTER
2/12/2020        [address] [place] [address] [address]    Dear Venu Callejas: Your healthcare provider has ordered a low dose CT scan of the chest for lung cancer screening. You will find enclosed, information about CT lung screening. Please review the statement of understanding, you will be asked to sign a copy of this at the time of your CT scan    If you have not already been contacted to make the appointment for your scan, please call our scheduling department at [phone]    Keep in mind that CT lung screening does not take the place of smoking cessation. If you are a current smoker, you will find enclosed smoking cessation resources. Please do not hesitate to contact me if you have any questions or concerns.     [address] [place] [address] Lung Screening Program  [phone]

## 2020-02-14 ENCOUNTER — HOSPITAL ENCOUNTER (OUTPATIENT)
Dept: CARDIAC REHAB | Age: 65
Setting detail: THERAPIES SERIES
Discharge: HOME | End: 2020-02-14
Payer: MEDICARE

## 2020-02-14 PROCEDURE — 93798 PHYS/QHP OP CAR RHAB W/ECG: CPT

## 2020-02-17 ENCOUNTER — HOSPITAL ENCOUNTER (OUTPATIENT)
Dept: CARDIAC REHAB | Age: 65
Setting detail: THERAPIES SERIES
Discharge: HOME | End: 2020-02-17
Payer: MEDICARE

## 2020-02-17 NOTE — PROGRESS NOTES
Phase II Cardiac Rehab Individualized Treatment Plan-Discharge    Patient Name: Anel Quinteros  Date of Initial Assessment: 2/17/2020  ACCOUNT #: [de-identified]  Date of Initial Assessment: 11/11/19  Diagnosis: STABLE ANGINA PECTORIS  Onset Date: 10/24/19  Referring MD Nereida Norton [id number]  Session Number: 24  Session Number: 36  EXERCISE    Stages of Change:   [] pre-contemplation  [x] Action   [] Contemplate   [] Maintainence   [] Prep   [] Relapse          Exercise Prescription:  Mode: [x]???? TM [x]???? UBE [x]???? STP []???? EL [x]???? R  Frequency: 3 DAYS PER WEEK  Duration: 31-60 MINUTES  Intensity: 6.4 METS  Target HR 90 - 108  Progression: INCREASE DURATION PER ABOVE F.I.T.T. Rx  PARAMETERS ON AVG OF 5-10 MIN / 1-2 WEEKS FOR THE FIRST 4-6 WEEKS.  AFTER 3-4 WEEKS ARE COMPLETED, CONTINUE TO GRADUALLY INCREASE F. I.T. PARAMETERS GRADUALLY UPWARD AT THE ESTABLISHED DURATION ON AVERAGE 0.5-1.0 METS PER 30 DAYS OVER THE COARSE OF REMAINING PROGRAM AS ESTABLISHED BY PT-CENTERED GOALS AND GUIDELINES. Plan/Goal: Increase 1-2 levels/week or 1-2 min/week to achieve target HR and RPE   12-16.   [x]? ??? Angina with Exertion         THR:  <120 MSHR   [x]? ??? Resistance Training  PROGRESS 8-12 bilateral UE and LE progressive resistance exercises at 1-3 sets per lift, on 2-3 non-consecutive days using weights/ BLUE therabands AND WTS TO 8-24 # for 8-15 reps to progressive overload by increasing resistance once reps progressed to at least 15 reps on at least 2 occasions     Hypertension:  [x] Yes  [] No  Resting BP: 110/62  Peak Exercise BP: 130/60  [] Med Change? NO    Intervention:  Home Exercise:  Type: WALKING AND TM  Duration: 20 - 60 MIN  Frequency: AT LEAST 2 NO REHAB DAYS PER WEEK   [x]? ??? Resistance Training   PROGRESS 8-12 bilateral UE and LE progressive resistance exercises at 1-3 sets per lift, on 2-3 non-consecutive days using weights/ BLUE therabands AND WTS TO 8-24 # for 8-15 reps to progressive overload by increasing resistance once reps progressed to at least 15 reps on at least 2 occasions    Depression Screening:  PHQ-9 DEPRESSION AND MOOD RATING 10.00 6.00 40%   Interpretation of Total Score Depression Severity: 1-4 = Minimal depression, 5-9 = Mild depression, 10-14 = Moderate depression, 15-19 = Moderately severe depression, 20-27 = Severe depression  IMPROVED 40%    Education:   [x] Education Goals met        Target Goal:   -Individual Exercise Plan  -Bp< 130/80  -Aerobic active 30 + minutes 5-7 days per week    Nutrition    Stages of Change:   [] pre-contemplation  [x] Action   [] Contemplate   [] Maintainenc   [] Prep   [] Relapse    Lipids:   NO NEW VALUES  [] Med Change? NO    Diabetes:  [x] Yes  [] No  FBS: 161  HbA1c: 6.5%  [] Med Change? NO  [] BS in range?   NO    Weight Management:  Weight: 303 LB  Height:  73 IN (3.43 M2)  BMI: 40.2  Wt Goal: 1-2 lbs/wk / LOSS  Special Diet: Special Diet:  1,600 Kcal / day, 2 GM Na+, 30% total fat, <10% saturated fat, 25-35 GM fiber, cholesterol reduced, balanced nutrition plan to be promoted and taught in rehab    Rate My Plate: SCORE 37%  HEALTHY NUTRITION SELF-RATING 39.00 53.00 36%       Intervention:   [] Dietitian Consult       [x] Nurse/Patient Discussion     [x] Diet Class           [] Referred to Diabetes Education     Education:  [x] Education Goals met    Target Goal:  -LDL-C<100 if triglycerides are > 200  -LDL-C < 70 for high risk patients  -HbA1c < 7%  -BMI < 25   Education    Stages of Change:    [] pre-contemplation  [x] Action   [] Contemplate   [] Maintainence   [] Prep   [] Relapse    Knowledge test score: 100%      Family support: [x] Yes  [] No    Tobacco use: [] Yes  [x] No    Intervention:  [] Referred to smoking cessation counselor     [] Individual education and counseling  [] Tobacco adjunct  [] Informed of education class schedule     Education:   [x] Education goals met    Target Goal:  -Complete cessation of tobacco use (if applicable)  -Continued risk factor modifications  -Recognizing signs/symptoms to report  -Proper use of meds    Psychosocial  Stages of Change:    [] pre-contemplation  [x] Action   [] Contemplate   [] Maintainence   [] Prep   [] Relapse    Psychosocial Test:  Tool Used: Chaya Sanchez Quality of Life  Score:   Q.OL. HEALTH & FUNCTIONING RATING 17.70 24.10 36%   Q.O.L. SOCIAL & ECONOMIC RATING 23.60 19.80 -16%   Q.O.L. PSYCHOLOGICAL/SPIRITUAL RATING 22.80 24.60 8%   Q. O.L FAMILY SELF-RATING 26.00 27.50 6%   Q.O.L. OVERALL INDEX SELF-RATING 21.20 23.70 12%       Depression screening score PHQ-9:   PHQ-9 DEPRESSION AND MOOD RATING 10.00 6.00 40%     []Medication change? NONE    Education:    [x] Education Goals met    Target Goal:  -Assess presence or absence of depression using a valid screening tool. -Maximize coping skills.  -Positive support system.     Preventative Medication:   [x] Aspirin       [x] Beta Blockade      [x] Statin or other lipid lowering agent     [] Clopidogrel   [x] ACE Inhibitor   [] Other anticoagulation medications     Fall Risk assess: [x] Yes  [] No / LOW FALL RISK  Assistive Device:   [] Cane  [] Walker [] Wheel Chair  [] Gait belt    Patient/Program goal:     \"WOULD LIKE TO BREATH BETTER AND EAT HEALTHIER\" / GOAL MET PER PT SELF-REPORT AND IMPROVED RATE YOUR PLATE SCORE     - PROGRESS stamina/strength to 30-50 total exercise by increasing 1-2 level/wk and 1-2   min/wk  to achieve THR and RPE 12-16 / INCREASE AVG CARDIO FC BY AT LEAST 0.5-1.0 MET IN THE NEXT 30 DAYS AND TOLERATE >31-45 MIN W/IN EX RX TARGETS /  Date: 11/11/19 2/17/20     PRE-PROGRAM POST-PROGRAM % Improvement   AVERAGE ENDURANCE LEVEL (mets): 5.1 6.2 22%   MAXIMUM ENDURANCE LEVEL (mets): 5.4 6.4 19%          - LOSE 2-4 LB BODY WT IN NEXT 30 DAYS / GOAL MET /   BODY WEIGHT 311.4 303 3%        - PROGRESS 8-12 bilateral UE and LE progressive resistance exercises at 1-3 sets per lift, on 2-3 non-consecutive days using weights/ BLUE AND GREY therabands AND WTS TO 8-24 # for 8 -15 reps to progressive overload by increasing resistance once reps progressed to at least 15 reps on at least 2 occasions / GOAL MET     - MAINTAIN OPTIMAL.  BP / GOAL MET  Date: 11/11/19 2/17/20     PRE-PROGRAM POST-PROGRAM % Improvement   BLOOD PRESSURE-SYSTOLIC (RESTING): 782.76 110.00 N/A   BLOOD PRESSURE-DIASTOLIC (RESTING): 89.95 62.00 N/A       - Improved cholesterol and  Triglycerides / NO NEW VALUES     - Develop regular exercise 30 min daily, AT LEAST 3-5 DAYS PER WEEK CONSISTENTLY W/IN THE NEXT 30 DAYS / GOAL MET     - Lower daily fasting blood glucose score to <131 and random after meal scores to <181 at home / MEETING GOAL W/ A1C 6.5 % ON 2/7/2020      -IMPROVE A1C TO <7.1 % /MEETING GOAL W/ A1C 6.5 % ON 2/7/2020 / CONTINUE PROGRESSING GOAL AS WRITTEN THE NEXT 30 DAYS   - To reduce self-reported psycho-social feelings of stress in next 30 days / Jordan Hogan 112 NEXT 30 DAYS     - To eat at least 2 servings of fruit per day and at least 4-5 servings of vegetables per day / GOAL MET PER RATE YOUR PLATE    Physician Changes/Comments:     Cardiac Rehab Staff:  Pat MASSEY RN, CEP

## 2020-02-18 RX ORDER — PAROXETINE HYDROCHLORIDE 20 MG/1
TABLET, FILM COATED ORAL
Qty: 180 TABLET | Refills: 1 | Status: SHIPPED | OUTPATIENT
Start: 2020-02-18 | End: 2020-10-26 | Stop reason: SDUPTHER

## 2020-02-18 NOTE — TELEPHONE ENCOUNTER
Last OV: 2/7/2020  Last RX: 10/02/19   Next scheduled apt: 5/7/2020    Medication pending. Health Maintenance   Topic Date Due    DTaP/Tdap/Td vaccine (1 - Tdap) 07/29/1968    Hepatitis B vaccine (1 of 3 - Risk 3-dose series) 07/29/1976    Shingles Vaccine (1 of 2) 07/29/2007    Low dose CT lung screening  07/29/2012    Diabetic microalbuminuria test  08/18/2017    Annual Wellness Visit (AWV)  05/29/2019    Lipid screen  10/15/2020    TSH testing  10/15/2020    Diabetic retinal exam  10/29/2020    Potassium monitoring  11/25/2020    Creatinine monitoring  11/25/2020    Diabetic foot exam  02/07/2021    A1C test (Diabetic or Prediabetic)  02/07/2021    Colon cancer screen colonoscopy  03/05/2029    Flu vaccine  Completed    Pneumococcal 0-64 years Vaccine  Completed    Hepatitis C screen  Completed    HIV screen  Completed    Hepatitis A vaccine  Aged Out    Hib vaccine  Aged Out    Meningococcal (ACWY) vaccine  Aged Out             (applicable per patient's age: Cancer Screenings, Depression Screening, Fall Risk Screening, Immunizations)    Hemoglobin A1C (%)   Date Value   02/07/2020 6.5   10/17/2019 8.3 (H)   10/20/2017 5.4     Microalb/Crt.  Ratio (mcg/mg creat)   Date Value   08/18/2016 6     LDL Cholesterol (mg/dL)   Date Value   10/15/2019          AST (U/L)   Date Value   11/25/2019 48 (H)     ALT (U/L)   Date Value   11/25/2019 47 (H)     BUN (mg/dL)   Date Value   11/25/2019 15      (goal A1C is < 7)   (goal LDL is <100) need 30-50% reduction from baseline     BP Readings from Last 3 Encounters:   02/07/20 122/80   01/31/20 110/60   11/25/19 130/71    (goal /80)      All Future Testing planned in [facility]:  Lab Frequency Next Occurrence   CBC Auto Differential Once 07/31/2020   Comprehensive Metabolic Panel Once 77/91/4649   Vitamin D 25 Hydroxy Once 07/31/2020   Lipid Panel Once 07/31/2020   TSH with Reflex Once 07/31/2020   Magnesium Once 07/31/2020   EKG 12 Lead Once 07/31/2020   CT lung screen [Initial/Annual] Once 02/07/2020       Next Visit Date:  Future Appointments   Date Time Provider Debbie Lewisi   2/19/2020  9:30 AM [facility]   5/7/2020  9:00 AM MD Pelon Amaro  [id number]   8/7/2020  8:30 AM MD Liza Calhoun [facility]            Patient Active Problem List:     GERD (gastroesophageal reflux disease)     S/P CABG x 3     CAD (coronary artery disease)     Essential hypertension     Mixed hyperlipidemia     Dysthymia (or depressive neurosis)     Chronic gout involving toe     Acquired hypothyroidism     Vitamin D deficiency disease     MINAL on CPAP     Chronic low back pain with left-sided sciatica     Class 3 severe obesity due to excess calories without serious comorbidity with body mass index (BMI) of 40.0 to 44.9 in adult (HCC)     LLQ pain     Abdominal bloating

## 2020-02-19 ENCOUNTER — APPOINTMENT (OUTPATIENT)
Dept: CARDIAC REHAB | Age: 65
End: 2020-02-19
Payer: MEDICARE

## 2020-02-19 ENCOUNTER — HOSPITAL ENCOUNTER (OUTPATIENT)
Dept: CT IMAGING | Age: 65
Discharge: HOME | End: 2020-02-21
Payer: MEDICARE

## 2020-02-19 PROCEDURE — G0297 LDCT FOR LUNG CA SCREEN: HCPCS

## 2020-02-21 ENCOUNTER — APPOINTMENT (OUTPATIENT)
Dept: CARDIAC REHAB | Age: 65
End: 2020-02-21
Payer: MEDICARE

## 2020-02-24 ENCOUNTER — APPOINTMENT (OUTPATIENT)
Dept: CARDIAC REHAB | Age: 65
End: 2020-02-24
Payer: MEDICARE

## 2020-02-26 ENCOUNTER — APPOINTMENT (OUTPATIENT)
Dept: CARDIAC REHAB | Age: 65
End: 2020-02-26
Payer: MEDICARE

## 2020-02-28 ENCOUNTER — APPOINTMENT (OUTPATIENT)
Dept: CARDIAC REHAB | Age: 65
End: 2020-02-28
Payer: MEDICARE

## 2020-03-16 NOTE — TELEPHONE ENCOUNTER
Last OV: 2/7/2020  Last RX: 11/25/19   Next scheduled apt: 5/7/2020    Medication pending      Lab Results   Component Value Date    LABA1C 6.5 02/07/2020     Lab Results   Component Value Date     10/17/2019           Health Maintenance   Topic Date Due    Hepatitis B vaccine (1 of 3 - Risk 3-dose series) 07/29/1976    DTaP/Tdap/Td vaccine (1 - Tdap) 07/29/1976    Shingles Vaccine (1 of 2) 07/29/2007    Diabetic microalbuminuria test  08/18/2017    Annual Wellness Visit (AWV)  05/29/2019    Lipid screen  10/15/2020    TSH testing  10/15/2020    Diabetic retinal exam  10/29/2020    Potassium monitoring  11/25/2020    Creatinine monitoring  11/25/2020    Diabetic foot exam  02/07/2021    A1C test (Diabetic or Prediabetic)  02/07/2021    Low dose CT lung screening  02/19/2021    Colon cancer screen colonoscopy  03/05/2029    Flu vaccine  Completed    Pneumococcal 0-64 years Vaccine  Completed    Hepatitis C screen  Completed    HIV screen  Completed    Hepatitis A vaccine  Aged Out    Hib vaccine  Aged Out    Meningococcal (ACWY) vaccine  Aged Out             (applicable per patient's age: Cancer Screenings, Depression Screening, Fall Risk Screening, Immunizations)    Hemoglobin A1C (%)   Date Value   02/07/2020 6.5   10/17/2019 8.3 (H)   10/20/2017 5.4     Microalb/Crt.  Ratio (mcg/mg creat)   Date Value   08/18/2016 6     LDL Cholesterol (mg/dL)   Date Value   10/15/2019          AST (U/L)   Date Value   11/25/2019 48 (H)     ALT (U/L)   Date Value   11/25/2019 47 (H)     BUN (mg/dL)   Date Value   11/25/2019 15      (goal A1C is < 7)   (goal LDL is <100) need 30-50% reduction from baseline     BP Readings from Last 3 Encounters:   02/07/20 122/80   01/31/20 110/60   11/25/19 130/71    (goal /80)      All Future Testing planned in [facility]:  Lab Frequency Next Occurrence   CBC Auto Differential Once 07/31/2020   Comprehensive Metabolic Panel Once 22/54/3134   Vitamin D 25 Hydroxy

## 2020-04-01 ENCOUNTER — OFFICE VISIT (OUTPATIENT)
Dept: PRIMARY CARE CLINIC | Age: 65
End: 2020-04-01
Payer: MEDICARE

## 2020-04-01 VITALS
HEART RATE: 84 BPM | WEIGHT: 303 LBS | SYSTOLIC BLOOD PRESSURE: 134 MMHG | BODY MASS INDEX: 39.98 KG/M2 | OXYGEN SATURATION: 95 % | DIASTOLIC BLOOD PRESSURE: 77 MMHG | TEMPERATURE: 98.2 F

## 2020-04-01 LAB
INFLUENZA A ANTIBODY: NEGATIVE
INFLUENZA B ANTIBODY: NEGATIVE

## 2020-04-01 PROCEDURE — G8926 SPIRO NO PERF OR DOC: HCPCS | Performed by: NURSE PRACTITIONER

## 2020-04-01 PROCEDURE — 99213 OFFICE O/P EST LOW 20 MIN: CPT | Performed by: NURSE PRACTITIONER

## 2020-04-01 PROCEDURE — G8417 CALC BMI ABV UP PARAM F/U: HCPCS | Performed by: NURSE PRACTITIONER

## 2020-04-01 PROCEDURE — G8427 DOCREV CUR MEDS BY ELIG CLIN: HCPCS | Performed by: NURSE PRACTITIONER

## 2020-04-01 PROCEDURE — 87804 INFLUENZA ASSAY W/OPTIC: CPT | Performed by: NURSE PRACTITIONER

## 2020-04-01 PROCEDURE — 1036F TOBACCO NON-USER: CPT | Performed by: NURSE PRACTITIONER

## 2020-04-01 PROCEDURE — 3017F COLORECTAL CA SCREEN DOC REV: CPT | Performed by: NURSE PRACTITIONER

## 2020-04-01 PROCEDURE — 3023F SPIROM DOC REV: CPT | Performed by: NURSE PRACTITIONER

## 2020-04-01 RX ORDER — ALBUTEROL SULFATE 2.5 MG/3ML
2.5 SOLUTION RESPIRATORY (INHALATION)
Qty: 56 VIAL | Refills: 0 | Status: SHIPPED | OUTPATIENT
Start: 2020-04-01 | End: 2020-05-04 | Stop reason: SDUPTHER

## 2020-04-01 RX ORDER — DOXYCYCLINE 100 MG/1
100 CAPSULE ORAL 2 TIMES DAILY
Qty: 14 CAPSULE | Refills: 0 | Status: SHIPPED | OUTPATIENT
Start: 2020-04-01 | End: 2020-04-08

## 2020-04-01 RX ORDER — PREDNISONE 20 MG/1
40 TABLET ORAL DAILY
Qty: 10 TABLET | Refills: 0 | Status: SHIPPED | OUTPATIENT
Start: 2020-04-01 | End: 2020-04-06

## 2020-04-01 NOTE — PATIENT INSTRUCTIONS
sulfite allergy;  · increased pressure inside your skull; or  · if you also take isotretinoin, seizure medicine, or a blood thinner such as warfarin (Coumadin). If you are using doxycycline to treat gonorrhea, your doctor may test you to make sure you do not also have syphilis, another sexually transmitted disease. Taking this medicine during pregnancy may affect tooth and bone development in the unborn baby. Taking doxycycline during the last half of pregnancy can cause permanent tooth discoloration later in the baby's life. Tell your doctor if you are pregnant or if you become pregnant. Doxycycline can make birth control pills less effective. Ask your doctor about using a non-hormonal birth control (condom, diaphragm with spermicide) to prevent pregnancy. Doxycycline can pass into breast milk and may affect bone and tooth development in a nursing infant. Do not breast-feed while you are taking doxycycline. Doxycycline can cause permanent yellowing or graying of the teeth in children younger than 6years old. Children should use doxycycline only in cases of severe or life-threatening conditions such as anthrax or [facility] spotted fever. The benefit of treating a serious condition may outweigh any risks to the child's tooth development. How should I take doxycycline? Follow all directions on your prescription label and read all medication guides or instruction sheets. Use the medicine exactly as directed. Take doxycycline with a full glass of water. Drink plenty of liquids while you are taking doxycycline. Read and carefully follow any Instructions for Use provided with your medicine. Ask your doctor or pharmacist if you do not understand these instructions. Most brands of doxycyline may be taken with food or milk if the medicine upsets your stomach. Different brands of doxycycline may have different instructions about taking them with or without food.   Take Oracea on an empty stomach, at least 1 Wear protective clothing and use sunscreen (SPF 30 or higher) when you are outdoors. Antibiotic medicines can cause diarrhea, which may be a sign of a new infection. If you have diarrhea that is watery or bloody, call your doctor. Do not use anti-diarrhea medicine unless your doctor tells you to. What are the possible side effects of doxycycline? Get emergency medical help if you have signs of an allergic reaction (hives, difficult breathing, swelling in your face or throat) or a severe skin reaction (fever, sore throat, burning in your eyes, skin pain, red or purple skin rash that spreads and causes blistering and peeling). Seek medical treatment if you have a serious drug reaction that can affect many parts of your body. Symptoms may include: skin rash, fever, swollen glands, flu-like symptoms, muscle aches, severe weakness, unusual bruising, or yellowing of your skin or eyes. This reaction may occur several weeks after you began using doxycycline. Call your doctor at once if you have:  · severe stomach pain, diarrhea that is watery or bloody;  · throat irritation, trouble swallowing;  · chest pain, irregular heart rhythm, feeling short of breath;  · little or no urination;  · low white blood cell counts --fever, chills, swollen glands, body aches, weakness, pale skin, easy bruising or bleeding;  · increased pressure inside the skull --severe headaches, ringing in your ears, dizziness, nausea, vision problems, pain behind your eyes; or  · signs of liver or pancreas problems --loss of appetite, upper stomach pain (that may spread to your back), tiredness, nausea or vomiting, fast heart rate, dark urine, jaundice (yellowing of the skin or eyes). Common side effects may include:  · nausea, vomiting, upset stomach, loss of appetite;  · mild diarrhea;  · skin rash or itching;  · darkened skin color; or  · vaginal itching or discharge. This is not a complete list of side effects and others may occur.  Call your worsening an infection you already have. Tell your doctor about any illness or infection you've had within the past several weeks. Tell your doctor if you have ever had:  · heart problems, high blood pressure, or a heart attack;  · glaucoma or cataracts;  · herpes infection of the eyes;  · past or present tuberculosis;  · a parasite infection that causes diarrhea (such as threadworms);  · any illness that causes diarrhea;  · underactive thyroid;  · diabetes;  · a stomach ulcer, diverticulitis;  · a colostomy or ileostomy;  · osteoporosis or low bone mineral density (steroid medication can increase your risk of bone loss);  · low levels of calcium or potassium in your blood;  · cirrhosis or other liver disease;  · mental illness or psychosis; or  · a muscle disorder such as myasthenia gravis. Long-term use of steroids may lead to bone loss (osteoporosis), especially if you smoke or drink alcohol, if you do not exercise, or if you do not get enough vitamin D or calcium in your diet. It is not known whether this medicine will harm an unborn baby. Tell your doctor if you are pregnant or plan to become pregnant. You should not breastfeed while using prednisone. How should I take prednisone? Follow all directions on your prescription label and read all medication guides or instruction sheets. Your doctor may occasionally change your dose. Use the medicine exactly as directed. Prednisone is taken daily or every other day, depending on the condition being treated. You may need to take the medicine at a certain time of day. Follow your doctor's instructions about when and how often to take this medicine. Take with food if prednisone upsets your stomach. Measure liquid medicine carefully. Use the dosing syringe provided, or use a medicine dose-measuring device (not a kitchen spoon). Swallow the delayed-release tablet whole and do not crush, chew, or break it.   Prednisone can weaken (suppress) your immune system, and drug or drug combination is safe, effective or appropriate for any given patient. [facility] does not assume any responsibility for any aspect of healthcare administered with the aid of information [facility] provides. The information contained herein is not intended to cover all possible uses, directions, precautions, warnings, drug interactions, allergic reactions, or adverse effects. If you have questions about the drugs you are taking, check with your doctor, nurse or pharmacist.  Copyright 4711-8349 [address]. Version: 8.03. Revision date: 3/15/2019. Care instructions adapted under license by [facility] ([facility]). If you have questions about a medical condition or this instruction, always ask your healthcare professional. [place] [address] any warranty or liability for your use of this information.

## 2020-05-01 RX ORDER — FLUTICASONE PROPIONATE 110 UG/1
2 AEROSOL, METERED RESPIRATORY (INHALATION) 2 TIMES DAILY
Qty: 1 INHALER | Refills: 3 | Status: SHIPPED | OUTPATIENT
Start: 2020-05-01 | End: 2021-04-30

## 2020-05-04 RX ORDER — ALBUTEROL SULFATE 2.5 MG/3ML
2.5 SOLUTION RESPIRATORY (INHALATION)
Qty: 56 VIAL | Refills: 1 | Status: SHIPPED | OUTPATIENT
Start: 2020-05-04 | End: 2021-02-01 | Stop reason: SDUPTHER

## 2020-05-04 RX ORDER — ALBUTEROL SULFATE 90 UG/1
2 AEROSOL, METERED RESPIRATORY (INHALATION) EVERY 6 HOURS PRN
Qty: 1 INHALER | Refills: 5 | Status: SHIPPED | OUTPATIENT
Start: 2020-05-04 | End: 2021-02-19 | Stop reason: SDUPTHER

## 2020-05-04 NOTE — TELEPHONE ENCOUNTER
Medication pending    Health Maintenance   Topic Date Due    DTaP/Tdap/Td vaccine (1 - Tdap) 07/29/1976    Shingles Vaccine (1 of 2) 07/29/2007    Diabetic microalbuminuria test  08/18/2017    Annual Wellness Visit (AWV)  05/29/2019    Lipid screen  10/15/2020    TSH testing  10/15/2020    Diabetic retinal exam  10/29/2020    Potassium monitoring  11/25/2020    Creatinine monitoring  11/25/2020    Diabetic foot exam  02/07/2021    A1C test (Diabetic or Prediabetic)  02/07/2021    Low dose CT lung screening  02/19/2021    Colon cancer screen colonoscopy  03/05/2029    Flu vaccine  Completed    Pneumococcal 0-64 years Vaccine  Completed    Hepatitis C screen  Completed    HIV screen  Completed    Hepatitis A vaccine  Aged Out    Hib vaccine  Aged Out    Meningococcal (ACWY) vaccine  Aged Out             (applicable per patient's age: Cancer Screenings, Depression Screening, Fall Risk Screening, Immunizations)    Hemoglobin A1C (%)   Date Value   02/07/2020 6.5   10/17/2019 8.3 (H)   10/20/2017 5.4     Microalb/Crt.  Ratio (mcg/mg creat)   Date Value   08/18/2016 6     LDL Cholesterol (mg/dL)   Date Value   10/15/2019          AST (U/L)   Date Value   11/25/2019 48 (H)     ALT (U/L)   Date Value   11/25/2019 47 (H)     BUN (mg/dL)   Date Value   11/25/2019 15      (goal A1C is < 7)   (goal LDL is <100) need 30-50% reduction from baseline     BP Readings from Last 3 Encounters:   04/01/20 134/77   02/07/20 122/80   01/31/20 110/60    (goal /80)      All Future Testing planned in [facility]:  Lab Frequency Next Occurrence   CBC Auto Differential Once 07/31/2020   Comprehensive Metabolic Panel Once 31/93/3575   Vitamin D 25 Hydroxy Once 07/31/2020   Lipid Panel Once 07/31/2020   TSH with Reflex Once 07/31/2020   Magnesium Once 07/31/2020   EKG 12 Lead Once 07/31/2020       Next Visit Date:  Future Appointments   Date Time Provider Debbie Izquierdo   5/7/2020  9:00 AM Per Dickinson MD [facility] [address]   8/7/2020  8:30 AM MD Sam Weiss [facility]            Patient Active Problem List:     GERD (gastroesophageal reflux disease)     S/P CABG x 3     CAD (coronary artery disease)     Essential hypertension     Mixed hyperlipidemia     Dysthymia (or depressive neurosis)     Chronic gout involving toe     Acquired hypothyroidism     Vitamin D deficiency disease     MINAL on CPAP     Chronic low back pain with left-sided sciatica     Class 3 severe obesity due to excess calories without serious comorbidity with body mass index (BMI) of 40.0 to 44.9 in adult (HCC)     LLQ pain     Abdominal bloating

## 2020-05-07 ENCOUNTER — HOSPITAL ENCOUNTER (OUTPATIENT)
Age: 65
Discharge: HOME | End: 2020-05-07
Payer: MEDICARE

## 2020-05-07 ENCOUNTER — OFFICE VISIT (OUTPATIENT)
Dept: FAMILY MEDICINE CLINIC | Age: 65
End: 2020-05-07
Payer: MEDICARE

## 2020-05-07 VITALS
HEART RATE: 61 BPM | WEIGHT: 305 LBS | RESPIRATION RATE: 18 BRPM | DIASTOLIC BLOOD PRESSURE: 76 MMHG | TEMPERATURE: 97.9 F | BODY MASS INDEX: 40.42 KG/M2 | HEIGHT: 73 IN | SYSTOLIC BLOOD PRESSURE: 128 MMHG | OXYGEN SATURATION: 95 %

## 2020-05-07 LAB
THYROXINE, FREE: 1.75 NG/DL (ref 0.93–1.7)
TSH SERPL DL<=0.05 MIU/L-ACNC: 0.29 MIU/L (ref 0.3–5)

## 2020-05-07 PROCEDURE — 84443 ASSAY THYROID STIM HORMONE: CPT

## 2020-05-07 PROCEDURE — 1036F TOBACCO NON-USER: CPT | Performed by: INTERNAL MEDICINE

## 2020-05-07 PROCEDURE — 3017F COLORECTAL CA SCREEN DOC REV: CPT | Performed by: INTERNAL MEDICINE

## 2020-05-07 PROCEDURE — 2022F DILAT RTA XM EVC RTNOPTHY: CPT | Performed by: INTERNAL MEDICINE

## 2020-05-07 PROCEDURE — G8427 DOCREV CUR MEDS BY ELIG CLIN: HCPCS | Performed by: INTERNAL MEDICINE

## 2020-05-07 PROCEDURE — 99214 OFFICE O/P EST MOD 30 MIN: CPT | Performed by: INTERNAL MEDICINE

## 2020-05-07 PROCEDURE — 84439 ASSAY OF FREE THYROXINE: CPT

## 2020-05-07 PROCEDURE — G8417 CALC BMI ABV UP PARAM F/U: HCPCS | Performed by: INTERNAL MEDICINE

## 2020-05-07 PROCEDURE — 36415 COLL VENOUS BLD VENIPUNCTURE: CPT

## 2020-05-07 PROCEDURE — 3044F HG A1C LEVEL LT 7.0%: CPT | Performed by: INTERNAL MEDICINE

## 2020-05-07 RX ORDER — LEVOTHYROXINE SODIUM 175 UG/1
TABLET ORAL
COMMUNITY
Start: 2020-04-08 | End: 2020-05-07 | Stop reason: DRUGHIGH

## 2020-05-07 RX ORDER — LEVOTHYROXINE SODIUM 0.2 MG/1
200 TABLET ORAL DAILY
Qty: 30 TABLET | Refills: 5 | Status: SHIPPED | OUTPATIENT
Start: 2020-05-07 | End: 2020-05-07 | Stop reason: DRUGHIGH

## 2020-05-07 RX ORDER — FLUTICASONE PROPIONATE 50 MCG
2 SPRAY, SUSPENSION (ML) NASAL DAILY
Qty: 1 BOTTLE | Refills: 3 | Status: SHIPPED | OUTPATIENT
Start: 2020-05-07 | End: 2021-02-19 | Stop reason: SDUPTHER

## 2020-05-07 ASSESSMENT — ENCOUNTER SYMPTOMS
SPUTUM PRODUCTION: 1
SINUS COMPLAINT: 1
CHEST TIGHTNESS: 1
SHORTNESS OF BREATH: 1
TROUBLE SWALLOWING: 0
COUGH: 1
RHINORRHEA: 1
WHEEZING: 1
SORE THROAT: 0

## 2020-05-07 ASSESSMENT — COPD QUESTIONNAIRES: COPD: 1

## 2020-05-07 NOTE — PROGRESS NOTES
HPI Notes    Name: Bia Parikh  : 1955         Chief Complaint:     Chief Complaint   Patient presents with    Diabetes     BS today was 80    Sinus Problem     had sinus drainage for awhile. Not getting any better    Hypertension    Hypothyroidism       History of Present Illness:        Rashad Rodriguez presents to office to follow up for DM, COPD, HTN, Hypothyroidism    States has sinus drainage  [facility] for evaluation of cough, congestion, SOB. Was diagnosed with COPD exacerbation. Prescribed Doxycycline, Prednisone course  States the breathing got better but having some sinus drainage and congestion. Rashad Rodriguez confirms that she is compliant on their thyroid medication. he denies an increase in fatigue, constipation, increased skin dryness, or increased lower extremity edema. The last TSH was 8.79 on 10/15/19        Diabetes   He presents for his follow-up diabetic visit. He has type 2 diabetes mellitus. His disease course has been stable. There are no hypoglycemic associated symptoms. Pertinent negatives for hypoglycemia include no dizziness, headaches, nervousness/anxiousness or tremors. Pertinent negatives for diabetes include no chest pain, no foot ulcerations, no polydipsia, no polyuria and no weakness. Symptoms are stable. Diabetic complications include heart disease. Pertinent negatives for diabetic complications include no CVA or PVD. Risk factors for coronary artery disease include diabetes mellitus, dyslipidemia, male sex, hypertension and obesity. Current diabetic treatment includes oral agent (monotherapy). He is compliant with treatment all of the time. He is following a generally healthy diet. There is no change in his home blood glucose trend. His overall blood glucose range is 130-140 mg/dl. An ACE inhibitor/angiotensin II receptor blocker is being taken. Sinus Problem   This is a recurrent problem. The current episode started 1 to 4 weeks ago.  The problem is unchanged. There has been no fever. Associated symptoms include congestion, coughing, shortness of breath and sinus pressure. Pertinent negatives include no chills, ear pain, headaches, sneezing or sore throat. Past treatments include antibiotics. The treatment provided mild relief. COPD   He complains of chest tightness, cough, shortness of breath, sputum production and wheezing. This is a chronic problem. The current episode started more than 1 year ago. The problem occurs intermittently. The problem has been unchanged. The cough is productive of sputum. Associated symptoms include dyspnea on exertion, nasal congestion and rhinorrhea. Pertinent negatives include no appetite change, chest pain, ear pain, fever, headaches, myalgias, sneezing, sore throat or trouble swallowing. His symptoms are aggravated by nothing. His symptoms are alleviated by beta-agonist. He reports significant improvement on treatment. Hypertension   This is a chronic problem. The current episode started more than 1 year ago. The problem is unchanged. The problem is controlled. Associated symptoms include shortness of breath. Pertinent negatives include no chest pain, headaches or palpitations. Risk factors for coronary artery disease include obesity, dyslipidemia, family history and diabetes mellitus. Past treatments include beta blockers, calcium channel blockers and angiotensin blockers. The current treatment provides significant improvement. There are no compliance problems. Hypertensive end-organ damage includes CAD/MI. There is no history of kidney disease, CVA, heart failure or PVD.            Past Medical History:     Past Medical History:   Diagnosis Date    Asthma     Bronchitis     CAD (coronary artery disease)     COPD (chronic obstructive pulmonary disease) ([address] 8/12/2011    Depression 8/12/2011    Diabetes mellitus (HCC)     GERD (gastroesophageal reflux disease) 8/12/2011    Hyperlipidemia 8/12/2011    Hypertension  Hypothyroid 8/12/2011    Lower back pain     Nicotine addiction 8/12/2011    Sleep apnea       Reviewed all health maintenance requirements and orderedappropriate tests  Health Maintenance Due   Topic Date Due    Diabetic microalbuminuria test  08/18/2017    Annual Wellness Visit (AWV)  05/29/2019       Past Surgical History:     Past Surgical History:   Procedure Laterality Date    CARDIAC CATHETERIZATION Left 01/21/15    Severe left main trunk disease extending into the ostium of the left anterior descending of 80% involving the ostium of the intermediate & atrioventricular branch of the circumflex. 60% disease in the atrioventricular branch of the circumflex in the midportion. 40% disease in the right coronary artery. Normal left ventricular function, ejection fraction of 60%    CARDIAC CATHETERIZATION Left 12/20/2017    Dr. Merced De Souza @ [facility]--70% left main trunck & ostial LAD & high lateral circumflex disease. Patent LIMA to the LAD. Patent vein graft to the high lateral circumflex. 60% disease in the AV branch of the circumflex that terminates in a large posterolateral branch with an FFR of 1.0, indicating no significant stenosis. 40% disease in the right coronary artery. Ejection fraction 50-55%.  COLONOSCOPY N/A 3/5/2019    COLONOSCOPY POLYPECTOMY HOT BIOPSY performed by Joce Burnett MD at [facility]  02/04/15    3 vessel CABG using left internal mammary artery to the left anterior descending coronary artery with a reverse saphenous vein aortocoronary sequential graft side to side to the ramus intermedius coronary artery & end to side to the first obtuse marginal coronary artery.     OTHER SURGICAL HISTORY  03-21-16    I & D- cyst- posterior neck    SPINE SURGERY      THYROID LOBECTOMY      UPPER GASTROINTESTINAL ENDOSCOPY N/A 3/5/2019    EGD BIOPSY performed by Joce Burnett MD at [facility] OR        Medications:       Prior to Admission Historical Provider, MD        Allergies:       Patient has no known allergies. Social History:     Tobacco: reports that he quit smoking about 5 years ago. His smoking use included cigarettes. He has a 45.00 pack-year smoking history. He has never used smokeless tobacco.  Alcohol:      reports no history of alcohol use. Drug Use:  reports no history of drug use. Family History:     Family History   Problem Relation Age of Onset    Diabetes Mother     Heart Disease Mother     Diabetes Father     Heart Disease Father     Heart Disease Brother        Review of Systems:         Review of Systems   Constitutional: Negative for activity change, appetite change, chills, fever and unexpected weight change. HENT: Positive for congestion, rhinorrhea and sinus pressure. Negative for ear discharge, ear pain, hearing loss, sinus pain, sneezing, sore throat and trouble swallowing. Eyes: Negative for discharge, itching and visual disturbance. Respiratory: Positive for cough, sputum production, shortness of breath and wheezing. Negative for chest tightness. Cardiovascular: Positive for dyspnea on exertion. Negative for chest pain, palpitations and leg swelling. Gastrointestinal: Negative for abdominal pain, blood in stool, constipation and nausea. Endocrine: Negative for cold intolerance, heat intolerance, polydipsia and polyuria. Genitourinary: Negative for difficulty urinating and dysuria. Musculoskeletal: Negative. Negative for myalgias. Skin: Negative for rash. Allergic/Immunologic: Negative. Neurological: Negative for dizziness, tremors, weakness and headaches. Psychiatric/Behavioral: Negative for agitation, behavioral problems and dysphoric mood. The patient is not nervous/anxious.           Physical Exam:     Vitals:  /76 (Site: Left Upper Arm, Position: Sitting, Cuff Size: Large Adult)   Pulse 61   Temp 97.9 °F (36.6 °C) (Oral)   Resp 18   Ht 6' 1\" (1.854 m)   Wt (!) 305 lb

## 2020-05-08 PROBLEM — R14.0 ABDOMINAL BLOATING: Status: RESOLVED | Noted: 2019-03-05 | Resolved: 2020-05-08

## 2020-05-08 PROBLEM — R10.32 LLQ PAIN: Status: RESOLVED | Noted: 2019-03-05 | Resolved: 2020-05-08

## 2020-05-08 ASSESSMENT — ENCOUNTER SYMPTOMS
ABDOMINAL PAIN: 0
ALLERGIC/IMMUNOLOGIC NEGATIVE: 1
EYE DISCHARGE: 0
SINUS PAIN: 0
CONSTIPATION: 0
BLOOD IN STOOL: 0
NAUSEA: 0
CHEST TIGHTNESS: 0
EYE ITCHING: 0
SINUS PRESSURE: 1

## 2020-05-18 RX ORDER — LEVOTHYROXINE SODIUM 0.2 MG/1
200 TABLET ORAL DAILY
Qty: 30 TABLET | Refills: 0 | Status: SHIPPED | OUTPATIENT
Start: 2020-05-18 | End: 2020-06-15 | Stop reason: SDUPTHER

## 2020-05-18 NOTE — TELEPHONE ENCOUNTER
Pending medication     Health Maintenance   Topic Date Due    Diabetic microalbuminuria test  08/18/2017    Annual Wellness Visit (AWV)  05/29/2019    DTaP/Tdap/Td vaccine (1 - Tdap) 05/07/2021 (Originally 7/29/1976)    Shingles Vaccine (1 of 2) 05/07/2021 (Originally 7/29/2007)    Lipid screen  10/15/2020    Diabetic retinal exam  10/29/2020    Potassium monitoring  11/25/2020    Creatinine monitoring  11/25/2020    Diabetic foot exam  02/07/2021    A1C test (Diabetic or Prediabetic)  02/07/2021    Low dose CT lung screening  02/19/2021    TSH testing  05/07/2021    Colon cancer screen colonoscopy  03/05/2029    Flu vaccine  Completed    Pneumococcal 0-64 years Vaccine  Completed    Hepatitis C screen  Completed    HIV screen  Completed    Hepatitis A vaccine  Aged Out    Hib vaccine  Aged Out    Meningococcal (ACWY) vaccine  Aged Out             (applicable per patient's age: Cancer Screenings, Depression Screening, Fall Risk Screening, Immunizations)    Hemoglobin A1C (%)   Date Value   02/07/2020 6.5   10/17/2019 8.3 (H)   10/20/2017 5.4     Microalb/Crt.  Ratio (mcg/mg creat)   Date Value   08/18/2016 6     LDL Cholesterol (mg/dL)   Date Value   10/15/2019          AST (U/L)   Date Value   11/25/2019 48 (H)     ALT (U/L)   Date Value   11/25/2019 47 (H)     BUN (mg/dL)   Date Value   11/25/2019 15      (goal A1C is < 7)   (goal LDL is <100) need 30-50% reduction from baseline     BP Readings from Last 3 Encounters:   05/07/20 128/76   04/01/20 134/77   02/07/20 122/80    (goal /80)      All Future Testing planned in [facility]:  Lab Frequency Next Occurrence   CBC Auto Differential Once 07/31/2020   Comprehensive Metabolic Panel Once 16/58/8892   Vitamin D 25 Hydroxy Once 07/31/2020   Lipid Panel Once 07/31/2020   TSH with Reflex Once 07/31/2020   Magnesium Once 07/31/2020   EKG 12 Lead Once 07/31/2020       Next Visit Date:  Future Appointments   Date Time Provider Debbie Izquierdo 5/21/2020  3:30 PM Issa Barajas MD [facility]   8/7/2020  8:30 AM MD Carmelo Pagan [facility]            Patient Active Problem List:     GERD (gastroesophageal reflux disease)     S/P CABG x 3     CAD (coronary artery disease)     Essential hypertension     Mixed hyperlipidemia     Dysthymia (or depressive neurosis)     Chronic gout involving toe     Acquired hypothyroidism     Vitamin D deficiency disease     MINAL on CPAP     Chronic low back pain with left-sided sciatica     Class 3 severe obesity due to excess calories without serious comorbidity with body mass index (BMI) of 40.0 to 44.9 in adult [facility])

## 2020-05-29 ENCOUNTER — OFFICE VISIT (OUTPATIENT)
Dept: FAMILY MEDICINE CLINIC | Age: 65
End: 2020-05-29
Payer: MEDICARE

## 2020-05-29 VITALS
OXYGEN SATURATION: 95 % | BODY MASS INDEX: 40.69 KG/M2 | DIASTOLIC BLOOD PRESSURE: 66 MMHG | WEIGHT: 307 LBS | HEART RATE: 60 BPM | RESPIRATION RATE: 20 BRPM | SYSTOLIC BLOOD PRESSURE: 113 MMHG | HEIGHT: 73 IN

## 2020-05-29 PROCEDURE — 3017F COLORECTAL CA SCREEN DOC REV: CPT | Performed by: INTERNAL MEDICINE

## 2020-05-29 PROCEDURE — G0438 PPPS, INITIAL VISIT: HCPCS | Performed by: INTERNAL MEDICINE

## 2020-05-29 RX ORDER — MONTELUKAST SODIUM 10 MG/1
10 TABLET ORAL DAILY
Qty: 30 TABLET | Refills: 3 | Status: SHIPPED | OUTPATIENT
Start: 2020-05-29 | End: 2020-11-17

## 2020-05-29 ASSESSMENT — LIFESTYLE VARIABLES
HOW OFTEN DURING THE LAST YEAR HAVE YOU FOUND THAT YOU WERE NOT ABLE TO STOP DRINKING ONCE YOU HAD STARTED: 0
HOW OFTEN DO YOU HAVE A DRINK CONTAINING ALCOHOL: 1
HOW MANY STANDARD DRINKS CONTAINING ALCOHOL DO YOU HAVE ON A TYPICAL DAY: 0
HOW OFTEN DURING THE LAST YEAR HAVE YOU NEEDED AN ALCOHOLIC DRINK FIRST THING IN THE MORNING TO GET YOURSELF GOING AFTER A NIGHT OF HEAVY DRINKING: 0
HOW OFTEN DURING THE LAST YEAR HAVE YOU HAD A FEELING OF GUILT OR REMORSE AFTER DRINKING: 0
HAVE YOU OR SOMEONE ELSE BEEN INJURED AS A RESULT OF YOUR DRINKING: 0
HOW OFTEN DO YOU HAVE SIX OR MORE DRINKS ON ONE OCCASION: 1
HOW OFTEN DURING THE LAST YEAR HAVE YOU FAILED TO DO WHAT WAS NORMALLY EXPECTED FROM YOU BECAUSE OF DRINKING: 0
AUDIT-C TOTAL SCORE: 2
HAS A RELATIVE, FRIEND, DOCTOR, OR ANOTHER HEALTH PROFESSIONAL EXPRESSED CONCERN ABOUT YOUR DRINKING OR SUGGESTED YOU CUT DOWN: 0
HOW OFTEN DURING THE LAST YEAR HAVE YOU BEEN UNABLE TO REMEMBER WHAT HAPPENED THE NIGHT BEFORE BECAUSE YOU HAD BEEN DRINKING: 0
AUDIT TOTAL SCORE: 2

## 2020-05-29 ASSESSMENT — PATIENT HEALTH QUESTIONNAIRE - PHQ9
SUM OF ALL RESPONSES TO PHQ QUESTIONS 1-9: 2
SUM OF ALL RESPONSES TO PHQ QUESTIONS 1-9: 2
SUM OF ALL RESPONSES TO PHQ9 QUESTIONS 1 & 2: 2
1. LITTLE INTEREST OR PLEASURE IN DOING THINGS: 1
2. FEELING DOWN, DEPRESSED OR HOPELESS: 1

## 2020-05-29 NOTE — PROGRESS NOTES
lovastatin (MEVACOR) 40 MG tablet TAKE 2 TABLETS  NIGHTLY Yes Mert Villa MD   fenofibrate (TRICOR) 145 MG tablet TAKE 1 TABLET BY MOUTH EVERY DAY Yes Augusto Peters MD   mometasone (ELOCON) 0.1 % cream Apply topically daily. Yes Jessy Freitas MD   aspirin 81 MG tablet Take 81 mg by mouth daily. Yes Historical Provider, MD   albuterol (PROVENTIL) (2.5 MG/3ML) 0.083% nebulizer solution Take 3 mLs by nebulization every 4-6 hours as needed for Becky Chen MD       Past Medical History:   Diagnosis Date    Asthma     Bronchitis     CAD (coronary artery disease)     COPD (chronic obstructive pulmonary disease) ([facility] [address] 8/12/2011    Depression 8/12/2011    Diabetes mellitus ([facility] [address]     GERD (gastroesophageal reflux disease) 8/12/2011    Hyperlipidemia 8/12/2011    Hypertension     Hypothyroid 8/12/2011    Lower back pain     Nicotine addiction 8/12/2011    Sleep apnea        Past Surgical History:   Procedure Laterality Date    CARDIAC CATHETERIZATION Left 01/21/15    Severe left main trunk disease extending into the ostium of the left anterior descending of 80% involving the ostium of the intermediate & atrioventricular branch of the circumflex. 60% disease in the atrioventricular branch of the circumflex in the midportion. 40% disease in the right coronary artery. Normal left ventricular function, ejection fraction of 60%    CARDIAC CATHETERIZATION Left 12/20/2017    Dr. Bronwyn Tong @ [facility]--70% left main trunck & ostial LAD & high lateral circumflex disease. Patent LIMA to the LAD. Patent vein graft to the high lateral circumflex. 60% disease in the AV branch of the circumflex that terminates in a large posterolateral branch with an FFR of 1.0, indicating no significant stenosis. 40% disease in the right coronary artery. Ejection fraction 50-55%.     COLONOSCOPY N/A 3/5/2019    COLONOSCOPY POLYPECTOMY HOT BIOPSY performed by Ruddy Limon MD at [facility] lymphadenopathy  Pulmonary/Chest: clear to auscultation bilaterally, few BL scattered  wheezes, rales or rhonchi, normal air movement, no respiratory distress  Cardiovascular: normal rate, regular rhythm, normal S1 and S2, no murmurs, rubs, clicks, or gallops, distal pulses intact  Abdomen: soft, obese, non-tender, non-distended, normal bowel sounds, no masses or organomegaly  Extremities: no cyanosis, clubbing or edema  Musculoskeletal: normal range of motion, no joint swelling, deformity or tenderness  Neurologic: reflexes normal and symmetric, no cranial nerve deficit, gait, coordination and speech normal    Patient's complete Health Risk Assessment and screening values have been reviewed and are found in Flowsheets. The following problems were reviewed today and where indicated follow up appointments were made and/or referrals ordered. Positive Risk Factor Screenings with Interventions:     General Health:  General  In general, how would you say your health is?: Fair  In the past 7 days, have you experienced any of the following? New or Increased Pain, New or Increased Fatigue, Loneliness, Social Isolation, Stress or Anger?: None of These  Do you get the social and emotional support that you need?: Yes  Do you have a Living Will?: (!) No  General Health Risk Interventions:  · No Living Will: patient declined    Health Habits/Nutrition:  Health Habits/Nutrition  Do you exercise for at least 20 minutes 2-3 times per week?: Yes  Have you lost any weight without trying in the past 3 months?: No  Do you eat fewer than 2 meals per day?: No  Have you seen a dentist within the past year?: (!) No  Body mass index is 40.5 kg/m².   Health Habits/Nutrition Interventions:  · Dental exam overdue:  patient declines dental evaluation   · Has dentures and reports no problems     Safety:  Safety  Do you have working smoke detectors?: Yes  Have all throw rugs been removed or fastened?: Yes  Do you have non-slip mats or surfaces

## 2020-06-01 RX ORDER — LEVOTHYROXINE SODIUM 175 UG/1
TABLET ORAL
Qty: 90 TABLET | Refills: 0 | Status: SHIPPED | OUTPATIENT
Start: 2020-06-01 | End: 2020-11-17

## 2020-06-15 RX ORDER — FENOFIBRATE 145 MG/1
TABLET, COATED ORAL
Qty: 30 TABLET | Refills: 3 | Status: SHIPPED | OUTPATIENT
Start: 2020-06-15 | End: 2020-10-19 | Stop reason: SDUPTHER

## 2020-06-15 RX ORDER — LEVOTHYROXINE SODIUM 0.2 MG/1
200 TABLET ORAL DAILY
Qty: 30 TABLET | Refills: 3 | Status: SHIPPED | OUTPATIENT
Start: 2020-06-15 | End: 2020-10-15 | Stop reason: SDUPTHER

## 2020-06-15 NOTE — TELEPHONE ENCOUNTER
Medication pending    Health Maintenance   Topic Date Due    Diabetic microalbuminuria test  08/18/2017    DTaP/Tdap/Td vaccine (1 - Tdap) 05/07/2021 (Originally 7/29/1976)    Shingles Vaccine (1 of 2) 05/07/2021 (Originally 7/29/2007)    Lipid screen  10/15/2020    Diabetic retinal exam  10/29/2020    Potassium monitoring  11/25/2020    Creatinine monitoring  11/25/2020    Diabetic foot exam  02/07/2021    A1C test (Diabetic or Prediabetic)  02/07/2021    Low dose CT lung screening  02/19/2021    TSH testing  05/07/2021    Annual Wellness Visit (AWV)  05/30/2021    Colon cancer screen colonoscopy  03/05/2029    Flu vaccine  Completed    Pneumococcal 0-64 years Vaccine  Completed    Hepatitis C screen  Completed    HIV screen  Completed    Hepatitis A vaccine  Aged Out    Hib vaccine  Aged Out    Meningococcal (ACWY) vaccine  Aged Out             (applicable per patient's age: Cancer Screenings, Depression Screening, Fall Risk Screening, Immunizations)    Hemoglobin A1C (%)   Date Value   02/07/2020 6.5   10/17/2019 8.3 (H)   10/20/2017 5.4     Microalb/Crt.  Ratio (mcg/mg creat)   Date Value   08/18/2016 6     LDL Cholesterol (mg/dL)   Date Value   10/15/2019          AST (U/L)   Date Value   11/25/2019 48 (H)     ALT (U/L)   Date Value   11/25/2019 47 (H)     BUN (mg/dL)   Date Value   11/25/2019 15      (goal A1C is < 7)   (goal LDL is <100) need 30-50% reduction from baseline     BP Readings from Last 3 Encounters:   05/29/20 113/66   05/07/20 128/76   04/01/20 134/77    (goal /80)      All Future Testing planned in [facility]:  Lab Frequency Next Occurrence   CBC Auto Differential Once 07/31/2020   Comprehensive Metabolic Panel Once 14/46/6853   Vitamin D 25 Hydroxy Once 07/31/2020   Lipid Panel Once 07/31/2020   TSH with Reflex Once 07/31/2020   Magnesium Once 07/31/2020   EKG 12 Lead Once 07/31/2020       Next Visit Date:  Future Appointments   Date Time Provider Debbie Izquierdo 8/7/2020  8:30 AM MD Erik Mock [facility]   6/1/2021  9:00 AM Celena Araujo MD [facility]            Patient Active Problem List:     GERD (gastroesophageal reflux disease)     S/P CABG x 3     CAD (coronary artery disease)     Essential hypertension     Mixed hyperlipidemia     Dysthymia (or depressive neurosis)     Chronic gout involving toe     Acquired hypothyroidism     Vitamin D deficiency disease     MINAL on CPAP     Chronic low back pain with left-sided sciatica     Class 3 severe obesity due to excess calories without serious comorbidity with body mass index (BMI) of 40.0 to 44.9 in adult [facility])

## 2020-08-06 ENCOUNTER — HOSPITAL ENCOUNTER (OUTPATIENT)
Age: 65
Discharge: HOME | End: 2020-08-06
Payer: MEDICARE

## 2020-08-06 LAB
ABSOLUTE EOS #: 0.3 K/UL (ref 0–0.4)
ABSOLUTE IMMATURE GRANULOCYTE: NORMAL K/UL (ref 0–0.3)
ABSOLUTE LYMPH #: 2 K/UL (ref 1–4.8)
ABSOLUTE MONO #: 0.7 K/UL (ref 0–1)
ALBUMIN SERPL-MCNC: 4.3 G/DL (ref 3.5–5.2)
ALBUMIN/GLOBULIN RATIO: ABNORMAL (ref 1–2.5)
ALP BLD-CCNC: 51 U/L (ref 40–129)
ALT SERPL-CCNC: 37 U/L (ref 5–41)
ANION GAP SERPL CALCULATED.3IONS-SCNC: 12 MMOL/L (ref 9–17)
AST SERPL-CCNC: 37 U/L
BASOPHILS # BLD: 1 % (ref 0–2)
BASOPHILS ABSOLUTE: 0.1 K/UL (ref 0–0.2)
BILIRUB SERPL-MCNC: 0.42 MG/DL (ref 0.3–1.2)
BUN BLDV-MCNC: 11 MG/DL (ref 8–23)
BUN/CREAT BLD: 13 (ref 9–20)
CALCIUM SERPL-MCNC: 10.4 MG/DL (ref 8.6–10.4)
CHLORIDE BLD-SCNC: 101 MMOL/L (ref 98–107)
CHOLESTEROL/HDL RATIO: 4.1
CHOLESTEROL: 122 MG/DL
CO2: 23 MMOL/L (ref 20–31)
CREAT SERPL-MCNC: 0.83 MG/DL (ref 0.7–1.2)
DIFFERENTIAL TYPE: YES
EOSINOPHILS RELATIVE PERCENT: 4 % (ref 0–5)
GFR AFRICAN AMERICAN: >60 ML/MIN
GFR NON-AFRICAN AMERICAN: >60 ML/MIN
GFR SERPL CREATININE-BSD FRML MDRD: ABNORMAL ML/MIN/{1.73_M2}
GFR SERPL CREATININE-BSD FRML MDRD: ABNORMAL ML/MIN/{1.73_M2}
GLUCOSE BLD-MCNC: 181 MG/DL (ref 70–99)
HCT VFR BLD CALC: 45.3 % (ref 41–53)
HDLC SERPL-MCNC: 30 MG/DL
HEMOGLOBIN: 15.3 G/DL (ref 13.5–17.5)
IMMATURE GRANULOCYTES: NORMAL %
LDL CHOLESTEROL: 42 MG/DL (ref 0–130)
LYMPHOCYTES # BLD: 27 % (ref 13–44)
MAGNESIUM: 1.9 MG/DL (ref 1.6–2.6)
MCH RBC QN AUTO: 31.8 PG (ref 26–34)
MCHC RBC AUTO-ENTMCNC: 33.7 G/DL (ref 31–37)
MCV RBC AUTO: 94.2 FL (ref 80–100)
MONOCYTES # BLD: 9 % (ref 5–9)
NRBC AUTOMATED: NORMAL PER 100 WBC
PATIENT FASTING?: YES
PDW BLD-RTO: 14 % (ref 12.1–15.2)
PLATELET # BLD: 233 K/UL (ref 140–450)
PLATELET ESTIMATE: NORMAL
PMV BLD AUTO: NORMAL FL (ref 6–12)
POTASSIUM SERPL-SCNC: 4.3 MMOL/L (ref 3.7–5.3)
RBC # BLD: 4.81 M/UL (ref 4.5–5.9)
RBC # BLD: NORMAL 10*6/UL
SEG NEUTROPHILS: 59 % (ref 39–75)
SEGMENTED NEUTROPHILS ABSOLUTE COUNT: 4.5 K/UL (ref 2.1–6.5)
SODIUM BLD-SCNC: 136 MMOL/L (ref 135–144)
TOTAL PROTEIN: 7.8 G/DL (ref 6.4–8.3)
TRIGL SERPL-MCNC: 252 MG/DL
TSH SERPL DL<=0.05 MIU/L-ACNC: 1.16 MIU/L (ref 0.3–5)
VITAMIN D 25-HYDROXY: 36.6 NG/ML (ref 30–100)
VLDLC SERPL CALC-MCNC: ABNORMAL MG/DL (ref 1–30)
WBC # BLD: 7.6 K/UL (ref 3.5–11)
WBC # BLD: NORMAL 10*3/UL

## 2020-08-06 PROCEDURE — 80053 COMPREHEN METABOLIC PANEL: CPT

## 2020-08-06 PROCEDURE — 84443 ASSAY THYROID STIM HORMONE: CPT

## 2020-08-06 PROCEDURE — 36415 COLL VENOUS BLD VENIPUNCTURE: CPT

## 2020-08-06 PROCEDURE — 93005 ELECTROCARDIOGRAM TRACING: CPT

## 2020-08-06 PROCEDURE — 80061 LIPID PANEL: CPT

## 2020-08-06 PROCEDURE — 85025 COMPLETE CBC W/AUTO DIFF WBC: CPT

## 2020-08-06 PROCEDURE — 82306 VITAMIN D 25 HYDROXY: CPT

## 2020-08-06 PROCEDURE — 83735 ASSAY OF MAGNESIUM: CPT

## 2020-08-07 LAB
EKG ATRIAL RATE: 52 BPM
EKG P AXIS: 51 DEGREES
EKG P-R INTERVAL: 168 MS
EKG Q-T INTERVAL: 404 MS
EKG QRS DURATION: 92 MS
EKG QTC CALCULATION (BAZETT): 375 MS
EKG R AXIS: 25 DEGREES
EKG T AXIS: 82 DEGREES
EKG VENTRICULAR RATE: 52 BPM

## 2020-08-07 PROCEDURE — 93010 ELECTROCARDIOGRAM REPORT: CPT | Performed by: INTERNAL MEDICINE

## 2020-08-10 ENCOUNTER — OFFICE VISIT (OUTPATIENT)
Dept: CARDIOLOGY CLINIC | Age: 65
End: 2020-08-10
Payer: MEDICARE

## 2020-08-10 VITALS
BODY MASS INDEX: 41.82 KG/M2 | WEIGHT: 315 LBS | HEART RATE: 67 BPM | SYSTOLIC BLOOD PRESSURE: 120 MMHG | OXYGEN SATURATION: 95 % | DIASTOLIC BLOOD PRESSURE: 70 MMHG

## 2020-08-10 PROCEDURE — 3044F HG A1C LEVEL LT 7.0%: CPT | Performed by: INTERNAL MEDICINE

## 2020-08-10 PROCEDURE — 2022F DILAT RTA XM EVC RTNOPTHY: CPT | Performed by: INTERNAL MEDICINE

## 2020-08-10 PROCEDURE — G8428 CUR MEDS NOT DOCUMENT: HCPCS | Performed by: INTERNAL MEDICINE

## 2020-08-10 PROCEDURE — G8417 CALC BMI ABV UP PARAM F/U: HCPCS | Performed by: INTERNAL MEDICINE

## 2020-08-10 PROCEDURE — 3017F COLORECTAL CA SCREEN DOC REV: CPT | Performed by: INTERNAL MEDICINE

## 2020-08-10 PROCEDURE — 99214 OFFICE O/P EST MOD 30 MIN: CPT | Performed by: INTERNAL MEDICINE

## 2020-08-10 PROCEDURE — 1036F TOBACCO NON-USER: CPT | Performed by: INTERNAL MEDICINE

## 2020-08-10 NOTE — PROGRESS NOTES
Ov Dr. Donnell Mcfarlane for 6 month follow up   Pt states no changes in medications  Pt back his wt-has not been exercising   No chest pain   Breathing -\"fair\"  No edema      No changes  Follow up in one year

## 2020-08-10 NOTE — LETTER
Yasmani Peoples M.D. [address] [place] [address] [place] [address]  [phone]        August 10, 2020        Morgan Oliva MD  [address] [facility], [address] [place] [address]    RE:   Soraya Cover  :  1955    Dear Dr. Gerry Guadalupe:    CHIEF COMPLAINT:  1. Coronary artery disease. 2.  Shortness of breath. HISTORY OF PRESENT ILLNESS:  I had the pleasure of seeing Mr. Kayla Andino in our office on 08/10/2020. He is a pleasant 72-year-old gentleman who has an extensive cardiac history. He developed shortness of breath with some chest pain in , which resulted in a stress test on 2014. His stress test was unremarkable; however, because of severe shortness of breath, I did a catheterization on 2015, at [facility], which showed 80% distal left main trunk disease and at the ostium of the LAD, 60% circumflex, 40% RCA, with an EF of 60%. He had subsequent open heart surgery by Dr. Amanuel Hernandez on 2015, with a LIMA to the LAD and a vein graft to the circumflex. In 10/2017, he had shortness of breath and I repeated a stress test on 10/22/2017, that was abnormal with a defect inferiorly. I did a catheterization on 2017, that showed 70% disease in the ostium of the LAD, with a distal left main trunk with patent LIMA to the LAD, patent vein graft to the high lateral branch of the circumflex. The AV branch of the circumflex had 60% disease. The right coronary artery had 40% disease. EF was 50% to 55%. We placed him in cardiac rehab and he did excellent. We did do pulmonary function tests that showed mild obstruction with severe disease in the small airways with significant reversible defect. He has done well on bronchodilators. In 10/2019, he had increased shortness of breath and loss of energy. We placed him in cardiac rehab and he did well.   However, when COVID scare came around, he stopped exercising. He had lost a significant amount of weight, which he put on. He has been doing no activity at this time. His energy level is down and he realizes it is from deconditioning. He does have more shortness of breath than he had previously. He is somewhat teary-eyed and does have a history of depression. CARDIAC RISK FACTORS:  Known CAD:  Positive. Bypass Surgery:  Positive. Hypertension:  Positive. Hyperlipidemia:  Positive. Other Family Members:  Positive. Smoking:  Negative. Diabetes:  Positive. MEDICATIONS AT THIS TIME:  He is on Proventil inhaler p.r.n., Zyloprim 300 mg daily, Norvasc 2.5 mg daily, aspirin 81 mg daily, TriCor 145 mg daily, Flovent 2 puffs b.i.d., Synthroid 200 mcg daily, Cozaar 25 mg daily, Mevacor 40 mg 2 tablets nightly, Glucophage 500 mg b.i.d., Toprol-XL 50 mg daily, Singulair 10 mg daily, Protonix 40 mg daily, Paxil 20 mg b.i.d. PAST MEDICAL HISTORY:  1. Thyroid lobectomy in , on thyroid replacement and euthyroid. 2.  Spine surgery many years ago. 3.  Chronic back pain, on disability. 4.  Non-insulin-dependent diabetes, with hemoglobin A1c of 6.9.  5.  Bypass surgery, as stated previously. 6.  Lower back pain. 7.  History of depression. FAMILY HISTORY:  Mother alive at 80, had an MI. Father  of MI.    SOCIAL HISTORY:  He is 63years old,  twice. Has 5 children. Quit smoking in 2014. Does not drink alcohol. Been on disability since . 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 He was exercising. He has not been exercising. He does ride a  [employer]. REVIEW OF SYSTEMS:  Cardiac as above.   Other systems reviewed including constitutional, eyes, ears, nose and throat, cardiovascular, respiratory, GI, , musculoskeletal, integumentary, neurologic, endocrine, hematologic and allergic/immunologic are negative except for what is described above. No weight loss or weight gain. No change in bowel habits, no blood in stool. No fevers, sweats or chills. PHYSICAL EXAMINATION:  VITAL SIGNS:  His blood pressure was 120/74 with a heart rate of 70 and regular. Respirations were 18. O2 saturation was 94%. He weighed 317 pounds. GENERAL:  He is a pleasant 29-year-old gentleman. Denied pain. He was oriented to person, place and time. Answered questions appropriately. SKIN:  No unusual skin changes. HEENT:  The pupils are equally round and intact. Mucous membranes were dry. NECK:  No JVD. Good carotid pulses. No carotid bruits. No lymphadenopathy or thyromegaly. CARDIOVASCULAR EXAM:  S1 and S2 were normal.  No S3 or S4. Soft systolic blowing type murmur. No diastolic murmur. PMI was normal.  No lift, thrust, or pericardial friction rub. LUNGS:  Quite clear to auscultation and percussion. ABDOMEN:  Soft and nontender. EXTREMITIES:  Good femoral pulses. Good pedal pulses. No pedal edema. Skin was warm and dry. No calf tenderness. Nail beds pink. Good cap refill. PULSES:  Bilateral symmetrical radial, brachial and carotid pulses. No carotid bruits. Good femoral and pedal pulses. NEUROLOGIC EXAM:  Within normal limits. PSYCHIATRIC EXAM:  Within normal limits. LABORATORY DATA:  From 08/06/2020, sodium 136, potassium 4.3, BUN 11, creatinine 0.83, GFR greater than 60, magnesium 1.9, glucose 181, calcium was 10.4. Cholesterol 122 with an HDL of 30, LDL 42, triglycerides 252. ALT was 37, AST was 37. His TSH was normal at 1. 16. Vitamin D 36.6. White count 7.6 with a hemoglobin of 15.3 and platelet count of 398,714. EKG showed sinus rhythm with nonspecific ST changes. IMPRESSION:  1. Severe coronary artery disease.   2.  Catheterization on 01/21/2015 showing 80% left main trunk and proximal LAD, with 60% circumflex, mild disease in the right coronary artery, EF of 60%. 3.  Open heart surgery by Dr. Andre Chew on 02/04/2015, with a LIMA to the LAD and a vein graft to the high lateral branch of the circumflex. 4.  Catheterization on 12/20/2017, after chest pain, that showed a patent LIMA to the LAD and a patent vein graft to the high lateral branch of the circumflex, with 60% disease in the AV branch and 40% disease in the right coronary artery, EF of 50% to 55%. 5.  COPD with reversibility. 6.  Status post Lexiscan Cardiolite stress test on 10/25/2019, that was mildly abnormal with anterior lateral wall defect. 7.  Echocardiogram on 10/24/2017, that showed normal LV function, EF of 55%. PLAN:  1. No change in medications. 2.  I would highly encourage him to get back into cardiac rehab phase 3 to start him back on exercise program as it has been to hot to exercise. DISCUSSION:  Mr. Lenora Haider risk factors are all nicely modified. However, he has not been exercising. He has gained all his weight back that he had lost when he was in cardiac rehab. I highly encouraged him to call rehab and get back into stage III to start his exercise program again. I think he would do well once he would start it. His equipment is in his garage, which is too hot at this point for him to be able to exercise. I will see him in a year. If he starts developing worsening shortness of breath, I would of course be glad to see him earlier. At this point, his cardiac status appears stable. Thank you very much for allowing me the privilege of seeing Mr. Sree Jarquin. If you have any questions on my thoughts, please do not hesitate to contact me.     Sincerely,        Juan David Durand    D: 08/10/2020 12:24:50     T: 08/11/2020 2:46:11     [email or web address]  Job#: [id number]   Doc#: [id number]

## 2020-08-11 ENCOUNTER — HOSPITAL ENCOUNTER (OUTPATIENT)
Age: 65
Discharge: HOME | End: 2020-08-11

## 2020-08-11 ENCOUNTER — HOSPITAL ENCOUNTER (OUTPATIENT)
Dept: CARDIAC REHAB | Age: 65
Discharge: HOME | End: 2020-08-11

## 2020-08-11 PROCEDURE — 9900000041 HC CARDIAC REHAB PHASE 3 - 45 VISITS

## 2020-08-11 PROCEDURE — 9900000040 HC CARDIAC REHAB PHASE 3 - 15 VISITS

## 2020-08-12 NOTE — PROGRESS NOTES
Jerica Allen M.D. [address] [place] [address] [place] [address]  [phone]        August 10, 2020        Alexis Haddad MD  [address] [facility], [address] [place] [address]    RE:   Hastings Rides  :  1955    Dear Dr. Eloi Madden:    CHIEF COMPLAINT:  1. Coronary artery disease. 2.  Shortness of breath. HISTORY OF PRESENT ILLNESS:  I had the pleasure of seeing Mr. Anamaria Flaherty in our office on 08/10/2020. He is a pleasant 60-year-old gentleman who has an extensive cardiac history. He developed shortness of breath with some chest pain in , which resulted in a stress test on 2014. His stress test was unremarkable; however, because of severe shortness of breath, I did a catheterization on 2015, at [facility], which showed 80% distal left main trunk disease and at the ostium of the LAD, 60% circumflex, 40% RCA, with an EF of 60%. He had subsequent open heart surgery by Dr. Javier Kendrick on 2015, with a LIMA to the LAD and a vein graft to the circumflex. In 10/2017, he had shortness of breath and I repeated a stress test on 10/22/2017, that was abnormal with a defect inferiorly. I did a catheterization on 2017, that showed 70% disease in the ostium of the LAD, with a distal left main trunk with patent LIMA to the LAD, patent vein graft to the high lateral branch of the circumflex. The AV branch of the circumflex had 60% disease. The right coronary artery had 40% disease. EF was 50% to 55%. We placed him in cardiac rehab and he did excellent. We did do pulmonary function tests that showed mild obstruction with severe disease in the small airways with significant reversible defect. He has done well on bronchodilators. In 10/2019, he had increased shortness of breath and loss of energy. We placed him in cardiac rehab and he did well. However, when COVID scare came around, he stopped exercising.   He had lost bowel habits, no blood in stool. No fevers, sweats or chills. PHYSICAL EXAMINATION:  VITAL SIGNS:  His blood pressure was 120/74 with a heart rate of 70 and regular. Respirations were 18. O2 saturation was 94%. He weighed 317 pounds. GENERAL:  He is a pleasant 56-year-old gentleman. Denied pain. He was oriented to person, place and time. Answered questions appropriately. SKIN:  No unusual skin changes. HEENT:  The pupils are equally round and intact. Mucous membranes were dry. NECK:  No JVD. Good carotid pulses. No carotid bruits. No lymphadenopathy or thyromegaly. CARDIOVASCULAR EXAM:  S1 and S2 were normal.  No S3 or S4. Soft systolic blowing type murmur. No diastolic murmur. PMI was normal.  No lift, thrust, or pericardial friction rub. LUNGS:  Quite clear to auscultation and percussion. ABDOMEN:  Soft and nontender. EXTREMITIES:  Good femoral pulses. Good pedal pulses. No pedal edema. Skin was warm and dry. No calf tenderness. Nail beds pink. Good cap refill. PULSES:  Bilateral symmetrical radial, brachial and carotid pulses. No carotid bruits. Good femoral and pedal pulses. NEUROLOGIC EXAM:  Within normal limits. PSYCHIATRIC EXAM:  Within normal limits. LABORATORY DATA:  From 08/06/2020, sodium 136, potassium 4.3, BUN 11, creatinine 0.83, GFR greater than 60, magnesium 1.9, glucose 181, calcium was 10.4. Cholesterol 122 with an HDL of 30, LDL 42, triglycerides 252. ALT was 37, AST was 37. His TSH was normal at 1. 16. Vitamin D 36.6. White count 7.6 with a hemoglobin of 15.3 and platelet count of 509,139. EKG showed sinus rhythm with nonspecific ST changes. IMPRESSION:  1. Severe coronary artery disease. 2.  Catheterization on 01/21/2015 showing 80% left main trunk and proximal LAD, with 60% circumflex, mild disease in the right coronary artery, EF of 60%.   3.  Open heart surgery by Dr. Magdalene Logan on 02/04/2015, with a LIMA to the LAD and a vein graft to the high lateral branch of the circumflex. 4.  Catheterization on 12/20/2017, after chest pain, that showed a patent LIMA to the LAD and a patent vein graft to the high lateral branch of the circumflex, with 60% disease in the AV branch and 40% disease in the right coronary artery, EF of 50% to 55%. 5.  COPD with reversibility. 6.  Status post Lexiscan Cardiolite stress test on 10/25/2019, that was mildly abnormal with anterior lateral wall defect. 7.  Echocardiogram on 10/24/2017, that showed normal LV function, EF of 55%. PLAN:  1. No change in medications. 2.  I would highly encourage him to get back into cardiac rehab phase 3 to start him back on exercise program as it has been to hot to exercise. DISCUSSION:  Mr. Ling Overcast risk factors are all nicely modified. However, he has not been exercising. He has gained all his weight back that he had lost when he was in cardiac rehab. I highly encouraged him to call rehab and get back into stage III to start his exercise program again. I think he would do well once he would start it. His equipment is in his garage, which is too hot at this point for him to be able to exercise. I will see him in a year. If he starts developing worsening shortness of breath, I would of course be glad to see him earlier. At this point, his cardiac status appears stable. Thank you very much for allowing me the privilege of seeing Mr. Yelena Reynoso. If you have any questions on my thoughts, please do not hesitate to contact me.     Sincerely,        Arabella Lauren    D: 08/10/2020 12:24:50     T: 08/11/2020 2:46:11     [email or web address]  Job#: [id number]   Doc#: [id number]

## 2020-10-14 NOTE — TELEPHONE ENCOUNTER
Health Maintenance   Topic Date Due    Diabetic microalbuminuria test  08/18/2017    Flu vaccine (1) 09/01/2020    DTaP/Tdap/Td vaccine (1 - Tdap) 05/07/2021 (Originally 7/29/1976)    Shingles Vaccine (1 of 2) 05/07/2021 (Originally 7/29/2007)    Diabetic retinal exam  10/29/2020    Diabetic foot exam  02/07/2021    A1C test (Diabetic or Prediabetic)  02/07/2021    Low dose CT lung screening  02/19/2021    Annual Wellness Visit (AWV)  05/30/2021    Lipid screen  08/06/2021    TSH testing  08/06/2021    Potassium monitoring  08/06/2021    Creatinine monitoring  08/06/2021    Colon cancer screen colonoscopy  03/05/2029    Pneumococcal 0-64 years Vaccine  Completed    Hepatitis C screen  Completed    HIV screen  Completed    Hepatitis A vaccine  Aged Out    Hib vaccine  Aged Out    Meningococcal (ACWY) vaccine  Aged Out             (applicable per patient's age: Cancer Screenings, Depression Screening, Fall Risk Screening, Immunizations)    Hemoglobin A1C (%)   Date Value   02/07/2020 6.5   10/17/2019 8.3 (H)   10/20/2017 5.4     Microalb/Crt.  Ratio (mcg/mg creat)   Date Value   08/18/2016 6     LDL Cholesterol (mg/dL)   Date Value   08/06/2020 42     AST (U/L)   Date Value   08/06/2020 37     ALT (U/L)   Date Value   08/06/2020 37     BUN (mg/dL)   Date Value   08/06/2020 11      (goal A1C is < 7)   (goal LDL is <100) need 30-50% reduction from baseline     BP Readings from Last 3 Encounters:   08/10/20 120/70   05/29/20 113/66   05/07/20 128/76    (goal /80)      All Future Testing planned in [facility]:  Lab Frequency Next Occurrence   CBC Auto Differential Once 08/10/2021   Comprehensive Metabolic Panel Once 83/14/1310   Vitamin D 25 Hydroxy Once 08/10/2021   Lipid Panel Once 08/10/2021   TSH with Reflex Once 08/10/2021   Magnesium Once 08/10/2021   EKG 12 Lead Once 08/10/2021   XR CHEST (2 VW) Once 08/10/2021   Hemoglobin A1C Once 08/10/2021       Next Visit Date:  Future Appointments Date Time Provider Debbie Izquierdo   6/1/2021  9:00 AM Terry Sebastian MD [facility]   8/9/2021 11:30 AM [facility]            Patient Active Problem List:     GERD (gastroesophageal reflux disease)     S/P CABG x 3     CAD (coronary artery disease)     Essential hypertension     Mixed hyperlipidemia     Dysthymia (or depressive neurosis)     Chronic gout involving toe     Acquired hypothyroidism     Vitamin D deficiency disease     MINAL on CPAP     Chronic low back pain with left-sided sciatica     Class 3 severe obesity due to excess calories without serious comorbidity with body mass index (BMI) of 40.0 to 44.9 in adult [facility])

## 2020-10-15 RX ORDER — LEVOTHYROXINE SODIUM 0.2 MG/1
200 TABLET ORAL DAILY
Qty: 30 TABLET | Refills: 3 | Status: SHIPPED | OUTPATIENT
Start: 2020-10-15 | End: 2021-02-08 | Stop reason: SDUPTHER

## 2020-10-19 RX ORDER — FENOFIBRATE 145 MG/1
TABLET, COATED ORAL
Qty: 30 TABLET | Refills: 3 | Status: SHIPPED | OUTPATIENT
Start: 2020-10-19 | End: 2021-02-19 | Stop reason: SDUPTHER

## 2020-10-19 NOTE — TELEPHONE ENCOUNTER
Health Maintenance   Topic Date Due    Diabetic microalbuminuria test  08/18/2017    Flu vaccine (1) 09/01/2020    Diabetic retinal exam  10/29/2020    DTaP/Tdap/Td vaccine (1 - Tdap) 05/07/2021 (Originally 7/29/1976)    Shingles Vaccine (1 of 2) 05/07/2021 (Originally 7/29/2007)    Diabetic foot exam  02/07/2021    A1C test (Diabetic or Prediabetic)  02/07/2021    Low dose CT lung screening  02/19/2021    Annual Wellness Visit (AWV)  05/30/2021    Lipid screen  08/06/2021    TSH testing  08/06/2021    Potassium monitoring  08/06/2021    Creatinine monitoring  08/06/2021    Colon cancer screen colonoscopy  03/05/2029    Pneumococcal 0-64 years Vaccine  Completed    Hepatitis C screen  Completed    HIV screen  Completed    Hepatitis A vaccine  Aged Out    Hib vaccine  Aged Out    Meningococcal (ACWY) vaccine  Aged Out             (applicable per patient's age: Cancer Screenings, Depression Screening, Fall Risk Screening, Immunizations)    Hemoglobin A1C (%)   Date Value   02/07/2020 6.5   10/17/2019 8.3 (H)   10/20/2017 5.4     Microalb/Crt.  Ratio (mcg/mg creat)   Date Value   08/18/2016 6     LDL Cholesterol (mg/dL)   Date Value   08/06/2020 42     AST (U/L)   Date Value   08/06/2020 37     ALT (U/L)   Date Value   08/06/2020 37     BUN (mg/dL)   Date Value   08/06/2020 11      (goal A1C is < 7)   (goal LDL is <100) need 30-50% reduction from baseline     BP Readings from Last 3 Encounters:   08/10/20 120/70   05/29/20 113/66   05/07/20 128/76    (goal /80)      All Future Testing planned in [facility]:  Lab Frequency Next Occurrence   CBC Auto Differential Once 08/10/2021   Comprehensive Metabolic Panel Once 75/32/4661   Vitamin D 25 Hydroxy Once 08/10/2021   Lipid Panel Once 08/10/2021   TSH with Reflex Once 08/10/2021   Magnesium Once 08/10/2021   EKG 12 Lead Once 08/10/2021   XR CHEST (2 VW) Once 08/10/2021   Hemoglobin A1C Once 08/10/2021       Next Visit Date:  Future Appointments Date Time Provider Debbie Izquierdo   6/1/2021  9:00 AM Louis Colin MD [facility]   8/9/2021 11:30 AM MD Demario Brenner [facility]            Patient Active Problem List:     GERD (gastroesophageal reflux disease)     S/P CABG x 3     CAD (coronary artery disease)     Essential hypertension     Mixed hyperlipidemia     Dysthymia (or depressive neurosis)     Chronic gout involving toe     Acquired hypothyroidism     Vitamin D deficiency disease     MINAL on CPAP     Chronic low back pain with left-sided sciatica     Class 3 severe obesity due to excess calories without serious comorbidity with body mass index (BMI) of 40.0 to 44.9 in adult [facility])

## 2020-10-26 RX ORDER — LOVASTATIN 40 MG/1
TABLET ORAL
Qty: 180 TABLET | Refills: 3 | Status: SHIPPED | OUTPATIENT
Start: 2020-10-26 | End: 2021-02-19 | Stop reason: SDUPTHER

## 2020-10-26 RX ORDER — LOSARTAN POTASSIUM 25 MG/1
TABLET ORAL
Qty: 90 TABLET | Refills: 0 | Status: SHIPPED | OUTPATIENT
Start: 2020-10-26 | End: 2021-02-19 | Stop reason: SDUPTHER

## 2020-10-26 RX ORDER — AMLODIPINE BESYLATE 2.5 MG/1
TABLET ORAL
Qty: 90 TABLET | Refills: 0 | Status: SHIPPED | OUTPATIENT
Start: 2020-10-26 | End: 2020-11-17

## 2020-10-26 RX ORDER — PAROXETINE HYDROCHLORIDE 20 MG/1
TABLET, FILM COATED ORAL
Qty: 180 TABLET | Refills: 1 | Status: SHIPPED | OUTPATIENT
Start: 2020-10-26 | End: 2021-02-19 | Stop reason: SDUPTHER

## 2020-10-26 RX ORDER — LOSARTAN POTASSIUM 25 MG/1
25 TABLET ORAL DAILY
Qty: 30 TABLET | Refills: 0 | Status: SHIPPED | OUTPATIENT
Start: 2020-10-26 | End: 2020-11-17

## 2020-10-26 RX ORDER — METOPROLOL SUCCINATE 50 MG/1
TABLET, EXTENDED RELEASE ORAL
Qty: 90 TABLET | Refills: 0 | Status: SHIPPED | OUTPATIENT
Start: 2020-10-26 | End: 2021-02-08 | Stop reason: SDUPTHER

## 2020-10-26 RX ORDER — AMLODIPINE BESYLATE 2.5 MG/1
2.5 TABLET ORAL DAILY
Qty: 30 TABLET | Refills: 0 | Status: SHIPPED | OUTPATIENT
Start: 2020-10-26 | End: 2020-11-16

## 2020-10-26 NOTE — TELEPHONE ENCOUNTER
Date:  Future Appointments   Date Time Provider Debbie Felecia   6/1/2021  9:00 AM Thomas Patterson MD [facility]   8/9/2021 11:30 AM [facility]            Patient Active Problem List:     GERD (gastroesophageal reflux disease)     S/P CABG x 3     CAD (coronary artery disease)     Essential hypertension     Mixed hyperlipidemia     Dysthymia (or depressive neurosis)     Chronic gout involving toe     Acquired hypothyroidism     Vitamin D deficiency disease     MINAL on CPAP     Chronic low back pain with left-sided sciatica     Class 3 severe obesity due to excess calories without serious comorbidity with body mass index (BMI) of 40.0 to 44.9 in adult [facility])

## 2020-11-16 RX ORDER — AMLODIPINE BESYLATE 2.5 MG/1
TABLET ORAL
Qty: 30 TABLET | Refills: 0 | Status: SHIPPED | OUTPATIENT
Start: 2020-11-16 | End: 2020-11-17

## 2020-11-16 RX ORDER — AMLODIPINE BESYLATE 2.5 MG/1
2.5 TABLET ORAL DAILY
Qty: 90 TABLET | Refills: 1 | Status: SHIPPED | OUTPATIENT
Start: 2020-11-16 | End: 2021-02-19 | Stop reason: SDUPTHER

## 2020-11-17 ENCOUNTER — OFFICE VISIT (OUTPATIENT)
Dept: FAMILY MEDICINE CLINIC | Age: 65
End: 2020-11-17
Payer: MEDICARE

## 2020-11-17 VITALS
BODY MASS INDEX: 41.77 KG/M2 | SYSTOLIC BLOOD PRESSURE: 119 MMHG | DIASTOLIC BLOOD PRESSURE: 78 MMHG | HEART RATE: 59 BPM | OXYGEN SATURATION: 94 % | TEMPERATURE: 97.2 F | WEIGHT: 315 LBS

## 2020-11-17 PROCEDURE — G8417 CALC BMI ABV UP PARAM F/U: HCPCS | Performed by: INTERNAL MEDICINE

## 2020-11-17 PROCEDURE — 2022F DILAT RTA XM EVC RTNOPTHY: CPT | Performed by: INTERNAL MEDICINE

## 2020-11-17 PROCEDURE — 3044F HG A1C LEVEL LT 7.0%: CPT | Performed by: INTERNAL MEDICINE

## 2020-11-17 PROCEDURE — G8427 DOCREV CUR MEDS BY ELIG CLIN: HCPCS | Performed by: INTERNAL MEDICINE

## 2020-11-17 PROCEDURE — 1036F TOBACCO NON-USER: CPT | Performed by: INTERNAL MEDICINE

## 2020-11-17 PROCEDURE — G8484 FLU IMMUNIZE NO ADMIN: HCPCS | Performed by: INTERNAL MEDICINE

## 2020-11-17 PROCEDURE — 99214 OFFICE O/P EST MOD 30 MIN: CPT | Performed by: INTERNAL MEDICINE

## 2020-11-17 PROCEDURE — 3017F COLORECTAL CA SCREEN DOC REV: CPT | Performed by: INTERNAL MEDICINE

## 2020-11-17 RX ORDER — ONDANSETRON 4 MG/1
4 TABLET, ORALLY DISINTEGRATING ORAL 3 TIMES DAILY PRN
Qty: 21 TABLET | Refills: 0 | Status: SHIPPED | OUTPATIENT
Start: 2020-11-17

## 2020-11-17 RX ORDER — BLOOD-GLUCOSE METER
KIT MISCELLANEOUS
COMMUNITY

## 2020-11-17 RX ORDER — MECLIZINE HYDROCHLORIDE 25 MG/1
25 TABLET ORAL 3 TIMES DAILY PRN
Qty: 15 TABLET | Refills: 0 | Status: SHIPPED | OUTPATIENT
Start: 2020-11-17 | End: 2020-11-27

## 2020-11-17 ASSESSMENT — ENCOUNTER SYMPTOMS
ALLERGIC/IMMUNOLOGIC NEGATIVE: 1
SORE THROAT: 0
BLOOD IN STOOL: 0
WHEEZING: 0
SHORTNESS OF BREATH: 0
COUGH: 0
VOMITING: 0
NAUSEA: 0
VISUAL CHANGE: 0
ABDOMINAL PAIN: 0
CONSTIPATION: 0
NAUSEA: 1

## 2020-11-17 NOTE — PROGRESS NOTES
HPI Notes    Name: John Travis  : 1955         Chief Complaint:     Chief Complaint   Patient presents with    Dizziness     Patient presents today with vertigo. Patient states he woke up this morning and everything was spinning which made him feel nauseous. History of Present Illness:        González Davis presents to office for evaluation of dizziness. He also would like to follow up for DM, HTN,     Started this morning when he woke up  He had similar problem in the past. Was diagnosed with Vertigo. States everything is spinning. Does not feel dizzy at rest. States it hits him when he moves his head. Has no associated HA, no CP, no palpitations. Had nausea but did not vomit. Has no ear ache, sore throat  Has sinus congestion. He has a h/o CAD, S/P CABG 2015. Since then he had card cath on 17 by Dr. Aspen Engle. It howed 70% disease in the ostium of the LAD, with a distal left main trunk with patent LIMA to the LAD, patent vein graft to the high lateral branch of the circumflex. The AV branch of the circumflex had 60% disease. The right coronary artery had 40% disease. EF was 50% to 55%. Was advised to undergo cardiac rehab. He completed about one month ago. States he feels better. Less SOB. Lost weight. Dizziness   This is a new problem. The current episode started today. The problem has been unchanged. Associated symptoms include nausea. Pertinent negatives include no abdominal pain, anorexia, arthralgias, chest pain, chills, coughing, diaphoresis, fever, headaches, myalgias, neck pain, sore throat, urinary symptoms, visual change or vomiting. The symptoms are aggravated by bending. He has tried nothing for the symptoms. Diabetes   He presents for his follow-up diabetic visit. He has type 2 diabetes mellitus. Hypoglycemia symptoms include dizziness. Pertinent negatives for hypoglycemia include no headaches.  Pertinent negatives for diabetes include no chest pain and disease in the AV branch of the circumflex that terminates in a large posterolateral branch with an FFR of 1.0, indicating no significant stenosis. 40% disease in the right coronary artery. Ejection fraction 50-55%.  COLONOSCOPY N/A 3/5/2019    COLONOSCOPY POLYPECTOMY HOT BIOPSY performed by Shazia Bruno MD at [facility] GRAFT  02/04/15    3 vessel CABG using left internal mammary artery to the left anterior descending coronary artery with a reverse saphenous vein aortocoronary sequential graft side to side to the ramus intermedius coronary artery & end to side to the first obtuse marginal coronary artery.  OTHER SURGICAL HISTORY  03-21-16    I & D- cyst- posterior neck    SPINE SURGERY      THYROID LOBECTOMY      UPPER GASTROINTESTINAL ENDOSCOPY N/A 3/5/2019    EGD BIOPSY performed by Shazia Bruno MD at [facility] OR        Medications:       Prior to Admission medications    Medication Sig Start Date End Date Taking?  Authorizing Provider   [address] [place] [address] by Does not apply route   Yes Historical Provider, MD   amLODIPine (NORVASC) 2.5 MG tablet Take 1 tablet by mouth daily 11/16/20  Yes Luis Henley MD   losartan (COZAAR) 25 MG tablet TAKE 1 TABLET EVERY DAY 10/26/20  Yes Luis Henley MD   lovastatin (MEVACOR) 40 MG tablet TAKE 2 TABLETS  NIGHTLY 10/26/20  Yes Luis Henley MD   metoprolol succinate (TOPROL XL) 50 MG extended release tablet TAKE 1 TABLET EVERY DAY 10/26/20  Yes Luis Henley MD   PARoxetine (PAXIL) 20 MG tablet TAKE 1 TABLET TWICE DAILY 10/26/20  Yes Luis Henley MD   fenofibrate (TRICOR) 145 MG tablet TAKE 1 TABLET BY MOUTH EVERY DAY 10/19/20  Yes Luis Henley MD   metFORMIN (GLUCOPHAGE) 500 MG tablet TAKE 1 TABLET BY MOUTH TWICE DAILY WITH MEALS 10/19/20  Yes Luis Henley MD   levothyroxine (SYNTHROID) 200 MCG tablet Take 1 tablet by mouth daily 10/15/20  Yes Luis Henley MD   fluticasone (FLONASE) 50 MCG/ACT nasal spray 2 sprays by Nasal route daily 5/7/20  Yes Dawna Barrett MD   albuterol sulfate  (90 Base) MCG/ACT inhaler Inhale 2 puffs into the lungs every 6 hours as needed for Wheezing 5/4/20  Yes Dawna Barrett MD   fluticasone (FLOVENT HFA) 110 MCG/ACT inhaler Inhale 2 puffs into the lungs 2 times daily 5/1/20 5/1/21 Yes Dawna Barrett MD   Respiratory Therapy Supplies [facility] CPAP full face mask, hose, head gear and  filter 2/7/20  Yes Dawna Barrett MD   allopurinol (ZYLOPRIM) 300 MG tablet TAKE 1 TABLET EVERY DAY 12/6/19  Yes Dawna Barrett MD   blood glucose monitor strips Test 2 times a day & as needed for symptoms of irregular blood glucose. 11/11/19  Yes Dawna Barrett MD   mometasone (ELOCON) 0.1 % cream Apply topically daily. 2/20/17  Yes Chaim Sim MD   aspirin 81 MG tablet Take 81 mg by mouth daily. Yes Historical Provider, MD   albuterol (PROVENTIL) (2.5 MG/3ML) 0.083% nebulizer solution Take 3 mLs by nebulization every 4-6 hours as needed for Wheezing 5/4/20 5/18/20  Dawna Barrett MD        Allergies:       Patient has no known allergies. Social History:     Tobacco: reports that he quit smoking about 5 years ago. His smoking use included cigarettes. He has a 45.00 pack-year smoking history. He has never used smokeless tobacco.  Alcohol:      reports no history of alcohol use. Drug Use:  reports no history of drug use. Family History:     Family History   Problem Relation Age of Onset    Diabetes Mother     Heart Disease Mother     Diabetes Father     Heart Disease Father     Heart Disease Brother        Review of Systems:         Review of Systems   Constitutional: Negative for chills, diaphoresis and fever. HENT: Negative for sore throat. Respiratory: Negative for cough. Cardiovascular: Negative for chest pain. Gastrointestinal: Positive for nausea. Negative for abdominal pain, anorexia and vomiting.    Musculoskeletal: Negative for arthralgias, myalgias and neck pain. Neurological: Positive for dizziness. Negative for headaches. Physical Exam:     Vitals:  /78 (Site: Right Upper Arm, Position: Sitting, Cuff Size: Large Adult)   Pulse 59   Temp 97.2 °F (36.2 °C) (Temporal)   Wt (!) 316 lb 9.6 oz (143.6 kg)   SpO2 94%   BMI 41.77 kg/m²       Physical Exam          Data:     Lab Results   Component Value Date     08/06/2020    K 4.3 08/06/2020     08/06/2020    CO2 23 08/06/2020    BUN 11 08/06/2020    CREATININE 0.83 08/06/2020    GLUCOSE 181 08/06/2020    GLUCOSE 111 10/14/2011    PROT 7.8 08/06/2020    LABALBU 4.3 08/06/2020    BILITOT 0.42 08/06/2020    ALKPHOS 51 08/06/2020    AST 37 08/06/2020    ALT 37 08/06/2020     Lab Results   Component Value Date    WBC 7.6 08/06/2020    RBC 4.81 08/06/2020    HGB 15.3 08/06/2020    HCT 45.3 08/06/2020    MCV 94.2 08/06/2020    MCH 31.8 08/06/2020    MCHC 33.7 08/06/2020    RDW 14.0 08/06/2020     08/06/2020    MPV NOT REPORTED 08/06/2020     Lab Results   Component Value Date    TSH 1.16 08/06/2020     Lab Results   Component Value Date    CHOL 122 08/06/2020    HDL 30 08/06/2020    PSA 0.35 12/17/2018    LABA1C 6.5 02/07/2020          Assessment & Plan       {No diagnosis found. (Refresh or delete this SmartLink)}                Completed Refills   Requested Prescriptions      No prescriptions requested or ordered in this encounter     No follow-ups on file. No orders of the defined types were placed in this encounter. No orders of the defined types were placed in this encounter. There are no Patient Instructions on file for this visit.     Electronically signed by Paco Floyd MD on 11/17/2020 at 2:48 PM           Completed Refills      Requested Prescriptions      No prescriptions requested or ordered in this encounter

## 2021-02-01 ENCOUNTER — OFFICE VISIT (OUTPATIENT)
Dept: PRIMARY CARE CLINIC | Age: 66
End: 2021-02-01
Payer: MEDICARE

## 2021-02-01 ENCOUNTER — HOSPITAL ENCOUNTER (OUTPATIENT)
Age: 66
Setting detail: SPECIMEN
Discharge: HOME | End: 2021-02-01
Payer: MEDICARE

## 2021-02-01 VITALS
HEART RATE: 78 BPM | RESPIRATION RATE: 20 BRPM | DIASTOLIC BLOOD PRESSURE: 85 MMHG | OXYGEN SATURATION: 97 % | SYSTOLIC BLOOD PRESSURE: 134 MMHG | BODY MASS INDEX: 41.75 KG/M2 | WEIGHT: 315 LBS | HEIGHT: 73 IN | TEMPERATURE: 97.9 F

## 2021-02-01 DIAGNOSIS — Z20.822 SUSPECTED COVID-19 VIRUS INFECTION: ICD-10-CM

## 2021-02-01 DIAGNOSIS — J44.1 COPD EXACERBATION (HCC): Primary | ICD-10-CM

## 2021-02-01 PROCEDURE — G8417 CALC BMI ABV UP PARAM F/U: HCPCS | Performed by: NURSE PRACTITIONER

## 2021-02-01 PROCEDURE — U0005 INFEC AGEN DETEC AMPLI PROBE: HCPCS

## 2021-02-01 PROCEDURE — 99213 OFFICE O/P EST LOW 20 MIN: CPT | Performed by: NURSE PRACTITIONER

## 2021-02-01 PROCEDURE — 1036F TOBACCO NON-USER: CPT | Performed by: NURSE PRACTITIONER

## 2021-02-01 PROCEDURE — 3023F SPIROM DOC REV: CPT | Performed by: NURSE PRACTITIONER

## 2021-02-01 PROCEDURE — U0003 INFECTIOUS AGENT DETECTION BY NUCLEIC ACID (DNA OR RNA); SEVERE ACUTE RESPIRATORY SYNDROME CORONAVIRUS 2 (SARS-COV-2) (CORONAVIRUS DISEASE [COVID-19]), AMPLIFIED PROBE TECHNIQUE, MAKING USE OF HIGH THROUGHPUT TECHNOLOGIES AS DESCRIBED BY CMS-2020-01-R: HCPCS

## 2021-02-01 PROCEDURE — G8427 DOCREV CUR MEDS BY ELIG CLIN: HCPCS | Performed by: NURSE PRACTITIONER

## 2021-02-01 PROCEDURE — G8926 SPIRO NO PERF OR DOC: HCPCS | Performed by: NURSE PRACTITIONER

## 2021-02-01 PROCEDURE — 3017F COLORECTAL CA SCREEN DOC REV: CPT | Performed by: NURSE PRACTITIONER

## 2021-02-01 PROCEDURE — C9803 HOPD COVID-19 SPEC COLLECT: HCPCS

## 2021-02-01 PROCEDURE — G8484 FLU IMMUNIZE NO ADMIN: HCPCS | Performed by: NURSE PRACTITIONER

## 2021-02-01 RX ORDER — DOXYCYCLINE 100 MG/1
100 CAPSULE ORAL 2 TIMES DAILY
Qty: 14 CAPSULE | Refills: 0 | Status: SHIPPED | OUTPATIENT
Start: 2021-02-01 | End: 2021-02-08

## 2021-02-01 RX ORDER — ALBUTEROL SULFATE 2.5 MG/3ML
2.5 SOLUTION RESPIRATORY (INHALATION)
Qty: 56 VIAL | Refills: 0 | Status: SHIPPED | OUTPATIENT
Start: 2021-02-01 | End: 2021-04-19 | Stop reason: SDUPTHER

## 2021-02-01 RX ORDER — PREDNISONE 20 MG/1
40 TABLET ORAL DAILY
Qty: 10 TABLET | Refills: 0 | Status: SHIPPED | OUTPATIENT
Start: 2021-02-01 | End: 2021-02-06

## 2021-02-01 NOTE — PATIENT INSTRUCTIONS
Patient Education        Chronic Obstructive Pulmonary Disease (COPD) Flare-Ups: Care Instructions  Your Care Instructions     Chronic obstructive pulmonary disease (COPD) is a lung disease that makes it hard to breathe. It is caused by damage to the lungs over many years, usually from smoking. COPD is often a mix of two diseases:  · Chronic bronchitis: The airways that carry air to the lungs (bronchial tubes) get inflamed and make a lot of mucus. This can narrow or block the airways. · Emphysema: In a healthy person, the tiny air sacs in the lungs are like balloons. As you breathe in and out, they get bigger and smaller to move air through your lungs. But with emphysema, these air sacs are damaged and lose their stretch. Less air gets in and out of the lungs. Many people with COPD have attacks called flare-ups or exacerbations. This is when your usual symptoms quickly get worse and stay worse. The doctor has checked you carefully. But problems can develop later. If you notice any problems or new symptoms, get medical treatment right away. Follow-up care is a key part of your treatment and safety. Be sure to make and go to all appointments, and call your doctor if you are having problems. It's also a good idea to know your test results and keep a list of the medicines you take. How can you care for yourself at home? · Be safe with medicines. Take your medicines exactly as prescribed. Call your doctor if you think you are having a problem with your medicine. You may be taking medicines such as:  ? Bronchodilators. These help open your airways and make breathing easier. ? Corticosteroids. These reduce airway inflammation. They may be given as pills, in a vein, or in an inhaled form. You may go home with pills in addition to an inhaler that you already use. · A spacer may help you get more inhaled medicine to your lungs. Ask your doctor or pharmacist if a spacer is right for you.  If it is, ask how to use it properly. · If your doctor prescribed antibiotics, take them as directed. Do not stop taking them just because you feel better. You need to take the full course of antibiotics. · If your doctor prescribed oxygen, use the flow rate your doctor has recommended. Do not change it without talking to your doctor first.  · Do not smoke. Smoking makes COPD worse. If you need help quitting, talk to your doctor about stop-smoking programs and medicines. These can increase your chances of quitting for good. When should you call for help? Call 911 anytime you think you may need emergency care. For example, call if:    · You have severe trouble breathing. Call your doctor now or seek immediate medical care if:    · You have new or worse trouble breathing.     · Your coughing or wheezing gets worse.     · You cough up dark brown or bloody mucus (sputum).     · You have a new or higher fever. Watch closely for changes in your health, and be sure to contact your doctor if:    · You notice more mucus or a change in the color of your mucus.     · You need to use your antibiotic or steroid pills.     · You do not get better as expected. Where can you learn more? Go to [email or web address]. org and sign in to your [employer] account. Enter A923 in the [employer] box to learn more about \"Chronic Obstructive Pulmonary Disease (COPD) Flare-Ups: Care Instructions. \"     If you do not have an account, please click on the \"Sign Up Now\" link. Current as of: February 24, 2020               Content Version: 12.6  © 2006-2020 Healthwise, Incorporated. Care instructions adapted under license by [facility] ([facility]). If you have questions about a medical condition or this instruction, always ask your healthcare professional. [place] [address] any warranty or liability for your use of this information.        Patient Education        albuterol inhalation  Pronunciation:  franck ly  Brand: ProAir HFA, ProAir RespiClick, Proventil HFA, Ventolin HFA  What is the most important information I should know about albuterol inhalation? Follow all directions on your medicine label and package. Tell each of your healthcare providers about all your medical conditions, allergies, and all medicines you use. What is albuterol inhalation? Albuterol inhalation is a bronchodilator that is used to treat or prevent bronchospasm in people with reversible obstructive airway disease. Albuterol is also used to prevent exercise-induced bronchospasm. Albuterol inhalation is for use in adults and children at least 3years old. Some brands or forms of this medicine are not for use in children younger than 15years old. Always follow your doctor's directions when giving albuterol to a child. Albuterol inhalation may also be used for purposes not listed in this medication guide. What should I discuss with my healthcare provider before using albuterol inhalation? You should not use this medicine if you are allergic to albuterol. You should not use ProAir RespiClick if you are allergic to milk proteins. Tell your doctor if you have ever had:  · heart disease, high blood pressure;  · a thyroid disorder;  · seizures;  · diabetes; or  · low levels of potassium in your blood. It is not known whether this medicine will harm an unborn baby. Tell your doctor if you are pregnant or plan to become pregnant. If you are pregnant, your name may be listed on a pregnancy registry to track the effects of albuterol on the baby. It may not be safe to breastfeed while using this medicine. Ask your doctor about any risk. How should I use albuterol inhalation? Follow all directions on your prescription label and read all medication guides. Use the medicine exactly as directed. Do not allow a young child to use albuterol inhalation without help from an adult.   To prevent exercise-induced bronchospasm, use this medicine 15 to 30 minutes before you exercise. The effects of albuterol inhalation should last about 4 to 6 hours. Seek medical attention if your breathing problems get worse quickly, or if you think your asthma medications are not working as well. Read and carefully follow any Instructions for Use provided with your medicine. Ask your doctor or pharmacist if you do not understand these instructions. Do not try to clean or take apart the ProAir RespiClick inhaler device. Always use the new inhaler device provided with your refill. Do not float a medicine canister in water to see if it is empty. Your dose needs may change due to surgery, illness, stress, or a recent asthma attack. Do not change your dose or dosing schedule without your doctor's advice. Store at room temperature away from moisture, heat, or cold temperatures. Keep the cover on your ProAir RespiClick inhaler when not in use. Store Proventil or Ventolin with the mouthpiece down. Keep the inhaler canister away from open flame or high heat. The canister may explode if it gets too hot. Do not puncture or burn an empty inhaler canister. What happens if I miss a dose? Use the medicine as soon as you can, but skip the missed dose if it is almost time for your next dose. Do not use two doses at one time. Get your prescription refilled before you run out of medicine completely. What happens if I overdose? Seek emergency medical attention or call the Poison Help line at 1-799.731.4757. An overdose of albuterol can be fatal.  Overdose symptoms may include dry mouth, tremors, chest pain, fast heartbeats, nausea, general ill feeling, seizure (convulsions), feeling light-headed or fainting. What should I avoid while using albuterol inhalation? Rinse with water if this medicine gets in your eyes. What are the possible side effects of albuterol inhalation?   Get emergency medical help if you have signs of an allergic reaction: hives; difficult breathing; swelling of your face, lips, tongue, or throat. Call your doctor at once if you have:  · wheezing, choking, or other breathing problems after using this medicine;  · chest pain, fast heart rate, pounding heartbeats or fluttering in your chest;  · severe headache, pounding in your neck or ears;  · pain or burning when you urinate;  · high blood sugar --increased thirst, increased urination, dry mouth, fruity breath odor; or  · low potassium --leg cramps, constipation, irregular heartbeats, increased thirst or urination, numbness or tingling, muscle weakness or limp feeling. Common side effects may include:  · chest pain, fast or pounding heartbeats;  · dizziness;  · feeling shaky or nervous;  · headache, back pain, body aches; or  · cough, sore throat, sinus pain, runny or stuffy nose. This is not a complete list of side effects and others may occur. Call your doctor for medical advice about side effects. You may report side effects to FDA at 2-850-FDA-2906. What other drugs will affect albuterol inhalation? Tell your doctor about all your other medicines, especially:  · any other inhaled medicines or bronchodilators;  · digoxin;  · a diuretic or \"water pill\";  · an antidepressant --amitriptyline, desipramine, imipramine, doxepin, nortriptyline, and others;  · a beta blocker --atenolol, carvedilol, labetalol, metoprolol, propranolol, sotalol, and others; or  · an MAO inhibitor --isocarboxazid, linezolid, methylene blue injection, phenelzine, rasagiline, selegiline, tranylcypromine, and others. This list is not complete. Other drugs may affect albuterol inhalation, including prescription and over-the-counter medicines, vitamins, and herbal products. Not all possible drug interactions are listed here. Where can I get more information? Your pharmacist can provide more information about albuterol inhalation.   Remember, keep this and all other medicines out of the reach of children, never share your medicines with others, and use this medication only for the indication prescribed. Every effort has been made to ensure that the information provided by [address]kh [address] is accurate, up-to-date, and complete, but no guarantee is made to that effect. Drug information contained herein may be time sensitive. [facility] information has been compiled for use by healthcare practitioners and consumers in the [place] and therefore [facility] does not warrant that uses outside of the [place] are appropriate, unless specifically indicated otherwise. [facility]'s drug information does not endorse drugs, diagnose patients or recommend therapy. [facility]'s drug information is an informational resource designed to assist licensed healthcare practitioners in caring for their patients and/or to serve consumers viewing this service as a supplement to, and not a substitute for, the expertise, skill, knowledge and judgment of healthcare practitioners. The absence of a warning for a given drug or drug combination in no way should be construed to indicate that the drug or drug combination is safe, effective or appropriate for any given patient. [facility] does not assume any responsibility for any aspect of healthcare administered with the aid of information [facility] provides. The information contained herein is not intended to cover all possible uses, directions, precautions, warnings, drug interactions, allergic reactions, or adverse effects. If you have questions about the drugs you are taking, check with your doctor, nurse or pharmacist.  Copyright 1203-5985 [address]. Version: 9.01. Revision date: 12/30/2019. Care instructions adapted under license by [facility] ([facility]). If you have questions about a medical condition or this instruction, always ask your healthcare professional. [place] [address] any warranty or liability for your use of this information.      Patient Education        doxycycline (oral/injection)  Pronunciation:  DOX i PROMISE kaye  Brand:  Acticlate, Adoxa, Alodox, Avidoxy, Doryx, Mondoxyne NL, Monodox, Morgidox, Oracea, Oraxyl, Targadox, Vibramycin  What is the most important information I should know about doxycycline? You should not take this medicine if you are allergic to any tetracycline antibiotic. Children younger than 6years old should use doxycycline only in cases of severe or life-threatening conditions. This medicine can cause permanent yellowing or graying of the teeth in children  Using doxycycline during pregnancy could harm the unborn baby or cause permanent tooth discoloration later in the baby's life. What is doxycycline? Doxycycline is a tetracycline antibiotic that fights bacteria in the body. Doxycycline is used to treat many different bacterial infections, such as acne, urinary tract infections, intestinal infections, eye infections, gonorrhea, chlamydia, periodontitis (gum disease), and others. Doxycycline is also used to treat blemishes, bumps, and acne-like lesions caused by rosacea. Doxycycline will not treat facial redness caused by rosacea. Some forms of doxycycline are used to prevent malaria, to treat anthrax, or to treat infections caused by mites, ticks, or lice. Doxycycline may also be used for purposes not listed in this medication guide. What should I discuss with my healthcare provider before taking doxycycline? You should not take this medicine if you are allergic to doxycycline or other tetracycline antibiotics such as demeclocycline, minocycline, tetracycline, or tigecycline. Tell your doctor if you have ever had:  · liver disease;  · kidney disease;  · asthma or sulfite allergy;  · increased pressure inside your skull; or  · if you also take isotretinoin, seizure medicine, or a blood thinner such as warfarin (Coumadin).   If you are using doxycycline to treat gonorrhea, your doctor may test you to make sure you do not also have syphilis, another sexually transmitted disease. Taking this medicine during pregnancy may affect tooth and bone development in the unborn baby. Taking doxycycline during the last half of pregnancy can cause permanent tooth discoloration later in the baby's life. Tell your doctor if you are pregnant or if you become pregnant. Doxycycline can make birth control pills less effective. Ask your doctor about using a non-hormonal birth control (condom, diaphragm with spermicide) to prevent pregnancy. Doxycycline can pass into breast milk and may affect bone and tooth development in a nursing infant. Do not breastfeed while you are taking doxycycline. Doxycycline can cause permanent yellowing or graying of the teeth in children younger than 6years old. Children should use doxycycline only in cases of severe or life-threatening conditions such as anthrax or [facility] spotted fever. The benefit of treating a serious condition may outweigh any risks to the child's tooth development. How should I take doxycycline? Follow all directions on your prescription label and read all medication guides or instruction sheets. Use the medicine exactly as directed. Take doxycycline with a full glass of water. Drink plenty of liquids while you are taking doxycycline. Read and carefully follow any Instructions for Use provided with your medicine. Ask your doctor or pharmacist if you do not understand these instructions. Most brands of doxycyline may be taken with food or milk if the medicine upsets your stomach. Different brands of doxycycline may have different instructions about taking them with or without food. Take Oracea on an empty stomach, at least 1 hour before or 2 hours after a meal.  You may need to split a doxycycline tablet to get the correct dose. Follow your doctor's instructions. Swallow a delayed-release capsule or tablet whole. Do not crush, chew, break, or open it.   Measure liquid medicine  with the dosing syringe provided, or with a special dose-measuring spoon or medicine cup. If you do not have a dose-measuring device, ask your pharmacist for one. If you take doxycycline to prevent malaria: Start taking the medicine 1 or 2 days before entering an area where malaria is common. Continue taking the medicine every day during your stay and for at least 4 weeks after you leave the area. Doxycycline is usually given by injection only if you are unable to take the medicine by mouth. A healthcare provider will give you this injection as an infusion into a vein. Use this medicine for the full prescribed length of time, even if your symptoms quickly improve. Skipping doses can increase your risk of infection that is resistant to medication. Doxycycline will not treat a viral infection such as the flu or a common cold. Store at room temperature away from moisture, heat, and light. Throw away any unused medicine after the expiration date on the label has passed. Using  doxycycline can cause damage to your kidneys. What happens if I miss a dose? Take the medicine as soon as you can, but skip the missed dose if it is almost time for your next dose. Do not take two doses at one time. What happens if I overdose? Seek emergency medical attention or call the Poison Help line at 1-952.268.2175. What should I avoid while taking doxycycline? Do not take iron supplements, multivitamins, calcium supplements, antacids, or laxatives within 2 hours before or after taking doxycycline. Avoid taking any other antibiotics with doxycycline unless your doctor has told you to. Doxycycline could make you sunburn more easily. Avoid sunlight or tanning beds. Wear protective clothing and use sunscreen (SPF 30 or higher) when you are outdoors. Antibiotic medicines can cause diarrhea, which may be a sign of a new infection. If you have diarrhea that is watery or bloody, call your doctor. Do not use anti-diarrhea medicine unless your doctor tells you to.   What are the possible side effects of doxycycline? Get emergency medical help if you have signs of an allergic reaction (hives, difficult breathing, swelling in your face or throat) or a severe skin reaction (fever, sore throat, burning in your eyes, skin pain, red or purple skin rash that spreads and causes blistering and peeling). Seek medical treatment if you have a serious drug reaction that can affect many parts of your body. Symptoms may include: skin rash, fever, swollen glands, flu-like symptoms, muscle aches, severe weakness, unusual bruising, or yellowing of your skin or eyes. This reaction may occur several weeks after you began using doxycycline. Call your doctor at once if you have:  · severe stomach pain, diarrhea that is watery or bloody;  · throat irritation, trouble swallowing;  · chest pain, irregular heart rhythm, feeling short of breath;  · little or no urination;  · low white blood cell counts --fever, chills, swollen glands, body aches, weakness, pale skin, easy bruising or bleeding;  · increased pressure inside the skull --severe headaches, ringing in your ears, dizziness, nausea, vision problems, pain behind your eyes; or  · signs of liver or pancreas problems --loss of appetite, upper stomach pain (that may spread to your back), tiredness, nausea or vomiting, fast heart rate, dark urine, jaundice (yellowing of the skin or eyes). Common side effects may include:  · nausea, vomiting, upset stomach, loss of appetite;  · mild diarrhea;  · skin rash or itching;  · darkened skin color; or  · vaginal itching or discharge. This is not a complete list of side effects and others may occur. Call your doctor for medical advice about side effects. You may report side effects to FDA at 8-581-FDA-7753. What other drugs will affect doxycycline? Sometimes it is not safe to use certain medications at the same time.  Some drugs can affect your blood levels of other drugs you take, which may increase side effects or make the medications less effective. Other drugs may affect doxycycline, including prescription and over-the-counter medicines, vitamins, and herbal products. Tell your doctor about all your current medicines and any medicine you start or stop using. Where can I get more information? Your pharmacist can provide more information about doxycycline. Remember, keep this and all other medicines out of the reach of children, never share your medicines with others, and use this medication only for the indication prescribed. Every effort has been made to ensure that the information provided by [address] is accurate, up-to-date, and complete, but no guarantee is made to that effect. Drug information contained herein may be time sensitive. [facility] information has been compiled for use by healthcare practitioners and consumers in the [place] and therefore [facility] does not warrant that uses outside of the [place] are appropriate, unless specifically indicated otherwise. [facility]'s drug information does not endorse drugs, diagnose patients or recommend therapy. [facility] drug information is an informational resource designed to assist licensed healthcare practitioners in caring for their patients and/or to serve consumers viewing this service as a supplement to, and not a substitute for, the expertise, skill, knowledge and judgment of healthcare practitioners. The absence of a warning for a given drug or drug combination in no way should be construed to indicate that the drug or drug combination is safe, effective or appropriate for any given patient. [facility] does not assume any responsibility for any aspect of healthcare administered with the aid of information [facility] provides. The information contained herein is not intended to cover all possible uses, directions, precautions, warnings, drug interactions, allergic reactions, or adverse effects.  If you have questions about the drugs you are taking, check with your doctor, nurse or pharmacist.  Copyright 0031-6063 [facility] [place]. Version: 21.03. Revision date: 2/18/2020. Care instructions adapted under license by [facility] ([facility]). If you have questions about a medical condition or this instruction, always ask your healthcare professional. [place] [address] any warranty or liability for your use of this information. Patient Education        prednisone  Pronunciation:  PRED ni sone  Brand:  [place]  What is the most important information I should know about prednisone? You should not use prednisone if you have a fungal infection anywhere in your body. You should not stop using prednisone suddenly. Follow your doctor's instructions about tapering your dose. What is prednisone? Prednisone is a steroid that reduces inflammation in the body, and also suppresses your immune system. Prednisone is used to treat many different conditions such as hormonal disorders, skin diseases, arthritis, lupus, psoriasis, allergic conditions, ulcerative colitis, Crohn's disease, eye diseases, lung diseases, asthma, tuberculosis, blood cell disorders, kidney disorders, leukemia, lymphoma, multiple sclerosis, organ transplant rejection, swelling from a brain tumor or injury. Prednisone may also be used for purposes not listed in this medication guide. What should I discuss with my healthcare provider before taking prednisone? You should not use prednisone if you are allergic to it, or if you have a fungal infection anywhere in your body. Steroid medication can weaken your immune system, making it easier for you to get an infection or worsening an infection you already have. Tell your doctor about any illness or infection you've had within the past several weeks.   Tell your doctor if you have ever had:  · heart problems, high blood pressure, or a heart attack;  · glaucoma or cataracts;  · herpes infection of the eyes;  · past or present tuberculosis;  · a parasite infection that causes diarrhea (such as threadworms);  · any illness that causes diarrhea;  · underactive thyroid;  · diabetes;  · a stomach ulcer, diverticulitis;  · a colostomy or ileostomy;  · osteoporosis or low bone mineral density (steroid medication can increase your risk of bone loss);  · low levels of calcium or potassium in your blood;  · cirrhosis or other liver disease;  · mental illness or psychosis; or  · a muscle disorder such as myasthenia gravis. Long-term use of steroids may lead to bone loss (osteoporosis), especially if you smoke or drink alcohol, if you do not exercise, or if you do not get enough vitamin D or calcium in your diet. It is not known whether this medicine will harm an unborn baby. Tell your doctor if you are pregnant or plan to become pregnant. You should not breastfeed while using prednisone. How should I take prednisone? Follow all directions on your prescription label and read all medication guides or instruction sheets. Your doctor may occasionally change your dose. Use the medicine exactly as directed. Prednisone is taken daily or every other day, depending on the condition being treated. You may need to take the medicine at a certain time of day. Follow your doctor's instructions about when and how often to take this medicine. Take with food if prednisone upsets your stomach. Measure liquid medicine carefully. Use the dosing syringe provided, or use a medicine dose-measuring device (not a kitchen spoon). Swallow the delayed-release tablet whole and do not crush, chew, or break it. Prednisone can weaken (suppress) your immune system, and you may get an infection more easily. Call your doctor if you have signs of infection (fever, weakness, cold or flu symptoms, skin sores, diarrhea, frequent or recurring illness). If you have major surgery or a severe injury or infection, your prednisone dose needs may change.  Make sure any doctor caring for you knows you are using this medicine. If you use this medicine long-term, you may need medical tests and vision exams. In case of emergency, wear or carry medical identification to let others know you use a steroid. You should not stop using prednisone suddenly. Follow your doctor's instructions about tapering your dose. Store at room temperature away from moisture, heat, and light. What happens if I miss a dose? Take the medicine as soon as you can, but skip the missed dose if it is almost time for your next dose. Do not take two doses at one time. What happens if I overdose? Seek emergency medical attention or call the Poison Help line at 1-689.482.6654. High doses or long-term use of prednisone can lead to thinning skin, easy bruising, changes in body fat (especially in your face, neck, back, and waist), increased acne or facial hair, menstrual problems, impotence, or loss of interest in sex. What should I avoid while taking prednisone? Do not receive a \"live\" vaccine while using prednisone. The vaccine may not work as well and may not fully protect you from disease. Live vaccines include measles, mumps, rubella (MMR), polio, rotavirus, typhoid, yellow fever, varicella (chickenpox), zoster (shingles), and nasal flu (influenza) vaccine. Avoid being near people who are sick or have infections. Call your doctor for preventive treatment if you are exposed to chickenpox or measles. These conditions can be serious or even fatal in people who are using steroid medicine. Avoid drinking alcohol. What are the possible side effects of prednisone? Get emergency medical help if you have signs of an allergic reaction: hives; difficult breathing; swelling of your face, lips, tongue, or throat.   Call your doctor at once if you have:  · muscle pain or weakness;  · blurred vision, tunnel vision, eye pain, or seeing halos around lights;  · severe depression, changes in personality, unusual thoughts or behavior;  · bloody or tarry stools, coughing up blood or vomit that looks like coffee grounds;  · swelling, rapid weight gain, feeling short of breath;  · irregular heartbeats;  · severe headache, pounding in your neck or ears;  · decreased adrenal gland hormones --muscle weakness, tiredness, diarrhea, nausea, menstrual changes, skin discoloration, craving salty foods, and feeling light-headed; or  · low potassium level --leg cramps, constipation, irregular heartbeats, fluttering in your chest, increased thirst or urination, numbness or tingling, muscle weakness or limp feeling. Prednisone can affect growth in children. Tell your doctor if your child is not growing at a normal rate while using this medicine. Common side effects may include:  · weight gain (especially in your face or your upper back and torso);  · increased appetite;  · mood changes, trouble sleeping;  · changes in your menstrual periods;  · problems with memory or thought;  · muscle or joint pain;  · weakness;  · headache, dizziness, spinning sensation;  · nausea, bloating, loss of appetite;  · slow wound healing; or  · acne, increased sweating, thinning skin, bruising, pinpoint spots under your skin. This is not a complete list of side effects and others may occur. Call your doctor for medical advice about side effects. You may report side effects to FDA at 4-885-FDA-2723. What other drugs will affect prednisone? Sometimes it is not safe to use certain medications at the same time. Some drugs can affect your blood levels of other drugs you take, which may increase side effects or make the medications less effective. Tell your doctor about all your current medicines.  Many drugs can affect prednisone, especially:  · bupropion;  · cyclosporine;  · digoxin;  · ketoconazole;  · an antibiotic;  · birth control pills or hormone replacement therapy;  · a diuretic or \"water pill\";  · insulin or oral diabetes medicine;  · a blood thinner --warfarin, at home. Serious cases need treatment in the hospital. Treatment may include medicines to reduce symptoms, plus breathing support such as oxygen therapy or a ventilator. Some people may be placed on their belly to help their oxygen levels. Treatments that may help people who have COVID-19 include:  Antiviral medicines. These medicines treat viral infections. Remdesivir is an example. Immune-based therapy. These medicines help the immune system fight COVID-19. One example is bamlanivimab. It's a monoclonal antibody. Blood thinners. These medicines help prevent blood clots. People with severe illness are at risk for blood clots. How can you protect yourself and others? The best way to protect yourself from getting sick is to:  · Avoid areas where there is an outbreak. · Avoid contact with people who may be infected. · Avoid crowds and try to stay at least 6 feet away from other people. · Wash your hands often, especially after you cough or sneeze. Use soap and water, and scrub for at least 20 seconds. If soap and water aren't available, use an alcohol-based hand . · Avoid touching your mouth, nose, and eyes. To help avoid spreading the virus to others:  · Stay home if you are sick or have been exposed to the virus. Don't go to school, work, or public areas. And don't use public transportation, ride-shares, or taxis unless you have no choice. · Wear a cloth face cover if you have to go to public areas. · Cover your mouth with a tissue when you cough or sneeze. Then throw the tissue in the trash and wash your hands right away. · If you're sick:  ? Leave your home only if you need to get medical care. But call the doctor's office first so they know you're coming. And wear a face cover. ? Wear the face cover whenever you're around other people. It can help stop the spread of the virus when you cough or sneeze. ? Limit contact with pets and people in your home.  If possible, stay in a separate bedroom and use a separate bathroom. ? Clean and disinfect your home every day. Use household  and disinfectant wipes or sprays. Take special care to clean things that you grab with your hands. These include doorknobs, remote controls, phones, and handles on your refrigerator and microwave. And don't forget countertops, tabletops, bathrooms, and computer keyboards. When should you call for help? Call 911 anytime you think you may need emergency care. For example, call if you have life-threatening symptoms, such as:    · You have severe trouble breathing. (You can't talk at all.)     · You have constant chest pain or pressure.     · You are severely dizzy or lightheaded.     · You are confused or can't think clearly.     · Your face and lips have a blue color.     · You pass out (lose consciousness) or are very hard to wake up. Call your doctor now or seek immediate medical care if:    · You have moderate trouble breathing. (You can't speak a full sentence.)     · You are coughing up blood (more than about 1 teaspoon).     · You have signs of low blood pressure. These include feeling lightheaded; being too weak to stand; and having cold, pale, clammy skin. Watch closely for changes in your health, and be sure to contact your doctor if:    · Your symptoms get worse.     · You are not getting better as expected. Call before you go to the doctor's office. Follow their instructions. And wear a cloth face cover. Current as of: December 18, 2020               Content Version: 12.7  © 2006-2021 [employer]. Care instructions adapted under license by [facility] ([facility]). If you have questions about a medical condition or this instruction, always ask your healthcare professional. [place] [address] any warranty or liability for your use of this information.      Patient Education        Coronavirus (KOBSW-26): Care Instructions  Overview  The coronavirus disease (COVID-19) is caused by a virus. Symptoms may include a fever, a cough, and shortness of breath. It mainly spreads person-to-person through droplets from coughing and sneezing. The virus also can spread when people are in close contact with someone who is infected. Most people have mild symptoms and can take care of themselves at home. If their symptoms get worse, they may need care in a hospital. Treatment may include medicines to reduce symptoms, plus breathing support such as oxygen therapy or a ventilator. It's important to not spread the virus to others. If you have COVID-19, wear a face cover anytime you are around other people. You need to isolate yourself while you are sick. Leave your home only if you need to get medical care or testing. Follow-up care is a key part of your treatment and safety. Be sure to make and go to all appointments, and call your doctor if you are having problems. It's also a good idea to know your test results and keep a list of the medicines you take. How can you care for yourself at home? · Get extra rest. It can help you feel better. · Drink plenty of fluids. This helps replace fluids lost from fever. Fluids also help ease a scratchy throat. Water, soup, fruit juice, and hot tea with lemon are good choices. · Take acetaminophen (such as Tylenol) to reduce a fever. It may also help with muscle aches. Read and follow all instructions on the label. · Use petroleum jelly on sore skin. This can help if the skin around your nose and lips becomes sore from rubbing a lot with tissues. Tips for self-isolation  · Limit contact with people in your home. If possible, stay in a separate bedroom and use a separate bathroom. · Wear a cloth face cover when you are around other people. It can help stop the spread of the virus when you cough or sneeze. · If you have to leave home, avoid crowds and try to stay at least 6 feet away from other people.   · Avoid contact with pets and other animals. · Cover your mouth and nose with a tissue when you cough or sneeze. Then throw it in the trash right away. · Wash your hands often, especially after you cough or sneeze. Use soap and water, and scrub for at least 20 seconds. If soap and water aren't available, use an alcohol-based hand . · Don't share personal household items. These include bedding, towels, cups and glasses, and eating utensils. · [address] in the warmest water allowed for the fabric type, and dry it completely. It's okay to wash other people's laundry with yours. · Clean and disinfect your home every day. Use household  and disinfectant wipes or sprays. Take special care to clean things that you grab with your hands. These include doorknobs, remote controls, phones, and handles on your refrigerator and microwave. And don't forget countertops, tabletops, bathrooms, and computer keyboards. When you can end self-isolation  · If you know or suspect that you have COVID-19, stay in self-isolation until:  ? You haven't had a fever for 24 hours while not taking medicines to lower the fever, and  ? Your symptoms have improved, and  ? It's been at least 10 days since your symptoms started. · Talk to your doctor about whether you also need testing, especially if you have a weakened immune system. When should you call for help? Call 911 anytime you think you may need emergency care. For example, call if you have life-threatening symptoms, such as:    · You have severe trouble breathing. (You can't talk at all.)     · You have constant chest pain or pressure.     · You are severely dizzy or lightheaded.     · You are confused or can't think clearly.     · Your face and lips have a blue color.     · You pass out (lose consciousness) or are very hard to wake up. Call your doctor now or seek immediate medical care if:    · You have moderate trouble breathing.  (You can't speak a full sentence.)     · You are coughing up blood (more than about 1 teaspoon).     · You have signs of low blood pressure. These include feeling lightheaded; being too weak to stand; and having cold, pale, clammy skin. Watch closely for changes in your health, and be sure to contact your doctor if:    · Your symptoms get worse.     · You are not getting better as expected. Call before you go to the doctor's office. Follow their instructions. And wear a cloth face cover. Current as of: December 18, 2020               Content Version: 12.7  © 2006-2021 [employer]. Care instructions adapted under license by [facility] ([facility]). If you have questions about a medical condition or this instruction, always ask your healthcare professional. [address] any warranty or liability for your use of this information.

## 2021-02-01 NOTE — PROGRESS NOTES
Trellthula Ice  1955    Chief Complaint   Patient presents with    Cough     x 4 days wheezing       HPI: Christian Taylor is a 59-year-old male who presents to clinic with concern for increasing wheezing, coughing and some shortness of breath. According to Christian Estradans, he was up most of the night last night coughing and wheezing. Christian Taylor describes his cough as tight with occasional thick and discolored expectorant. According to Christian Brandon his shortness of breath is \"just when I start coughing. \"  He does not have any exertional shortness of breath. He denies fevers or chills. No loss of taste or smell. There are reports that he has had symptoms similar in the past in which an antibiotic and prednisone helped. He has well is requesting a refill of his albuterol nebulizer as he reports being \"almost out\". He reports a history of COPD. He does not work and has no known exposures to COVID-19. Relevant PMH: COPD, diabetes, asthma, obstructive sleep apnea with CPAP, coronary artery disease, status post CABG x3.     Smoking history:  He  reports that he quit smoking about 6 years ago. His smoking use included cigarettes. He has a 45.00 pack-year smoking history. He has never used smokeless tobacco.     He has had no known ill contacts. Treatment to date: oral decongestant. Travel screen completed:  Yes          Vitals:    02/01/21 1041 02/01/21 1100   BP: 134/85    Pulse: 78    Resp: 24 20   Temp: 97.9 °F (36.6 °C)    TempSrc: Oral    SpO2: 96% 97%   Weight: (!) 315 lb 9.6 oz (143.2 kg)    Height: 6' 1\" (1.854 m)       Physical Exam  Vitals signs and nursing note reviewed. Constitutional:       Appearance: He is not ill-appearing. Comments: Appears to be of stated age with warm, dry skin; normal coloration without rash of the exposed skin. Patient is well-appearing, well-hydrated, and appears nontoxic without apparent distress. HENT:      Head: Normocephalic.       Nose: Nose normal.      Mouth/Throat: concerns including SOB.  Advised Covid 19 results may take up to 3-7 days to be finalized. Senthil Rosales will be contacted per phone with results or may check their Mychart.  Will plan to follow up with testing results and plans for return to work as advised per [facility] [facility], local health authorities and employer. Soumya Allen, VERONIQUE - CNP  2/1/21     This visit was provided as a focused evaluation during the COVID -19 pandemic/national emergency. A comprehensive review of all previous patient history and testing was not conducted. Pertinent findings were elicited during the visit.

## 2021-02-02 LAB
SARS-COV-2, RAPID: NORMAL
SARS-COV-2: NORMAL
SARS-COV-2: NOT DETECTED
SOURCE: NORMAL

## 2021-02-08 DIAGNOSIS — E03.9 ACQUIRED HYPOTHYROIDISM: ICD-10-CM

## 2021-02-08 DIAGNOSIS — I10 ESSENTIAL HYPERTENSION: ICD-10-CM

## 2021-02-08 RX ORDER — LEVOTHYROXINE SODIUM 0.2 MG/1
200 TABLET ORAL DAILY
Qty: 90 TABLET | Refills: 0 | Status: SHIPPED | OUTPATIENT
Start: 2021-02-08 | End: 2021-02-19 | Stop reason: SDUPTHER

## 2021-02-08 RX ORDER — METOPROLOL SUCCINATE 50 MG/1
TABLET, EXTENDED RELEASE ORAL
Qty: 90 TABLET | Refills: 0 | Status: SHIPPED | OUTPATIENT
Start: 2021-02-08 | End: 2021-02-19 | Stop reason: SDUPTHER

## 2021-02-08 NOTE — TELEPHONE ENCOUNTER
Medication pending    Health Maintenance   Topic Date Due    Diabetic microalbuminuria test  08/18/2017    Flu vaccine (1) 09/01/2020    Diabetic retinal exam  10/29/2020    Diabetic foot exam  02/07/2021    A1C test (Diabetic or Prediabetic)  02/07/2021    DTaP/Tdap/Td vaccine (1 - Tdap) 05/07/2021 (Originally 7/29/1974)    Shingles Vaccine (1 of 2) 05/07/2021 (Originally 7/29/2007)    Low dose CT lung screening  02/19/2021    Annual Wellness Visit (AWV)  05/30/2021    Lipid screen  08/06/2021    TSH testing  08/06/2021    Potassium monitoring  08/06/2021    Creatinine monitoring  08/06/2021    Colon cancer screen colonoscopy  03/05/2029    Pneumococcal 0-64 years Vaccine  Completed    Hepatitis C screen  Completed    HIV screen  Completed    Hepatitis A vaccine  Aged Out    Hib vaccine  Aged Out    Meningococcal (ACWY) vaccine  Aged Out             (applicable per patient's age: Cancer Screenings, Depression Screening, Fall Risk Screening, Immunizations)    Hemoglobin A1C (%)   Date Value   02/07/2020 6.5   10/17/2019 8.3 (H)   10/20/2017 5.4     Microalb/Crt.  Ratio (mcg/mg creat)   Date Value   08/18/2016 6     LDL Cholesterol (mg/dL)   Date Value   08/06/2020 42     AST (U/L)   Date Value   08/06/2020 37     ALT (U/L)   Date Value   08/06/2020 37     BUN (mg/dL)   Date Value   08/06/2020 11      (goal A1C is < 7)   (goal LDL is <100) need 30-50% reduction from baseline     BP Readings from Last 3 Encounters:   02/01/21 134/85   11/17/20 119/78   08/10/20 120/70    (goal /80)      All Future Testing planned in [facility]:  Lab Frequency Next Occurrence   CBC Auto Differential Once 08/10/2021   Comprehensive Metabolic Panel Once 10/52/5993   Vitamin D 25 Hydroxy Once 08/10/2021   Lipid Panel Once 08/10/2021   TSH with Reflex Once 08/10/2021   Magnesium Once 08/10/2021   EKG 12 Lead Once 08/10/2021   XR CHEST (2 VW) Once 08/10/2021   Hemoglobin A1C Once 08/10/2021       Next Visit Date:  Future Appointments   Date Time Provider Debbie Lewisi   2/16/2021  9:30 AM Flakita Cyr MD [facility]   6/1/2021  9:00 AM Flakita Cyr MD [facility]   8/9/2021 11:30 AM Rolando Connell MD [facility]            Patient Active Problem List:     GERD (gastroesophageal reflux disease)     S/P CABG x 3     CAD (coronary artery disease)     Essential hypertension     Mixed hyperlipidemia     Dysthymia (or depressive neurosis)     Chronic gout involving toe     Acquired hypothyroidism     Vitamin D deficiency disease     MINAL on CPAP     Chronic low back pain with left-sided sciatica     Class 3 severe obesity due to excess calories without serious comorbidity with body mass index (BMI) of 40.0 to 44.9 in [facility])

## 2021-02-18 NOTE — PATIENT INSTRUCTIONS
SURVEY:    You may be receiving a survey from [employer] regarding your visit today. Please complete the survey to enable us to provide the highest quality of care to you and your family. If you cannot score us a very good on any question, please call the office to discuss how we could of made your experience a very good one. Thank you. You may be receiving a survey from [employer] regarding your visit today. You may get this in the mail, through your MyChart or in your email. Please complete the survey to enable us to provide the highest quality of care to you and your family. If you cannot score us as very good ( 5 Stars) on any question, please feel free to call the office to discuss how we could have made your experience exceptional.     Thank You! Gates Baumgarten, MD Coby Sailors, KALIE Garcia    Schedule a Vaccine  When you qualify to receive the vaccine per the [address] guidelines, call the [facility]) COVID-19 Vaccination Hotline to schedule your appointment or to get additional information about the [facility]) locations which are offering the COVID-19 vaccine. To be most effective, it's important that you receive two doses of one of the COVID-19 vaccines. -If you are receiving the Saenz Peter vaccine, your second shot will be scheduled as close to 21 days after the first shot as possible. -If you are receiving the Moderna vaccine, your second shot will be scheduled as close to 28 days after the first shot as possible. [address] Vaccination Hotline: [phone]    In partnership with [facility] and [facility], patients can call [phone], Monday-Friday 8:00am-4:00pm for scheduling at our Hospitals. Or visit the below [facility] websites for additional information of vaccine administration locations.      Links to [facility]) website and [facility] websites:    iRx Reminder/mercy-[employer]-monitoring-coronavirus-covid-19/covid-19-vaccine/[place]/heard-vaccine    \[facility]\""    [email or web address]    What is lung cancer screening? Lung cancer screening is a way in which doctors check the lungs for early signs of cancer in people who have no symptoms of lung cancer. A low-dose CT scan uses much less radiation than a normal CT scan and shows a more detailed image of the lungs than a standard X-ray. The goal of lung cancer screening is to find cancer early, before it has a chance to grow, spread, or cause problems. One large study found that smokers who were screened with low-dose CT scans were less likely to die of lung cancer than those who were screened with standard X-ray. Below is a summary of the things you need to know regarding screening for lung cancer with low-dose computed tomography (LDCT). This is a screening program that involves routine annual screening with LDCT studies of the lung. The LDCTs are done using low-dose radiation that is not thought to increase your cancer risk. If you have other serious medical conditions (other cancers, congestive heart failure) that limit your life expectancy to less than 10 years, you should not undergo lung cancer screening with LDCT. The chance is 20%-60% that the LDCT result will show abnormalities. This would require additional testing which could include repeat imaging or even invasive procedures. Most (about 95%) of \"abnormal\" LDCT results are false in the sense that no lung cancer is ultimately found. Additionally, some (about 10%) of the cancers found would not affect your life expectancy, even if undetected and untreated. If you are still smoking, the single most important thing that you can do to reduce your risk of dying of lung cancer is to quit. For this screening to be covered by Medicare and most other insurers, strict criteria must be met.   If you do not meet these criteria, but still wish to undergo LDCT testing, you will be required to sign a waiver indicating your willingness to pay for the scan.

## 2021-02-19 ENCOUNTER — OFFICE VISIT (OUTPATIENT)
Dept: FAMILY MEDICINE CLINIC | Age: 66
End: 2021-02-19
Payer: MEDICARE

## 2021-02-19 VITALS
DIASTOLIC BLOOD PRESSURE: 78 MMHG | SYSTOLIC BLOOD PRESSURE: 120 MMHG | TEMPERATURE: 97 F | BODY MASS INDEX: 41.08 KG/M2 | HEART RATE: 67 BPM | OXYGEN SATURATION: 97 % | WEIGHT: 311.4 LBS

## 2021-02-19 DIAGNOSIS — I10 ESSENTIAL HYPERTENSION: ICD-10-CM

## 2021-02-19 DIAGNOSIS — E78.2 MIXED HYPERLIPIDEMIA: ICD-10-CM

## 2021-02-19 DIAGNOSIS — E03.9 ACQUIRED HYPOTHYROIDISM: ICD-10-CM

## 2021-02-19 DIAGNOSIS — Z87.891 PERSONAL HISTORY OF TOBACCO USE: ICD-10-CM

## 2021-02-19 DIAGNOSIS — E11.69 TYPE 2 DIABETES MELLITUS WITH OTHER SPECIFIED COMPLICATION, WITHOUT LONG-TERM CURRENT USE OF INSULIN (HCC): Primary | ICD-10-CM

## 2021-02-19 DIAGNOSIS — I25.10 CORONARY ARTERY DISEASE INVOLVING NATIVE CORONARY ARTERY OF NATIVE HEART WITHOUT ANGINA PECTORIS: ICD-10-CM

## 2021-02-19 PROBLEM — J01.91 ACUTE RECURRENT SINUSITIS: Status: ACTIVE | Noted: 2021-02-19

## 2021-02-19 LAB — HBA1C MFR BLD: 9 %

## 2021-02-19 PROCEDURE — 1036F TOBACCO NON-USER: CPT | Performed by: INTERNAL MEDICINE

## 2021-02-19 PROCEDURE — 83036 HEMOGLOBIN GLYCOSYLATED A1C: CPT | Performed by: INTERNAL MEDICINE

## 2021-02-19 PROCEDURE — G8417 CALC BMI ABV UP PARAM F/U: HCPCS | Performed by: INTERNAL MEDICINE

## 2021-02-19 PROCEDURE — G8482 FLU IMMUNIZE ORDER/ADMIN: HCPCS | Performed by: INTERNAL MEDICINE

## 2021-02-19 PROCEDURE — 3052F HG A1C>EQUAL 8.0%<EQUAL 9.0%: CPT | Performed by: INTERNAL MEDICINE

## 2021-02-19 PROCEDURE — 2022F DILAT RTA XM EVC RTNOPTHY: CPT | Performed by: INTERNAL MEDICINE

## 2021-02-19 PROCEDURE — G8427 DOCREV CUR MEDS BY ELIG CLIN: HCPCS | Performed by: INTERNAL MEDICINE

## 2021-02-19 PROCEDURE — G0296 VISIT TO DETERM LDCT ELIG: HCPCS | Performed by: INTERNAL MEDICINE

## 2021-02-19 PROCEDURE — 99214 OFFICE O/P EST MOD 30 MIN: CPT | Performed by: INTERNAL MEDICINE

## 2021-02-19 PROCEDURE — 3017F COLORECTAL CA SCREEN DOC REV: CPT | Performed by: INTERNAL MEDICINE

## 2021-02-19 RX ORDER — LOVASTATIN 40 MG/1
TABLET ORAL
Qty: 180 TABLET | Refills: 3 | Status: SHIPPED | OUTPATIENT
Start: 2021-02-19 | End: 2021-11-01

## 2021-02-19 RX ORDER — ALBUTEROL SULFATE 90 UG/1
2 AEROSOL, METERED RESPIRATORY (INHALATION) EVERY 6 HOURS PRN
Qty: 1 INHALER | Refills: 5 | Status: SHIPPED | OUTPATIENT
Start: 2021-02-19

## 2021-02-19 RX ORDER — LOSARTAN POTASSIUM 25 MG/1
TABLET ORAL
Qty: 90 TABLET | Refills: 0 | Status: SHIPPED | OUTPATIENT
Start: 2021-02-19 | End: 2021-05-17

## 2021-02-19 RX ORDER — LEVOTHYROXINE SODIUM 0.2 MG/1
200 TABLET ORAL DAILY
Qty: 90 TABLET | Refills: 0 | Status: SHIPPED | OUTPATIENT
Start: 2021-02-19 | End: 2021-08-06 | Stop reason: SDUPTHER

## 2021-02-19 RX ORDER — PAROXETINE HYDROCHLORIDE 20 MG/1
TABLET, FILM COATED ORAL
Qty: 180 TABLET | Refills: 1 | Status: SHIPPED | OUTPATIENT
Start: 2021-02-19 | End: 2021-11-01

## 2021-02-19 RX ORDER — AMLODIPINE BESYLATE 2.5 MG/1
2.5 TABLET ORAL DAILY
Qty: 90 TABLET | Refills: 1 | Status: SHIPPED | OUTPATIENT
Start: 2021-02-19 | End: 2021-05-17 | Stop reason: SDUPTHER

## 2021-02-19 RX ORDER — FLUTICASONE PROPIONATE 50 MCG
2 SPRAY, SUSPENSION (ML) NASAL DAILY
Qty: 1 BOTTLE | Refills: 3 | Status: SHIPPED | OUTPATIENT
Start: 2021-02-19

## 2021-02-19 RX ORDER — GLIPIZIDE 5 MG/1
5 TABLET ORAL 2 TIMES DAILY
Qty: 60 TABLET | Refills: 3 | Status: SHIPPED | OUTPATIENT
Start: 2021-02-19 | End: 2021-07-20 | Stop reason: ALTCHOICE

## 2021-02-19 RX ORDER — FENOFIBRATE 145 MG/1
TABLET, COATED ORAL
Qty: 30 TABLET | Refills: 3 | Status: SHIPPED | OUTPATIENT
Start: 2021-02-19 | End: 2021-06-18 | Stop reason: SDUPTHER

## 2021-02-19 RX ORDER — ALLOPURINOL 300 MG/1
TABLET ORAL
Qty: 90 TABLET | Refills: 0 | Status: SHIPPED | OUTPATIENT
Start: 2021-02-19 | End: 2021-05-17 | Stop reason: SDUPTHER

## 2021-02-19 RX ORDER — METOPROLOL SUCCINATE 50 MG/1
TABLET, EXTENDED RELEASE ORAL
Qty: 90 TABLET | Refills: 0 | Status: SHIPPED | OUTPATIENT
Start: 2021-02-19 | End: 2021-05-11 | Stop reason: SDUPTHER

## 2021-02-19 ASSESSMENT — ENCOUNTER SYMPTOMS
COUGH: 0
ALLERGIC/IMMUNOLOGIC NEGATIVE: 1
WHEEZING: 0
BLOOD IN STOOL: 0
CONSTIPATION: 0
NAUSEA: 0
BLURRED VISION: 0
SHORTNESS OF BREATH: 0
ABDOMINAL PAIN: 0
SORE THROAT: 0

## 2021-02-19 NOTE — PROGRESS NOTES
HPI Notes    Name: Marquis Storey  : 1955         Chief Complaint:     Chief Complaint   Patient presents with    Diabetes     Patient presents today for a 3 month check up.  Gastroesophageal Reflux    Hypertension       History of Present Illness:        Gabi Joya presents office to follow-up for diabetes, hypertension, hyperlipidemia, hypothyroidism. States gained weight, unable to exercise outside due to cold weather. Also was on steroids for bronchitis and it made sugar readings to go up. Prudence Teresa confirms that she is compliant with his thyroid medication. he denies an increase in fatigue, constipation, increased skin dryness, or increased lower extremity edema. The last TSH was 1.16 on 2020    Diabetes  He presents for his follow-up diabetic visit. He has type 2 diabetes mellitus. There are no hypoglycemic associated symptoms. Pertinent negatives for hypoglycemia include no headaches or nervousness/anxiousness. There are no diabetic associated symptoms. Pertinent negatives for diabetes include no blurred vision, no chest pain, no polydipsia, no polyuria and no weakness. Symptoms are stable. Diabetic complications include heart disease. Risk factors for coronary artery disease include diabetes mellitus, dyslipidemia, family history, male sex, obesity and hypertension. Current diabetic treatment includes oral agent (monotherapy). He is compliant with treatment all of the time. His weight is increasing steadily. He is following a generally unhealthy diet. His overall blood glucose range is >200 mg/dl. An ACE inhibitor/angiotensin II receptor blocker is being taken. He does not see a podiatrist.Eye exam is not current. Hypertension  This is a chronic problem. The current episode started more than 1 year ago. The problem is unchanged. The problem is controlled. Pertinent negatives include no blurred vision, chest pain, headaches, palpitations, peripheral edema or shortness of breath. Normal left ventricular function, ejection fraction of 60%    CARDIAC CATHETERIZATION Left 12/20/2017    Dr. Tonio Correa @ [facility]--70% left main trunck & ostial LAD & high lateral circumflex disease. Patent LIMA to the LAD. Patent vein graft to the high lateral circumflex. 60% disease in the AV branch of the circumflex that terminates in a large posterolateral branch with an FFR of 1.0, indicating no significant stenosis. 40% disease in the right coronary artery. Ejection fraction 50-55%.  COLONOSCOPY N/A 3/5/2019    COLONOSCOPY POLYPECTOMY HOT BIOPSY performed by Alvin Shepard MD at [facility]  02/04/15    3 vessel CABG using left internal mammary artery to the left anterior descending coronary artery with a reverse saphenous vein aortocoronary sequential graft side to side to the ramus intermedius coronary artery & end to side to the first obtuse marginal coronary artery.  OTHER SURGICAL HISTORY  03-21-16    I & D- cyst- posterior neck    SPINE SURGERY      THYROID LOBECTOMY      UPPER GASTROINTESTINAL ENDOSCOPY N/A 3/5/2019    EGD BIOPSY performed by Alvin Shepard MD at [facility] OR        Medications:       Prior to Admission medications    Medication Sig Start Date End Date Taking?  Authorizing Provider   PARoxetine (PAXIL) 20 MG tablet TAKE 1 TABLET TWICE DAILY 2/19/21  Yes Rivka Treviño MD   metoprolol succinate (TOPROL XL) 50 MG extended release tablet TAKE 1 TABLET EVERY DAY 2/19/21  Yes Rivka Treviño MD   lovastatin (MEVACOR) 40 MG tablet TAKE 2 TABLETS  NIGHTLY 2/19/21  Yes Rivka Treviño MD   losartan (COZAAR) 25 MG tablet TAKE 1 TABLET EVERY DAY 2/19/21  Yes Rivka Treviño MD   levothyroxine (SYNTHROID) 200 MCG tablet Take 1 tablet by mouth daily 2/19/21  Yes Rivka Treviño MD   fluticasone (FLONASE) 50 MCG/ACT nasal spray 2 sprays by Nasal route daily 2/19/21  Yes Rivka Treviño MD   fenofibrate (TRICOR) 145 MG tablet TAKE 1 TABLET BY MOUTH EVERY DAY 2/19/21  Yes Rivka Treviño MD   amLODIPine (NORVASC) 2.5 MG tablet Take 1 tablet by mouth daily 2/19/21  Yes Rivka Treviño MD   allopurinol (ZYLOPRIM) 300 MG tablet TAKE 1 TABLET EVERY DAY 2/19/21  Yes Rivka Treviño MD   albuterol sulfate  (90 Base) MCG/ACT inhaler Inhale 2 puffs into the lungs every 6 hours as needed for Wheezing 2/19/21  Yes Rivka Treviño MD   metFORMIN (GLUCOPHAGE) 1000 MG tablet Take 1 tablet by mouth 2 times daily (with meals) 2/19/21  Yes Rivka Treviño MD   glipiZIDE (GLUCOTROL) 5 MG tablet Take 1 tablet by mouth 2 times daily 2/19/21  Yes Rivka Treviño MD   [facility] [address] by Does not apply route   Yes Historical Provider, MD   ondansetron (ZOFRAN-ODT) 4 MG disintegrating tablet Take 1 tablet by mouth 3 times daily as needed for Nausea or Vomiting 11/17/20  Yes Rivka Treviño MD   fluticasone (FLOVENT HFA) 110 MCG/ACT inhaler Inhale 2 puffs into the lungs 2 times daily 5/1/20 5/1/21 Yes Rivka Treviño MD   blood glucose monitor strips Test 2 times a day & as needed for symptoms of irregular blood glucose. 11/11/19  Yes Rivka Treviño MD   mometasone (ELOCON) 0.1 % cream Apply topically daily. 2/20/17  Yes Carmel Torres MD   aspirin 81 MG tablet Take 81 mg by mouth daily. Yes Historical Provider, MD   albuterol (PROVENTIL) (2.5 MG/3ML) 0.083% nebulizer solution Take 3 mLs by nebulization every 4-6 hours as needed for Wheezing 2/1/21 2/15/21  VERONIQUE Barbour - JOSE   Respiratory Therapy Supplies [facility] [place] full face mask, hose, head gear and  filter  Patient not taking: Reported on 2/1/2021 2/7/20   Rivka Treviño MD        Allergies:       Patient has no known allergies. Social History:     Tobacco: reports that he quit smoking about 6 years ago. His smoking use included cigarettes. He has a 45.00 pack-year smoking history. He has never used smokeless tobacco.  Alcohol:      reports no history of alcohol use.   Drug Use: No thyromegaly. Cardiovascular:      Rate and Rhythm: Normal rate and regular rhythm. Heart sounds: Normal heart sounds. No murmur. Pulmonary:      Effort: Pulmonary effort is normal.      Breath sounds: Normal breath sounds. No wheezing or rales. Abdominal:      General: Bowel sounds are normal. There is no distension. Palpations: Abdomen is soft. There is no mass. Tenderness: There is no abdominal tenderness. Musculoskeletal: Normal range of motion. Right lower leg: No edema. Left lower leg: No edema. Lymphadenopathy:      Cervical: No cervical adenopathy. Skin:     General: Skin is warm and dry. Coloration: Skin is not pale. Findings: No rash. Neurological:      Mental Status: He is alert and oriented to person, place, and time.    Psychiatric:         Behavior: Behavior normal.         Judgment: Judgment normal.       Diabetic Foot Exam    Pulses:  normal,   Edema: negative  Skin: normal    Sensory exam  Monofilament Sensation:  normal  (minimum of 5 random plantar locations tested, avoid callous areas - > 1 area with absence of sensation is + for neuropathy)      Plus one of the following  Pinprick:  Intact        Data:     Lab Results   Component Value Date     08/06/2020    K 4.3 08/06/2020     08/06/2020    CO2 23 08/06/2020    BUN 11 08/06/2020    CREATININE 0.83 08/06/2020    GLUCOSE 181 08/06/2020    GLUCOSE 111 10/14/2011    PROT 7.8 08/06/2020    LABALBU 4.3 08/06/2020    BILITOT 0.42 08/06/2020    ALKPHOS 51 08/06/2020    AST 37 08/06/2020    ALT 37 08/06/2020     Lab Results   Component Value Date    WBC 7.6 08/06/2020    RBC 4.81 08/06/2020    HGB 15.3 08/06/2020    HCT 45.3 08/06/2020    MCV 94.2 08/06/2020    MCH 31.8 08/06/2020    MCHC 33.7 08/06/2020    RDW 14.0 08/06/2020     08/06/2020    MPV NOT REPORTED 08/06/2020     Lab Results   Component Value Date    TSH 1.16 08/06/2020     Lab Results   Component Value Date    CHOL 122 08/06/2020    HDL 30 08/06/2020    PSA 0.35 12/17/2018    LABA1C 9.0 02/19/2021          Assessment & Plan        Diagnosis Orders   1. Type 2 diabetes mellitus with other specified complication, without long-term current use of insulin (HCC)   Worsening course with hemoglobin A1c 9.0 today. Increase Metformin from 500 twice daily to 1000 mg twice daily and add glipizide 5 mg twice daily. Patient advised to cut down on carbs, increase physical activities and try to lose weight. We will follow-up in 3 months POCT glycosylated hemoglobin (Hb A1C)    [facility] DIABETES FOOT EXAM   2. Essential hypertension   Blood pressure is stable, continue on current medications metoprolol succinate (TOPROL XL) 50 MG extended release tablet    losartan (COZAAR) 25 MG tablet   3. Mixed hyperlipidemia   Lipid panel controlled, continue on TriCor and Mevacor lovastatin (MEVACOR) 40 MG tablet    fenofibrate (TRICOR) 145 MG tablet   4. Acquired hypothyroidism   TSH within normal limits, continue on current dose of Synthroid levothyroxine (SYNTHROID) 200 MCG tablet   5. Coronary artery disease involving native coronary artery of native heart without angina pectoris   Asymptomatic, follows up with Dr. Rao Montalvo    6.  Dysthymia (or depressive neurosis)   Continue on Paxil PARoxetine (PAXIL) 20 MG tablet     albuterol sulfate  (90 Base) MCG/ACT inhaler     fluticasone (FLONASE) 50 MCG/ACT nasal spray                   Completed Refills   Requested Prescriptions     Signed Prescriptions Disp Refills    PARoxetine (PAXIL) 20 MG tablet 180 tablet 1     Sig: TAKE 1 TABLET TWICE DAILY    metoprolol succinate (TOPROL XL) 50 MG extended release tablet 90 tablet 0     Sig: TAKE 1 TABLET EVERY DAY    lovastatin (MEVACOR) 40 MG tablet 180 tablet 3     Sig: TAKE 2 TABLETS  NIGHTLY    losartan (COZAAR) 25 MG tablet 90 tablet 0     Sig: TAKE 1 TABLET EVERY DAY    levothyroxine (SYNTHROID) 200 MCG tablet 90 tablet 0     Sig: Take 1 tablet by mouth daily    fluticasone (FLONASE) 50 MCG/ACT nasal spray 1 Bottle 3     Si sprays by Nasal route daily    fenofibrate (TRICOR) 145 MG tablet 30 tablet 3     Sig: TAKE 1 TABLET BY MOUTH EVERY DAY    amLODIPine (NORVASC) 2.5 MG tablet 90 tablet 1     Sig: Take 1 tablet by mouth daily    allopurinol (ZYLOPRIM) 300 MG tablet 90 tablet 0     Sig: TAKE 1 TABLET EVERY DAY    albuterol sulfate  (90 Base) MCG/ACT inhaler 1 Inhaler 5     Sig: Inhale 2 puffs into the lungs every 6 hours as needed for Wheezing    metFORMIN (GLUCOPHAGE) 1000 MG tablet 180 tablet 1     Sig: Take 1 tablet by mouth 2 times daily (with meals)    glipiZIDE (GLUCOTROL) 5 MG tablet 60 tablet 3     Sig: Take 1 tablet by mouth 2 times daily     Return in about 3 months (around 2021) for diabetes, HTN, hyperlipidemia.      Orders Placed This Encounter   Medications    PARoxetine (PAXIL) 20 MG tablet     Sig: TAKE 1 TABLET TWICE DAILY     Dispense:  180 tablet     Refill:  1    metoprolol succinate (TOPROL XL) 50 MG extended release tablet     Sig: TAKE 1 TABLET EVERY DAY     Dispense:  90 tablet     Refill:  0    lovastatin (MEVACOR) 40 MG tablet     Sig: TAKE 2 TABLETS  NIGHTLY     Dispense:  180 tablet     Refill:  3    losartan (COZAAR) 25 MG tablet     Sig: TAKE 1 TABLET EVERY DAY     Dispense:  90 tablet     Refill:  0    levothyroxine (SYNTHROID) 200 MCG tablet     Sig: Take 1 tablet by mouth daily     Dispense:  90 tablet     Refill:  0    fluticasone (FLONASE) 50 MCG/ACT nasal spray     Si sprays by Nasal route daily     Dispense:  1 Bottle     Refill:  3    fenofibrate (TRICOR) 145 MG tablet     Sig: TAKE 1 TABLET BY MOUTH EVERY DAY     Dispense:  30 tablet     Refill:  3    amLODIPine (NORVASC) 2.5 MG tablet     Sig: Take 1 tablet by mouth daily     Dispense:  90 tablet     Refill:  1    allopurinol (ZYLOPRIM) 300 MG tablet     Sig: TAKE 1 TABLET EVERY DAY     Dispense:  90 tablet     Refill:  0    albuterol sulfate  (90 Base) MCG/ACT inhaler     Sig: Inhale 2 puffs into the lungs every 6 hours as needed for Wheezing     Dispense:  1 Inhaler     Refill:  5    metFORMIN (GLUCOPHAGE) 1000 MG tablet     Sig: Take 1 tablet by mouth 2 times daily (with meals)     Dispense:  180 tablet     Refill:  1    glipiZIDE (GLUCOTROL) 5 MG tablet     Sig: Take 1 tablet by mouth 2 times daily     Dispense:  60 tablet     Refill:  3     Orders Placed This Encounter   Procedures    CT lung screen [Initial/Annual]     Age: 63 y.o. Smoking History:   Social History    Tobacco Use      Smoking status: Former Smoker        Packs/day: 1.00        Years: 45.00        Pack years: 39        Types: Cigarettes        Quit date: 2014        Years since quittin.2      Smokeless tobacco: Never Used    Alcohol use: No    Drug use: No    Pack years: 45  Last CT lung screen: [unfilled]     Standing Status:   Future     Standing Expiration Date:   2022     Order Specific Question:   Is there documentation of shared decision making? Answer:   Yes     Order Specific Question:   Does the patient show any signs or symptoms of lung cancer? Answer:   No     Order Specific Question:   Is this the first (baseline) CT or an annual exam?     Answer: Annual [2]     Order Specific Question:   Is this a low dose CT or a routine CT? Answer:   Low Dose CT [1]     Order Specific Question:   Smoking Status? Answer: Former Smoker [4]     Order Specific Question:   Date quit smoking? (must be within 15 years)     Answer:   2014     Order Specific Question:   Smoking packs per day? Answer:   1     Order Specific Question:   Years smoking? Answer:   45    POCT glycosylated hemoglobin (Hb A1C)     DIABETES FOOT EXAM         Patient Instructions   SURVEY:    You may be receiving a survey from [employer] regarding your visit today.     Please complete the survey to enable us to provide the highest quality of care to you and your family. If you cannot score us a very good on any question, please call the office to discuss how we could of made your experience a very good one. Thank you. You may be receiving a survey from [employer] regarding your visit today. You may get this in the mail, through your MyChart or in your email. Please complete the survey to enable us to provide the highest quality of care to you and your family. If you cannot score us as very good ( 5 Stars) on any question, please feel free to call the office to discuss how we could have made your experience exceptional.     Thank You! MD Lety Antonio, SHAY Acevedo, KALIE    Schedule a Vaccine  When you qualify to receive the vaccine per the [address] guidelines, call the [facility]) COVID-19 Vaccination Hotline to schedule your appointment or to get additional information about the [facility]) locations which are offering the COVID-19 vaccine. To be most effective, it's important that you receive two doses of one of the COVID-19 vaccines. -If you are receiving the Saenz Peter vaccine, your second shot will be scheduled as close to 21 days after the first shot as possible. -If you are receiving the Moderna vaccine, your second shot will be scheduled as close to 28 days after the first shot as possible. [address] Vaccination Hotline: [phone]    In partnership with [facility] and [facility] HEALTH Departments, patients can call [phone], Monday-Friday 8:00am-4:00pm for scheduling at our Hospitals. Or visit the [facility] websites for additional information of vaccine administration locations. Links to [facility]) website and Health Department websites:    [employer]/[facility]cy-[facility]-monitoring-coronavirus-covid-19/covid-19-vaccine/[place]/heard-vaccine    [facility].tn    [email or web address]        Electronically signed by Desi Olivares MD cancer would not have been detected in the patient's lifetime without the screening. Need for follow up screens annually to continue lung cancer screening effectiveness     Risks associated with radiation from annual LDCT- Radiation exposure is about the same as for a mammogram, which is about 1/3 of the annual background radiation exposure from everyday life. Starting screening at age 54 is not likely to increase cancer risk from radiation exposure. Patients with comorbidities resulting in life expectancy of < 10 years, or that would preclude treatment of an abnormality identified on CT, should not be screened due to lack of benefit.     To obtain maximal benefit from this screening, smoking cessation and long-term abstinence from smoking is critical

## 2021-02-24 ENCOUNTER — TELEPHONE (OUTPATIENT)
Dept: ONCOLOGY | Age: 66
End: 2021-02-24

## 2021-02-24 NOTE — LETTER
2/24/2021        [phone] [place] [address]    Dear Kayla Reardon: Your healthcare provider has ordered a low dose CT scan of the chest for lung cancer screening. You will find enclosed, information about CT lung screening. Please review the statement of understanding, you will be asked to sign a copy of this at the time of your CT scan    If you have not already been contacted to make the appointment for your scan, please call our scheduling department at [phone]    Keep in mind that CT lung screening does not take the place of smoking cessation. If you are a current smoker, you will find enclosed smoking cessation resources. Please do not hesitate to contact me if you have any questions or concerns.     [facility] [place],      [facility] Lung Screening Program  [phone]

## 2021-03-03 ENCOUNTER — HOSPITAL ENCOUNTER (OUTPATIENT)
Dept: CT IMAGING | Age: 66
Discharge: HOME | End: 2021-03-05
Payer: MEDICARE

## 2021-03-03 DIAGNOSIS — Z87.891 PERSONAL HISTORY OF TOBACCO USE: ICD-10-CM

## 2021-03-03 PROCEDURE — 71271 CT THORAX LUNG CANCER SCR C-: CPT

## 2021-04-19 ENCOUNTER — OFFICE VISIT (OUTPATIENT)
Dept: FAMILY MEDICINE CLINIC | Age: 66
End: 2021-04-19
Payer: MEDICARE

## 2021-04-19 VITALS
SYSTOLIC BLOOD PRESSURE: 110 MMHG | WEIGHT: 307.2 LBS | OXYGEN SATURATION: 96 % | HEART RATE: 62 BPM | BODY MASS INDEX: 40.53 KG/M2 | DIASTOLIC BLOOD PRESSURE: 64 MMHG | TEMPERATURE: 97.7 F

## 2021-04-19 DIAGNOSIS — J44.9 CHRONIC OBSTRUCTIVE PULMONARY DISEASE, UNSPECIFIED COPD TYPE (HCC): ICD-10-CM

## 2021-04-19 DIAGNOSIS — G47.33 OSA TREATED WITH BIPAP: Primary | ICD-10-CM

## 2021-04-19 PROCEDURE — 99213 OFFICE O/P EST LOW 20 MIN: CPT | Performed by: INTERNAL MEDICINE

## 2021-04-19 RX ORDER — ALBUTEROL SULFATE 2.5 MG/3ML
2.5 SOLUTION RESPIRATORY (INHALATION)
Qty: 56 VIAL | Refills: 0 | Status: SHIPPED | OUTPATIENT
Start: 2021-04-19 | End: 2021-09-03 | Stop reason: SDUPTHER

## 2021-04-19 ASSESSMENT — PATIENT HEALTH QUESTIONNAIRE - PHQ9
2. FEELING DOWN, DEPRESSED OR HOPELESS: 0
SUM OF ALL RESPONSES TO PHQ QUESTIONS 1-9: 0
SUM OF ALL RESPONSES TO PHQ QUESTIONS 1-9: 0

## 2021-04-19 NOTE — PROGRESS NOTES
Dr. Estee Ramirez @ [facility]--70% left main trunck & ostial LAD & high lateral circumflex disease. Patent LIMA to the LAD. Patent vein graft to the high lateral circumflex. 60% disease in the AV branch of the circumflex that terminates in a large posterolateral branch with an FFR of 1.0, indicating no significant stenosis. 40% disease in the right coronary artery. Ejection fraction 50-55%.  COLONOSCOPY N/A 3/5/2019    COLONOSCOPY POLYPECTOMY HOT BIOPSY performed by Bob Browne MD at [facility] GRAFT  02/04/15    3 vessel CABG using left internal mammary artery to the left anterior descending coronary artery with a reverse saphenous vein aortocoronary sequential graft side to side to the ramus intermedius coronary artery & end to side to the first obtuse marginal coronary artery.  OTHER SURGICAL HISTORY  03-21-16    I & D- cyst- posterior neck    SPINE SURGERY      THYROID LOBECTOMY      UPPER GASTROINTESTINAL ENDOSCOPY N/A 3/5/2019    EGD BIOPSY performed by Bob Browne MD at [facility] OR        Medications:       Prior to Admission medications    Medication Sig Start Date End Date Taking?  Authorizing Provider   albuterol (PROVENTIL) (2.5 MG/3ML) 0.083% nebulizer solution Take 3 mLs by nebulization every 4-6 hours as needed for Wheezing 4/19/21 5/3/21 Yes Leda Kent MD   CPAP Machine [id number] by Does not apply route 4/19/21  Yes Leda Kent MD   PARoxetine (PAXIL) 20 MG tablet TAKE 1 TABLET TWICE DAILY 2/19/21  Yes Leda Kent MD   metoprolol succinate (TOPROL XL) 50 MG extended release tablet TAKE 1 TABLET EVERY DAY 2/19/21  Yes Leda Kent MD   lovastatin (MEVACOR) 40 MG tablet TAKE 2 TABLETS  NIGHTLY 2/19/21  Yes Leda Kent MD   losartan (COZAAR) 25 MG tablet TAKE 1 TABLET EVERY DAY 2/19/21  Yes Leda Kent MD   levothyroxine (SYNTHROID) 200 MCG tablet Take 1 tablet by mouth daily 2/19/21  Yes Leda Kent MD   fluticasone (FLONASE) 50 MCG/ACT nasal spray 2 sprays by Nasal route daily 2/19/21  Yes Laura Wolfe MD   fenofibrate (TRICOR) 145 MG tablet TAKE 1 TABLET BY MOUTH EVERY DAY 2/19/21  Yes Laura Wolfe MD   amLODIPine (NORVASC) 2.5 MG tablet Take 1 tablet by mouth daily 2/19/21  Yes Laura Wolfe MD   allopurinol (ZYLOPRIM) 300 MG tablet TAKE 1 TABLET EVERY DAY 2/19/21  Yes Laura Wolfe MD   albuterol sulfate  (90 Base) MCG/ACT inhaler Inhale 2 puffs into the lungs every 6 hours as needed for Wheezing 2/19/21  Yes Laura Wolfe MD   metFORMIN (GLUCOPHAGE) 1000 MG tablet Take 1 tablet by mouth 2 times daily (with meals) 2/19/21  Yes Laura Wolfe MD   glipiZIDE (GLUCOTROL) 5 MG tablet Take 1 tablet by mouth 2 times daily 2/19/21  Yes Laura Wolfe MD   [address] by Does not apply route   Yes Historical Provider, MD   fluticasone (FLOVENT HFA) 110 MCG/ACT inhaler Inhale 2 puffs into the lungs 2 times daily 5/1/20 5/1/21 Yes Laura Wolfe MD   Respiratory Therapy Supplies [facility] CPAP full face mask, hose, head gear and  filter 2/7/20  Yes Laura Wolfe MD   blood glucose monitor strips Test 2 times a day & as needed for symptoms of irregular blood glucose. 11/11/19  Yes Laura Wolfe MD   mometasone (ELOCON) 0.1 % cream Apply topically daily. 2/20/17  Yes Michael Hung MD   aspirin 81 MG tablet Take 81 mg by mouth daily. Yes Historical Provider, MD   ondansetron (ZOFRAN-ODT) 4 MG disintegrating tablet Take 1 tablet by mouth 3 times daily as needed for Nausea or Vomiting  Patient not taking: Reported on 4/19/2021 11/17/20   Laura Wolfe MD        Allergies:       Patient has no known allergies. Social History:     Tobacco: reports that he quit smoking about 6 years ago. His smoking use included cigarettes. He has a 45.00 pack-year smoking history. He has never used smokeless tobacco.  Alcohol:      reports no history of alcohol use.   Drug Use:  reports no history of drug use.    Family History:     Family History   Problem Relation Age of Onset    Diabetes Mother     Heart Disease Mother     Diabetes Father     Heart Disease Father     Heart Disease Brother        Review of Systems:         Review of Systems   Constitutional: Negative for activity change, appetite change and unexpected weight change. HENT: Negative for congestion, ear discharge, ear pain and sore throat. Eyes: Negative for visual disturbance. Respiratory: Negative for cough, shortness of breath and wheezing. Cardiovascular: Negative for chest pain, palpitations and leg swelling. Gastrointestinal: Negative for abdominal pain, blood in stool, constipation and nausea. Endocrine: Negative for cold intolerance, heat intolerance, polydipsia and polyuria. Genitourinary: Negative for difficulty urinating and dysuria. Musculoskeletal: Negative. Skin: Negative for rash. Allergic/Immunologic: Negative. Neurological: Negative for weakness and headaches. Psychiatric/Behavioral: Negative for behavioral problems and dysphoric mood. The patient is not nervous/anxious. Physical Exam:     Vitals:  /64 (Site: Right Upper Arm, Position: Sitting, Cuff Size: Large Adult)   Pulse 62   Temp 97.7 °F (36.5 °C)   Wt (!) 307 lb 3.2 oz (139.3 kg)   SpO2 96%   BMI 40.53 kg/m²       Physical Exam  Vitals signs reviewed. Constitutional:       General: He is not in acute distress. Appearance: He is well-developed. He is obese. HENT:      Head: Normocephalic and atraumatic. Neck:      Thyroid: No thyromegaly. Cardiovascular:      Rate and Rhythm: Normal rate and regular rhythm. Heart sounds: Normal heart sounds. No murmur. Pulmonary:      Effort: Pulmonary effort is normal.      Breath sounds: Normal breath sounds. No wheezing or rales. Abdominal:      General: Bowel sounds are normal. There is no distension. Palpations: Abdomen is soft. There is no mass.       Tenderness: There is no abdominal tenderness. Musculoskeletal: Normal range of motion. Right lower leg: No edema. Left lower leg: No edema. Lymphadenopathy:      Cervical: No cervical adenopathy. Skin:     General: Skin is warm and dry. Findings: No rash. Neurological:      General: No focal deficit present. Mental Status: He is alert and oriented to person, place, and time. Psychiatric:         Mood and Affect: Mood normal.         Behavior: Behavior normal.         Judgment: Judgment normal.               Data:     Lab Results   Component Value Date     08/06/2020    K 4.3 08/06/2020     08/06/2020    CO2 23 08/06/2020    BUN 11 08/06/2020    CREATININE 0.83 08/06/2020    GLUCOSE 181 08/06/2020    GLUCOSE 111 10/14/2011    PROT 7.8 08/06/2020    LABALBU 4.3 08/06/2020    BILITOT 0.42 08/06/2020    ALKPHOS 51 08/06/2020    AST 37 08/06/2020    ALT 37 08/06/2020     Lab Results   Component Value Date    WBC 7.6 08/06/2020    RBC 4.81 08/06/2020    HGB 15.3 08/06/2020    HCT 45.3 08/06/2020    MCV 94.2 08/06/2020    MCH 31.8 08/06/2020    MCHC 33.7 08/06/2020    RDW 14.0 08/06/2020     08/06/2020    MPV NOT REPORTED 08/06/2020     Lab Results   Component Value Date    TSH 1.16 08/06/2020     Lab Results   Component Value Date    CHOL 122 08/06/2020    HDL 30 08/06/2020    PSA 0.35 12/17/2018    LABA1C 9.0 02/19/2021          Assessment & Plan        Diagnosis Orders   1. MINAL treated with BiPAP   Patient benefits from BiPAP machine and compliant with treatment. Will reorder    2. Chronic obstructive pulmonary disease, unspecified COPD type ([facility] [address]   Needs refill on Albuterol nebs.                      Completed Refills   Requested Prescriptions     Signed Prescriptions Disp Refills    albuterol (PROVENTIL) (2.5 MG/3ML) 0.083% nebulizer solution 56 vial 0     Sig: Take 3 mLs by nebulization every 4-6 hours as needed for Wheezing    CPAP Machine MISC 1 each 0     Sig: by Does not

## 2021-04-21 ENCOUNTER — TELEPHONE (OUTPATIENT)
Dept: FAMILY MEDICINE CLINIC | Age: 66
End: 2021-04-21

## 2021-04-21 DIAGNOSIS — G47.33 OSA ON CPAP: Primary | ICD-10-CM

## 2021-04-21 DIAGNOSIS — Z99.89 OSA ON CPAP: Primary | ICD-10-CM

## 2021-04-21 NOTE — TELEPHONE ENCOUNTER
Rx signed   Thanks
08/10/2021   XR CHEST (2 VW) Once 08/10/2021   Hemoglobin A1C Once 08/10/2021       Next Visit Date:  Future Appointments   Date Time Provider Debbie Felecia   5/20/2021  9:45 [facility]   6/1/2021  9:00 AM [facility]   8/9/2021 11:30 AM Jonelle Eagles, MD Alejo Skiff [facility]            Patient Active Problem List:     GERD (gastroesophageal reflux disease)     S/P CABG x 3     CAD (coronary artery disease)     Essential hypertension     Mixed hyperlipidemia     Dysthymia (or depressive neurosis)     Chronic gout involving toe     Acquired hypothyroidism     Vitamin D deficiency disease     MINAL on CPAP     Chronic low back pain with left-sided sciatica     Class 3 severe obesity due to excess calories without serious comorbidity with body mass index (BMI) of 40.0 to 44.9 in [facility])     Acute recurrent sinusitis

## 2021-04-22 PROBLEM — G47.33 OSA ON CPAP: Status: RESOLVED | Noted: 2018-05-29 | Resolved: 2021-04-22

## 2021-04-22 PROBLEM — G47.33 OSA TREATED WITH BIPAP: Status: ACTIVE | Noted: 2021-04-22

## 2021-04-22 PROBLEM — Z99.89 OSA ON CPAP: Status: RESOLVED | Noted: 2018-05-29 | Resolved: 2021-04-22

## 2021-04-22 ASSESSMENT — ENCOUNTER SYMPTOMS
BLOOD IN STOOL: 0
SHORTNESS OF BREATH: 0
ABDOMINAL PAIN: 0
ALLERGIC/IMMUNOLOGIC NEGATIVE: 1
CONSTIPATION: 0
COUGH: 0
SORE THROAT: 0
NAUSEA: 0
WHEEZING: 0

## 2021-04-27 ENCOUNTER — TELEPHONE (OUTPATIENT)
Dept: FAMILY MEDICINE CLINIC | Age: 66
End: 2021-04-27

## 2021-04-27 ENCOUNTER — HOSPITAL ENCOUNTER (EMERGENCY)
Age: 66
Discharge: HOME | End: 2021-04-27
Attending: EMERGENCY MEDICINE
Payer: MEDICARE

## 2021-04-27 ENCOUNTER — APPOINTMENT (OUTPATIENT)
Dept: GENERAL RADIOLOGY | Age: 66
End: 2021-04-27
Payer: MEDICARE

## 2021-04-27 VITALS
RESPIRATION RATE: 10 BRPM | DIASTOLIC BLOOD PRESSURE: 91 MMHG | OXYGEN SATURATION: 95 % | SYSTOLIC BLOOD PRESSURE: 125 MMHG | TEMPERATURE: 98.2 F | HEART RATE: 81 BPM

## 2021-04-27 DIAGNOSIS — J44.1 COPD EXACERBATION (HCC): Primary | ICD-10-CM

## 2021-04-27 PROCEDURE — 96375 TX/PRO/DX INJ NEW DRUG ADDON: CPT

## 2021-04-27 PROCEDURE — 71045 X-RAY EXAM CHEST 1 VIEW: CPT

## 2021-04-27 PROCEDURE — 6360000002 HC RX W HCPCS: Performed by: EMERGENCY MEDICINE

## 2021-04-27 PROCEDURE — 94664 DEMO&/EVAL PT USE INHALER: CPT

## 2021-04-27 PROCEDURE — 99283 EMERGENCY DEPT VISIT LOW MDM: CPT

## 2021-04-27 PROCEDURE — 96374 THER/PROPH/DIAG INJ IV PUSH: CPT

## 2021-04-27 PROCEDURE — 2580000003 HC RX 258: Performed by: EMERGENCY MEDICINE

## 2021-04-27 PROCEDURE — 6370000000 HC RX 637 (ALT 250 FOR IP)

## 2021-04-27 RX ORDER — DOXYCYCLINE 100 MG/1
100 CAPSULE ORAL 2 TIMES DAILY
Qty: 14 CAPSULE | Refills: 0 | Status: SHIPPED | OUTPATIENT
Start: 2021-04-27 | End: 2021-07-20 | Stop reason: ALTCHOICE

## 2021-04-27 RX ORDER — IPRATROPIUM BROMIDE AND ALBUTEROL SULFATE 2.5; .5 MG/3ML; MG/3ML
1 SOLUTION RESPIRATORY (INHALATION) ONCE
Status: COMPLETED | OUTPATIENT
Start: 2021-04-27 | End: 2021-04-27

## 2021-04-27 RX ORDER — PREDNISONE 20 MG/1
TABLET ORAL
Qty: 10 TABLET | Refills: 0 | Status: SHIPPED | OUTPATIENT
Start: 2021-04-27 | End: 2021-07-20 | Stop reason: ALTCHOICE

## 2021-04-27 RX ORDER — IPRATROPIUM BROMIDE AND ALBUTEROL SULFATE 2.5; .5 MG/3ML; MG/3ML
1 SOLUTION RESPIRATORY (INHALATION)
Status: DISCONTINUED | OUTPATIENT
Start: 2021-04-27 | End: 2021-04-27

## 2021-04-27 RX ORDER — IPRATROPIUM BROMIDE AND ALBUTEROL SULFATE 2.5; .5 MG/3ML; MG/3ML
SOLUTION RESPIRATORY (INHALATION)
Status: COMPLETED
Start: 2021-04-27 | End: 2021-04-27

## 2021-04-27 RX ORDER — METHYLPREDNISOLONE SODIUM SUCCINATE 40 MG/ML
40 INJECTION, POWDER, LYOPHILIZED, FOR SOLUTION INTRAMUSCULAR; INTRAVENOUS ONCE
Status: COMPLETED | OUTPATIENT
Start: 2021-04-27 | End: 2021-04-27

## 2021-04-27 RX ADMIN — IPRATROPIUM BROMIDE AND ALBUTEROL SULFATE 1 AMPULE: .5; 3 SOLUTION RESPIRATORY (INHALATION) at 04:35

## 2021-04-27 RX ADMIN — METHYLPREDNISOLONE SODIUM SUCCINATE 40 MG: 40 INJECTION, POWDER, FOR SOLUTION INTRAMUSCULAR; INTRAVENOUS at 05:05

## 2021-04-27 RX ADMIN — CEFTRIAXONE SODIUM 1000 MG: 1 INJECTION, POWDER, FOR SOLUTION INTRAMUSCULAR; INTRAVENOUS at 05:05

## 2021-04-27 RX ADMIN — IPRATROPIUM BROMIDE AND ALBUTEROL SULFATE 1 AMPULE: 2.5; .5 SOLUTION RESPIRATORY (INHALATION) at 04:35

## 2021-04-27 NOTE — TELEPHONE ENCOUNTER
[facility]) ED Follow up Call    Reason for ED visit:  COPD exacerbation     4/27/2021     Charlie Rendon  this is [facility] from Dr. Alaina Tesfaye office, just calling to see how you are doing after your recent ED visit. Did you receive discharge instructions? Yes  Do you understand the discharge instructions? Yes  Did the ED give you any new prescriptions? Yes  Were you able to fill your prescriptions? Yes      Do you have one of our red, yellow and green  Zone sheets that help you to determine when you should go to the ED? Not Applicable      Do you need or want to make a follow up appt with your PCP? Yes    Do you have any further needs in the home, e.g. equipment?   No        FU appts/Provider:    Future Appointments   Date Time Provider Debbie Izquierdo   4/30/2021 10:30 AM [facility]   5/20/2021  9:45 [facility]   6/1/2021  9:00 AM Trista Reece [facility]   8/9/2021 11:30 AM MD Pilar Ann [facility]

## 2021-04-27 NOTE — ED PROVIDER NOTES
performed by Lucy Werner MD at [facility] GRAFT  02/04/15    3 vessel CABG using left internal mammary artery to the left anterior descending coronary artery with a reverse saphenous vein aortocoronary sequential graft side to side to the ramus intermedius coronary artery & end to side to the first obtuse marginal coronary artery.     OTHER SURGICAL HISTORY  03-21-16    I & D- cyst- posterior neck    SPINE SURGERY      THYROID LOBECTOMY      UPPER GASTROINTESTINAL ENDOSCOPY N/A 3/5/2019    EGD BIOPSY performed by Lucy Werner MD at [address] [place] [address]    Current Outpatient Rx   Medication Sig Dispense Refill    doxycycline monohydrate (MONODOX) 100 MG capsule Take 1 capsule by mouth 2 times daily 14 capsule 0    predniSONE (DELTASONE) 20 MG tablet 2 tablets daily x3 days then 1 tablet daily 10 tablet 0    fluticasone-salmeterol (ADVAIR DISKUS) 100-50 MCG/DOSE diskus inhaler Inhale 1 puff into the lungs 2 times daily 3 each 3    ipratropium-albuterol (DUONEB) 0.5-2.5 (3) MG/3ML SOLN nebulizer solution Inhale 3 mLs into the lungs 4 times daily 360 mL 1    albuterol (PROVENTIL) (2.5 MG/3ML) 0.083% nebulizer solution Take 3 mLs by nebulization every 4-6 hours as needed for Wheezing 56 vial 0    CPAP Machine MISC by Does not apply route 1 each 0    PARoxetine (PAXIL) 20 MG tablet TAKE 1 TABLET TWICE DAILY 180 tablet 1    metoprolol succinate (TOPROL XL) 50 MG extended release tablet TAKE 1 TABLET EVERY DAY 90 tablet 0    lovastatin (MEVACOR) 40 MG tablet TAKE 2 TABLETS  NIGHTLY 180 tablet 3    losartan (COZAAR) 25 MG tablet TAKE 1 TABLET EVERY DAY 90 tablet 0    levothyroxine (SYNTHROID) 200 MCG tablet Take 1 tablet by mouth daily 90 tablet 0    fluticasone (FLONASE) 50 MCG/ACT nasal spray 2 sprays by Nasal route daily 1 Bottle 3    fenofibrate (TRICOR) 145 MG tablet TAKE 1 TABLET BY MOUTH EVERY DAY 30 tablet 3    amLODIPine (NORVASC) 2.5 MG tablet Take 1 tablet by mouth daily 90 tablet 1    allopurinol (ZYLOPRIM) 300 MG tablet TAKE 1 TABLET EVERY DAY 90 tablet 0    albuterol sulfate  (90 Base) MCG/ACT inhaler Inhale 2 puffs into the lungs every 6 hours as needed for Wheezing 1 Inhaler 5    metFORMIN (GLUCOPHAGE) 1000 MG tablet Take 1 tablet by mouth 2 times daily (with meals) 180 tablet 1    glipiZIDE (GLUCOTROL) 5 MG tablet Take 1 tablet by mouth 2 times daily 60 tablet 3    Nebulizer System All-In-One MISC by Does not apply route      ondansetron (ZOFRAN-ODT) 4 MG disintegrating tablet Take 1 tablet by mouth 3 times daily as needed for Nausea or Vomiting 21 tablet 0    Respiratory Therapy Supplies MISC CPAP full face mask, hose, head gear and  filter 1 each 0    blood glucose monitor strips Test 2 times a day & as needed for symptoms of irregular blood glucose. 100 strip 3    mometasone (ELOCON) 0.1 % cream Apply topically daily. 1 Tube 3    aspirin 81 MG tablet Take 81 mg by mouth daily.          ALLERGIES    No Known Allergies    FAMILY HISTORY    Family History   Problem Relation Age of Onset    Diabetes Mother     Heart Disease Mother     Diabetes Father     Heart Disease Father     Heart Disease Brother        SOCIAL HISTORY    Social History     Socioeconomic History    Marital status:      Spouse name: None    Number of children: None    Years of education: None    Highest education level: None   Occupational History    None   Social Needs    Financial resource strain: None    Food insecurity     Worry: None     Inability: None    Transportation needs     Medical: None     Non-medical: None   Tobacco Use    Smoking status: Former Smoker     Packs/day: 1.00     Years: 45.00     Pack years: 45.00     Types: Cigarettes     Quit date: 2014     Years since quittin.4    Smokeless tobacco: Never Used   Substance and Sexual Activity    Alcohol use: No    Drug use: No    Sexual activity: None   Lifestyle    Physical activity     Days per week: None     Minutes per session: None    Stress: None   Relationships    Social connections     Talks on phone: None     Gets together: None     Attends Hoahaoism service: None     Active member of club or organization: None     Attends meetings of clubs or organizations: None     Relationship status: None    Intimate partner violence     Fear of current or ex partner: None     Emotionally abused: None     Physically abused: None     Forced sexual activity: None   Other Topics Concern    None   Social History Narrative    None           Review of Systems:  Constitutional: Positive for shortness of breath  Eyes:  Denies photophobia or discharge   HENT:  Denies sore throat or ear pain   Respiratory: Positive for cough and shortness of breath cardiovascular:  Denies chest pain, palpitations or swelling   GI:  Denies abdominal pain, nausea, vomiting, or diarrhea   Musculoskeletal:  Denies back pain   Skin:  Denies rash   Neurologic:  Denies headache, focal weakness or sensory changes   Endocrine:  Denies polyuria or polydypsia   Lymphatic:  Denies swollen glands   Psychiatric:  Denies depression, suicidal ideation or homicidal ideation   All systems negative except as marked. PHYSICAL EXAM    VITAL SIGNS: BP (!) 125/91   Pulse 81   Temp 98.2 °F (36.8 °C) (Oral)   Resp 10   SpO2 95%    Constitutional: Ill-appearing male with shortness of breath and wheezing HENT:  Normocephalic, Atraumatic, Bilateral external ears normal, Oropharynx moist, No oral exudates, Nose normal. Neck- Normal range of motion, No tenderness, Supple, No stridor. Eyes:  PERRL, EOMI, Conjunctiva normal, No discharge. Respiratory: Bilateral expiratory wheezing, diminished breath sounds bilaterally, nonlabored respirations cardiovascular:  Normal heart rate, Normal rhythm, No murmurs, No rubs, No gallops. GI:  Bowel sounds normal, Soft, No tenderness, No masses, No pulsatile masses.    : External genitalia appear normal, No masses or lesions. No discharge. No CVA tenderness. Musculoskeletal:  Intact distal pulses, No edema, No tenderness, No cyanosis, No clubbing. Good range of motion in all major joints. No tenderness to palpation or major deformities noted. Back- No tenderness. Integument:  Warm, Dry, No erythema, No rash. Lymphatic:  No lymphadenopathy noted. Neurologic:  Alert & oriented x 3, Normal motor function, Normal sensory function, No focal deficits noted. Psychiatric:  Affect normal, Judgment normal, Mood normal.     EKG        RADIOLOGY    XR CHEST PORTABLE   Final Result      No acute abnormality. PROCEDURES        Labs  Labs Reviewed - No data to display          Summation      Patient Course: Patient given DuoNeb nebulized treatment Solu-Medrol and Rocephin in ED. Patient will be sent home on prednisone and doxycycline. ED Medications administered this visit:    Medications   ipratropium-albuterol (DUONEB) nebulizer solution 1 ampule (1 ampule Inhalation Given 4/27/21 0435)   methylPREDNISolone sodium (SOLU-MEDROL) injection 40 mg (40 mg Intravenous Given 4/27/21 0505)   cefTRIAXone (ROCEPHIN) 1,000 mg in sterile water 10 mL IV syringe (0 mg Intravenous Stopped 4/27/21 0510)       New Prescriptions from this visit:    Discharge Medication List as of 4/27/2021  4:57 AM      START taking these medications    Details   doxycycline monohydrate (MONODOX) 100 MG capsule Take 1 capsule by mouth 2 times daily, Disp-14 capsule, R-0Normal      predniSONE (DELTASONE) 20 MG tablet 2 tablets daily x3 days then 1 tablet daily, Disp-10 tablet, R-0Normal             Follow-up:  Preston Durbin MD  [address] [place] [id number] [phone]    In 2 days  Return to  ED if worse        Final Impression:   1.  COPD exacerbation ([facility] [address]               (Please note that portions of this note were completed with a voice recognition program.  Efforts were made to edit the dictations but occasionally words are mis-transcribed.)          Adrian Alejo MD  04/27/21 [id number]       Adrian Alejo MD  05/05/21 1955

## 2021-04-29 NOTE — PATIENT INSTRUCTIONS
SURVEY:    You may be receiving a survey from [employer] regarding your visit today. Please complete the survey to enable us to provide the highest quality of care to you and your family. If you cannot score us a very good on any question, please call the office to discuss how we could of made your experience a very good one. Thank you. You may be receiving a survey from [employer] regarding your visit today. You may get this in the mail, through your MyChart or in your email. Please complete the survey to enable us to provide the highest quality of care to you and your family. If you cannot score us as very good ( 5 Stars) on any question, please feel free to call the office to discuss how we could have made your experience exceptional.     Thank You!       MD Sara Bustillo, [facility], PCA

## 2021-04-30 ENCOUNTER — OFFICE VISIT (OUTPATIENT)
Dept: FAMILY MEDICINE CLINIC | Age: 66
End: 2021-04-30
Payer: MEDICARE

## 2021-04-30 VITALS
OXYGEN SATURATION: 96 % | SYSTOLIC BLOOD PRESSURE: 118 MMHG | DIASTOLIC BLOOD PRESSURE: 62 MMHG | TEMPERATURE: 97 F | WEIGHT: 307.2 LBS | HEART RATE: 77 BPM | BODY MASS INDEX: 40.53 KG/M2

## 2021-04-30 DIAGNOSIS — J44.9 CHRONIC OBSTRUCTIVE PULMONARY DISEASE, UNSPECIFIED COPD TYPE (HCC): Primary | ICD-10-CM

## 2021-04-30 DIAGNOSIS — L98.9 SKIN LESIONS: ICD-10-CM

## 2021-04-30 PROCEDURE — 99214 OFFICE O/P EST MOD 30 MIN: CPT | Performed by: INTERNAL MEDICINE

## 2021-04-30 RX ORDER — IPRATROPIUM BROMIDE AND ALBUTEROL SULFATE 2.5; .5 MG/3ML; MG/3ML
1 SOLUTION RESPIRATORY (INHALATION) 4 TIMES DAILY
Qty: 360 ML | Refills: 1 | Status: SHIPPED | OUTPATIENT
Start: 2021-04-30

## 2021-04-30 RX ORDER — BUDESONIDE AND FORMOTEROL FUMARATE DIHYDRATE 160; 4.5 UG/1; UG/1
2 AEROSOL RESPIRATORY (INHALATION) 2 TIMES DAILY
Qty: 1 INHALER | Refills: 3 | Status: SHIPPED | OUTPATIENT
Start: 2021-04-30 | End: 2021-05-03

## 2021-04-30 ASSESSMENT — ENCOUNTER SYMPTOMS
CHEST TIGHTNESS: 0
ABDOMINAL PAIN: 0
SORE THROAT: 0
CONSTIPATION: 0
BLOOD IN STOOL: 0
WHEEZING: 1
COUGH: 1
ALLERGIC/IMMUNOLOGIC NEGATIVE: 1
SINUS PRESSURE: 0
SHORTNESS OF BREATH: 1
NAUSEA: 0

## 2021-04-30 NOTE — PROGRESS NOTES
predniSONE (DELTASONE) 20 MG tablet 2 tablets daily x3 days then 1 tablet daily 4/27/21  Yes Jose Travis MD   albuterol (PROVENTIL) (2.5 MG/3ML) 0.083% nebulizer solution Take 3 mLs by nebulization every 4-6 hours as needed for Wheezing 4/19/21 5/3/21 Yes Candace David MD   CPAP Machine [id number] [facility] by Does not apply route 4/19/21  Yes Candace David MD   PARoxetine (PAXIL) 20 MG tablet TAKE 1 TABLET TWICE DAILY 2/19/21  Yes Candace David MD   metoprolol succinate (TOPROL XL) 50 MG extended release tablet TAKE 1 TABLET EVERY DAY 2/19/21  Yes Candace David MD   lovastatin (MEVACOR) 40 MG tablet TAKE 2 TABLETS  NIGHTLY 2/19/21  Yes Candace David MD   losartan (COZAAR) 25 MG tablet TAKE 1 TABLET EVERY DAY 2/19/21  Yes Candace David MD   levothyroxine (SYNTHROID) 200 MCG tablet Take 1 tablet by mouth daily 2/19/21  Yes Candace David MD   fluticasone (FLONASE) 50 MCG/ACT nasal spray 2 sprays by Nasal route daily 2/19/21  Yes Candace David MD   fenofibrate (TRICOR) 145 MG tablet TAKE 1 TABLET BY MOUTH EVERY DAY 2/19/21  Yes Candace David MD   amLODIPine (NORVASC) 2.5 MG tablet Take 1 tablet by mouth daily 2/19/21  Yes Candace David MD   allopurinol (ZYLOPRIM) 300 MG tablet TAKE 1 TABLET EVERY DAY 2/19/21  Yes Candace David MD   albuterol sulfate  (90 Base) MCG/ACT inhaler Inhale 2 puffs into the lungs every 6 hours as needed for Wheezing 2/19/21  Yes Candace David MD   metFORMIN (GLUCOPHAGE) 1000 MG tablet Take 1 tablet by mouth 2 times daily (with meals) 2/19/21  Yes Candace David MD   glipiZIDE (GLUCOTROL) 5 MG tablet Take 1 tablet by mouth 2 times daily 2/19/21  Yes Candace David MD   [address] by Does not apply route   Yes Historical MD Nora   ondansetron (ZOFRAN-ODT) 4 MG disintegrating tablet Take 1 tablet by mouth 3 times daily as needed for Nausea or Vomiting 11/17/20  Yes Candace David MD   Respiratory Therapy Supplies MISC CPAP full face mask, hose, head gear and  filter 2/7/20  Yes Leda Kent MD   blood glucose monitor strips Test 2 times a day & as needed for symptoms of irregular blood glucose. 11/11/19  Yes Leda Kent MD   mometasone (ELOCON) 0.1 % cream Apply topically daily. 2/20/17  Yes Raegan Manning MD   aspirin 81 MG tablet Take 81 mg by mouth daily. Yes Historical Provider, MD        Allergies:       Patient has no known allergies. Social History:     Tobacco: reports that he quit smoking about 6 years ago. His smoking use included cigarettes. He has a 45.00 pack-year smoking history. He has never used smokeless tobacco.  Alcohol:      reports no history of alcohol use. Drug Use:  reports no history of drug use. Family History:     Family History   Problem Relation Age of Onset    Diabetes Mother     Heart Disease Mother     Diabetes Father     Heart Disease Father     Heart Disease Brother        Review of Systems:         Review of Systems   Constitutional: Negative for activity change, chills, diaphoresis, fatigue, fever and unexpected weight change. HENT: Negative for congestion, ear discharge, ear pain, mouth sores, sinus pressure and sore throat. Eyes: Negative for visual disturbance. Respiratory: Positive for cough, shortness of breath and wheezing. Negative for chest tightness. Cardiovascular: Negative for chest pain, palpitations and leg swelling. Gastrointestinal: Negative for abdominal pain, blood in stool, constipation and nausea. Endocrine: Negative for cold intolerance, heat intolerance, polydipsia and polyuria. Genitourinary: Negative for difficulty urinating and dysuria. Musculoskeletal: Negative. Skin: Negative for rash. Multiple skin lesions on forearms and back   Allergic/Immunologic: Negative. Neurological: Negative for dizziness, weakness and headaches. Psychiatric/Behavioral: Negative for behavioral problems and dysphoric mood.  The patient is not nervous/anxious. Physical Exam:     Vitals:  /62   Pulse 77   Temp 97 °F (36.1 °C)   Wt (!) 307 lb 3.2 oz (139.3 kg)   SpO2 96%   BMI 40.53 kg/m²       Physical Exam  Vitals signs reviewed. Constitutional:       General: He is not in acute distress. Appearance: He is well-developed. He is obese. He is not ill-appearing or diaphoretic. HENT:      Head: Normocephalic and atraumatic. Nose: No congestion or rhinorrhea. Mouth/Throat:      Pharynx: Oropharynx is clear. Eyes:      General: No scleral icterus. Right eye: No discharge. Left eye: No discharge. Conjunctiva/sclera: Conjunctivae normal.   Neck:      Thyroid: No thyromegaly. Cardiovascular:      Rate and Rhythm: Normal rate and regular rhythm. Heart sounds: Normal heart sounds. No murmur. Pulmonary:      Effort: Pulmonary effort is normal. No respiratory distress. Breath sounds: Wheezing present. No rhonchi or rales. Abdominal:      General: Bowel sounds are normal. There is no distension. Palpations: Abdomen is soft. There is no mass. Tenderness: There is no abdominal tenderness. Musculoskeletal: Normal range of motion. Right lower leg: No edema. Left lower leg: No edema. Lymphadenopathy:      Cervical: No cervical adenopathy. Skin:     General: Skin is warm and dry. Coloration: Skin is not pale. Findings: No rash. Comments: Multiple dark brownish raised skin lesions on both forearms, various sizes, rough surface   Neurological:      General: No focal deficit present. Mental Status: He is alert and oriented to person, place, and time.    Psychiatric:         Mood and Affect: Mood normal.         Behavior: Behavior normal.         Judgment: Judgment normal.               Data:     Lab Results   Component Value Date     08/06/2020    K 4.3 08/06/2020     08/06/2020    CO2 23 08/06/2020    BUN 11 08/06/2020    CREATININE 0.83 08/06/2020    GLUCOSE 181 08/06/2020    GLUCOSE 111 10/14/2011    PROT 7.8 08/06/2020    LABALBU 4.3 08/06/2020    BILITOT 0.42 08/06/2020    ALKPHOS 51 08/06/2020    AST 37 08/06/2020    ALT 37 08/06/2020     Lab Results   Component Value Date    WBC 7.6 08/06/2020    RBC 4.81 08/06/2020    HGB 15.3 08/06/2020    HCT 45.3 08/06/2020    MCV 94.2 08/06/2020    MCH 31.8 08/06/2020    MCHC 33.7 08/06/2020    RDW 14.0 08/06/2020     08/06/2020    MPV NOT REPORTED 08/06/2020     Lab Results   Component Value Date    TSH 1.16 08/06/2020     Lab Results   Component Value Date    CHOL 122 08/06/2020    HDL 30 08/06/2020    PSA 0.35 12/17/2018    LABA1C 9.0 02/19/2021          Assessment & Plan        Diagnosis Orders   1. Chronic obstructive pulmonary disease, unspecified COPD type ([facility] [address]   Patient with recent COPD exacerbation, clinically improving, continue taper dose prednisone, doxycycline. Prescribed Symbicort inhaler since Flovent did not help. Also prescribed DuoNeb to use instead of albuterol. Follow-up if worsening in symptoms. budesonide-formoterol (SYMBICORT) 160-4.5 MCG/ACT AERO    ipratropium-albuterol (DUONEB) 0.5-2.5 (3) MG/3ML SOLN nebulizer solution   2. Skin lesions   Patient prefers to see a dermatologist.  Referral is made External Referral To Dermatology                   Completed Refills   Requested Prescriptions     Signed Prescriptions Disp Refills    budesonide-formoterol (SYMBICORT) 160-4.5 MCG/ACT AERO 1 Inhaler 3     Sig: Inhale 2 puffs into the lungs 2 times daily    ipratropium-albuterol (DUONEB) 0.5-2.5 (3) MG/3ML SOLN nebulizer solution 360 mL 1     Sig: Inhale 3 mLs into the lungs 4 times daily     Return if symptoms worsen or fail to improve.      Orders Placed This Encounter   Medications    budesonide-formoterol (SYMBICORT) 160-4.5 MCG/ACT AERO     Sig: Inhale 2 puffs into the lungs 2 times daily     Dispense:  1 Inhaler     Refill:  3    ipratropium-albuterol (Danita Martinez) 0.5-2.5 (3) MG/3ML SOLN nebulizer solution     Sig: Inhale 3 mLs into the lungs 4 times daily     Dispense:  360 mL     Refill:  1     Orders Placed This Encounter   Procedures    External Referral To Dermatology     Referral Priority:   Routine     Referral Type:   Eval and Treat     Referral Reason:   Specialty Services Required     Referred to Provider:   Estrada Yu PA-C     Requested Specialty:   Dermatology     Number of Visits Requested:   1         Patient Instructions   SURVEY:    You may be receiving a survey from [employer] :60 Seconds regarding your visit today. Please complete the survey to enable us to provide the highest quality of care to you and your family. If you cannot score us a very good on any question, please call the office to discuss how we could of made your experience a very good one. Thank you. You may be receiving a survey from [employer]60 Seconds regarding your visit today. You may get this in the mail, through your MyChart or in your email. Please complete the survey to enable us to provide the highest quality of care to you and your family. If you cannot score us as very good ( 5 Stars) on any question, please feel free to call the office to discuss how we could have made your experience exceptional.     Thank You!       MD Hannah Rivers, SHAY Ortiz Cera, KALIE        Electronically signed by Candace David MD on 4/30/2021 at 10:49 AM           Completed Refills      Requested Prescriptions     Signed Prescriptions Disp Refills    budesonide-formoterol (SYMBICORT) 160-4.5 MCG/ACT AERO 1 Inhaler 3     Sig: Inhale 2 puffs into the lungs 2 times daily    ipratropium-albuterol (DUONEB) 0.5-2.5 (3) MG/3ML SOLN nebulizer solution 360 mL 1     Sig: Inhale 3 mLs into the lungs 4 times daily

## 2021-05-03 DIAGNOSIS — J44.9 CHRONIC OBSTRUCTIVE PULMONARY DISEASE, UNSPECIFIED COPD TYPE (HCC): Primary | ICD-10-CM

## 2021-05-03 NOTE — TELEPHONE ENCOUNTER
Medication pending, pt's insurance won't cover Symbicort    Health Maintenance   Topic Date Due    Diabetic microalbuminuria test  08/18/2017    Diabetic retinal exam  10/29/2020    Annual Wellness Visit (AWV)  05/30/2021    DTaP/Tdap/Td vaccine (1 - Tdap) 05/07/2021 (Originally 7/29/1974)    Shingles Vaccine (1 of 2) 05/07/2021 (Originally 7/29/2007)    A1C test (Diabetic or Prediabetic)  05/19/2021    Lipid screen  08/06/2021    TSH testing  08/06/2021    Potassium monitoring  08/06/2021    Creatinine monitoring  08/06/2021    Diabetic foot exam  02/19/2022    Low dose CT lung screening  03/03/2022    Colon cancer screen colonoscopy  03/05/2029    Flu vaccine  Completed    Pneumococcal 0-64 years Vaccine  Completed    COVID-19 Vaccine  Completed    Hepatitis C screen  Completed    HIV screen  Completed    Hepatitis A vaccine  Aged Out    Hib vaccine  Aged Out    Meningococcal (ACWY) vaccine  Aged Out             (applicable per patient's age: Cancer Screenings, Depression Screening, Fall Risk Screening, Immunizations)    Hemoglobin A1C (%)   Date Value   02/19/2021 9.0   02/07/2020 6.5   10/17/2019 8.3 (H)     Microalb/Crt.  Ratio (mcg/mg creat)   Date Value   08/18/2016 6     LDL Cholesterol (mg/dL)   Date Value   08/06/2020 42     AST (U/L)   Date Value   08/06/2020 37     ALT (U/L)   Date Value   08/06/2020 37     BUN (mg/dL)   Date Value   08/06/2020 11      (goal A1C is < 7)   (goal LDL is <100) need 30-50% reduction from baseline     BP Readings from Last 3 Encounters:   04/30/21 118/62   04/27/21 (!) 125/91   04/19/21 110/64    (goal /80)      All Future Testing planned in [facility]:  Lab Frequency Next Occurrence   CBC Auto Differential Once 08/10/2021   Comprehensive Metabolic Panel Once 11/59/3513   Vitamin D 25 Hydroxy Once 08/10/2021   Lipid Panel Once 08/10/2021   TSH with Reflex Once 08/10/2021   Magnesium Once 08/10/2021   EKG 12 Lead Once 08/10/2021   XR CHEST (2 VW) Once 08/10/2021   Hemoglobin A1C Once 08/10/2021       Next Visit Date:  Future Appointments   Date Time Provider Debbie Izquierdo   5[facility]20[facility]2021  9:45 [facility]   [facility]/1/2021  9[facility]00 [facility]   8/9/2021 11:30 AM Natalia Tim MD Kervin Factor [facility]            Patient Active Problem List:     COPD (chronic obstructive pulmonary disease) ([facility] [address]     GERD (gastroesophageal reflux disease)     S/P CABG x 3     CAD (coronary artery disease)     Essential hypertension     Mixed hyperlipidemia     Dysthymia (or depressive neurosis)     Chronic gout involving toe     Acquired hypothyroidism     Vitamin D deficiency disease     Chronic low back pain with left-sided sciatica     Class 3 severe obesity due to excess calories without serious comorbidity with body mass index (BMI) of 40.0 to 44.9 in adult [facility])     Acute recurrent sinusitis     MINAL treated with BiPAP

## 2021-05-11 DIAGNOSIS — I10 ESSENTIAL HYPERTENSION: ICD-10-CM

## 2021-05-11 RX ORDER — METOPROLOL SUCCINATE 50 MG/1
TABLET, EXTENDED RELEASE ORAL
Qty: 90 TABLET | Refills: 0 | Status: SHIPPED | OUTPATIENT
Start: 2021-05-11 | End: 2021-08-06 | Stop reason: SDUPTHER

## 2021-05-11 NOTE — TELEPHONE ENCOUNTER
Hemoglobin A1C Once 08/10/2021       Next Visit Date:  Future Appointments   Date Time Provider Debbie Izquierdo   5/20/2021  9:45 [facility]   6/1/2021  9:00 AM MD Karl Foss [facility]   8/9/2021 11:30 AM MD Jose Hernandez [facility]            Patient Active Problem List:     COPD (chronic obstructive pulmonary disease) ([facility] [address]     GERD (gastroesophageal reflux disease)     S/P CABG x 3     CAD (coronary artery disease)     Essential hypertension     Mixed hyperlipidemia     Dysthymia (or depressive neurosis)     Chronic gout involving toe     Acquired hypothyroidism     Vitamin D deficiency disease     Chronic low back pain with left-sided sciatica     Class 3 severe obesity due to excess calories without serious comorbidity with body mass index (BMI) of 40.0 to 44.9 in adult [facility])     Acute recurrent sinusitis     MINAL treated with BiPAP

## 2021-05-13 RX ORDER — BUDESONIDE AND FORMOTEROL FUMARATE DIHYDRATE 160; 4.5 UG/1; UG/1
2 AEROSOL RESPIRATORY (INHALATION) 2 TIMES DAILY
Qty: 3 INHALER | Refills: 3 | Status: SHIPPED | OUTPATIENT
Start: 2021-05-13 | End: 2021-07-20 | Stop reason: SDUPTHER

## 2021-05-13 NOTE — TELEPHONE ENCOUNTER
Appointments   Date Time Provider Debbie Felecia   5/20/2021  9:45 AM [facility]   6/1/2021  9:00 AM MD Karl Wilson [facility]   8/9/2021 11:30 AM MD Toby Parr [facility]            Patient Active Problem List:     COPD (chronic obstructive pulmonary disease) ([facility] [address]     GERD (gastroesophageal reflux disease)     S/P CABG x 3     CAD (coronary artery disease)     Essential hypertension     Mixed hyperlipidemia     Dysthymia (or depressive neurosis)     Chronic gout involving toe     Acquired hypothyroidism     Vitamin D deficiency disease     Chronic low back pain with left-sided sciatica     Class 3 severe obesity due to excess calories without serious comorbidity with body mass index (BMI) of 40.0 to 44.9 in adult [facility])     Acute recurrent sinusitis     MINAL treated with BiPAP

## 2021-05-17 DIAGNOSIS — I10 ESSENTIAL HYPERTENSION: ICD-10-CM

## 2021-05-17 RX ORDER — AMLODIPINE BESYLATE 2.5 MG/1
2.5 TABLET ORAL DAILY
Qty: 90 TABLET | Refills: 1 | Status: SHIPPED | OUTPATIENT
Start: 2021-05-17 | End: 2021-08-06 | Stop reason: SDUPTHER

## 2021-05-17 RX ORDER — LOSARTAN POTASSIUM 25 MG/1
TABLET ORAL
Qty: 90 TABLET | Refills: 0 | Status: SHIPPED | OUTPATIENT
Start: 2021-05-17 | End: 2021-08-06 | Stop reason: SDUPTHER

## 2021-05-17 RX ORDER — ALLOPURINOL 300 MG/1
TABLET ORAL
Qty: 90 TABLET | Refills: 0 | Status: SHIPPED | OUTPATIENT
Start: 2021-05-17 | End: 2022-02-02 | Stop reason: SDUPTHER

## 2021-05-17 NOTE — TELEPHONE ENCOUNTER
Refill request  Last OV 4/30/2021  Last date RX filled 2/19/21  Next scheduled appt 5/20/2021  Medication pended    Health Maintenance   Topic Date Due    DTaP/Tdap/Td vaccine (1 - Tdap) Never done    Shingles Vaccine (1 of 2) Never done    Diabetic microalbuminuria test  08/18/2017    Diabetic retinal exam  10/29/2020    A1C test (Diabetic or Prediabetic)  05/19/2021    Annual Wellness Visit (AWV)  05/30/2021    Lipid screen  08/06/2021    TSH testing  08/06/2021    Potassium monitoring  08/06/2021    Creatinine monitoring  08/06/2021    Diabetic foot exam  02/19/2022    Low dose CT lung screening  03/03/2022    Pneumococcal 0-64 years Vaccine (2 of 2) 10/08/2024    Colon cancer screen colonoscopy  03/05/2029    Flu vaccine  Completed    COVID-19 Vaccine  Completed    Hepatitis C screen  Completed    HIV screen  Completed    Hepatitis A vaccine  Aged Out    Hib vaccine  Aged Out    Meningococcal (ACWY) vaccine  Aged Out             (applicable per patient's age: Cancer Screenings, Depression Screening, Fall Risk Screening, Immunizations)    Hemoglobin A1C (%)   Date Value   02/19/2021 9.0   02/07/2020 6.5   10/17/2019 8.3 (H)     Microalb/Crt.  Ratio (mcg/mg creat)   Date Value   08/18/2016 6     LDL Cholesterol (mg/dL)   Date Value   08/06/2020 42     AST (U/L)   Date Value   08/06/2020 37     ALT (U/L)   Date Value   08/06/2020 37     BUN (mg/dL)   Date Value   08/06/2020 11      (goal A1C is < 7)   (goal LDL is <100) need 30-50% reduction from baseline     BP Readings from Last 3 Encounters:   04/30/21 118/62   04/27/21 (!) 125/91   04/19/21 110/64    (goal /80)      All Future Testing planned in [facility]:  Lab Frequency Next Occurrence   CBC Auto Differential Once 08/10/2021   Comprehensive Metabolic Panel Once 20/30/1265   Vitamin D 25 Hydroxy Once 08/10/2021   Lipid Panel Once 08/10/2021   TSH with Reflex Once 08/10/2021   Magnesium Once 08/10/2021   EKG 12 Lead Once 08/10/2021   XR

## 2021-05-17 NOTE — TELEPHONE ENCOUNTER
Hemoglobin A1C Once 08/10/2021       Next Visit Date:  Future Appointments   Date Time Provider Debbie Izquierdo   5/20/2021  9:45 AM [facility]   6/1/2021  9:00 AM MD Karl [facility]   8/9/2021 11:30 AM MD Natalya [facility]            Patient Active Problem List:     COPD (chronic obstructive pulmonary disease) ([facility] [address]     GERD (gastroesophageal reflux disease)     S/P CABG x 3     CAD (coronary artery disease)     Essential hypertension     Mixed hyperlipidemia     Dysthymia (or depressive neurosis)     Chronic gout involving toe     Acquired hypothyroidism     Vitamin D deficiency disease     Chronic low back pain with left-sided sciatica     Class 3 severe obesity due to excess calories without serious comorbidity with body mass index (BMI) of 40.0 to 44.9 in adult [facility])     Acute recurrent sinusitis     MINAL treated with BiPAP

## 2021-06-01 ENCOUNTER — OFFICE VISIT (OUTPATIENT)
Dept: FAMILY MEDICINE CLINIC | Age: 66
End: 2021-06-01
Payer: MEDICARE

## 2021-06-01 VITALS
DIASTOLIC BLOOD PRESSURE: 68 MMHG | HEART RATE: 54 BPM | HEIGHT: 73 IN | BODY MASS INDEX: 41.72 KG/M2 | SYSTOLIC BLOOD PRESSURE: 120 MMHG | WEIGHT: 314.8 LBS | OXYGEN SATURATION: 96 % | TEMPERATURE: 97.2 F

## 2021-06-01 DIAGNOSIS — Z00.00 ROUTINE GENERAL MEDICAL EXAMINATION AT A HEALTH CARE FACILITY: ICD-10-CM

## 2021-06-01 DIAGNOSIS — Z00.00 ENCOUNTER FOR ANNUAL WELLNESS EXAM IN MEDICARE PATIENT: Primary | ICD-10-CM

## 2021-06-01 PROCEDURE — G0439 PPPS, SUBSEQ VISIT: HCPCS | Performed by: INTERNAL MEDICINE

## 2021-06-01 SDOH — ECONOMIC STABILITY: FOOD INSECURITY: WITHIN THE PAST 12 MONTHS, THE FOOD YOU BOUGHT JUST DIDN'T LAST AND YOU DIDN'T HAVE MONEY TO GET MORE.: NEVER TRUE

## 2021-06-01 SDOH — ECONOMIC STABILITY: FOOD INSECURITY: WITHIN THE PAST 12 MONTHS, YOU WORRIED THAT YOUR FOOD WOULD RUN OUT BEFORE YOU GOT MONEY TO BUY MORE.: NEVER TRUE

## 2021-06-01 ASSESSMENT — PATIENT HEALTH QUESTIONNAIRE - PHQ9
SUM OF ALL RESPONSES TO PHQ QUESTIONS 1-9: 0
1. LITTLE INTEREST OR PLEASURE IN DOING THINGS: 0
2. FEELING DOWN, DEPRESSED OR HOPELESS: 0
SUM OF ALL RESPONSES TO PHQ QUESTIONS 1-9: 0
SUM OF ALL RESPONSES TO PHQ QUESTIONS 1-9: 0
SUM OF ALL RESPONSES TO PHQ9 QUESTIONS 1 & 2: 0

## 2021-06-01 ASSESSMENT — SOCIAL DETERMINANTS OF HEALTH (SDOH): HOW HARD IS IT FOR YOU TO PAY FOR THE VERY BASICS LIKE FOOD, HOUSING, MEDICAL CARE, AND HEATING?: SOMEWHAT HARD

## 2021-06-01 ASSESSMENT — LIFESTYLE VARIABLES
HOW OFTEN DO YOU HAVE A DRINK CONTAINING ALCOHOL: NEVER
AUDIT-C TOTAL SCORE: INCOMPLETE
HOW OFTEN DO YOU HAVE A DRINK CONTAINING ALCOHOL: 0
AUDIT TOTAL SCORE: INCOMPLETE

## 2021-06-01 NOTE — PROGRESS NOTES
Medicare Annual Wellness Visit  Name: Carrington Sotelo Date: 2021   MRN: [id number] Sex: Male   Age: 63 y.o. Ethnicity: Non-/Non    : 1955 Race: Kirill Gudino is here for Medicare AWV (Patient presents today for AWV)    Screenings for behavioral, psychosocial and functional/safety risks, and cognitive dysfunction are all negative except as indicated below. These results, as well as other patient data from the [address] form, are documented in Flowsheets linked to this Encounter. No Known Allergies    Prior to Visit Medications    Medication Sig Taking?  Authorizing Provider   amLODIPine (NORVASC) 2.5 MG tablet Take 1 tablet by mouth daily Yes Gal Valle MD   allopurinol (ZYLOPRIM) 300 MG tablet TAKE 1 TABLET EVERY DAY Yes Gal Valle MD   losartan (COZAAR) 25 MG tablet TAKE 1 TABLET BY MOUTH EVERY DAY Yes Gal Valle MD   SYMBICORT 160-4.5 MCG/ACT AERO Inhale 2 puffs into the lungs 2 times daily Yes Gal Valle MD   metoprolol succinate (TOPROL XL) 50 MG extended release tablet TAKE 1 TABLET EVERY DAY Yes Gal Valle MD   ipratropium-albuterol (DUONEB) 0.5-2.5 (3) MG/3ML SOLN nebulizer solution Inhale 3 mLs into the lungs 4 times daily Yes Gal Valle MD   doxycycline monohydrate (MONODOX) 100 MG capsule Take 1 capsule by mouth 2 times daily Yes Monique Rosales MD   predniSONE (DELTASONE) 20 MG tablet 2 tablets daily x3 days then 1 tablet daily Yes Monique Rosales MD   CPAP Machine MISC by Does not apply route Yes Gal Valle MD   PARoxetine (PAXIL) 20 MG tablet TAKE 1 TABLET TWICE DAILY Yes Gal Valle MD   lovastatin (MEVACOR) 40 MG tablet TAKE 2 TABLETS  NIGHTLY Yes Gal Valle MD   levothyroxine (SYNTHROID) 200 MCG tablet Take 1 tablet by mouth daily Yes Gal Valle MD   fluticasone (FLONASE) 50 MCG/ACT nasal spray 2 sprays by Nasal route daily Yes Gal Valle MD   fenofibrate (TRICOR) 145 MG tablet TAKE 1 TABLET BY MOUTH EVERY DAY Yes Pankaj Coronado MD   albuterol sulfate  (90 Base) MCG/ACT inhaler Inhale 2 puffs into the lungs every 6 hours as needed for Wheezing Yes Pankaj Coronado MD   metFORMIN (GLUCOPHAGE) 1000 MG tablet Take 1 tablet by mouth 2 times daily (with meals) Yes Pankaj Coronado MD   glipiZIDE (GLUCOTROL) 5 MG tablet Take 1 tablet by mouth 2 times daily Yes Pankaj Coronado MD   [address] by Does not apply route Yes Historical Provider, MD   ondansetron (ZOFRAN-ODT) 4 MG disintegrating tablet Take 1 tablet by mouth 3 times daily as needed for Nausea or Vomiting Yes Pankaj Coronado MD   Respiratory Therapy Supplies [facility] CPAP full face mask, hose, head gear and  filter Yes Pankaj Coronado MD   blood glucose monitor strips Test 2 times a day & as needed for symptoms of irregular blood glucose. Yes Pankaj Coronado MD   mometasone (ELOCON) 0.1 % cream Apply topically daily. Yes Jackie Ames MD   aspirin 81 MG tablet Take 81 mg by mouth daily. Yes Historical Provider, MD   albuterol (PROVENTIL) (2.5 MG/3ML) 0.083% nebulizer solution Take 3 mLs by nebulization every 4-6 hours as needed for Ho MD Marguerite       Past Medical History:   Diagnosis Date    Asthma     Bronchitis     CAD (coronary artery disease)     COPD (chronic obstructive pulmonary disease) ([address] 8/12/2011    Depression 8/12/2011    Diabetes mellitus ([facility]     GERD (gastroesophageal reflux disease) 8/12/2011    Hyperlipidemia 8/12/2011    Hypertension     Hypothyroid 8/12/2011    Lower back pain     Nicotine addiction 8/12/2011    Sleep apnea        Past Surgical History:   Procedure Laterality Date    CARDIAC CATHETERIZATION Left 01/21/15    Severe left main trunk disease extending into the ostium of the left anterior descending of 80% involving the ostium of the intermediate & atrioventricular branch of the circumflex.   60% disease in the atrioventricular oz (142.8 kg)   Height: 6' 1\" (1.854 m)     Body mass index is 41.53 kg/m². Based upon direct observation of the patient, evaluation of cognition reveals recent and remote memory intact. General Appearance: alert and oriented to person, place and time, well developed and well- nourished, in no acute distress  Skin: warm and dry, no rash or erythema  Head: normocephalic and atraumatic  Eyes: pupils equal, round, and reactive to light,  conjunctivae normal  ENT: tympanic membrane, external ear and ear canal normal bilaterally, nose without deformity, nasal mucosa and turbinates normal without polyps  Neck: supple and non-tender without mass, no thyromegaly or thyroid nodules, no cervical lymphadenopathy  Pulmonary/Chest: clear to auscultation bilaterally- no wheezes, rales or rhonchi, normal air movement, no respiratory distress  Cardiovascular: normal rate, regular rhythm, normal S1 and S2, no murmurs, rubs, clicks, or gallops, distal pulses intact  Abdomen: soft, non-tender, non-distended, normal bowel sounds, no masses or organomegaly  Extremities: no cyanosis, clubbing or edema  Musculoskeletal: normal range of motion, no joint swelling, deformity or tenderness  Neurologic:  no cranial nerve deficit, gait, coordination and speech normal    Patient's complete Health Risk Assessment and screening values have been reviewed and are found in Flowsheets. The following problems were reviewed today and where indicated follow up appointments were made and/or referrals ordered. Positive Risk Factor Screenings with Interventions:          General Health and ACP:  General  In general, how would you say your health is?: Fair  In the past 7 days, have you experienced any of the following?  New or Increased Pain, New or Increased Fatigue, Loneliness, Social Isolation, Stress or Anger?: None of These  Do you get the social and emotional support that you need?: Yes  Do you have a Living Will?: (!) No  Advance Directives Polysaccharide ([id number]) 01/21/2015, 10/08/2019        Health Maintenance   Topic Date Due    DTaP/Tdap/Td vaccine (1 - Tdap) Never done    Shingles Vaccine (1 of 2) Never done    Diabetic microalbuminuria test  08/18/2017    Annual Wellness Visit (AWV)  Never done    Diabetic retinal exam  10/29/2020    A1C test (Diabetic or Prediabetic)  05/19/2021    Lipid screen  08/06/2021    TSH testing  08/06/2021    Potassium monitoring  08/06/2021    Creatinine monitoring  08/06/2021    Diabetic foot exam  02/19/2022    Low dose CT lung screening  03/03/2022    Pneumococcal 0-64 years Vaccine (2 of 2) 10/08/2024    Colon cancer screen colonoscopy  03/05/2029    Flu vaccine  Completed    COVID-19 Vaccine  Completed    Hepatitis C screen  Completed    HIV screen  Completed    Hepatitis A vaccine  Aged Out    Hib vaccine  Aged Out    Meningococcal (ACWY) vaccine  Aged Out     Recommendations for [employer] Due: see orders and patient instructions/AVS.  . Recommended screening schedule for the next 5-10 years is provided to the patient in written form: see Patient Instructions/AVS.    There are no diagnoses linked to this encounter.

## 2021-06-01 NOTE — PATIENT INSTRUCTIONS
SURVEY:    You may be receiving a survey from [facility] regarding your visit today. Please complete the survey to enable us to provide the highest quality of care to you and your family. If you cannot score us a very good on any question, please call the office to discuss how we could of made your experience a very good one. Thank you. You may be receiving a survey from [facility] regarding your visit today. You may get this in the mail, through your MyChart or in your email. Please complete the survey to enable us to provide the highest quality of care to you and your family. If you cannot score us as very good ( 5 Stars) on any question, please feel free to call the office to discuss how we could have made your experience exceptional.     Thank You! MD Marlene Pa SHAY      Personalized Preventive Plan for Ronaldo Goel - 6/1/2021  Medicare offers a range of preventive health benefits. Some of the tests and screenings are paid in full while other may be subject to a deductible, co-insurance, and/or copay. Some of these benefits include a comprehensive review of your medical history including lifestyle, illnesses that may run in your family, and various assessments and screenings as appropriate. After reviewing your medical record and screening and assessments performed today your provider may have ordered immunizations, labs, imaging, and/or referrals for you. A list of these orders (if applicable) as well as your Preventive Care list are included within your After Visit Summary for your review. Other Preventive Recommendations:    · A preventive eye exam performed by an eye specialist is recommended every 1-2 years to screen for glaucoma; cataracts, macular degeneration, and other eye disorders. · A preventive dental visit is recommended every 6 months.   · Try to get at least 150 minutes of exercise per week or 10,000 steps per day on a pedometer .  · Order or download the FREE \"Exercise & Physical Activity: Your Everyday Guide\" from [employer]. Call 6-442.910.5163 or search [employer] online. · You need 7100-3844 mg of calcium and 0902-6999 IU of vitamin D per day. It is possible to meet your calcium requirement with diet alone, but a vitamin D supplement is usually necessary to meet this goal.  · When exposed to the sun, use a sunscreen that protects against both UVA and UVB radiation with an SPF of 30 or greater. Reapply every 2 to 3 hours or after sweating, drying off with a towel, or swimming. · Always wear a seat belt when traveling in a car. Always wear a helmet when riding a bicycle or motorcycle.

## 2021-06-15 DIAGNOSIS — E11.69 TYPE 2 DIABETES MELLITUS WITH OTHER SPECIFIED COMPLICATION, WITHOUT LONG-TERM CURRENT USE OF INSULIN (HCC): ICD-10-CM

## 2021-06-15 NOTE — TELEPHONE ENCOUNTER
Refill request  Last OV 6/1/2021  Last date RX filled 2/19/21  Next scheduled appt 7/20/2021  Medication pended    Health Maintenance   Topic Date Due    DTaP/Tdap/Td vaccine (1 - Tdap) Never done    Shingles Vaccine (1 of 2) Never done    Diabetic microalbuminuria test  08/18/2017    Diabetic retinal exam  10/29/2020    A1C test (Diabetic or Prediabetic)  05/19/2021    Lipid screen  08/06/2021    TSH testing  08/06/2021    Potassium monitoring  08/06/2021    Creatinine monitoring  08/06/2021    Diabetic foot exam  02/19/2022    Low dose CT lung screening  03/03/2022    Annual Wellness Visit (AWV)  06/02/2022    Pneumococcal 0-64 years Vaccine (2 of 2) 10/08/2024    Colon cancer screen colonoscopy  03/05/2029    Flu vaccine  Completed    COVID-19 Vaccine  Completed    Hepatitis C screen  Completed    HIV screen  Completed    Hepatitis A vaccine  Aged Out    Hib vaccine  Aged Out    Meningococcal (ACWY) vaccine  Aged Out             (applicable per patient's age: Cancer Screenings, Depression Screening, Fall Risk Screening, Immunizations)    Hemoglobin A1C (%)   Date Value   02/19/2021 9.0   02/07/2020 6.5   10/17/2019 8.3 (H)     Microalb/Crt.  Ratio (mcg/mg creat)   Date Value   08/18/2016 6     LDL Cholesterol (mg/dL)   Date Value   08/06/2020 42     AST (U/L)   Date Value   08/06/2020 37     ALT (U/L)   Date Value   08/06/2020 37     BUN (mg/dL)   Date Value   08/06/2020 11      (goal A1C is < 7)   (goal LDL is <100) need 30-50% reduction from baseline     BP Readings from Last 3 Encounters:   06/01/21 120/68   04/30/21 118/62   04/27/21 (!) 125/91    (goal /80)      All Future Testing planned in [facility]:  Lab Frequency Next Occurrence   CBC Auto Differential Once 08/10/2021   Comprehensive Metabolic Panel Once 60/07/4983   Vitamin D 25 Hydroxy Once 08/10/2021   Lipid Panel Once 08/10/2021   TSH with Reflex Once 08/10/2021   Magnesium Once 08/10/2021   EKG 12 Lead Once 08/10/2021   XR

## 2021-06-18 DIAGNOSIS — E78.2 MIXED HYPERLIPIDEMIA: ICD-10-CM

## 2021-06-18 RX ORDER — FENOFIBRATE 145 MG/1
TABLET, COATED ORAL
Qty: 30 TABLET | Refills: 3 | Status: SHIPPED | OUTPATIENT
Start: 2021-06-18 | End: 2021-09-20 | Stop reason: SDUPTHER

## 2021-06-18 NOTE — TELEPHONE ENCOUNTER
Rx pending. Last OV: 6/1/2021  Last RX:   Next scheduled apt: 7/20/2021        Health Maintenance   Topic Date Due    DTaP/Tdap/Td vaccine (1 - Tdap) Never done    Shingles Vaccine (1 of 2) Never done    Diabetic microalbuminuria test  08/18/2017    Diabetic retinal exam  10/29/2020    A1C test (Diabetic or Prediabetic)  05/19/2021    Lipid screen  08/06/2021    TSH testing  08/06/2021    Potassium monitoring  08/06/2021    Creatinine monitoring  08/06/2021    Diabetic foot exam  02/19/2022    Low dose CT lung screening  03/03/2022    Annual Wellness Visit (AWV)  06/02/2022    Pneumococcal 0-64 years Vaccine (2 of 2) 10/08/2024    Colon cancer screen colonoscopy  03/05/2029    Flu vaccine  Completed    COVID-19 Vaccine  Completed    Hepatitis C screen  Completed    HIV screen  Completed    Hepatitis A vaccine  Aged Out    Hib vaccine  Aged Out    Meningococcal (ACWY) vaccine  Aged Out             (applicable per patient's age: Cancer Screenings, Depression Screening, Fall Risk Screening, Immunizations)    Hemoglobin A1C (%)   Date Value   02/19/2021 9.0   02/07/2020 6.5   10/17/2019 8.3 (H)     Microalb/Crt.  Ratio (mcg/mg creat)   Date Value   08/18/2016 6     LDL Cholesterol (mg/dL)   Date Value   08/06/2020 42     AST (U/L)   Date Value   08/06/2020 37     ALT (U/L)   Date Value   08/06/2020 37     BUN (mg/dL)   Date Value   08/06/2020 11      (goal A1C is < 7)   (goal LDL is <100) need 30-50% reduction from baseline     BP Readings from Last 3 Encounters:   06/01/21 120/68   04/30/21 118/62   04/27/21 (!) 125/91    (goal /80)      All Future Testing planned in [facility]:  Lab Frequency Next Occurrence   CBC Auto Differential Once 08/10/2021   Comprehensive Metabolic Panel Once 78/48/8917   Vitamin D 25 Hydroxy Once 08/10/2021   Lipid Panel Once 08/10/2021   TSH with Reflex Once 08/10/2021   Magnesium Once 08/10/2021   EKG 12 Lead Once 08/10/2021   XR CHEST (2 VW) Once 08/10/2021

## 2021-07-19 NOTE — PATIENT INSTRUCTIONS
SURVEY:    You may be receiving a survey from [employer] regarding your visit today. Please complete the survey to enable us to provide the highest quality of care to you and your family. If you cannot score us a very good on any question, please call the office to discuss how we could of made your experience a very good one. Thank you. You may be receiving a survey from [employer] regarding your visit today. You may get this in the mail, through your MyChart or in your email. Please complete the survey to enable us to provide the highest quality of care to you and your family. If you cannot score us as very good ( 5 Stars) on any question, please feel free to call the office to discuss how we could have made your experience exceptional.     Thank You!       MD Ivette Villatoro

## 2021-07-20 ENCOUNTER — OFFICE VISIT (OUTPATIENT)
Dept: FAMILY MEDICINE CLINIC | Age: 66
End: 2021-07-20
Payer: MEDICARE

## 2021-07-20 VITALS
OXYGEN SATURATION: 96 % | DIASTOLIC BLOOD PRESSURE: 84 MMHG | SYSTOLIC BLOOD PRESSURE: 132 MMHG | BODY MASS INDEX: 41.48 KG/M2 | TEMPERATURE: 97.7 F | WEIGHT: 314.4 LBS | HEART RATE: 56 BPM

## 2021-07-20 DIAGNOSIS — E11.69 TYPE 2 DIABETES MELLITUS WITH OTHER SPECIFIED COMPLICATION, WITHOUT LONG-TERM CURRENT USE OF INSULIN (HCC): Primary | ICD-10-CM

## 2021-07-20 DIAGNOSIS — J44.9 CHRONIC OBSTRUCTIVE PULMONARY DISEASE, UNSPECIFIED COPD TYPE (HCC): ICD-10-CM

## 2021-07-20 DIAGNOSIS — I10 ESSENTIAL HYPERTENSION: ICD-10-CM

## 2021-07-20 DIAGNOSIS — G47.33 OSA TREATED WITH BIPAP: ICD-10-CM

## 2021-07-20 LAB — HBA1C MFR BLD: 6.9 %

## 2021-07-20 PROCEDURE — 99214 OFFICE O/P EST MOD 30 MIN: CPT | Performed by: INTERNAL MEDICINE

## 2021-07-20 PROCEDURE — 83036 HEMOGLOBIN GLYCOSYLATED A1C: CPT | Performed by: INTERNAL MEDICINE

## 2021-07-20 RX ORDER — BUDESONIDE AND FORMOTEROL FUMARATE DIHYDRATE 160; 4.5 UG/1; UG/1
2 AEROSOL RESPIRATORY (INHALATION) 2 TIMES DAILY
Qty: 3 INHALER | Refills: 3 | Status: SHIPPED | OUTPATIENT
Start: 2021-07-20 | End: 2021-11-04 | Stop reason: SDUPTHER

## 2021-07-20 ASSESSMENT — ENCOUNTER SYMPTOMS
NAUSEA: 0
BLOOD IN STOOL: 0
SORE THROAT: 0
ABDOMINAL PAIN: 0
WHEEZING: 0
COUGH: 0
ALLERGIC/IMMUNOLOGIC NEGATIVE: 1
CONSTIPATION: 0
SHORTNESS OF BREATH: 0

## 2021-07-20 ASSESSMENT — COPD QUESTIONNAIRES: COPD: 1

## 2021-07-20 NOTE — PROGRESS NOTES
HPI Notes    Name: Nasra Hamlin  : 1955         Chief Complaint:     Chief Complaint   Patient presents with    Diabetes     Patient presents today for a 1 month check up. Patient would like to discuss the glipizide.  Hypertension    COPD       History of Present Illness:        Adriane Degroot is to office to follow-up for diabetes, hypertension, and COPD    He has a history of MINAL, compliant with CPAP  States ran out Glipizide couple of months ago. Cut down on Metformin from 1000 mg bid to 500 mg bid because sugar would drop to 50 some mornings. Diabetes  He presents for his follow-up diabetic visit. He has type 2 diabetes mellitus. Pertinent negatives for hypoglycemia include no dizziness, headaches or nervousness/anxiousness. Pertinent negatives for diabetes include no chest pain, no foot paresthesias, no foot ulcerations, no polydipsia, no polyuria and no weakness. Diabetic complications include heart disease. Risk factors for coronary artery disease include diabetes mellitus, dyslipidemia, male sex, obesity, hypertension and tobacco exposure. Current diabetic treatment includes oral agent (dual therapy). He is compliant with treatment all of the time. He is following a generally unhealthy diet. His overall blood glucose range is 140-180 mg/dl. An ACE inhibitor/angiotensin II receptor blocker is being taken. Eye exam is current. Hypertension  This is a chronic problem. The current episode started more than 1 year ago. The problem is unchanged. The problem is controlled. Pertinent negatives include no chest pain, headaches, palpitations, peripheral edema or shortness of breath. Past treatments include beta blockers, angiotensin blockers and calcium channel blockers. The current treatment provides significant improvement. COPD  There is no cough, shortness of breath or wheezing. This is a chronic problem. The current episode started more than 1 year ago. The problem has been unchanged. Pertinent negatives include no appetite change, chest pain, dyspnea on exertion, ear pain, headaches or sore throat. His symptoms are aggravated by strenuous activity, change in weather and URI. His symptoms are alleviated by steroid inhaler and beta-agonist. He reports significant improvement on treatment. Risk factors for lung disease include smoking/tobacco exposure. His past medical history is significant for COPD. Past Medical History:     Past Medical History:   Diagnosis Date    Asthma     Bronchitis     CAD (coronary artery disease)     [facility] 8/12/2011    Depression 8/12/2011    Diabetes mellitus ([facility]     GERD (gastroesophageal reflux disease) 8/12/2011    Hyperlipidemia 8/12/2011    Hypertension     Hypothyroid 8/12/2011    Lower back pain     Nicotine addiction 8/12/2011    Sleep apnea       Reviewed all health maintenance requirements and orderedappropriate tests  Health Maintenance Due   Topic Date Due    DTaP/Tdap/Td vaccine (1 - Tdap) Never done    Shingles Vaccine (1 of 2) Never done    Diabetic microalbuminuria test  08/18/2017    Diabetic retinal exam  10/29/2020       Past Surgical History:     Past Surgical History:   Procedure Laterality Date    CARDIAC CATHETERIZATION Left 01/21/15    Severe left main trunk disease extending into the ostium of the left anterior descending of 80% involving the ostium of the intermediate & atrioventricular branch of the circumflex. 60% disease in the atrioventricular branch of the circumflex in the midportion. 40% disease in the right coronary artery. Normal left ventricular function, ejection fraction of 60%    CARDIAC CATHETERIZATION Left 12/20/2017    Dr. Froylan Jaime @ [facility]--70% left main trunck & ostial LAD & high lateral circumflex disease. Patent LIMA to the LAD. Patent vein graft to the high lateral circumflex.   60% disease in the AV branch of the circumflex that terminates in a large posterolateral branch with an FFR of 1.0, indicating no significant stenosis. 40% disease in the right coronary artery. Ejection fraction 50-55%.  COLONOSCOPY N/A 3/5/2019    COLONOSCOPY POLYPECTOMY HOT BIOPSY performed by Diane Diallo MD at [facility] GRAFT  02/04/15    3 vessel CABG using left internal mammary artery to the left anterior descending coronary artery with a reverse saphenous vein aortocoronary sequential graft side to side to the ramus intermedius coronary artery & end to side to the first obtuse marginal coronary artery.  OTHER SURGICAL HISTORY  03-21-16    I & D- cyst- posterior neck    SPINE SURGERY      THYROID LOBECTOMY      UPPER GASTROINTESTINAL ENDOSCOPY N/A 3/5/2019    EGD BIOPSY performed by Diane Diallo MD at [facility] OR        Medications:       Prior to Admission medications    Medication Sig Start Date End Date Taking?  Authorizing Provider   SYMBICORT 160-4.5 MCG/ACT AERO Inhale 2 puffs into the lungs 2 times daily 7/20/21  Yes Tremayne Moeller MD   fenofibrate (TRICOR) 145 MG tablet TAKE 1 TABLET BY MOUTH EVERY DAY 6/18/21  Yes Tremayne Moeller MD   metFORMIN (GLUCOPHAGE) 500 MG tablet TAKE 1 TABLET BY MOUTH TWICE DAILY WITH MEALS 6/15/21  Yes Tremayne Moeller MD   amLODIPine (NORVASC) 2.5 MG tablet Take 1 tablet by mouth daily 5/17/21  Yes Tremayne Moeller MD   allopurinol (ZYLOPRIM) 300 MG tablet TAKE 1 TABLET EVERY DAY 5/17/21  Yes Tremayne Moeller MD   losartan (COZAAR) 25 MG tablet TAKE 1 TABLET BY MOUTH EVERY DAY 5/17/21  Yes Tremayne Moeller MD   metoprolol succinate (TOPROL XL) 50 MG extended release tablet TAKE 1 TABLET EVERY DAY 5/11/21  Yes Tremayne Moeller MD   ipratropium-albuterol (DUONEB) 0.5-2.5 (3) MG/3ML SOLN nebulizer solution Inhale 3 mLs into the lungs 4 times daily 4/30/21  Yes Tremayne Moeller MD   CPAP Machine MISC by Does not apply route 4/19/21  Yes Tremayne Moeller MD   PARoxetine (PAXIL) 20 MG tablet TAKE 1 TABLET TWICE DAILY 2/19/21  Yes Mara Kim MD   lovastatin (MEVACOR) 40 MG tablet TAKE 2 TABLETS  NIGHTLY 2/19/21  Yes Mara Kim MD   levothyroxine (SYNTHROID) 200 MCG tablet Take 1 tablet by mouth daily 2/19/21  Yes Mara Kim MD   fluticasone (FLONASE) 50 MCG/ACT nasal spray 2 sprays by Nasal route daily 2/19/21  Yes Mara Kim MD   albuterol sulfate  (90 Base) MCG/ACT inhaler Inhale 2 puffs into the lungs every 6 hours as needed for Wheezing 2/19/21  Yes Mara Kim MD   metFORMIN (GLUCOPHAGE) 1000 MG tablet Take 1 tablet by mouth 2 times daily (with meals) 2/19/21  Yes Mara Kim MD   [facility] [address] by Does not apply route   Yes Historical Provider, MD   ondansetron (ZOFRAN-ODT) 4 MG disintegrating tablet Take 1 tablet by mouth 3 times daily as needed for Nausea or Vomiting 11/17/20  Yes Mara Kim MD   Respiratory Therapy Supplies [facility] CPAP full face mask, hose, head gear and  filter 2/7/20  Yes Mara Kim MD   blood glucose monitor strips Test 2 times a day & as needed for symptoms of irregular blood glucose. 11/11/19  Yes Mara Kim MD   mometasone (ELOCON) 0.1 % cream Apply topically daily. 2/20/17  Yes Sylvia Lanes, MD   aspirin 81 MG tablet Take 81 mg by mouth daily. Yes Historical Provider, MD   albuterol (PROVENTIL) (2.5 MG/3ML) 0.083% nebulizer solution Take 3 mLs by nebulization every 4-6 hours as needed for Wheezing 4/19/21 5/3/21  Mara Kim MD        Allergies:       Patient has no known allergies. Social History:     Tobacco: reports that he quit smoking about 6 years ago. His smoking use included cigarettes. He has a 45.00 pack-year smoking history. He has never used smokeless tobacco.  Alcohol:      reports no history of alcohol use. Drug Use:  reports no history of drug use.     Family History:     Family History   Problem Relation Age of Onset    Diabetes Mother     Heart Disease Mother     Diabetes Father     Heart Disease Father     Heart Disease Brother        Review of Systems:         Review of Systems   Constitutional: Negative for activity change, appetite change and unexpected weight change. HENT: Negative for congestion, ear discharge, ear pain and sore throat. Eyes: Negative for visual disturbance. Respiratory: Negative for cough, shortness of breath and wheezing. Cardiovascular: Negative for chest pain, dyspnea on exertion, palpitations and leg swelling. Gastrointestinal: Negative for abdominal pain, blood in stool, constipation and nausea. Endocrine: Negative for cold intolerance, heat intolerance, polydipsia and polyuria. Genitourinary: Negative for difficulty urinating and dysuria. Musculoskeletal: Negative. Skin: Negative for rash. Allergic/Immunologic: Negative. Neurological: Negative for dizziness, weakness and headaches. Psychiatric/Behavioral: Negative for behavioral problems and dysphoric mood. The patient is not nervous/anxious. Physical Exam:     Vitals:  /84   Pulse 56   Temp 97.7 °F (36.5 °C)   Wt (!) 314 lb 6.4 oz (142.6 kg)   SpO2 96%   BMI 41.48 kg/m²       Physical Exam  Vitals reviewed. Constitutional:       General: He is not in acute distress. Appearance: He is well-developed. HENT:      Head: Normocephalic and atraumatic. Neck:      Thyroid: No thyromegaly. Cardiovascular:      Rate and Rhythm: Normal rate and regular rhythm. Heart sounds: Normal heart sounds. No murmur heard. Pulmonary:      Effort: Pulmonary effort is normal.      Breath sounds: Normal breath sounds. No wheezing or rales. Abdominal:      General: Bowel sounds are normal. There is no distension. Palpations: Abdomen is soft. There is no mass. Tenderness: There is no abdominal tenderness. Musculoskeletal:         General: Normal range of motion. Lymphadenopathy:      Cervical: No cervical adenopathy.    Skin:     General: Skin is

## 2021-08-06 DIAGNOSIS — I10 ESSENTIAL HYPERTENSION: ICD-10-CM

## 2021-08-06 DIAGNOSIS — E78.2 MIXED HYPERLIPIDEMIA: ICD-10-CM

## 2021-08-06 DIAGNOSIS — E03.9 ACQUIRED HYPOTHYROIDISM: ICD-10-CM

## 2021-08-06 RX ORDER — LOSARTAN POTASSIUM 25 MG/1
TABLET ORAL
Qty: 90 TABLET | Refills: 0 | Status: SHIPPED | OUTPATIENT
Start: 2021-08-06 | End: 2021-08-16 | Stop reason: SDUPTHER

## 2021-08-06 RX ORDER — METOPROLOL SUCCINATE 50 MG/1
TABLET, EXTENDED RELEASE ORAL
Qty: 90 TABLET | Refills: 0 | Status: SHIPPED | OUTPATIENT
Start: 2021-08-06 | End: 2021-08-16 | Stop reason: SDUPTHER

## 2021-08-06 RX ORDER — LEVOTHYROXINE SODIUM 0.2 MG/1
200 TABLET ORAL DAILY
Qty: 90 TABLET | Refills: 0 | Status: SHIPPED | OUTPATIENT
Start: 2021-08-06 | End: 2022-01-10 | Stop reason: SDUPTHER

## 2021-08-06 RX ORDER — AMLODIPINE BESYLATE 2.5 MG/1
2.5 TABLET ORAL DAILY
Qty: 90 TABLET | Refills: 1 | Status: SHIPPED | OUTPATIENT
Start: 2021-08-06 | End: 2021-09-03 | Stop reason: SDUPTHER

## 2021-08-06 NOTE — TELEPHONE ENCOUNTER
Patient called requesting a refill of four different medications. Patient was last seen in office on 07/20/2021.  Please advise thanks
No significant past surgical history

## 2021-08-07 ENCOUNTER — HOSPITAL ENCOUNTER (OUTPATIENT)
Age: 66
Discharge: HOME | End: 2021-08-09
Payer: MEDICARE

## 2021-08-07 ENCOUNTER — HOSPITAL ENCOUNTER (OUTPATIENT)
Age: 66
Discharge: HOME | End: 2021-08-07
Payer: MEDICARE

## 2021-08-07 ENCOUNTER — HOSPITAL ENCOUNTER (OUTPATIENT)
Dept: GENERAL RADIOLOGY | Age: 66
Discharge: HOME | End: 2021-08-09
Payer: MEDICARE

## 2021-08-07 DIAGNOSIS — I10 ESSENTIAL HYPERTENSION: ICD-10-CM

## 2021-08-07 DIAGNOSIS — I25.10 CORONARY ARTERY DISEASE INVOLVING NATIVE CORONARY ARTERY OF NATIVE HEART WITHOUT ANGINA PECTORIS: ICD-10-CM

## 2021-08-07 DIAGNOSIS — E78.2 MIXED HYPERLIPIDEMIA: ICD-10-CM

## 2021-08-07 DIAGNOSIS — N18.1 OTHER SPECIFIED DIABETES MELLITUS WITH STAGE 1 CHRONIC KIDNEY DISEASE, UNSPECIFIED WHETHER LONG TERM INSULIN USE (HCC): ICD-10-CM

## 2021-08-07 DIAGNOSIS — E55.9 VITAMIN D DEFICIENCY DISEASE: ICD-10-CM

## 2021-08-07 DIAGNOSIS — E13.22 OTHER SPECIFIED DIABETES MELLITUS WITH STAGE 1 CHRONIC KIDNEY DISEASE, UNSPECIFIED WHETHER LONG TERM INSULIN USE (HCC): ICD-10-CM

## 2021-08-07 LAB
ABSOLUTE EOS #: 0.2 K/UL (ref 0–0.4)
ABSOLUTE IMMATURE GRANULOCYTE: NORMAL K/UL (ref 0–0.3)
ABSOLUTE LYMPH #: 2.2 K/UL (ref 1–4.8)
ABSOLUTE MONO #: 0.6 K/UL (ref 0–1)
ALBUMIN SERPL-MCNC: 4 G/DL (ref 3.5–5.2)
ALBUMIN/GLOBULIN RATIO: ABNORMAL (ref 1–2.5)
ALP BLD-CCNC: 51 U/L (ref 40–129)
ALT SERPL-CCNC: 34 U/L (ref 5–41)
ANION GAP SERPL CALCULATED.3IONS-SCNC: 10 MMOL/L (ref 9–17)
AST SERPL-CCNC: 33 U/L
BASOPHILS # BLD: 1 % (ref 0–2)
BASOPHILS ABSOLUTE: 0.1 K/UL (ref 0–0.2)
BILIRUB SERPL-MCNC: 0.38 MG/DL (ref 0.3–1.2)
BUN BLDV-MCNC: 11 MG/DL (ref 8–23)
BUN/CREAT BLD: 13 (ref 9–20)
CALCIUM SERPL-MCNC: 9.1 MG/DL (ref 8.6–10.4)
CHLORIDE BLD-SCNC: 105 MMOL/L (ref 98–107)
CHOLESTEROL/HDL RATIO: 4.1
CHOLESTEROL: 122 MG/DL
CO2: 24 MMOL/L (ref 20–31)
CREAT SERPL-MCNC: 0.84 MG/DL (ref 0.7–1.2)
DIFFERENTIAL TYPE: YES
EOSINOPHILS RELATIVE PERCENT: 3 % (ref 0–5)
GFR AFRICAN AMERICAN: >60 ML/MIN
GFR NON-AFRICAN AMERICAN: >60 ML/MIN
GFR SERPL CREATININE-BSD FRML MDRD: ABNORMAL ML/MIN/{1.73_M2}
GFR SERPL CREATININE-BSD FRML MDRD: ABNORMAL ML/MIN/{1.73_M2}
GLUCOSE BLD-MCNC: 176 MG/DL (ref 70–99)
HCT VFR BLD CALC: 44.6 % (ref 41–53)
HDLC SERPL-MCNC: 30 MG/DL
HEMOGLOBIN: 14.9 G/DL (ref 13.5–17.5)
IMMATURE GRANULOCYTES: NORMAL %
LDL CHOLESTEROL: 54 MG/DL (ref 0–130)
LYMPHOCYTES # BLD: 30 % (ref 13–44)
MAGNESIUM: 1.8 MG/DL (ref 1.6–2.6)
MCH RBC QN AUTO: 31.2 PG (ref 26–34)
MCHC RBC AUTO-ENTMCNC: 33.5 G/DL (ref 31–37)
MCV RBC AUTO: 93.3 FL (ref 80–100)
MONOCYTES # BLD: 8 % (ref 5–9)
NRBC AUTOMATED: NORMAL PER 100 WBC
PATIENT FASTING?: YES
PDW BLD-RTO: 13.9 % (ref 12.1–15.2)
PLATELET # BLD: 230 K/UL (ref 140–450)
PLATELET ESTIMATE: NORMAL
PMV BLD AUTO: NORMAL FL (ref 6–12)
POTASSIUM SERPL-SCNC: 4.2 MMOL/L (ref 3.7–5.3)
RBC # BLD: 4.78 M/UL (ref 4.5–5.9)
RBC # BLD: NORMAL 10*6/UL
SEG NEUTROPHILS: 58 % (ref 39–75)
SEGMENTED NEUTROPHILS ABSOLUTE COUNT: 4.3 K/UL (ref 2.1–6.5)
SODIUM BLD-SCNC: 139 MMOL/L (ref 135–144)
TOTAL PROTEIN: 6.6 G/DL (ref 6.4–8.3)
TRIGL SERPL-MCNC: 192 MG/DL
TSH SERPL DL<=0.05 MIU/L-ACNC: 0.95 MIU/L (ref 0.3–5)
VITAMIN D 25-HYDROXY: 32.3 NG/ML (ref 30–100)
VLDLC SERPL CALC-MCNC: ABNORMAL MG/DL (ref 1–30)
WBC # BLD: 7.3 K/UL (ref 3.5–11)
WBC # BLD: NORMAL 10*3/UL

## 2021-08-07 PROCEDURE — 83036 HEMOGLOBIN GLYCOSYLATED A1C: CPT

## 2021-08-07 PROCEDURE — 71046 X-RAY EXAM CHEST 2 VIEWS: CPT

## 2021-08-07 PROCEDURE — 80061 LIPID PANEL: CPT

## 2021-08-07 PROCEDURE — 83735 ASSAY OF MAGNESIUM: CPT

## 2021-08-07 PROCEDURE — 82306 VITAMIN D 25 HYDROXY: CPT

## 2021-08-07 PROCEDURE — 85025 COMPLETE CBC W/AUTO DIFF WBC: CPT

## 2021-08-07 PROCEDURE — 84443 ASSAY THYROID STIM HORMONE: CPT

## 2021-08-07 PROCEDURE — 36415 COLL VENOUS BLD VENIPUNCTURE: CPT

## 2021-08-07 PROCEDURE — 93005 ELECTROCARDIOGRAM TRACING: CPT

## 2021-08-07 PROCEDURE — 80053 COMPREHEN METABOLIC PANEL: CPT

## 2021-08-08 LAB
ESTIMATED AVERAGE GLUCOSE: 157 MG/DL
HBA1C MFR BLD: 7.1 % (ref 4–6)

## 2021-08-09 ENCOUNTER — OFFICE VISIT (OUTPATIENT)
Dept: CARDIOLOGY CLINIC | Age: 66
End: 2021-08-09
Payer: MEDICARE

## 2021-08-09 VITALS
BODY MASS INDEX: 41.96 KG/M2 | HEART RATE: 68 BPM | WEIGHT: 315 LBS | SYSTOLIC BLOOD PRESSURE: 130 MMHG | OXYGEN SATURATION: 95 % | DIASTOLIC BLOOD PRESSURE: 80 MMHG

## 2021-08-09 DIAGNOSIS — I25.10 CORONARY ARTERY DISEASE INVOLVING NATIVE CORONARY ARTERY OF NATIVE HEART WITHOUT ANGINA PECTORIS: Primary | ICD-10-CM

## 2021-08-09 DIAGNOSIS — E55.9 VITAMIN D DEFICIENCY DISEASE: ICD-10-CM

## 2021-08-09 DIAGNOSIS — I10 ESSENTIAL HYPERTENSION: ICD-10-CM

## 2021-08-09 DIAGNOSIS — E78.2 MIXED HYPERLIPIDEMIA: ICD-10-CM

## 2021-08-09 LAB
EKG ATRIAL RATE: 56 BPM
EKG P AXIS: 59 DEGREES
EKG P-R INTERVAL: 174 MS
EKG Q-T INTERVAL: 398 MS
EKG QRS DURATION: 90 MS
EKG QTC CALCULATION (BAZETT): 384 MS
EKG R AXIS: 32 DEGREES
EKG T AXIS: 76 DEGREES
EKG VENTRICULAR RATE: 56 BPM

## 2021-08-09 PROCEDURE — 93010 ELECTROCARDIOGRAM REPORT: CPT | Performed by: INTERNAL MEDICINE

## 2021-08-09 PROCEDURE — 99214 OFFICE O/P EST MOD 30 MIN: CPT | Performed by: INTERNAL MEDICINE

## 2021-08-09 NOTE — LETTER
Angie Perez M.D. [address] [place] [address] [place] [address]  [phone]        2021        Parminder Meredith MD  [address] [facility], [address]    RE:   Farzad Patel  :  1955    Dear Dr. Leena Atkinson:    CHIEF COMPLAINT:  1. Coronary artery disease. 2.  Status post open heart surgery by Dr. Jignesh Rosario on 2015. 3.  Catheterization on 2017 that showed patent bypass grafts with an EF of 50% to 55%. HISTORY OF PRESENT ILLNESS:  I had the pleasure of seeing Mr. Darleen Meigs in our office on 2021. He is a pleasant 44-year-old gentleman who developed shortness of breath with some chest pain in , which resulted in a stress test, which was unremarkable; however, because of severe shortness of breath, I did a catheterization on 2015, in [place] that showed 80% distal left main trunk and ostium of the LAD, 60% circumflex, 40% RCA, with an EF of 60%. He had subsequent open heart surgery by Dr. Jignesh Rosario on 2015, with a LIMA to the LAD and a vein graft to the circumflex. I repeated a stress test on 10/22/2017, which was abnormal, and therefore, I did a catheterization on 2017, that showed 70% in the ostium of the LAD with distal left main trunk disease, with patent LIMA to the LAD and patent vein graft to the high lateral branch of the circumflex, the right coronary artery had 40% disease, EF of 50% to 55%, and medical therapy was recommended. He does have mild obstruction with severe disease in small airways with reversible defect. When I saw him last year, he had stopped exercising and was more short of breath because of deconditioning. He still has some shortness of breath especially in hot, humid weather. He is exercising now three days a week at home on a treadmill and bicycle.   He still does complain of loss of energy and shortness of breath although it is better after he has been exercising. He has had no hospitalizations or procedures since I saw him one year ago. CARDIAC RISK FACTORS:  Known CAD:  Positive. Bypass Surgery:  Positive. Hypertension:  Positive. Hyperlipidemia:  Positive. Other Family Members:  Positive. Smoking:  Negative. Diabetes:  Positive. MEDICATIONS AT HOME:  He is currently on albuterol p.r.n., Zyloprim 300 mg daily, Norvasc 2.5 mg daily, aspirin 81 mg daily, CPAP machine nightly, TriCor 145 mg daily, Flonase p.r.n., Synthroid 200 mcg daily, Cozaar 25 mg daily, Mevacor 40 mg two tablets nightly, Glucophage 1000 mg b.i.d. as well as Glucophage 500 mg b.i.d., Toprol-XL 50 mg daily, Paxil 20 mg b.i.d., Symbicort two puffs b.i.d. PAST MEDICAL AND SURGICAL HISTORY:  1. Thyroid lobectomy in , on thyroid replacement, euthyroid. 2.  Spine surgery many years ago. 3.  Chronic back pain, on disability. 4.  Non-insulin-dependent diabetes, with his hemoglobin A1c at 7.1.  5.  Bypass surgery, as stated previously. 6.  Lower back pain. 7.  History of depression. FAMILY HISTORY:  Mother alive at 80 had an MI. Father  of an MI.    SOCIAL HISTORY:  He is 61years old.  twice. Has five children. Quit smoking in 2014. Does not drink alcohol. He has been on disability since . 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 He has a  Segundo-Murguia and his brother has a  Segundo-Murguia. His brother, Serge Sandoval, completely redid [employer]'s 2003 Segundo-Murguia and now Serge Sandoval wants to buy it back from him. Evidently, Serge Sandoval has a  Jeep Wrangler that was recently redone that Joan Strong is eyeing. .. REVIEW OF SYSTEMS:  Cardiac as above.   Other systems reviewed including constitutional, eyes, ears, nose and throat, cardiovascular, respiratory, GI, , musculoskeletal, integumentary, neurologic, endocrine, hematologic and allergic/immunologic are negative except for what is EF of 60%. 3.  Open heart surgery by Dr. Ishmael Domingo on 02/04/2015, with a LIMA to the LAD, vein graft to the high lateral branch of the circumflex. 4.  Catheterization on 12/20/2017, after chest pain, that showed patent LIMA to the LAD, patent vein graft to the high lateral branch of the circumflex, with 60% disease in the AV branch of the circumflex and 40% disease in the right coronary artery, EF of 50% to 55%. 5.  COPD with reversibility. 6.  History of normal LV function. 7.  Shortness of breath, most likely from deconditioning and COPD. PLAN:  1. No change in medications. 2.  See in one year. DISCUSSION:  Mr. Joseluis Castillo overall is doing fairly well. Hot humid weather bothers his COPD and he does become short of breath. However, he has had no chest pain or chest discomfort and I do not believe he is having any anginal-type symptoms at this time. I will plan on seeing him in one year unless a problem would develop. Thank you very much for allowing me the privilege of seeing Mr. Joseluis Castillo. If you have any questions on my thoughts, please do not hesitate to contact me.      Sincerely,        Les Castelan    D: 08/09/2021 11:52:44     T: 08/09/2021 11:57:39     [email or web address]  Job#: [id number]   Doc#: [id number]      <>

## 2021-08-11 NOTE — PROGRESS NOTES
Kishore Monzon M.D. [address] [place] [address] [place] [address]  [phone]        2021        Marylee Arias, MD  [address] [facility], [address] [place] [address]    RE:   Chela Umana  :  1955    Dear Dr. Pepito Mtz:    CHIEF COMPLAINT:  1. Coronary artery disease. 2.  Status post open heart surgery by Dr. Mando Koo on 2015. 3.  Catheterization on 2017 that showed patent bypass grafts with an EF of 50% to 55%. HISTORY OF PRESENT ILLNESS:  I had the pleasure of seeing Mr. Torrie Diehl in our office on 2021. He is a pleasant 82-year-old gentleman who developed shortness of breath with some chest pain in , which resulted in a stress test, which was unremarkable; however, because of severe shortness of breath, I did a catheterization on 2015, in [place] that showed 80% distal left main trunk and ostium of the LAD, 60% circumflex, 40% RCA, with an EF of 60%. He had subsequent open heart surgery by Dr. Mando Koo on 2015, with a LIMA to the LAD and a vein graft to the circumflex. I repeated a stress test on 10/22/2017, which was abnormal, and therefore, I did a catheterization on 2017, that showed 70% in the ostium of the LAD with distal left main trunk disease, with patent LIMA to the LAD and patent vein graft to the high lateral branch of the circumflex, the right coronary artery had 40% disease, EF of 50% to 55%, and medical therapy was recommended. He does have mild obstruction with severe disease in small airways with reversible defect. When I saw him last year, he had stopped exercising and was more short of breath because of deconditioning. He still has some shortness of breath especially in hot, humid weather. He is exercising now three days a week at home on a treadmill and bicycle.   He still does complain of loss of energy and shortness of breath although it is better after he has been exercising. He has had no hospitalizations or procedures since I saw him one year ago. CARDIAC RISK FACTORS:  Known CAD:  Positive. Bypass Surgery:  Positive. Hypertension:  Positive. Hyperlipidemia:  Positive. Other Family Members:  Positive. Smoking:  Negative. Diabetes:  Positive. MEDICATIONS AT HOME:  He is currently on albuterol p.r.n., Zyloprim 300 mg daily, Norvasc 2.5 mg daily, aspirin 81 mg daily, CPAP machine nightly, TriCor 145 mg daily, Flonase p.r.n., Synthroid 200 mcg daily, Cozaar 25 mg daily, Mevacor 40 mg two tablets nightly, Glucophage 1000 mg b.i.d. as well as Glucophage 500 mg b.i.d., Toprol-XL 50 mg daily, Paxil 20 mg b.i.d., Symbicort two puffs b.i.d. PAST MEDICAL AND SURGICAL HISTORY:  1. Thyroid lobectomy in , on thyroid replacement, euthyroid. 2.  Spine surgery many years ago. 3.  Chronic back pain, on disability. 4.  Non-insulin-dependent diabetes, with his hemoglobin A1c at 7.1.  5.  Bypass surgery, as stated previously. 6.  Lower back pain. 7.  History of depression. FAMILY HISTORY:  Mother alive at 80 had an MI. Father  of an MI.    SOCIAL HISTORY:  He is 61years old.  twice. Has five children. Quit smoking in 2014. Does not drink alcohol. He has been on disability since . 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 He has a  Segundo-Murguia and his brother has a  Segundo-Murguia. His brother, Vanita Heimlich, completely redid [employer]'s 2003 Segundo-Murguia and now Vanita Heimlich wants to buy it back from him. Evidently, Vanita Heimlich has a  Jeep Wrangler that was recently redone that Raymond Purdy is eyeing. .. REVIEW OF SYSTEMS:  Cardiac as above.   Other systems reviewed including constitutional, eyes, ears, nose and throat, cardiovascular, respiratory, GI, , musculoskeletal, integumentary, neurologic, endocrine, hematologic and allergic/immunologic are negative except for what is described above. No weight loss or weight gain. No change in bowel habits. No blood in stools. No fevers, sweats or chills. PHYSICAL EXAMINATION:  VITAL SIGNS:  His blood pressure was 130/80 with a heart rate of 68 and regular. Respiratory rate 18. O2 sat 95%. Weight 218 pounds. GENERAL:  He is a pleasant 56-year-old gentleman. Denied pain. He was oriented to person, place and time. Answered questions appropriately. SKIN:  No unusual skin changes. HEENT:  The pupils are equally round and intact. Mucous membranes were dry. NECK:  No JVD. Good carotid pulses. No carotid bruits. No lymphadenopathy or thyromegaly. CARDIOVASCULAR EXAM:  S1 and S2 were normal.  No S3 or S4. Soft systolic blowing type murmur. No diastolic murmur. PMI was normal.  No lift, thrust, or pericardial friction rub. LUNGS:  Quite clear to auscultation and percussion. ABDOMEN:  Soft and nontender. Good bowel sounds. EXTREMITIES:  Good femoral pulses. Good pedal pulses. Mild pedal edema. Skin was warm and dry. No calf tenderness. Nail beds pink. Good cap refill. PULSES:  Bilateral symmetrical radial, brachial and carotid pulses. No carotid bruits. Good femoral and pedal pulses. NEUROLOGIC EXAM:  Within normal limits. PSYCHIATRIC EXAM:  Within normal limits. LABORATORY DATA:  From 08/07/2021, sodium 139, potassium 4.2, BUN 11, creatinine 0.84, GFR greater than 60, magnesium 1.8, glucose was 176, calcium 9.1. Cholesterol 122, HDL 30, LDL 54, triglycerides 192. ALT was 34, ALT was 33. Hemoglobin A1c 7.1.  TSH 0.95. Vitamin D 32.3. White count 7.3, hemoglobin 14.9 with a platelet count of 817,733. EKG showed sinus rhythm with sinus arrhythmia with an old MI that was unchanged from previous EKGs. Chest x-ray was unremarkable. IMPRESSION:  1. Severe CAD.   2.  Catheterization on 01/21/2015, showing 80% left main trunk and proximal LAD, with 60% circumflex, mild disease in the right coronary artery, EF of 60%. 3.  Open heart surgery by Dr. Dc Kohli on 02/04/2015, with a LIMA to the LAD, vein graft to the high lateral branch of the circumflex. 4.  Catheterization on 12/20/2017, after chest pain, that showed patent LIMA to the LAD, patent vein graft to the high lateral branch of the circumflex, with 60% disease in the AV branch of the circumflex and 40% disease in the right coronary artery, EF of 50% to 55%. 5.  COPD with reversibility. 6.  History of normal LV function. 7.  Shortness of breath, most likely from deconditioning and COPD. PLAN:  1. No change in medications. 2.  See in one year. DISCUSSION:  Mr. Cheli Miller overall is doing fairly well. Hot humid weather bothers his COPD and he does become short of breath. However, he has had no chest pain or chest discomfort and I do not believe he is having any anginal-type symptoms at this time. I will plan on seeing him in one year unless a problem would develop. Thank you very much for allowing me the privilege of seeing Mr. Cheli Miller. If you have any questions on my thoughts, please do not hesitate to contact me.      Sincerely,        Na Mcneal    D: 08/09/2021 11:52:44     T: 08/09/2021 11:57:39     [email or web address]  Job#: [id number]   Doc#: [id number]      <>

## 2021-08-16 DIAGNOSIS — I10 ESSENTIAL HYPERTENSION: ICD-10-CM

## 2021-08-16 RX ORDER — METOPROLOL SUCCINATE 50 MG/1
TABLET, EXTENDED RELEASE ORAL
Qty: 90 TABLET | Refills: 3 | Status: SHIPPED | OUTPATIENT
Start: 2021-08-16 | End: 2021-09-03 | Stop reason: SDUPTHER

## 2021-08-16 RX ORDER — LOSARTAN POTASSIUM 25 MG/1
TABLET ORAL
Qty: 90 TABLET | Refills: 3 | Status: SHIPPED | OUTPATIENT
Start: 2021-08-16 | End: 2021-10-19

## 2021-09-03 DIAGNOSIS — E11.69 TYPE 2 DIABETES MELLITUS WITH OTHER SPECIFIED COMPLICATION, WITHOUT LONG-TERM CURRENT USE OF INSULIN (HCC): ICD-10-CM

## 2021-09-03 DIAGNOSIS — I10 ESSENTIAL HYPERTENSION: ICD-10-CM

## 2021-09-03 DIAGNOSIS — J44.9 CHRONIC OBSTRUCTIVE PULMONARY DISEASE, UNSPECIFIED COPD TYPE (HCC): Primary | ICD-10-CM

## 2021-09-03 RX ORDER — AMLODIPINE BESYLATE 2.5 MG/1
2.5 TABLET ORAL DAILY
Qty: 90 TABLET | Refills: 1 | Status: SHIPPED | OUTPATIENT
Start: 2021-09-03 | End: 2022-01-20

## 2021-09-03 RX ORDER — METOPROLOL SUCCINATE 50 MG/1
TABLET, EXTENDED RELEASE ORAL
Qty: 90 TABLET | Refills: 3 | Status: SHIPPED | OUTPATIENT
Start: 2021-09-03 | End: 2022-03-01 | Stop reason: SDUPTHER

## 2021-09-03 RX ORDER — ALBUTEROL SULFATE 2.5 MG/3ML
2.5 SOLUTION RESPIRATORY (INHALATION) EVERY 4 HOURS PRN
Qty: 56 EACH | Refills: 5 | Status: SHIPPED | OUTPATIENT
Start: 2021-09-03 | End: 2021-11-23

## 2021-09-20 DIAGNOSIS — E78.2 MIXED HYPERLIPIDEMIA: ICD-10-CM

## 2021-09-20 RX ORDER — FENOFIBRATE 145 MG/1
TABLET, COATED ORAL
Qty: 30 TABLET | Refills: 3 | Status: SHIPPED | OUTPATIENT
Start: 2021-09-20 | End: 2022-03-01 | Stop reason: SDUPTHER

## 2021-09-20 NOTE — TELEPHONE ENCOUNTER
Pending medication    Health Maintenance   Topic Date Due    DTaP/Tdap/Td vaccine (1 - Tdap) Never done    Shingles Vaccine (1 of 2) Never done    Diabetic microalbuminuria test  08/18/2017    Diabetic retinal exam  10/29/2020    Flu vaccine (1) 09/01/2021    Diabetic foot exam  02/19/2022    Low dose CT lung screening  03/03/2022    Annual Wellness Visit (AWV)  06/02/2022    A1C test (Diabetic or Prediabetic)  08/07/2022    Lipid screen  08/07/2022    TSH testing  08/07/2022    Potassium monitoring  08/07/2022    Creatinine monitoring  08/07/2022    Pneumococcal 0-64 years Vaccine (2 of 2 - PPSV23) 10/08/2024    Colon cancer screen colonoscopy  03/05/2029    COVID-19 Vaccine  Completed    Hepatitis C screen  Completed    HIV screen  Completed    Hepatitis A vaccine  Aged Out    Hib vaccine  Aged Out    Meningococcal (ACWY) vaccine  Aged Out             (applicable per patient's age: Cancer Screenings, Depression Screening, Fall Risk Screening, Immunizations)    Hemoglobin A1C (%)   Date Value   08/07/2021 7.1 (H)   07/20/2021 6.9   02/19/2021 9.0     Microalb/Crt.  Ratio (mcg/mg creat)   Date Value   08/18/2016 6     LDL Cholesterol (mg/dL)   Date Value   08/07/2021 54     AST (U/L)   Date Value   08/07/2021 33     ALT (U/L)   Date Value   08/07/2021 34     BUN (mg/dL)   Date Value   08/07/2021 11      (goal A1C is < 7)   (goal LDL is <100) need 30-50% reduction from baseline     BP Readings from Last 3 Encounters:   08/09/21 130/80   07/20/21 132/84   06/01/21 120/68    (goal /80)      All Future Testing planned in [facility]:  Lab Frequency Next Occurrence   TSH with Reflex Once 07/09/2022   CBC Auto Differential Once 07/09/2022   Comprehensive Metabolic Panel Once 30/13/0331   Vitamin D 25 Hydroxy Once 07/09/2022   Lipid Panel Once 07/09/2022   Magnesium Once 07/09/2022   EKG 12 Lead Once 07/09/2022   XR CHEST (2 VW) Once 07/09/2022       Next Visit Date:  Future Appointments   Date Time Provider Debbie Izquierdo   10/21/2021  9:30 [facility]   6/2/2022  9:00 AM [facility]   8/4/2022 10:30 AM MD Kamala Reyna [facility]            Patient Active Problem List:     COPD (chronic obstructive pulmonary disease) ([facility] [address]     GERD (gastroesophageal reflux disease)     S/P CABG x 3     CAD (coronary artery disease)     Essential hypertension     Mixed hyperlipidemia     Dysthymia (or depressive neurosis)     Chronic gout involving toe     Acquired hypothyroidism     Vitamin D deficiency disease     Chronic low back pain with left-sided sciatica     Class 3 severe obesity due to excess calories without serious comorbidity with body mass index (BMI) of 40.0 to 44.9 in adult [facility])     Acute recurrent sinusitis     MINAL treated with BiPAP

## 2021-10-19 DIAGNOSIS — I10 ESSENTIAL HYPERTENSION: ICD-10-CM

## 2021-10-19 RX ORDER — LOSARTAN POTASSIUM 25 MG/1
TABLET ORAL
Qty: 90 TABLET | Refills: 3 | Status: SHIPPED | OUTPATIENT
Start: 2021-10-19 | End: 2022-03-01 | Stop reason: SDUPTHER

## 2021-10-29 DIAGNOSIS — E78.2 MIXED HYPERLIPIDEMIA: ICD-10-CM

## 2021-11-01 DIAGNOSIS — E11.69 TYPE 2 DIABETES MELLITUS WITH OTHER SPECIFIED COMPLICATION, WITHOUT LONG-TERM CURRENT USE OF INSULIN (HCC): ICD-10-CM

## 2021-11-01 RX ORDER — LOVASTATIN 40 MG/1
TABLET ORAL
Qty: 180 TABLET | Refills: 3 | Status: SHIPPED | OUTPATIENT
Start: 2021-11-01 | End: 2022-03-01 | Stop reason: SDUPTHER

## 2021-11-01 RX ORDER — PAROXETINE HYDROCHLORIDE 20 MG/1
TABLET, FILM COATED ORAL
Qty: 180 TABLET | Refills: 1 | Status: SHIPPED | OUTPATIENT
Start: 2021-11-01 | End: 2022-03-01 | Stop reason: SDUPTHER

## 2021-11-04 ENCOUNTER — OFFICE VISIT (OUTPATIENT)
Dept: FAMILY MEDICINE CLINIC | Age: 66
End: 2021-11-04
Payer: COMMERCIAL

## 2021-11-04 VITALS
DIASTOLIC BLOOD PRESSURE: 82 MMHG | SYSTOLIC BLOOD PRESSURE: 128 MMHG | WEIGHT: 315 LBS | HEIGHT: 73 IN | HEART RATE: 57 BPM | TEMPERATURE: 98.7 F | OXYGEN SATURATION: 95 % | RESPIRATION RATE: 18 BRPM | BODY MASS INDEX: 41.75 KG/M2

## 2021-11-04 DIAGNOSIS — E11.69 TYPE 2 DIABETES MELLITUS WITH OTHER SPECIFIED COMPLICATION, WITHOUT LONG-TERM CURRENT USE OF INSULIN (HCC): Primary | ICD-10-CM

## 2021-11-04 DIAGNOSIS — R19.7 DIARRHEA, UNSPECIFIED TYPE: ICD-10-CM

## 2021-11-04 DIAGNOSIS — J44.9 CHRONIC OBSTRUCTIVE PULMONARY DISEASE, UNSPECIFIED COPD TYPE (HCC): ICD-10-CM

## 2021-11-04 DIAGNOSIS — I10 ESSENTIAL HYPERTENSION: ICD-10-CM

## 2021-11-04 PROCEDURE — 3051F HG A1C>EQUAL 7.0%<8.0%: CPT | Performed by: INTERNAL MEDICINE

## 2021-11-04 PROCEDURE — 99214 OFFICE O/P EST MOD 30 MIN: CPT | Performed by: INTERNAL MEDICINE

## 2021-11-04 RX ORDER — GLIPIZIDE 5 MG/1
5 TABLET ORAL 2 TIMES DAILY
Qty: 60 TABLET | Refills: 3 | Status: SHIPPED | OUTPATIENT
Start: 2021-11-04 | End: 2021-12-03 | Stop reason: DRUGHIGH

## 2021-11-04 RX ORDER — METRONIDAZOLE 500 MG/1
500 TABLET ORAL 3 TIMES DAILY
Qty: 30 TABLET | Refills: 0 | Status: SHIPPED | OUTPATIENT
Start: 2021-11-04 | End: 2021-11-14

## 2021-11-04 RX ORDER — BUDESONIDE AND FORMOTEROL FUMARATE DIHYDRATE 160; 4.5 UG/1; UG/1
2 AEROSOL RESPIRATORY (INHALATION) 2 TIMES DAILY
Qty: 30.6 G | Refills: 1 | Status: SHIPPED | OUTPATIENT
Start: 2021-11-04 | End: 2022-03-01 | Stop reason: SDUPTHER

## 2021-11-04 RX ORDER — GREEN TEA/HOODIA GORDONII 315-12.5MG
1 CAPSULE ORAL 2 TIMES DAILY
Qty: 60 TABLET | Refills: 0 | Status: SHIPPED | OUTPATIENT
Start: 2021-11-04 | End: 2021-12-04

## 2021-11-04 ASSESSMENT — ENCOUNTER SYMPTOMS
SHORTNESS OF BREATH: 0
NAUSEA: 1
SORE THROAT: 0
COUGH: 0
DIARRHEA: 1
BLOOD IN STOOL: 0
CONSTIPATION: 0
VOMITING: 1
ABDOMINAL PAIN: 0
BLOATING: 0
ALLERGIC/IMMUNOLOGIC NEGATIVE: 1
WHEEZING: 0

## 2021-11-04 ASSESSMENT — COPD QUESTIONNAIRES: COPD: 1

## 2021-11-04 NOTE — PROGRESS NOTES
HPI Notes    Name: Ramiro Walton  : 1955         Chief Complaint:     Chief Complaint   Patient presents with    Hypertension     3 month.  Nausea     x 2 weeks. c/o nausea \"all the time. \"    Diarrhea     x 2 weeks. c/o diarrhea everytime having a bowel movement. History of Present Illness:        Lul Fajardo presents office to follow-up for hypertension, diabetes, COPD    He also complains of diarrhea started about one week ago. States was exposed to his ex wife who was just diagnosed with Cdiff colitis  Andreas c/o abdominal cramps, nausea , no vomiting.  in the past had some diarrhea from Metformin but nothing like this. He continues to take his Metformin for his diabetes. Would like to have a new prescription for nebulizer machine since the old one is not working anymore    Hypertension  This is a chronic problem. The current episode started more than 1 year ago. The problem is unchanged. The problem is controlled. Pertinent negatives include no chest pain, headaches, palpitations, peripheral edema or shortness of breath. There are no associated agents to hypertension. Risk factors for coronary artery disease include diabetes mellitus, dyslipidemia, family history, obesity and male gender. Past treatments include angiotensin blockers and beta blockers. The current treatment provides significant improvement. There are no compliance problems. Hypertensive end-organ damage includes kidney disease and CAD/MI. There is no history of heart failure or PVD. Diabetes  He presents for his follow-up diabetic visit. He has type 2 diabetes mellitus. Pertinent negatives for hypoglycemia include no dizziness, headaches, nervousness/anxiousness or speech difficulty. Pertinent negatives for diabetes include no chest pain, no fatigue, no polydipsia, no polyuria and no weakness. There are no hypoglycemic complications. Diabetic complications include heart disease and nephropathy.  Pertinent negatives for diabetic complications include no PVD. Risk factors for coronary artery disease include obesity, male sex, hypertension, dyslipidemia, diabetes mellitus and family history. Current diabetic treatment includes oral agent (monotherapy). He is compliant with treatment all of the time. He is following a generally healthy diet. His overall blood glucose range is 140-180 mg/dl. An ACE inhibitor/angiotensin II receptor blocker is being taken. Eye exam is current. COPD  There is no cough, shortness of breath or wheezing. This is a chronic problem. The current episode started more than 1 year ago. The problem occurs intermittently. The problem has been unchanged. Pertinent negatives include no appetite change, chest pain, ear pain, headaches or sore throat. His symptoms are aggravated by any activity, URI and strenuous activity. His symptoms are alleviated by beta-agonist, steroid inhaler and ipratropium. Diarrhea   This is a new problem. The current episode started in the past 7 days. The problem occurs 2 to 4 times per day. The problem has been unchanged. The stool consistency is described as watery. Associated symptoms include arthralgias and vomiting. Pertinent negatives include no abdominal pain, bloating, coughing or headaches. Risk factors include ill contacts. He has tried nothing for the symptoms. The treatment provided no relief.            Past Medical History:     Past Medical History:   Diagnosis Date    Asthma     Bronchitis     CAD (coronary artery disease)     COPD (chronic obstructive pulmonary disease) ([facility] [address] 8/12/2011    Depression 8/12/2011    Diabetes mellitus ([facility] [address]     GERD (gastroesophageal reflux disease) 8/12/2011    Hyperlipidemia 8/12/2011    Hypertension     Hypothyroid 8/12/2011    Lower back pain     Nicotine addiction 8/12/2011    Sleep apnea       Reviewed all health maintenance requirements and orderedappropriate tests  Health Maintenance Due   Topic Date Due    Diabetic microalbuminuria test  08/18/2017    Diabetic retinal exam  10/29/2020       Past Surgical History:     Past Surgical History:   Procedure Laterality Date    CARDIAC CATHETERIZATION Left 01/21/15    Severe left main trunk disease extending into the ostium of the left anterior descending of 80% involving the ostium of the intermediate & atrioventricular branch of the circumflex. 60% disease in the atrioventricular branch of the circumflex in the midportion. 40% disease in the right coronary artery. Normal left ventricular function, ejection fraction of 60%    CARDIAC CATHETERIZATION Left 12/20/2017    Dr. Rickie Goncalves @ [facility]--70% left main trunck & ostial LAD & high lateral circumflex disease. Patent LIMA to the LAD. Patent vein graft to the high lateral circumflex. 60% disease in the AV branch of the circumflex that terminates in a large posterolateral branch with an FFR of 1.0, indicating no significant stenosis. 40% disease in the right coronary artery. Ejection fraction 50-55%.  COLONOSCOPY N/A 3/5/2019    COLONOSCOPY POLYPECTOMY HOT BIOPSY performed by Bao Galvez MD at [facility]  02/04/15    3 vessel CABG using left internal mammary artery to the left anterior descending coronary artery with a reverse saphenous vein aortocoronary sequential graft side to side to the ramus intermedius coronary artery & end to side to the first obtuse marginal coronary artery.  OTHER SURGICAL HISTORY  03-21-16    I & D- cyst- posterior neck    SPINE SURGERY      THYROID LOBECTOMY      UPPER GASTROINTESTINAL ENDOSCOPY N/A 3/5/2019    EGD BIOPSY performed by Bao Galvez MD at [address] [place] [address] Way OR        Medications:       Prior to Admission medications    Medication Sig Start Date End Date Taking?  Authorizing Provider   glipiZIDE (GLUCOTROL) 5 MG tablet Take 1 tablet by mouth 2 times daily 11/4/21  Yes Rula Joe MD   budesonide-formoterol (SYMBICORT) 160-4.5 MCG/ACT AERO Inhale 2 puffs into the lungs 2 times daily 11/4/21  Yes Spencer Sanchez MD   metroNIDAZOLE (FLAGYL) 500 MG tablet Take 1 tablet by mouth 3 times daily for 10 days 11/4/21 11/14/21 Yes Spencer Sanchez MD   Probiotic Acidophilus CRESTWOOD [facility]) TABS Take 1 tablet by mouth 2 times daily 11/4/21 12/4/21 Yes Spencer Sanchez MD   lovastatin (MEVACOR) 40 MG tablet TAKE 2 TABLETS  NIGHTLY 11/1/21  Yes Spencer Sanchez MD   PARoxetine (PAXIL) 20 MG tablet TAKE 1 TABLET TWICE DAILY 11/1/21  Yes Spencer Sanchez MD   losartan (COZAAR) 25 MG tablet TAKE 1 TABLET EVERY DAY 10/19/21  Yes Spencer Sanchez MD   fenofibrate (TRICOR) 145 MG tablet TAKE 1 TABLET BY MOUTH EVERY DAY 9/20/21  Yes Spencer Sanchez MD   metoprolol succinate (TOPROL XL) 50 MG extended release tablet TAKE 1 TABLET EVERY DAY 9/3/21  Yes Spencer Sanchez MD   amLODIPine (NORVASC) 2.5 MG tablet Take 1 tablet by mouth daily 9/3/21  Yes Spencer Sanchez MD   levothyroxine (SYNTHROID) 200 MCG tablet Take 1 tablet by mouth daily 8/6/21  Yes Spencer Sanchez MD   allopurinol (ZYLOPRIM) 300 MG tablet TAKE 1 TABLET EVERY DAY 5/17/21  Yes Spencer Sanchez MD   ipratropium-albuterol (DUONEB) 0.5-2.5 (3) MG/3ML SOLN nebulizer solution Inhale 3 mLs into the lungs 4 times daily 4/30/21  Yes Spencer Sanchez MD   CPAP Machine MISC by Does not apply route 4/19/21  Yes Spencer Sanchez MD   fluticasone (FLONASE) 50 MCG/ACT nasal spray 2 sprays by Nasal route daily 2/19/21  Yes Spencer Sanchez MD   albuterol sulfate  (90 Base) MCG/ACT inhaler Inhale 2 puffs into the lungs every 6 hours as needed for Wheezing 2/19/21  Yes Spencer Sanchez MD   [address] by Does not apply route   Yes Tova Melendez MD   ondansetron (ZOFRAN-ODT) 4 MG disintegrating tablet Take 1 tablet by mouth 3 times daily as needed for Nausea or Vomiting 11/17/20  Yes Spencer Sanchez MD   Respiratory Therapy Supplies MISC CPAP full face mask, hose, head gear and  filter 2/7/20  Yes Luci Rendon MD   blood glucose monitor strips Test 2 times a day & as needed for symptoms of irregular blood glucose. 11/11/19  Yes Luci Rendon MD   mometasone (ELOCON) 0.1 % cream Apply topically daily. 2/20/17  Yes Ezra Berry MD   aspirin 81 MG tablet Take 81 mg by mouth daily. Yes Historical Provider, MD   albuterol (PROVENTIL) (2.5 MG/3ML) 0.083% nebulizer solution Take 3 mLs by nebulization every 4 hours as needed for Wheezing or Shortness of Breath 9/3/21 10/3/21  Luci Rendon MD        Allergies:       Patient has no known allergies. Social History:     Tobacco: reports that he quit smoking about 6 years ago. His smoking use included cigarettes. He has a 45.00 pack-year smoking history. He has never used smokeless tobacco.  Alcohol:      reports no history of alcohol use. Drug Use:  reports no history of drug use. Family History:     Family History   Problem Relation Age of Onset    Diabetes Mother     Heart Disease Mother     Diabetes Father     Heart Disease Father     Heart Disease Brother        Review of Systems:         Review of Systems   Constitutional: Negative for activity change, appetite change, fatigue and unexpected weight change. HENT: Negative for congestion, ear discharge, ear pain and sore throat. Eyes: Negative for visual disturbance. Respiratory: Negative for cough, shortness of breath and wheezing. Cardiovascular: Negative for chest pain, palpitations and leg swelling. Gastrointestinal: Positive for diarrhea, nausea and vomiting. Negative for abdominal pain, bloating, blood in stool and constipation. Endocrine: Negative for cold intolerance, heat intolerance, polydipsia and polyuria. Genitourinary: Negative for difficulty urinating and dysuria. Musculoskeletal: Positive for arthralgias. Skin: Negative for rash. Allergic/Immunologic: Negative.     Neurological: Negative for dizziness, speech difficulty, weakness and headaches. Psychiatric/Behavioral: Negative for behavioral problems and dysphoric mood. The patient is not nervous/anxious. Physical Exam:     Vitals:  /82 (Site: Right Upper Arm, Position: Sitting, Cuff Size: Large Adult)   Pulse 57   Temp 98.7 °F (37.1 °C) (Temporal)   Resp 18   Ht 6' 1\" (1.854 m)   Wt (!) 318 lb (144.2 kg)   SpO2 95%   BMI 41.96 kg/m²       Physical Exam  Vitals reviewed. Constitutional:       General: He is not in acute distress. Appearance: Normal appearance. He is well-developed. He is obese. He is not ill-appearing or diaphoretic. HENT:      Head: Normocephalic and atraumatic. Right Ear: External ear normal.      Left Ear: External ear normal.      Nose: Nose normal. No congestion. Mouth/Throat:      Mouth: Mucous membranes are moist.      Pharynx: Oropharynx is clear. Eyes:      General: No scleral icterus. Right eye: No discharge. Left eye: No discharge. Conjunctiva/sclera: Conjunctivae normal.   Neck:      Thyroid: No thyromegaly. Cardiovascular:      Rate and Rhythm: Normal rate and regular rhythm. Heart sounds: Normal heart sounds. No murmur heard. Pulmonary:      Effort: Pulmonary effort is normal.      Breath sounds: Normal breath sounds. No wheezing or rales. Abdominal:      General: Bowel sounds are normal. There is no distension. Palpations: Abdomen is soft. There is no mass. Tenderness: There is no abdominal tenderness. There is no right CVA tenderness or left CVA tenderness. Musculoskeletal:         General: Normal range of motion. Right lower leg: No edema. Left lower leg: No edema. Lymphadenopathy:      Cervical: No cervical adenopathy. Skin:     General: Skin is warm and dry. Coloration: Skin is not jaundiced or pale. Findings: No rash. Neurological:      General: No focal deficit present. Mental Status: He is alert and oriented to person, place, and time. Mental status is at baseline. Psychiatric:         Mood and Affect: Mood normal.         Behavior: Behavior normal.         Thought Content: Thought content normal.         Judgment: Judgment normal.               Data:     Lab Results   Component Value Date     08/07/2021    K 4.2 08/07/2021     08/07/2021    CO2 24 08/07/2021    BUN 11 08/07/2021    CREATININE 0.84 08/07/2021    GLUCOSE 176 08/07/2021    GLUCOSE 111 10/14/2011    PROT 6.6 08/07/2021    LABALBU 4.0 08/07/2021    BILITOT 0.38 08/07/2021    ALKPHOS 51 08/07/2021    AST 33 08/07/2021    ALT 34 08/07/2021     Lab Results   Component Value Date    WBC 7.3 08/07/2021    RBC 4.78 08/07/2021    HGB 14.9 08/07/2021    HCT 44.6 08/07/2021    MCV 93.3 08/07/2021    MCH 31.2 08/07/2021    MCHC 33.5 08/07/2021    RDW 13.9 08/07/2021     08/07/2021    MPV NOT REPORTED 08/07/2021     Lab Results   Component Value Date    TSH 0.95 08/07/2021     Lab Results   Component Value Date    CHOL 122 08/07/2021    HDL 30 08/07/2021    PSA 0.35 12/17/2018    LABA1C 7.1 08/07/2021          Assessment & Plan        Diagnosis Orders   1. Type 2 diabetes mellitus with other specified complication, without long-term current use of insulin (HCC)   Overall controlled, will stop Metformin and start on glipizide 5 mg twice daily due to diarrhea. Continue on low-carb diet. Follow-up in 3 months glipiZIDE (GLUCOTROL) 5 MG tablet   2. Diarrhea, unspecified type   Patient reports being exposed to his ex-wife with C. difficile. Will check stool for C. difficile and also GI panel. Check BMP, CBC. Will stop Metformin. Also empirically start on Flagyl and lactobacillus for suspected C. difficile. Advised to go to the emergency room if symptoms worsen CBC Auto Differential    Basic Metabolic Panel    Clostridium Difficile Toxin/Antigen    Gastrointestinal Panel, Molecular    metroNIDAZOLE (FLAGYL) 500 MG tablet    Probiotic Acidophilus (FLORANEX) TABS   3. Essential hypertension   Blood pressure stable, continue on current meds    4. Chronic obstructive pulmonary disease, unspecified COPD type ([facility] [address]   Symptoms stable, continue on current treatment plan. Will order a new nebulizer since he reports is old machine is not functioning anymore DME Order for Nebulizer as OP                   Completed Refills   Requested Prescriptions     Signed Prescriptions Disp Refills    glipiZIDE (GLUCOTROL) 5 MG tablet 60 tablet 3     Sig: Take 1 tablet by mouth 2 times daily    budesonide-formoterol (SYMBICORT) 160-4.5 MCG/ACT AERO 30.6 g 1     Sig: Inhale 2 puffs into the lungs 2 times daily    metroNIDAZOLE (FLAGYL) 500 MG tablet 30 tablet 0     Sig: Take 1 tablet by mouth 3 times daily for 10 days    Probiotic Acidophilus (FLORANEX) TABS 60 tablet 0     Sig: Take 1 tablet by mouth 2 times daily     Return in about 3 months (around 2/4/2022) for diabetes, HTN, hypothyroidism, hyperlipidemia.      Orders Placed This Encounter   Medications    glipiZIDE (GLUCOTROL) 5 MG tablet     Sig: Take 1 tablet by mouth 2 times daily     Dispense:  60 tablet     Refill:  3    budesonide-formoterol (SYMBICORT) 160-4.5 MCG/ACT AERO     Sig: Inhale 2 puffs into the lungs 2 times daily     Dispense:  30.6 g     Refill:  1    metroNIDAZOLE (FLAGYL) 500 MG tablet     Sig: Take 1 tablet by mouth 3 times daily for 10 days     Dispense:  30 tablet     Refill:  0    Probiotic Acidophilus (FLORANEX) TABS     Sig: Take 1 tablet by mouth 2 times daily     Dispense:  60 tablet     Refill:  0     Orders Placed This Encounter   Procedures    Clostridium Difficile Toxin/Antigen     Standing Status:   Future     Standing Expiration Date:   11/4/2022    Gastrointestinal Panel, Molecular     Standing Status:   Future     Standing Expiration Date:   11/4/2022    CBC Auto Differential     Standing Status:   Future     Standing Expiration Date:   11/4/2022    Basic Metabolic Panel     Standing Status: Future     Standing Expiration Date:   11/4/2022    DME Order for Nebulizer as OP     You must complete the order parameters below and add the medical necessity documentation for this DME in a separate note. Nebulizer with compressor  Disposable Med Nebs 2 per month  Reusable Med Nebs 1 per 6 months  Aerosol Mask 1 per month  Replacement Filters 2 per month  All other related supplies as needed per month    Frequency: Three times daily    Diagnosis: COPD      Length of Need: 12 months     Order Specific Question:   Medications being used: Answer: Albuterol . 083 unit dose vial         There are no Patient Instructions on file for this visit.     Electronically signed by Salvatore Chavez MD on 11/4/2021 at 2:57 PM           Completed Refills      Requested Prescriptions     Signed Prescriptions Disp Refills    glipiZIDE (GLUCOTROL) 5 MG tablet 60 tablet 3     Sig: Take 1 tablet by mouth 2 times daily    budesonide-formoterol (SYMBICORT) 160-4.5 MCG/ACT AERO 30.6 g 1     Sig: Inhale 2 puffs into the lungs 2 times daily    metroNIDAZOLE (FLAGYL) 500 MG tablet 30 tablet 0     Sig: Take 1 tablet by mouth 3 times daily for 10 days    Probiotic Acidophilus (FLORANEX) TABS 60 tablet 0     Sig: Take 1 tablet by mouth 2 times daily

## 2021-11-05 ENCOUNTER — TELEPHONE (OUTPATIENT)
Dept: FAMILY MEDICINE CLINIC | Age: 66
End: 2021-11-05

## 2021-11-05 ENCOUNTER — HOSPITAL ENCOUNTER (OUTPATIENT)
Age: 66
Discharge: HOME | End: 2021-11-05
Payer: COMMERCIAL

## 2021-11-05 DIAGNOSIS — R19.7 DIARRHEA, UNSPECIFIED TYPE: ICD-10-CM

## 2021-11-05 LAB
-: NORMAL
ABSOLUTE EOS #: 0.2 K/UL (ref 0–0.4)
ABSOLUTE IMMATURE GRANULOCYTE: NORMAL K/UL (ref 0–0.3)
ABSOLUTE LYMPH #: 2.1 K/UL (ref 1–4.8)
ABSOLUTE MONO #: 0.5 K/UL (ref 0–1)
ANION GAP SERPL CALCULATED.3IONS-SCNC: 12 MMOL/L (ref 9–17)
BASOPHILS # BLD: 1 % (ref 0–2)
BASOPHILS ABSOLUTE: 0 K/UL (ref 0–0.2)
BUN BLDV-MCNC: 13 MG/DL (ref 8–23)
BUN/CREAT BLD: 14 (ref 9–20)
CALCIUM SERPL-MCNC: 9.8 MG/DL (ref 8.6–10.4)
CHLORIDE BLD-SCNC: 100 MMOL/L (ref 98–107)
CO2: 25 MMOL/L (ref 20–31)
CREAT SERPL-MCNC: 0.94 MG/DL (ref 0.7–1.2)
DIFFERENTIAL TYPE: YES
EOSINOPHILS RELATIVE PERCENT: 2 % (ref 0–5)
GFR AFRICAN AMERICAN: >60 ML/MIN
GFR NON-AFRICAN AMERICAN: >60 ML/MIN
GFR SERPL CREATININE-BSD FRML MDRD: ABNORMAL ML/MIN/{1.73_M2}
GFR SERPL CREATININE-BSD FRML MDRD: ABNORMAL ML/MIN/{1.73_M2}
GLUCOSE BLD-MCNC: 164 MG/DL (ref 70–99)
HCT VFR BLD CALC: 43.7 % (ref 41–53)
HEMOGLOBIN: 14.7 G/DL (ref 13.5–17.5)
IMMATURE GRANULOCYTES: NORMAL %
LYMPHOCYTES # BLD: 26 % (ref 13–44)
MCH RBC QN AUTO: 31.2 PG (ref 26–34)
MCHC RBC AUTO-ENTMCNC: 33.7 G/DL (ref 31–37)
MCV RBC AUTO: 92.7 FL (ref 80–100)
MONOCYTES # BLD: 7 % (ref 5–9)
NRBC AUTOMATED: NORMAL PER 100 WBC
PDW BLD-RTO: 13.9 % (ref 12.1–15.2)
PLATELET # BLD: 239 K/UL (ref 140–450)
PLATELET ESTIMATE: NORMAL
PMV BLD AUTO: NORMAL FL (ref 6–12)
POTASSIUM SERPL-SCNC: 4.4 MMOL/L (ref 3.7–5.3)
RBC # BLD: 4.72 M/UL (ref 4.5–5.9)
RBC # BLD: NORMAL 10*6/UL
REASON FOR REJECTION: NORMAL
SEG NEUTROPHILS: 64 % (ref 39–75)
SEGMENTED NEUTROPHILS ABSOLUTE COUNT: 5.3 K/UL (ref 2.1–6.5)
SODIUM BLD-SCNC: 137 MMOL/L (ref 135–144)
WBC # BLD: 8.2 K/UL (ref 3.5–11)
WBC # BLD: NORMAL 10*3/UL
ZZ NTE CLEAN UP: ORDERED TEST: NORMAL
ZZ NTE WITH NAME CLEAN UP: SPECIMEN SOURCE: NORMAL

## 2021-11-05 PROCEDURE — 80048 BASIC METABOLIC PNL TOTAL CA: CPT

## 2021-11-05 PROCEDURE — 36415 COLL VENOUS BLD VENIPUNCTURE: CPT

## 2021-11-05 PROCEDURE — 85025 COMPLETE CBC W/AUTO DIFF WBC: CPT

## 2021-11-05 PROCEDURE — 87506 IADNA-DNA/RNA PROBE TQ 6-11: CPT

## 2021-11-05 NOTE — TELEPHONE ENCOUNTER
----- Message from Robbi Sharma MD sent at 11/5/2021  1:15 PM EDT -----  Please call patient and inform all results normal

## 2021-11-06 LAB
CAMPYLOBACTER PCR: NORMAL
E COLI ENTEROTOXIGENIC PCR: NORMAL
PLESIOMONAS SHIGELLOIDES PCR: NORMAL
SALMONELLA PCR: NORMAL
SHIGATOXIN GENE PCR: NORMAL
SHIGELLA SP PCR: NORMAL
SPECIMEN DESCRIPTION: NORMAL
VIBRIO PCR: NORMAL
YERSINIA ENTEROCOLITICA PCR: NORMAL

## 2021-11-22 DIAGNOSIS — J44.9 CHRONIC OBSTRUCTIVE PULMONARY DISEASE, UNSPECIFIED COPD TYPE (HCC): ICD-10-CM

## 2021-11-23 RX ORDER — ALBUTEROL SULFATE 2.5 MG/3ML
SOLUTION RESPIRATORY (INHALATION)
Qty: 90 ML | Refills: 1 | Status: SHIPPED | OUTPATIENT
Start: 2021-11-23 | End: 2021-12-07

## 2021-11-23 NOTE — TELEPHONE ENCOUNTER
Pending medication. Health Maintenance   Topic Date Due    Diabetic microalbuminuria test  08/18/2017    Diabetic retinal exam  10/29/2020    COVID-19 Vaccine (3 - Booster for Moderna series) 09/29/2021    DTaP/Tdap/Td vaccine (1 - Tdap) 11/04/2022 (Originally 7/29/1974)    Shingles Vaccine (1 of 2) 11/04/2022 (Originally 7/29/2007)    Diabetic foot exam  02/19/2022    Low dose CT lung screening  03/03/2022    A1C test (Diabetic or Prediabetic)  08/07/2022    Lipid screen  08/07/2022    TSH testing  08/07/2022    Potassium monitoring  11/05/2022    Creatinine monitoring  11/05/2022    Pneumococcal 0-64 years Vaccine (2 of 2 - PPSV23) 10/08/2024    Colon cancer screen colonoscopy  03/05/2029    Flu vaccine  Completed    Hepatitis C screen  Completed    HIV screen  Completed    Hepatitis A vaccine  Aged Out    Hib vaccine  Aged Out    Meningococcal (ACWY) vaccine  Aged Out             (applicable per patient's age: Cancer Screenings, Depression Screening, Fall Risk Screening, Immunizations)    Hemoglobin A1C (%)   Date Value   08/07/2021 7.1 (H)   07/20/2021 6.9   02/19/2021 9.0     Microalb/Crt.  Ratio (mcg/mg creat)   Date Value   08/18/2016 6     LDL Cholesterol (mg/dL)   Date Value   08/07/2021 54     AST (U/L)   Date Value   08/07/2021 33     ALT (U/L)   Date Value   08/07/2021 34     BUN (mg/dL)   Date Value   11/05/2021 13      (goal A1C is < 7)   (goal LDL is <100) need 30-50% reduction from baseline     BP Readings from Last 3 Encounters:   11/04/21 128/82   08/09/21 130/80   07/20/21 132/84    (goal /80)      All Future Testing planned in [facility]:  Lab Frequency Next Occurrence   TSH with Reflex Once 07/09/2022   Comprehensive Metabolic Panel Once 50/26/3031   Vitamin D 25 Hydroxy Once 07/09/2022   Lipid Panel Once 07/09/2022   Magnesium Once 07/09/2022   EKG 12 Lead Once 07/09/2022   XR CHEST (2 VW) Once 07/09/2022       Next Visit Date:  Future Appointments   Date Time Provider Debbie Izquierdo   6/2/2022  9:00 AM MD Karl Garcia  [facility]   8/4/2022 10:30 [facility]            Patient Active Problem List:     COPD (chronic obstructive pulmonary disease) ([facility] [address]     GERD (gastroesophageal reflux disease)     S/P CABG x 3     CAD (coronary artery disease)     Essential hypertension     Mixed hyperlipidemia     Diabetes mellitus (HCC)     Dysthymia (or depressive neurosis)     Chronic gout involving toe     Acquired hypothyroidism     Vitamin D deficiency disease     Chronic low back pain with left-sided sciatica     Class 3 severe obesity due to excess calories without serious comorbidity with body mass index (BMI) of 40.0 to 44.9 in adult [facility])     Acute recurrent sinusitis     MINAL treated with BiPAP

## 2021-12-03 ENCOUNTER — OFFICE VISIT (OUTPATIENT)
Dept: FAMILY MEDICINE CLINIC | Age: 66
End: 2021-12-03
Payer: COMMERCIAL

## 2021-12-03 VITALS
HEART RATE: 61 BPM | RESPIRATION RATE: 18 BRPM | WEIGHT: 315 LBS | HEIGHT: 73 IN | TEMPERATURE: 97.8 F | DIASTOLIC BLOOD PRESSURE: 80 MMHG | BODY MASS INDEX: 41.75 KG/M2 | OXYGEN SATURATION: 96 % | SYSTOLIC BLOOD PRESSURE: 130 MMHG

## 2021-12-03 DIAGNOSIS — I10 ESSENTIAL HYPERTENSION: ICD-10-CM

## 2021-12-03 DIAGNOSIS — E11.9 TYPE 2 DIABETES MELLITUS WITHOUT COMPLICATION, WITHOUT LONG-TERM CURRENT USE OF INSULIN (HCC): ICD-10-CM

## 2021-12-03 DIAGNOSIS — J44.9 CHRONIC OBSTRUCTIVE PULMONARY DISEASE, UNSPECIFIED COPD TYPE (HCC): Primary | ICD-10-CM

## 2021-12-03 PROCEDURE — 3051F HG A1C>EQUAL 7.0%<8.0%: CPT | Performed by: INTERNAL MEDICINE

## 2021-12-03 PROCEDURE — 99214 OFFICE O/P EST MOD 30 MIN: CPT | Performed by: INTERNAL MEDICINE

## 2021-12-03 RX ORDER — GLIPIZIDE 10 MG/1
10 TABLET ORAL 2 TIMES DAILY
Qty: 180 TABLET | Refills: 1 | Status: SHIPPED | OUTPATIENT
Start: 2021-12-03 | End: 2022-10-03

## 2021-12-03 ASSESSMENT — ENCOUNTER SYMPTOMS
BLURRED VISION: 0
CONSTIPATION: 0
ORTHOPNEA: 0
WHEEZING: 0
SORE THROAT: 0
COUGH: 0
SHORTNESS OF BREATH: 0
ALLERGIC/IMMUNOLOGIC NEGATIVE: 1
NAUSEA: 0
BLOOD IN STOOL: 0
ABDOMINAL PAIN: 0

## 2021-12-03 ASSESSMENT — COPD QUESTIONNAIRES: COPD: 1

## 2021-12-06 DIAGNOSIS — J44.9 CHRONIC OBSTRUCTIVE PULMONARY DISEASE, UNSPECIFIED COPD TYPE (HCC): ICD-10-CM

## 2021-12-07 RX ORDER — ALBUTEROL SULFATE 2.5 MG/3ML
SOLUTION RESPIRATORY (INHALATION)
Qty: 90 ML | Refills: 1 | Status: SHIPPED | OUTPATIENT
Start: 2021-12-07 | End: 2021-12-27

## 2021-12-09 ENCOUNTER — HOSPITAL ENCOUNTER (EMERGENCY)
Age: 66
Discharge: HOME | End: 2021-12-09
Attending: EMERGENCY MEDICINE
Payer: COMMERCIAL

## 2021-12-09 VITALS
HEART RATE: 61 BPM | SYSTOLIC BLOOD PRESSURE: 142 MMHG | HEIGHT: 73 IN | RESPIRATION RATE: 18 BRPM | WEIGHT: 315 LBS | BODY MASS INDEX: 41.75 KG/M2 | DIASTOLIC BLOOD PRESSURE: 85 MMHG | TEMPERATURE: 98.2 F | OXYGEN SATURATION: 94 %

## 2021-12-09 DIAGNOSIS — S39.012A STRAIN OF LUMBAR REGION, INITIAL ENCOUNTER: Primary | ICD-10-CM

## 2021-12-09 PROCEDURE — 99283 EMERGENCY DEPT VISIT LOW MDM: CPT

## 2021-12-09 RX ORDER — NAPROXEN 500 MG/1
500 TABLET ORAL 2 TIMES DAILY
Qty: 60 TABLET | Refills: 0 | Status: SHIPPED | OUTPATIENT
Start: 2021-12-09

## 2021-12-09 RX ORDER — LIDOCAINE 50 MG/G
1 PATCH TOPICAL DAILY
Qty: 30 PATCH | Refills: 0 | Status: SHIPPED | OUTPATIENT
Start: 2021-12-09 | End: 2022-01-08

## 2021-12-09 ASSESSMENT — ENCOUNTER SYMPTOMS
SORE THROAT: 0
SHORTNESS OF BREATH: 0
TROUBLE SWALLOWING: 0
EYE PAIN: 0
VOMITING: 0
COUGH: 0
COLOR CHANGE: 0
ABDOMINAL PAIN: 0
BACK PAIN: 1
DIARRHEA: 0
NAUSEA: 0
EYE REDNESS: 0

## 2021-12-09 ASSESSMENT — PAIN DESCRIPTION - ORIENTATION: ORIENTATION: LOWER

## 2021-12-09 ASSESSMENT — PAIN DESCRIPTION - DESCRIPTORS: DESCRIPTORS: SHARP

## 2021-12-09 ASSESSMENT — PAIN SCALES - GENERAL: PAINLEVEL_OUTOF10: 7

## 2021-12-09 ASSESSMENT — PAIN DESCRIPTION - PAIN TYPE: TYPE: ACUTE PAIN;CHRONIC PAIN

## 2021-12-09 ASSESSMENT — PAIN DESCRIPTION - LOCATION: LOCATION: BACK

## 2021-12-09 NOTE — ED PROVIDER NOTES
[facility] ED  eMERGENCY dEPARTMENT eNCOUnter      Pt Name: Rajinder Greenberg  MRN: [id number]  [place] 1955  Date of evaluation: 12/9/2021  Provider: Cuco Funes MD    CHIEF COMPLAINT       Chief Complaint   Patient presents with    Back Pain     Pt has had low back pain for past couple months, pt was seeing Pain Management in [place] and having nerves burned but stopped because it got better. Pain worse today. Patient is a 79-year-old male who presents to the emergency department complaining of low back pain. Patient states he was seen pain management in the past but has not been going lately. He states that over the past month or 2 has been having pain in the left low back radiating a little bit down his leg. He denies any numbness tingling or weakness. He denies any loss of bowel or bladder control. He denies any fever or chills. He denies any history of IV drug abuse. Nursing Notes were reviewed. REVIEW OF SYSTEMS    (2-9 systems for level 4, 10 or more for level 5)     Review of Systems   Constitutional: Negative for chills and fever. HENT: Negative for ear pain, sore throat and trouble swallowing. Eyes: Negative for pain and redness. Respiratory: Negative for cough and shortness of breath. Cardiovascular: Negative for chest pain and palpitations. Gastrointestinal: Negative for abdominal pain, diarrhea, nausea and vomiting. Genitourinary: Negative for dysuria and frequency. Musculoskeletal: Positive for back pain. Negative for neck pain. Skin: Negative for color change and rash. Neurological: Negative for dizziness, syncope and headaches. Psychiatric/Behavioral: Negative for hallucinations and suicidal ideas. Except as noted above the remainder of the review of systems was reviewed and negative.        PAST MEDICAL HISTORY     Past Medical History:   Diagnosis Date    Asthma     Bronchitis     CAD (coronary artery disease)     [facility] 8/12/2011    Depression 8/12/2011    Diabetes mellitus ([facility] [address]     GERD (gastroesophageal reflux disease) 8/12/2011    Hyperlipidemia 8/12/2011    Hypertension     Hypothyroid 8/12/2011    Lower back pain     Nicotine addiction 8/12/2011    Sleep apnea          SURGICAL HISTORY       Past Surgical History:   Procedure Laterality Date    CARDIAC CATHETERIZATION Left 01/21/15    Severe left main trunk disease extending into the ostium of the left anterior descending of 80% involving the ostium of the intermediate & atrioventricular branch of the circumflex. 60% disease in the atrioventricular branch of the circumflex in the midportion. 40% disease in the right coronary artery. Normal left ventricular function, ejection fraction of 60%    CARDIAC CATHETERIZATION Left 12/20/2017    Dr. Mago Harrison @ [facility]--70% left main trunck & ostial LAD & high lateral circumflex disease. Patent LIMA to the LAD. Patent vein graft to the high lateral circumflex. 60% disease in the AV branch of the circumflex that terminates in a large posterolateral branch with an FFR of 1.0, indicating no significant stenosis. 40% disease in the right coronary artery. Ejection fraction 50-55%.  COLONOSCOPY N/A 3/5/2019    COLONOSCOPY POLYPECTOMY HOT BIOPSY performed by Swati Moran MD at [facility]  02/04/15    3 vessel CABG using left internal mammary artery to the left anterior descending coronary artery with a reverse saphenous vein aortocoronary sequential graft side to side to the ramus intermedius coronary artery & end to side to the first obtuse marginal coronary artery.  OTHER SURGICAL HISTORY  03-21-16    I & D- cyst- posterior neck    SPINE SURGERY      THYROID LOBECTOMY      UPPER GASTROINTESTINAL ENDOSCOPY N/A 3/5/2019    EGD BIOPSY performed by Swati Moran MD at [place] [id number]     Patient has no known allergies.     FAMILY HISTORY       Family History   Problem Relation Age of Onset    Diabetes Mother     Heart Disease Mother     Diabetes Father     Heart Disease Father     Heart Disease Brother           SOCIAL HISTORY       Social History     Socioeconomic History    Marital status:      Spouse name: None    Number of children: None    Years of education: None    Highest education level: None   Occupational History    None   Tobacco Use    Smoking status: Former Smoker     Packs/day: 1.00     Years: 45.00     Pack years: 45.00     Types: Cigarettes     Quit date: 2014     Years since quittin.0    Smokeless tobacco: Never Used   Vaping Use    Vaping Use: Never used   Substance and Sexual Activity    Alcohol use: No    Drug use: No    Sexual activity: None   Other Topics Concern    None   Social History Narrative    None     Social Determinants of Health     Financial Resource Strain: Medium Risk    Difficulty of Paying Living Expenses: Somewhat hard   Food Insecurity: No Food Insecurity    Worried About Running Out of Food in the Last Year: Never true    Nayla of Food in the Last Year: Never true   Transportation Needs:     Lack of Transportation (Medical): Not on file    Lack of Transportation (Non-Medical):  Not on file   Physical Activity:     Days of Exercise per Week: Not on file    Minutes of Exercise per Session: Not on file   Stress:     Feeling of Stress : Not on file   Social Connections:     Frequency of Communication with Friends and Family: Not on file    Frequency of Social Gatherings with Friends and Family: Not on file    Attends Confucianist Services: Not on file    Active Member of Clubs or Organizations: Not on file    Attends Club or Organization Meetings: Not on file    Marital Status: Not on file   Intimate Partner Violence:     Fear of Current or Ex-Partner: Not on file    Emotionally Abused: Not on file    Physically Abused: Not on file    Sexually Abused: Not on file   Housing Stability:     Unable to Pay for Housing in the Last Year: Not on file    Number of [place] in the Last Year: Not on file    Unstable Housing in the Last Year: Not on file           PHYSICAL EXAM    (up to 7 for level 4, 8 ormore for level 5)     ED Triage Vitals [12/09/21 1059]   BP Temp Temp Source Pulse Resp SpO2 Height Weight   (!) 142/85 98.2 °F (36.8 °C) Oral 61 18 94 % 6' 1\" (1.854 m) (!) 317 lb (143.8 kg)       Physical Exam     Physical    Vital signs and nursing notes were reviewed as well as the social, family, and past medical history. Gen. appearance: Patient is alert and oriented and in no acute distress    Head: Atraumatic, normocephalic    Neck: Supple, trachea/thyroid normal    EENT: PERRLA, EOMI, conjunctiva normal.    Skin: Warm and dry with no rash    Cardiovascular: Heart RRR, no gallops or rubs, no aortic enlargement or bruits noted. Respiratory: Lungs clear, no wheezing, no rales, normal breath sounds. Gastrointestinal: Abdomen nontender, bowel sounds normal, no rebound/guarding/distention or mass    Musculoskeletal: No tenderness in the extremities, patient has mild tenderness in the left lumbosacral paraspinous muscles. There is no erythema or warmth or clinical signs of infection. Neurological: Patient is alert and oriented ×3, no focal motor or sensory deficits noted, reflexes normal, L1-S1 intact bilaterally. DIAGNOSTIC RESULTS             LABS:  Labs Reviewed - No data to display    All other labs were within normal range or not returned as of this dictation.     EMERGENCY DEPARTMENT COURSE and DIFFERENTIAL DIAGNOSIS/MDM:   Vitals:    Vitals:    12/09/21 1059   BP: (!) 142/85   Pulse: 61   Resp: 18   Temp: 98.2 °F (36.8 °C)   TempSrc: Oral   SpO2: 94%   Weight: (!) 317 lb (143.8 kg)   Height: 6' 1\" (1.854 m)                 REASSESSMENT      I discussed with the patient and we will discharged home to follow-up with his pain management and primary doctor. PROCEDURES:  Unless otherwise noted below, none     Procedures    FINAL IMPRESSION      1. Strain of lumbar region, initial encounter          DISPOSITION/PLAN   DISPOSITION Decision To Discharge 12/09/2021 12:02:38 PM      PATIENT REFERRED TO:  Etta Zimmer MD  [address] [place] [phone]    In 2 days        DISCHARGE MEDICATIONS:  New Prescriptions    LIDOCAINE (LIDODERM) 5 %    Place 1 patch onto the skin daily 12 hours on, 12 hours off.     NAPROXEN (NAPROSYN) 500 MG TABLET    Take 1 tablet by mouth 2 times daily          (Please note that portions ofthis note were completed with a voice recognition program.  Efforts were made to edit the dictations but occasionally words are mis-transcribed.)    Morgan Lopez MD(electronically signed)  Attending Emergency Physician            Morgan Lopez MD  12/09/21 [id number]

## 2021-12-23 DIAGNOSIS — J44.9 CHRONIC OBSTRUCTIVE PULMONARY DISEASE, UNSPECIFIED COPD TYPE (HCC): ICD-10-CM

## 2021-12-27 RX ORDER — ALBUTEROL SULFATE 2.5 MG/3ML
SOLUTION RESPIRATORY (INHALATION)
Qty: 90 ML | Refills: 1 | Status: SHIPPED | OUTPATIENT
Start: 2021-12-27 | End: 2022-01-11

## 2022-01-10 DIAGNOSIS — E03.9 ACQUIRED HYPOTHYROIDISM: ICD-10-CM

## 2022-01-10 DIAGNOSIS — J44.9 CHRONIC OBSTRUCTIVE PULMONARY DISEASE, UNSPECIFIED COPD TYPE (HCC): ICD-10-CM

## 2022-01-10 RX ORDER — LEVOTHYROXINE SODIUM 0.2 MG/1
200 TABLET ORAL DAILY
Qty: 90 TABLET | Refills: 0 | Status: SHIPPED | OUTPATIENT
Start: 2022-01-10 | End: 2022-03-01 | Stop reason: SDUPTHER

## 2022-01-10 NOTE — TELEPHONE ENCOUNTER
Provider Debbie Izquierdo   2/25/2022  9:00 AM MD Karl Murillo  [facility]   6/2/2022  9:00 AM [facility]   8/4/2022 10:30 AM MD Kobe Sethi [facility]            Patient Active Problem List:     COPD (chronic obstructive pulmonary disease) ([facility] [address]     GERD (gastroesophageal reflux disease)     S/P CABG x 3     CAD (coronary artery disease)     Essential hypertension     Mixed hyperlipidemia     Diabetes mellitus ([facility]     Dysthymia (or depressive neurosis)     Chronic gout involving toe     Acquired hypothyroidism     Vitamin D deficiency disease     Chronic low back pain with left-sided sciatica     Class 3 severe obesity due to excess calories without serious comorbidity with body mass index (BMI) of 40.0 to 44.9 in adult [facility])     Acute recurrent sinusitis     MINAL treated with BiPAP

## 2022-01-11 RX ORDER — ALBUTEROL SULFATE 2.5 MG/3ML
SOLUTION RESPIRATORY (INHALATION)
Qty: 90 ML | Refills: 1 | Status: SHIPPED | OUTPATIENT
Start: 2022-01-11

## 2022-01-19 DIAGNOSIS — I10 ESSENTIAL HYPERTENSION: ICD-10-CM

## 2022-01-20 RX ORDER — AMLODIPINE BESYLATE 2.5 MG/1
TABLET ORAL
Qty: 90 TABLET | Refills: 1 | Status: SHIPPED | OUTPATIENT
Start: 2022-01-20 | End: 2022-03-01 | Stop reason: SDUPTHER

## 2022-02-02 RX ORDER — ALLOPURINOL 300 MG/1
TABLET ORAL
Qty: 90 TABLET | Refills: 0 | Status: SHIPPED | OUTPATIENT
Start: 2022-02-02 | End: 2022-03-01 | Stop reason: SDUPTHER

## 2022-03-01 ENCOUNTER — OFFICE VISIT (OUTPATIENT)
Dept: FAMILY MEDICINE CLINIC | Age: 67
End: 2022-03-01
Payer: MEDICARE

## 2022-03-01 VITALS
OXYGEN SATURATION: 96 % | TEMPERATURE: 97.5 F | WEIGHT: 312 LBS | DIASTOLIC BLOOD PRESSURE: 80 MMHG | SYSTOLIC BLOOD PRESSURE: 122 MMHG | RESPIRATION RATE: 18 BRPM | HEART RATE: 59 BPM | BODY MASS INDEX: 41.16 KG/M2

## 2022-03-01 DIAGNOSIS — E78.2 MIXED HYPERLIPIDEMIA: ICD-10-CM

## 2022-03-01 DIAGNOSIS — I10 ESSENTIAL HYPERTENSION: ICD-10-CM

## 2022-03-01 DIAGNOSIS — M1A.9XX0 CHRONIC GOUT INVOLVING TOE WITHOUT TOPHUS, UNSPECIFIED CAUSE, UNSPECIFIED LATERALITY: ICD-10-CM

## 2022-03-01 DIAGNOSIS — E03.9 ACQUIRED HYPOTHYROIDISM: ICD-10-CM

## 2022-03-01 DIAGNOSIS — E11.9 TYPE 2 DIABETES MELLITUS WITHOUT COMPLICATION, WITHOUT LONG-TERM CURRENT USE OF INSULIN (HCC): Primary | ICD-10-CM

## 2022-03-01 LAB — HBA1C MFR BLD: 6.2 %

## 2022-03-01 PROCEDURE — 99214 OFFICE O/P EST MOD 30 MIN: CPT | Performed by: INTERNAL MEDICINE

## 2022-03-01 PROCEDURE — 83036 HEMOGLOBIN GLYCOSYLATED A1C: CPT | Performed by: INTERNAL MEDICINE

## 2022-03-01 RX ORDER — ALLOPURINOL 300 MG/1
TABLET ORAL
Qty: 90 TABLET | Refills: 0 | Status: SHIPPED | OUTPATIENT
Start: 2022-03-01

## 2022-03-01 RX ORDER — BUDESONIDE AND FORMOTEROL FUMARATE DIHYDRATE 160; 4.5 UG/1; UG/1
2 AEROSOL RESPIRATORY (INHALATION) 2 TIMES DAILY
Qty: 30.6 G | Refills: 1 | Status: SHIPPED | OUTPATIENT
Start: 2022-03-01 | End: 2022-08-17

## 2022-03-01 RX ORDER — LOSARTAN POTASSIUM 25 MG/1
TABLET ORAL
Qty: 90 TABLET | Refills: 3 | Status: SHIPPED | OUTPATIENT
Start: 2022-03-01

## 2022-03-01 RX ORDER — GLIPIZIDE 5 MG/1
5 TABLET ORAL EVERY MORNING
Qty: 90 TABLET | Refills: 1 | Status: SHIPPED | OUTPATIENT
Start: 2022-03-01 | End: 2022-07-23 | Stop reason: ALTCHOICE

## 2022-03-01 RX ORDER — METOPROLOL SUCCINATE 50 MG/1
TABLET, EXTENDED RELEASE ORAL
Qty: 90 TABLET | Refills: 3 | Status: SHIPPED | OUTPATIENT
Start: 2022-03-01

## 2022-03-01 RX ORDER — PAROXETINE HYDROCHLORIDE 20 MG/1
20 TABLET, FILM COATED ORAL EVERY MORNING
Qty: 180 TABLET | Refills: 1 | Status: SHIPPED | OUTPATIENT
Start: 2022-03-01

## 2022-03-01 RX ORDER — LOVASTATIN 40 MG/1
40 TABLET ORAL 2 TIMES DAILY
Qty: 180 TABLET | Refills: 3 | Status: SHIPPED | OUTPATIENT
Start: 2022-03-01 | End: 2022-10-03

## 2022-03-01 RX ORDER — GLIPIZIDE 10 MG/1
10 TABLET ORAL 2 TIMES DAILY
Qty: 180 TABLET | Refills: 1 | Status: CANCELLED | OUTPATIENT
Start: 2022-03-01

## 2022-03-01 RX ORDER — LEVOTHYROXINE SODIUM 0.2 MG/1
200 TABLET ORAL DAILY
Qty: 90 TABLET | Refills: 0 | Status: SHIPPED | OUTPATIENT
Start: 2022-03-01 | End: 2022-07-13 | Stop reason: SDUPTHER

## 2022-03-01 RX ORDER — FENOFIBRATE 145 MG/1
TABLET, COATED ORAL
Qty: 30 TABLET | Refills: 3 | Status: SHIPPED | OUTPATIENT
Start: 2022-03-01 | End: 2022-06-27

## 2022-03-01 RX ORDER — AMLODIPINE BESYLATE 2.5 MG/1
TABLET ORAL
Qty: 90 TABLET | Refills: 1 | Status: SHIPPED | OUTPATIENT
Start: 2022-03-01 | End: 2022-08-17

## 2022-03-01 ASSESSMENT — ENCOUNTER SYMPTOMS
BACK PAIN: 0
NAUSEA: 0
SORE THROAT: 0
WHEEZING: 0
COUGH: 0
CONSTIPATION: 0
SHORTNESS OF BREATH: 0
BLOOD IN STOOL: 0
ABDOMINAL PAIN: 0
ALLERGIC/IMMUNOLOGIC NEGATIVE: 1

## 2022-03-01 NOTE — PROGRESS NOTES
HPI Notes    Name: Dwayne Saab  : 1955         Chief Complaint:     Chief Complaint   Patient presents with    Diabetes     pt does check his sugars at home, readings usually around 100-150. Pt is interested in the [employer]       History of Present Illness:        Santos Rico presents to office to follow-up for diabetes, hypertension, hypothyroidism, gout    States doing well. Santos Rico confirms that he is compliant with his  thyroid medication. He  denies an increase in fatigue, constipation, increased skin dryness, or increased lower extremity edema. The last TSH was 0.95 on 2021. Has a h/o gout for many years. On Allopurinol. Last flare up was about 4 months ago. States mainly toes are affected. Diabetes  He presents for his follow-up diabetic visit. He has type 2 diabetes mellitus. Hypoglycemia symptoms include sleepiness and sweats. Pertinent negatives for hypoglycemia include no dizziness, headaches or nervousness/anxiousness. Pertinent negatives for diabetes include no chest pain, no foot paresthesias, no foot ulcerations, no polydipsia, no polyuria and no weakness. There are no hypoglycemic complications. Diabetic complications include heart disease. Pertinent negatives for diabetic complications include no CVA. Risk factors for coronary artery disease include diabetes mellitus, dyslipidemia, male sex, hypertension, obesity and family history. Current diabetic treatment includes oral agent (dual therapy). He is compliant with treatment all of the time. He is following a generally healthy diet. His overall blood glucose range is 130-140 mg/dl. An ACE inhibitor/angiotensin II receptor blocker is being taken. Eye exam is current. Hypertension  This is a chronic problem. The current episode started more than 1 year ago. The problem is unchanged. The problem is controlled. Associated symptoms include sweats.  Pertinent negatives include no chest pain, headaches, palpitations, peripheral edema or shortness of breath. There are no associated agents to hypertension. Past treatments include beta blockers, calcium channel blockers and angiotensin blockers. The current treatment provides significant improvement. There are no compliance problems. Hypertensive end-organ damage includes CAD/MI. There is no history of CVA. Past Medical History:     Past Medical History:   Diagnosis Date    Asthma     Bronchitis     CAD (coronary artery disease)     [facility] ([facility] 8/12/2011    Depression 8/12/2011    Diabetes mellitus ([facility]     GERD (gastroesophageal reflux disease) 8/12/2011    Hyperlipidemia 8/12/2011    Hypertension     Hypothyroid 8/12/2011    Lower back pain     Nicotine addiction 8/12/2011    Sleep apnea       Reviewed all health maintenance requirements and orderedappropriate tests  Health Maintenance Due   Topic Date Due    Diabetic microalbuminuria test  08/18/2017    Diabetic retinal exam  10/29/2020       Past Surgical History:     Past Surgical History:   Procedure Laterality Date    CARDIAC CATHETERIZATION Left 01/21/15    Severe left main trunk disease extending into the ostium of the left anterior descending of 80% involving the ostium of the intermediate & atrioventricular branch of the circumflex. 60% disease in the atrioventricular branch of the circumflex in the midportion. 40% disease in the right coronary artery. Normal left ventricular function, ejection fraction of 60%    CARDIAC CATHETERIZATION Left 12/20/2017    Dr. Guillermo Dumont @ [facility]--70% left main trunck & ostial LAD & high lateral circumflex disease. Patent LIMA to the LAD. Patent vein graft to the high lateral circumflex. 60% disease in the AV branch of the circumflex that terminates in a large posterolateral branch with an FFR of 1.0, indicating no significant stenosis. 40% disease in the right coronary artery. Ejection fraction 50-55%.     COLONOSCOPY N/A 3/5/2019    COLONOSCOPY POLYPECTOMY HOT BIOPSY performed by Anna Temple MD at [facility] GRAFT  02/04/15    3 vessel CABG using left internal mammary artery to the left anterior descending coronary artery with a reverse saphenous vein aortocoronary sequential graft side to side to the ramus intermedius coronary artery & end to side to the first obtuse marginal coronary artery.  OTHER SURGICAL HISTORY  03-21-16    I & D- cyst- posterior neck    SPINE SURGERY      THYROID LOBECTOMY      UPPER GASTROINTESTINAL ENDOSCOPY N/A 3/5/2019    EGD BIOPSY performed by Anna Temple MD at [facility] OR        Medications:       Prior to Admission medications    Medication Sig Start Date End Date Taking?  Authorizing Provider   amLODIPine (NORVASC) 2.5 MG tablet TAKE 1 TABLET EVERY DAY 3/1/22  Yes Nena Beltran MD   budesonide-formoterol (SYMBICORT) 160-4.5 MCG/ACT AERO Inhale 2 puffs into the lungs 2 times daily 3/1/22  Yes Nena Beltran MD   fenofibrate (TRICOR) 145 MG tablet TAKE 1 TABLET BY MOUTH EVERY DAY 3/1/22  Yes Nena Beltran MD   lovastatin (MEVACOR) 40 MG tablet Take 1 tablet by mouth 2 times daily 3/1/22 5/30/22 Yes Nena Beltran MD   metFORMIN (GLUCOPHAGE) 1000 MG tablet Take 1 tablet by mouth 2 times daily (with meals) 3/1/22  Yes Nena Beltran MD   metoprolol succinate (TOPROL XL) 50 MG extended release tablet TAKE 1 TABLET EVERY DAY 3/1/22  Yes Nena Beltran MD   allopurinol (ZYLOPRIM) 300 MG tablet TAKE 1 TABLET EVERY DAY 3/1/22  Yes Nena Beltran MD   levothyroxine (SYNTHROID) 200 MCG tablet Take 1 tablet by mouth daily 3/1/22  Yes Nena Beltran MD   losartan (COZAAR) 25 MG tablet TAKE 1 TABLET EVERY DAY 3/1/22  Yes Nena Beltran MD   PARoxetine (PAXIL) 20 MG tablet Take 1 tablet by mouth every morning 3/1/22  Yes Nena Beltran MD   glipiZIDE (GLUCOTROL) 5 MG tablet Take 1 tablet by mouth every morning 3/1/22  Yes Nena Beltran MD albuterol (PROVENTIL) (2.5 MG/3ML) 0.083% nebulizer solution INHALE THE CONTENTS OF 1 VIAL VIA NEBULIZER EVERY 4 HOURS AS NEEDED FOR WHEEZING OR SHORTNESS OF BREATH 1/11/22  Yes Mann Mantilla MD   naproxen (NAPROSYN) 500 MG tablet Take 1 tablet by mouth 2 times daily 12/9/21  Yes Jennie Bower MD   glipiZIDE (GLUCOTROL) 10 MG tablet Take 1 tablet by mouth 2 times daily 12/3/21  Yes Mann Mantilla MD   ipratropium-albuterol (DUONEB) 0.5-2.5 (3) MG/3ML SOLN nebulizer solution Inhale 3 mLs into the lungs 4 times daily 4/30/21  Yes Mann Mantilla MD   CPAP Machine MISC by Does not apply route 4/19/21  Yes Mann Mantilla MD   fluticasone (FLONASE) 50 MCG/ACT nasal spray 2 sprays by Nasal route daily 2/19/21  Yes Mann Mantilla MD   albuterol sulfate  (90 Base) MCG/ACT inhaler Inhale 2 puffs into the lungs every 6 hours as needed for Wheezing 2/19/21  Yes Mann Mantilla MD   [facility] [address] by Does not apply route   Yes Historical Provider, MD   ondansetron (ZOFRAN-ODT) 4 MG disintegrating tablet Take 1 tablet by mouth 3 times daily as needed for Nausea or Vomiting 11/17/20  Yes Mnan Mantilla MD   Respiratory Therapy Supplies [facility] CPAP full face mask, hose, head gear and  filter 2/7/20  Yes Mann Mantilla MD   blood glucose monitor strips Test 2 times a day & as needed for symptoms of irregular blood glucose. 11/11/19  Yes Mann Mantilla MD   mometasone (ELOCON) 0.1 % cream Apply topically daily. 2/20/17  Yes Lieutenant Siddharth MD   aspirin 81 MG tablet Take 81 mg by mouth daily. Yes Historical Provider, MD        Allergies:       Patient has no known allergies. Social History:     Tobacco: reports that he quit smoking about 7 years ago. His smoking use included cigarettes. He has a 45.00 pack-year smoking history. He has never used smokeless tobacco.  Alcohol:      reports no history of alcohol use. Drug Use:  reports no history of drug use.     Family History:     Family History   Problem Relation Age of Onset    Diabetes Mother     Heart Disease Mother     Diabetes Father     Heart Disease Father     Heart Disease Brother        Review of Systems:         Review of Systems   Constitutional: Negative for activity change, appetite change and unexpected weight change. HENT: Negative for congestion, ear discharge, ear pain and sore throat. Eyes: Negative for visual disturbance. Respiratory: Negative for cough, shortness of breath and wheezing. Cardiovascular: Negative for chest pain, palpitations and leg swelling. Gastrointestinal: Negative for abdominal pain, blood in stool, constipation and nausea. Endocrine: Negative for cold intolerance, heat intolerance, polydipsia and polyuria. Genitourinary: Negative for difficulty urinating and dysuria. Musculoskeletal: Negative. Negative for back pain. Skin: Negative for rash. Allergic/Immunologic: Negative. Neurological: Negative for dizziness, weakness and headaches. Psychiatric/Behavioral: Negative for behavioral problems and dysphoric mood. The patient is not nervous/anxious. Physical Exam:     Vitals:  /80   Pulse 59   Temp 97.5 °F (36.4 °C) (Temporal)   Resp 18   Wt (!) 312 lb (141.5 kg)   SpO2 96%   BMI 41.16 kg/m²       Physical Exam  Vitals reviewed. Constitutional:       General: He is not in acute distress. Appearance: He is well-developed. He is obese. HENT:      Head: Normocephalic and atraumatic. Right Ear: External ear normal.      Left Ear: External ear normal.      Mouth/Throat:      Mouth: Mucous membranes are moist.   Eyes:      General:         Right eye: No discharge. Left eye: No discharge. Conjunctiva/sclera: Conjunctivae normal.   Neck:      Thyroid: No thyromegaly. Cardiovascular:      Rate and Rhythm: Normal rate and regular rhythm. Heart sounds: Normal heart sounds. No murmur heard.       Pulmonary:      Effort: Pulmonary effort is normal.      Breath sounds: Normal breath sounds. No wheezing or rales. Abdominal:      General: Bowel sounds are normal. There is no distension. Palpations: Abdomen is soft. There is no mass. Tenderness: There is no abdominal tenderness. Musculoskeletal:         General: Normal range of motion. Right lower leg: No edema. Left lower leg: No edema. Lymphadenopathy:      Cervical: No cervical adenopathy. Skin:     General: Skin is warm and dry. Coloration: Skin is not jaundiced or pale. Findings: No rash. Neurological:      General: No focal deficit present. Mental Status: He is alert and oriented to person, place, and time. Psychiatric:         Mood and Affect: Mood normal.         Behavior: Behavior normal.         Thought Content:  Thought content normal.     Diabetic Foot Exam    Pulses:  normal,   Edema: negative  Skin: normal    Sensory exam  Monofilament Sensation:  normal  (minimum of 5 random plantar locations tested, avoid callous areas - > 1 area with absence of sensation is + for neuropathy)      Plus one of the following  Pinprick:  Intact        Data:     Lab Results   Component Value Date     11/05/2021    K 4.4 11/05/2021     11/05/2021    CO2 25 11/05/2021    BUN 13 11/05/2021    CREATININE 0.94 11/05/2021    GLUCOSE 164 11/05/2021    GLUCOSE 111 10/14/2011    PROT 6.6 08/07/2021    LABALBU 4.0 08/07/2021    BILITOT 0.38 08/07/2021    ALKPHOS 51 08/07/2021    AST 33 08/07/2021    ALT 34 08/07/2021     Lab Results   Component Value Date    WBC 8.2 11/05/2021    RBC 4.72 11/05/2021    HGB 14.7 11/05/2021    HCT 43.7 11/05/2021    MCV 92.7 11/05/2021    MCH 31.2 11/05/2021    MCHC 33.7 11/05/2021    RDW 13.9 11/05/2021     11/05/2021    MPV NOT REPORTED 11/05/2021     Lab Results   Component Value Date    TSH 0.95 08/07/2021     Lab Results   Component Value Date    CHOL 122 08/07/2021    HDL 30 08/07/2021    PSA 0.35 12/17/2018    LABA1C 6.2 03/01/2022          Assessment & Plan        Diagnosis Orders   1. Type 2 diabetes mellitus without complication, without long-term current use of insulin (HCC)   Course is improving. HbA1C is 6.2 % today. Will continue on Metformin 1000 mg bid, decrease Glipizide from 10mg bid to  Mg daily to avoid reported hypoglycemic episodes. Advised to adhere to 1800 pat diet, exercise to try lose weight. POCT glycosylated hemoglobin (Hb A1C)    HM DIABETES FOOT EXAM    metFORMIN (GLUCOPHAGE) 1000 MG tablet    glipiZIDE (GLUCOTROL) 5 MG tablet   2. Essential hypertension   BP controlled, continue on current meds amLODIPine (NORVASC) 2.5 MG tablet    metoprolol succinate (TOPROL XL) 50 MG extended release tablet    losartan (COZAAR) 25 MG tablet   3. Acquired hypothyroidism   Continue on current dose of Synthroid levothyroxine (SYNTHROID) 200 MCG tablet   4. 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  Mixed hyperlipidemia  fenofibrate (TRICOR) 145 MG tablet    lovastatin (MEVACOR) 40 MG tablet                   Completed Refills   Requested Prescriptions     Signed Prescriptions Disp Refills    amLODIPine (NORVASC) 2.5 MG tablet 90 tablet 1     Sig: TAKE 1 TABLET EVERY DAY    budesonide-formoterol (SYMBICORT) 160-4.5 MCG/ACT AERO 30.6 g 1     Sig: Inhale 2 puffs into the lungs 2 times daily    fenofibrate (TRICOR) 145 MG tablet 30 tablet 3     Sig: TAKE 1 TABLET BY MOUTH EVERY DAY    lovastatin (MEVACOR) 40 MG tablet 180 tablet 3     Sig: Take 1 tablet by mouth 2 times daily    metFORMIN (GLUCOPHAGE) 1000 MG tablet 180 tablet 1     Sig: Take 1 tablet by mouth 2 times daily (with meals)    metoprolol succinate (TOPROL XL) 50 MG extended release tablet 90 tablet 3     Sig: TAKE 1 TABLET EVERY DAY    allopurinol (ZYLOPRIM) 300 MG tablet 90 tablet 0     Sig: TAKE 1 TABLET EVERY DAY    levothyroxine (SYNTHROID) 200 MCG tablet 90 tablet 0     Sig: Take 1 tablet by mouth daily    losartan (COZAAR) 25 MG tablet 90 tablet 3     Sig: TAKE 1 TABLET EVERY DAY    PARoxetine (PAXIL) 20 MG tablet 180 tablet 1     Sig: Take 1 tablet by mouth every morning    glipiZIDE (GLUCOTROL) 5 MG tablet 90 tablet 1     Sig: Take 1 tablet by mouth every morning     Return in about 3 months (around 6/1/2022) for diabetes, HTN, hyperlipidemia, copd.      Orders Placed This Encounter   Medications    amLODIPine (NORVASC) 2.5 MG tablet     Sig: TAKE 1 TABLET EVERY DAY     Dispense:  90 tablet     Refill:  1    budesonide-formoterol (SYMBICORT) 160-4.5 MCG/ACT AERO     Sig: Inhale 2 puffs into the lungs 2 times daily     Dispense:  30.6 g     Refill:  1    fenofibrate (TRICOR) 145 MG tablet     Sig: TAKE 1 TABLET BY MOUTH EVERY DAY     Dispense:  30 tablet     Refill:  3    lovastatin (MEVACOR) 40 MG tablet     Sig: Take 1 tablet by mouth 2 times daily     Dispense:  180 tablet     Refill:  3    metFORMIN (GLUCOPHAGE) 1000 MG tablet     Sig: Take 1 tablet by mouth 2 times daily (with meals)     Dispense:  180 tablet     Refill:  1    metoprolol succinate (TOPROL XL) 50 MG extended release tablet     Sig: TAKE 1 TABLET EVERY DAY     Dispense:  90 tablet     Refill:  3    allopurinol (ZYLOPRIM) 300 MG tablet     Sig: TAKE 1 TABLET EVERY DAY     Dispense:  90 tablet     Refill:  0    levothyroxine (SYNTHROID) 200 MCG tablet     Sig: Take 1 tablet by mouth daily     Dispense:  90 tablet     Refill:  0    losartan (COZAAR) 25 MG tablet     Sig: TAKE 1 TABLET EVERY DAY     Dispense:  90 tablet     Refill:  3    PARoxetine (PAXIL) 20 MG tablet     Sig: Take 1 tablet by mouth every morning     Dispense:  180 tablet     Refill:  1    glipiZIDE (GLUCOTROL) 5 MG tablet     Sig: Take 1 tablet by mouth every morning     Dispense:  90 tablet     Refill:  1     Orders Placed This Encounter   Procedures    POCT glycosylated hemoglobin (Hb A1C)    HM DIABETES FOOT EXAM         There are no Patient Instructions on file for this visit.     Electronically signed by Ernestine Dickinson MD on 3/1/2022 at 3:45 PM           Completed Refills      Requested Prescriptions     Signed Prescriptions Disp Refills    amLODIPine (NORVASC) 2.5 MG tablet 90 tablet 1     Sig: TAKE 1 TABLET EVERY DAY    budesonide-formoterol (SYMBICORT) 160-4.5 MCG/ACT AERO 30.6 g 1     Sig: Inhale 2 puffs into the lungs 2 times daily    fenofibrate (TRICOR) 145 MG tablet 30 tablet 3     Sig: TAKE 1 TABLET BY MOUTH EVERY DAY    lovastatin (MEVACOR) 40 MG tablet 180 tablet 3     Sig: Take 1 tablet by mouth 2 times daily    metFORMIN (GLUCOPHAGE) 1000 MG tablet 180 tablet 1     Sig: Take 1 tablet by mouth 2 times daily (with meals)    metoprolol succinate (TOPROL XL) 50 MG extended release tablet 90 tablet 3     Sig: TAKE 1 TABLET EVERY DAY    allopurinol (ZYLOPRIM) 300 MG tablet 90 tablet 0     Sig: TAKE 1 TABLET EVERY DAY    levothyroxine (SYNTHROID) 200 MCG tablet 90 tablet 0     Sig: Take 1 tablet by mouth daily    losartan (COZAAR) 25 MG tablet 90 tablet 3     Sig: TAKE 1 TABLET EVERY DAY    PARoxetine (PAXIL) 20 MG tablet 180 tablet 1     Sig: Take 1 tablet by mouth every morning    glipiZIDE (GLUCOTROL) 5 MG tablet 90 tablet 1     Sig: Take 1 tablet by mouth every morning

## 2022-03-15 ENCOUNTER — TELEPHONE (OUTPATIENT)
Dept: ONCOLOGY | Age: 67
End: 2022-03-15

## 2022-05-19 ENCOUNTER — OFFICE VISIT (OUTPATIENT)
Dept: FAMILY MEDICINE CLINIC | Age: 67
End: 2022-05-19
Payer: MEDICARE

## 2022-05-19 VITALS
SYSTOLIC BLOOD PRESSURE: 132 MMHG | OXYGEN SATURATION: 94 % | RESPIRATION RATE: 18 BRPM | DIASTOLIC BLOOD PRESSURE: 84 MMHG | HEART RATE: 62 BPM | WEIGHT: 312 LBS | BODY MASS INDEX: 41.35 KG/M2 | HEIGHT: 73 IN

## 2022-05-19 DIAGNOSIS — L30.9 ECZEMA, UNSPECIFIED TYPE: ICD-10-CM

## 2022-05-19 DIAGNOSIS — R42 DIZZINESS: ICD-10-CM

## 2022-05-19 DIAGNOSIS — R42 VERTIGO: Primary | ICD-10-CM

## 2022-05-19 PROCEDURE — 99214 OFFICE O/P EST MOD 30 MIN: CPT | Performed by: INTERNAL MEDICINE

## 2022-05-19 RX ORDER — MECLIZINE HYDROCHLORIDE 25 MG/1
25 TABLET ORAL 3 TIMES DAILY PRN
Qty: 90 TABLET | Refills: 0 | Status: SHIPPED | OUTPATIENT
Start: 2022-05-19 | End: 2022-06-18

## 2022-05-19 ASSESSMENT — PATIENT HEALTH QUESTIONNAIRE - PHQ9
7. TROUBLE CONCENTRATING ON THINGS, SUCH AS READING THE NEWSPAPER OR WATCHING TELEVISION: 0
SUM OF ALL RESPONSES TO PHQ QUESTIONS 1-9: 0
4. FEELING TIRED OR HAVING LITTLE ENERGY: 0
SUM OF ALL RESPONSES TO PHQ9 QUESTIONS 1 & 2: 0
SUM OF ALL RESPONSES TO PHQ QUESTIONS 1-9: 0
1. LITTLE INTEREST OR PLEASURE IN DOING THINGS: 0
3. TROUBLE FALLING OR STAYING ASLEEP: 0
SUM OF ALL RESPONSES TO PHQ QUESTIONS 1-9: 0
10. IF YOU CHECKED OFF ANY PROBLEMS, HOW DIFFICULT HAVE THESE PROBLEMS MADE IT FOR YOU TO DO YOUR WORK, TAKE CARE OF THINGS AT HOME, OR GET ALONG WITH OTHER PEOPLE: 0
8. MOVING OR SPEAKING SO SLOWLY THAT OTHER PEOPLE COULD HAVE NOTICED. OR THE OPPOSITE, BEING SO FIGETY OR RESTLESS THAT YOU HAVE BEEN MOVING AROUND A LOT MORE THAN USUAL: 0
2. FEELING DOWN, DEPRESSED OR HOPELESS: 0
5. POOR APPETITE OR OVEREATING: 0
SUM OF ALL RESPONSES TO PHQ QUESTIONS 1-9: 0
6. FEELING BAD ABOUT YOURSELF - OR THAT YOU ARE A FAILURE OR HAVE LET YOURSELF OR YOUR FAMILY DOWN: 0
9. THOUGHTS THAT YOU WOULD BE BETTER OFF DEAD, OR OF HURTING YOURSELF: 0

## 2022-05-19 ASSESSMENT — ENCOUNTER SYMPTOMS
BLOOD IN STOOL: 0
SHORTNESS OF BREATH: 0
COUGH: 0
ABDOMINAL PAIN: 0
NAUSEA: 0
ALLERGIC/IMMUNOLOGIC NEGATIVE: 1
WHEEZING: 0
SORE THROAT: 0
CHEST TIGHTNESS: 0
CONSTIPATION: 0

## 2022-05-19 NOTE — PROGRESS NOTES
HPI Notes    Name: Ranulfo Landin  : 1955         Chief Complaint:     Chief Complaint   Patient presents with    Dizziness     Patient in office c/o vertigo. x 3 weeks.  Rash     Patient c/o rash on hand. c/o itching, burning. Denies injury       History of Present Illness: The patient presents to office for evaluation of dizziness and rash over the left hand    Patient states that he developed dizziness about 3 weeks ago. States has a h/o vertigo in the past and symptoms feel the same as in the past. Reports no HA, CP, no SOB, palpitations, syncope. Has no problems with speech, vision  Says in the past he took Meclizine and it helped with dizziness      Dizziness  This is a recurrent problem. The current episode started 1 to 4 weeks ago. The problem occurs constantly. The problem has been unchanged. Pertinent negatives include no abdominal pain, chest pain, congestion, coughing, fatigue, headaches, nausea, numbness, rash, sore throat or weakness. Exacerbated by: lying down, sometimes standing up. He has tried nothing for the symptoms. Rash  This is a chronic problem. The current episode started more than 1 year ago. The problem is unchanged. The affected locations include the left hand and face. The rash is characterized by dryness, scaling, peeling and itchiness. Pertinent negatives include no congestion, cough, fatigue, shortness of breath or sore throat. Past treatments include moisturizer and anti-itch cream. The treatment provided no relief.            Past Medical History:     Past Medical History:   Diagnosis Date    Asthma     Bronchitis     CAD (coronary artery disease)     [facility]    Depression 2011    Diabetes mellitus (HCC)     GERD (gastroesophageal reflux disease) 2011    Hyperlipidemia 2011    Hypertension     Hypothyroid 2011    Lower back pain     Nicotine addiction 2011    Sleep apnea Reviewed all health maintenance requirements and orderedappropriate tests  Health Maintenance Due   Topic Date Due    Diabetic microalbuminuria test  08/18/2017    Diabetic retinal exam  10/29/2020    Low dose CT lung screening  03/03/2022    Depression Monitoring  06/01/2022    Annual Wellness Visit (AWV)  06/02/2022       Past Surgical History:     Past Surgical History:   Procedure Laterality Date    CARDIAC CATHETERIZATION Left 01/21/15    Severe left main trunk disease extending into the ostium of the left anterior descending of 80% involving the ostium of the intermediate & atrioventricular branch of the circumflex. 60% disease in the atrioventricular branch of the circumflex in the midportion. 40% disease in the right coronary artery. Normal left ventricular function, ejection fraction of 60%    CARDIAC CATHETERIZATION Left 12/20/2017    Dr. Malini Lino @ [facility]--70% left main trunck & ostial LAD & high lateral circumflex disease. Patent LIMA to the LAD. Patent vein graft to the high lateral circumflex. 60% disease in the AV branch of the circumflex that terminates in a large posterolateral branch with an FFR of 1.0, indicating no significant stenosis. 40% disease in the right coronary artery. Ejection fraction 50-55%.  COLONOSCOPY N/A 3/5/2019    COLONOSCOPY POLYPECTOMY HOT BIOPSY performed by Kayden Burnett MD at [facility]  02/04/15    3 vessel CABG using left internal mammary artery to the left anterior descending coronary artery with a reverse saphenous vein aortocoronary sequential graft side to side to the ramus intermedius coronary artery & end to side to the first obtuse marginal coronary artery.     OTHER SURGICAL HISTORY  03-21-16    I & D- cyst- posterior neck    SPINE SURGERY      THYROID LOBECTOMY      UPPER GASTROINTESTINAL ENDOSCOPY N/A 3/5/2019    EGD BIOPSY performed by Kayden Burnett MD at [facility] OR        Medications:       Prior to Admission medications    Medication Sig Start Date End Date Taking?  Authorizing Provider   meclizine (ANTIVERT) 25 MG tablet Take 1 tablet by mouth 3 times daily as needed for Dizziness 5/19/22 6/18/22 Yes Nancy Shah MD   fluocinolone 0.01 % cream Apply topically 2 times daily 5/19/22  Yes Nancy Shah MD   amLODIPine (NORVASC) 2.5 MG tablet TAKE 1 TABLET EVERY DAY 3/1/22  Yes Nancy Shah MD   budesonide-formoterol (SYMBICORT) 160-4.5 MCG/ACT AERO Inhale 2 puffs into the lungs 2 times daily 3/1/22  Yes Nancy Shah MD   fenofibrate (TRICOR) 145 MG tablet TAKE 1 TABLET BY MOUTH EVERY DAY 3/1/22  Yes Nancy Shah MD   lovastatin (MEVACOR) 40 MG tablet Take 1 tablet by mouth 2 times daily 3/1/22 5/30/22 Yes Nancy Shah MD   metFORMIN (GLUCOPHAGE) 1000 MG tablet Take 1 tablet by mouth 2 times daily (with meals) 3/1/22  Yes Nancy Shah MD   metoprolol succinate (TOPROL XL) 50 MG extended release tablet TAKE 1 TABLET EVERY DAY 3/1/22  Yes Nancy Shah MD   allopurinol (ZYLOPRIM) 300 MG tablet TAKE 1 TABLET EVERY DAY 3/1/22  Yes Nancy Shah MD   levothyroxine (SYNTHROID) 200 MCG tablet Take 1 tablet by mouth daily 3/1/22  Yes Nancy Shah MD   losartan (COZAAR) 25 MG tablet TAKE 1 TABLET EVERY DAY 3/1/22  Yes Nancy Shah MD   PARoxetine (PAXIL) 20 MG tablet Take 1 tablet by mouth every morning 3/1/22  Yes Nancy Shah MD   glipiZIDE (GLUCOTROL) 5 MG tablet Take 1 tablet by mouth every morning 3/1/22  Yes Nancy Shah MD   albuterol (PROVENTIL) (2.5 MG/3ML) 0.083% nebulizer solution INHALE THE CONTENTS OF 1 VIAL VIA NEBULIZER EVERY 4 HOURS AS NEEDED FOR WHEEZING OR SHORTNESS OF BREATH 1/11/22  Yes Nancy Shah MD   naproxen (NAPROSYN) 500 MG tablet Take 1 tablet by mouth 2 times daily 12/9/21  Yes Scar Gipson MD   glipiZIDE (GLUCOTROL) 10 MG tablet Take 1 tablet by mouth 2 times daily 12/3/21  Yes Nancy Shah MD   ipratropium-albuterol (DUONEB) 0.5-2.5 (3) MG/3ML SOLN nebulizer solution Inhale 3 mLs into the lungs 4 times daily 4/30/21  Yes Harriett Napoles MD   CPAP Machine MISC by Does not apply route 4/19/21  Yes Harriett Napoles MD   fluticasone (FLONASE) 50 MCG/ACT nasal spray 2 sprays by Nasal route daily 2/19/21  Yes Harriett Napoles MD   albuterol sulfate  (90 Base) MCG/ACT inhaler Inhale 2 puffs into the lungs every 6 hours as needed for Wheezing 2/19/21  Yes Harriett Napoles MD   [address] by Does not apply route   Yes Historical Provider, MD   ondansetron (ZOFRAN-ODT) 4 MG disintegrating tablet Take 1 tablet by mouth 3 times daily as needed for Nausea or Vomiting 11/17/20  Yes Harriett Napoles MD   Respiratory Therapy Supplies MISC CPAP full face mask, hose, head gear and  filter 2/7/20  Yes Harriett Napoles MD   blood glucose monitor strips Test 2 times a day & as needed for symptoms of irregular blood glucose. 11/11/19  Yes Harriett Napoles MD   mometasone (ELOCON) 0.1 % cream Apply topically daily. 2/20/17  Yes Lois Saenz MD   aspirin 81 MG tablet Take 81 mg by mouth daily. Yes Historical Provider, MD        Allergies:       Patient has no known allergies. Social History:     Tobacco: reports that he quit smoking about 7 years ago. His smoking use included cigarettes. He has a 45.00 pack-year smoking history. He has never used smokeless tobacco.  Alcohol:      reports no history of alcohol use. Drug Use:  reports no history of drug use. Family History:     Family History   Problem Relation Age of Onset    Diabetes Mother     Heart Disease Mother     Diabetes Father     Heart Disease Father     Heart Disease Brother        Review of Systems:         Review of Systems   Constitutional: Negative for activity change, appetite change, fatigue and unexpected weight change. HENT: Negative for congestion, ear discharge, ear pain and sore throat. Eyes: Negative for visual disturbance.    Respiratory: soft. There is no mass. Tenderness: There is no abdominal tenderness. Musculoskeletal:         General: No tenderness. Normal range of motion. Right lower leg: No edema. Left lower leg: No edema. Lymphadenopathy:      Cervical: No cervical adenopathy. Skin:     General: Skin is warm and dry. Coloration: Skin is not jaundiced or pale. Findings: Rash (maculopapular rash over the left hand dorsum, skin dry, areas of skin peeling. similar rash present over the middle of the forehead) present. Neurological:      General: No focal deficit present. Mental Status: He is alert and oriented to person, place, and time. Mental status is at baseline. Cranial Nerves: No cranial nerve deficit. Sensory: No sensory deficit. Motor: No weakness. Coordination: Coordination normal.      Gait: Gait normal.   Psychiatric:         Mood and Affect: Mood normal.         Behavior: Behavior normal.         Thought Content: Thought content normal.               Data:     Lab Results   Component Value Date     11/05/2021    K 4.4 11/05/2021     11/05/2021    CO2 25 11/05/2021    BUN 13 11/05/2021    CREATININE 0.94 11/05/2021    GLUCOSE 164 11/05/2021    GLUCOSE 111 10/14/2011    PROT 6.6 08/07/2021    LABALBU 4.0 08/07/2021    BILITOT 0.38 08/07/2021    ALKPHOS 51 08/07/2021    AST 33 08/07/2021    ALT 34 08/07/2021     Lab Results   Component Value Date    WBC 8.2 11/05/2021    RBC 4.72 11/05/2021    HGB 14.7 11/05/2021    HCT 43.7 11/05/2021    MCV 92.7 11/05/2021    MCH 31.2 11/05/2021    MCHC 33.7 11/05/2021    RDW 13.9 11/05/2021     11/05/2021    MPV NOT REPORTED 11/05/2021     Lab Results   Component Value Date    TSH 0.95 08/07/2021     Lab Results   Component Value Date    CHOL 122 08/07/2021    HDL 30 08/07/2021    PSA 0.35 12/17/2018    LABA1C 6.2 03/01/2022          Assessment & Plan        Diagnosis Orders   1.  Vertigo   Prescribed Meclizine prn for Sig: Apply topically 2 times daily

## 2022-05-19 NOTE — PATIENT INSTRUCTIONS
SURVEY:    You may be receiving a survey from [employer] regarding your visit today. Please complete the survey to enable us to provide the highest quality of care to you and your family. If you cannot score us a very good on any question, please call the office to discuss how we could have made your experience a very good one. Thank you.   [address] [place] [address]  MD Petar Gross MD Roark File, MD Obadiah Presume, MD Sandralee Falconer, MD Junnie Parlor, AuD Amy [place], [facility] [place] [address]  Kim Maldonado [place], [place]

## 2022-05-20 ENCOUNTER — TELEPHONE (OUTPATIENT)
Dept: FAMILY MEDICINE CLINIC | Age: 67
End: 2022-05-20

## 2022-05-20 NOTE — TELEPHONE ENCOUNTER
Pharmacy called and the cream Andreas was prescribed yesterday is discontinued can you please send in a different cream script

## 2022-06-02 ENCOUNTER — OFFICE VISIT (OUTPATIENT)
Dept: FAMILY MEDICINE CLINIC | Age: 67
End: 2022-06-02
Payer: MEDICARE

## 2022-06-02 VITALS
RESPIRATION RATE: 18 BRPM | HEIGHT: 73 IN | BODY MASS INDEX: 40.69 KG/M2 | OXYGEN SATURATION: 94 % | SYSTOLIC BLOOD PRESSURE: 128 MMHG | HEART RATE: 62 BPM | WEIGHT: 307 LBS | DIASTOLIC BLOOD PRESSURE: 84 MMHG

## 2022-06-02 DIAGNOSIS — Z00.00 ENCOUNTER FOR ANNUAL WELLNESS EXAM IN MEDICARE PATIENT: ICD-10-CM

## 2022-06-02 DIAGNOSIS — Z00.00 MEDICARE ANNUAL WELLNESS VISIT, SUBSEQUENT: Primary | ICD-10-CM

## 2022-06-02 PROCEDURE — G0439 PPPS, SUBSEQ VISIT: HCPCS | Performed by: INTERNAL MEDICINE

## 2022-06-02 SDOH — ECONOMIC STABILITY: FOOD INSECURITY: WITHIN THE PAST 12 MONTHS, YOU WORRIED THAT YOUR FOOD WOULD RUN OUT BEFORE YOU GOT MONEY TO BUY MORE.: NEVER TRUE

## 2022-06-02 SDOH — ECONOMIC STABILITY: FOOD INSECURITY: WITHIN THE PAST 12 MONTHS, THE FOOD YOU BOUGHT JUST DIDN'T LAST AND YOU DIDN'T HAVE MONEY TO GET MORE.: NEVER TRUE

## 2022-06-02 ASSESSMENT — PATIENT HEALTH QUESTIONNAIRE - PHQ9
6. FEELING BAD ABOUT YOURSELF - OR THAT YOU ARE A FAILURE OR HAVE LET YOURSELF OR YOUR FAMILY DOWN: 0
SUM OF ALL RESPONSES TO PHQ9 QUESTIONS 1 & 2: 0
10. IF YOU CHECKED OFF ANY PROBLEMS, HOW DIFFICULT HAVE THESE PROBLEMS MADE IT FOR YOU TO DO YOUR WORK, TAKE CARE OF THINGS AT HOME, OR GET ALONG WITH OTHER PEOPLE: 0
SUM OF ALL RESPONSES TO PHQ QUESTIONS 1-9: 0
SUM OF ALL RESPONSES TO PHQ QUESTIONS 1-9: 0
7. TROUBLE CONCENTRATING ON THINGS, SUCH AS READING THE NEWSPAPER OR WATCHING TELEVISION: 0
2. FEELING DOWN, DEPRESSED OR HOPELESS: 0
9. THOUGHTS THAT YOU WOULD BE BETTER OFF DEAD, OR OF HURTING YOURSELF: 0
5. POOR APPETITE OR OVEREATING: 0
SUM OF ALL RESPONSES TO PHQ QUESTIONS 1-9: 0
SUM OF ALL RESPONSES TO PHQ QUESTIONS 1-9: 0
8. MOVING OR SPEAKING SO SLOWLY THAT OTHER PEOPLE COULD HAVE NOTICED. OR THE OPPOSITE, BEING SO FIGETY OR RESTLESS THAT YOU HAVE BEEN MOVING AROUND A LOT MORE THAN USUAL: 0
4. FEELING TIRED OR HAVING LITTLE ENERGY: 0
3. TROUBLE FALLING OR STAYING ASLEEP: 0
1. LITTLE INTEREST OR PLEASURE IN DOING THINGS: 0

## 2022-06-02 ASSESSMENT — LIFESTYLE VARIABLES: HOW OFTEN DO YOU HAVE A DRINK CONTAINING ALCOHOL: NEVER

## 2022-06-02 ASSESSMENT — SOCIAL DETERMINANTS OF HEALTH (SDOH): HOW HARD IS IT FOR YOU TO PAY FOR THE VERY BASICS LIKE FOOD, HOUSING, MEDICAL CARE, AND HEATING?: NOT HARD AT ALL

## 2022-06-02 NOTE — PATIENT INSTRUCTIONS
Personalized Preventive Plan for Gina Lemos - 6/2/2022  Medicare offers a range of preventive health benefits. Some of the tests and screenings are paid in full while other may be subject to a deductible, co-insurance, and/or copay. Some of these benefits include a comprehensive review of your medical history including lifestyle, illnesses that may run in your family, and various assessments and screenings as appropriate. After reviewing your medical record and screening and assessments performed today your provider may have ordered immunizations, labs, imaging, and/or referrals for you. A list of these orders (if applicable) as well as your Preventive Care list are included within your After Visit Summary for your review. Other Preventive Recommendations:    · A preventive eye exam performed by an eye specialist is recommended every 1-2 years to screen for glaucoma; cataracts, macular degeneration, and other eye disorders. · A preventive dental visit is recommended every 6 months. · Try to get at least 150 minutes of exercise per week or 10,000 steps per day on a pedometer . · Order or download the FREE \"Exercise & Physical Activity: Your Everyday Guide\" from [employer]. Call 0-268.407.7980 or search [employer] online. · You need 6929-8466 mg of calcium and 8400-7188 IU of vitamin D per day. It is possible to meet your calcium requirement with diet alone, but a vitamin D supplement is usually necessary to meet this goal.  · When exposed to the sun, use a sunscreen that protects against both UVA and UVB radiation with an SPF of 30 or greater. Reapply every 2 to 3 hours or after sweating, drying off with a towel, or swimming. · Always wear a seat belt when traveling in a car. Always wear a helmet when riding a bicycle or motorcycle.

## 2022-06-02 NOTE — PROGRESS NOTES
Medicare Annual Wellness Visit    Jamia Morales is here for Medicare AWV    Assessment & Plan    Recommendations for Preventive Services Due: see orders and patient instructions/AVS.  Recommended screening schedule for the next 5-10 years is provided to the patient in written form: see Patient Instructions/AVS.     No follow-ups on file. Subjective   The following acute and/or chronic problems were also addressed today:    Patient's complete Health Risk Assessment and screening values have been reviewed and are found in Flowsheets. The following problems were reviewed today and where indicated follow up appointments were made and/or referrals ordered.     Positive Risk Factor Screenings with Interventions:             General Health and ACP:  General  In general, how would you say your health is?: Fair  In the past 7 days, have you experienced any of the following: New or Increased Pain, New or Increased Fatigue, Loneliness, Social Isolation, Stress or Anger?: No  Do you get the social and emotional support that you need?: Yes  Do you have a Living Will?: (!) No    Advance Directives     Power of  Living Will ACP-Advance Directive ACP-Power of     Not on File Filed on 12/17/18 Filed Not on File      General Health Risk Interventions:  · No Living Will: Patient declines ACP discussion/assistance    Health Habits/Nutrition:     Physical Activity: Sufficiently Active    Days of Exercise per Week: 3 days    Minutes of Exercise per Session: 60 min     Have you lost any weight without trying in the past 3 months?: No  Body mass index: (!) 40.5  Have you seen the dentist within the past year?: (!) No    Health Habits/Nutrition Interventions:  · Nutritional issues:  patient agrees to work toward a weight loss goal of 20-25 lbs pounds over the next 4-6 months month(s) using the following plan: low carbohydrate diet  · Dental exam overdue:  patient encouraged to make appointment with his/her dentist    Hearing/Vision:  Do you or your family notice any trouble with your hearing that hasn't been managed with hearing aids?: No  Do you have difficulty driving, watching TV, or doing any of your daily activities because of your eyesight?: No  Have you had an eye exam within the past year?: (!) No  No exam data present    Hearing/Vision Interventions:  · Vision concerns:  patient encouraged to make appointment with his/her eye specialist    Safety:  Do you have working smoke detectors?: Yes  Do you have any tripping hazards - loose or unsecured carpets or rugs?: No  Do you have any tripping hazards - clutter in doorways, halls, or stairs?: No  Do you have either shower bars, grab bars, non-slip mats or non-slip surfaces in your shower or bathtub?: (!) No  Do all of your stairways have a railing or banister?: Yes  Do you always fasten your seatbelt when you are in a car?: Yes    Safety Interventions:  · Home safety tips provided           Objective   Vitals:    06/02/22 0859   BP: 128/84   Pulse: 62   Resp: 18   SpO2: 94%   Weight: (!) 307 lb (139.3 kg)   Height: 6' 1\" (1.854 m)      Body mass index is 40.5 kg/m².         General Appearance: alert and oriented to person, place and time, well developed and well- nourished, in no acute distress  Skin: warm and dry, no rash   Head: normocephalic and atraumatic  Eyes: pupils equal, round, and reactive to light,  conjunctivae normal  ENT: tympanic membrane, external ear and ear canal normal bilaterally, nose without deformity, nasal mucosa and turbinates normal without polyps  Neck: supple and non-tender without mass, no thyromegaly or thyroid nodules, no cervical lymphadenopathy  Pulmonary/Chest: clear to auscultation bilaterally- no wheezes, rales or rhonchi, normal air movement, no respiratory distress  Cardiovascular: normal rate, regular rhythm, normal S1 and S2, no murmurs, rubs, clicks, or gallops, distal pulses intact  Abdomen: soft, non-tender, non-distended, normal bowel sounds  Extremities: no cyanosis, clubbing or edema  Musculoskeletal: normal range of motion, no joint swelling, deformity or tenderness  Neurologic: reflexes normal and symmetric, no cranial nerve deficit, gait, coordination and speech normal       No Known Allergies  Prior to Visit Medications    Medication Sig Taking? Authorizing Provider   fluocinonide (LIDEX) 0.05 % cream Apply topically 2 times daily.  Yes Issa Freire MD   meclizine (ANTIVERT) 25 MG tablet Take 1 tablet by mouth 3 times daily as needed for Dizziness Yes Issa Freire MD   amLODIPine (NORVASC) 2.5 MG tablet TAKE 1 TABLET EVERY DAY Yes Issa Freire MD   budesonide-formoterol (SYMBICORT) 160-4.5 MCG/ACT AERO Inhale 2 puffs into the lungs 2 times daily Yes Issa Freire MD   fenofibrate (TRICOR) 145 MG tablet TAKE 1 TABLET BY MOUTH EVERY DAY Yes Issa Freire MD   metFORMIN (GLUCOPHAGE) 1000 MG tablet Take 1 tablet by mouth 2 times daily (with meals) Yes Issa Freire MD   metoprolol succinate (TOPROL XL) 50 MG extended release tablet TAKE 1 TABLET EVERY DAY Yes Issa Freire MD   allopurinol (ZYLOPRIM) 300 MG tablet TAKE 1 TABLET EVERY DAY Yes Issa Freire MD   levothyroxine (SYNTHROID) 200 MCG tablet Take 1 tablet by mouth daily Yes Issa Freire MD   losartan (COZAAR) 25 MG tablet TAKE 1 TABLET EVERY DAY Yes Issa Freire MD   PARoxetine (PAXIL) 20 MG tablet Take 1 tablet by mouth every morning Yes Issa Freire MD   glipiZIDE (GLUCOTROL) 5 MG tablet Take 1 tablet by mouth every morning Yes Issa Freire MD   albuterol (PROVENTIL) (2.5 MG/3ML) 0.083% nebulizer solution INHALE THE CONTENTS OF 1 VIAL VIA NEBULIZER EVERY 4 HOURS AS NEEDED FOR WHEEZING OR SHORTNESS OF BREATH Yes Issa Freire MD   naproxen (NAPROSYN) 500 MG tablet Take 1 tablet by mouth 2 times daily Yes Anupam Hopkins MD   glipiZIDE (GLUCOTROL) 10 MG tablet Take 1 tablet by mouth 2 times daily Yes Issa Freire MD   ipratropium-albuterol (DUONEB) 0.5-2.5 (3) MG/3ML SOLN nebulizer solution Inhale 3 mLs into the lungs 4 times daily Yes Bernadine Vazquez MD   CPAP Machine MISC by Does not apply route Yes Bernadine Vazquez MD   fluticasone (FLONASE) 50 MCG/ACT nasal spray 2 sprays by Nasal route daily Yes Bernadine Vazquez MD   albuterol sulfate  (90 Base) MCG/ACT inhaler Inhale 2 puffs into the lungs every 6 hours as needed for Wheezing Yes Bernadine Vazquez MD   [address] by Does not apply route Yes Historical Provider, MD   ondansetron (ZOFRAN-ODT) 4 MG disintegrating tablet Take 1 tablet by mouth 3 times daily as needed for Nausea or Vomiting Yes Bernadine Vazquez MD   Respiratory Therapy Supplies [facility] CPAP full face mask, hose, head gear and  filter Yes Bernadine Vazquez MD   blood glucose monitor strips Test 2 times a day & as needed for symptoms of irregular blood glucose. Yes Berndaine Vazquez MD   mometasone (ELOCON) 0.1 % cream Apply topically daily. Yes Moni Howard MD   aspirin 81 MG tablet Take 81 mg by mouth daily.  Yes Historical Provider, MD   lovastatin (MEVACOR) 40 MG tablet Take 1 tablet by mouth 2 times daily  Bernadine Vazquez MD       CareTe (Including outside providers/suppliers regularly involved in providing care):   Patient Care Team:  Bernadine Vazquez MD as PCP - General (Hospitalist)  Bernadine Vazquez MD as PCP - REHABILITATION [facility] Empaneled Provider  Vasu Osman RN as Nurse Navigator     Reviewed and updated this visit:  Tobacco  Allergies  Meds  Problems  Med Hx  Surg Hx  Soc Hx  Fam Hx

## 2022-06-26 DIAGNOSIS — E78.2 MIXED HYPERLIPIDEMIA: ICD-10-CM

## 2022-06-27 RX ORDER — FENOFIBRATE 145 MG/1
TABLET, COATED ORAL
Qty: 30 TABLET | Refills: 3 | Status: SHIPPED | OUTPATIENT
Start: 2022-06-27 | End: 2022-07-26 | Stop reason: SDUPTHER

## 2022-07-13 DIAGNOSIS — E03.9 ACQUIRED HYPOTHYROIDISM: ICD-10-CM

## 2022-07-13 RX ORDER — LEVOTHYROXINE SODIUM 0.2 MG/1
200 TABLET ORAL DAILY
Qty: 90 TABLET | Refills: 0 | Status: SHIPPED | OUTPATIENT
Start: 2022-07-13 | End: 2022-07-14

## 2022-07-14 DIAGNOSIS — E03.9 ACQUIRED HYPOTHYROIDISM: ICD-10-CM

## 2022-07-14 RX ORDER — LEVOTHYROXINE SODIUM 200 MCG
TABLET ORAL
Qty: 90 TABLET | Refills: 0 | Status: SHIPPED | OUTPATIENT
Start: 2022-07-14

## 2022-07-22 ENCOUNTER — OFFICE VISIT (OUTPATIENT)
Dept: FAMILY MEDICINE CLINIC | Age: 67
End: 2022-07-22
Payer: MEDICARE

## 2022-07-22 VITALS
BODY MASS INDEX: 41.43 KG/M2 | DIASTOLIC BLOOD PRESSURE: 72 MMHG | HEART RATE: 54 BPM | OXYGEN SATURATION: 96 % | SYSTOLIC BLOOD PRESSURE: 126 MMHG | RESPIRATION RATE: 18 BRPM | WEIGHT: 314 LBS

## 2022-07-22 DIAGNOSIS — E78.2 MIXED HYPERLIPIDEMIA: ICD-10-CM

## 2022-07-22 DIAGNOSIS — J44.9 CHRONIC OBSTRUCTIVE PULMONARY DISEASE, UNSPECIFIED COPD TYPE (HCC): ICD-10-CM

## 2022-07-22 DIAGNOSIS — E11.9 TYPE 2 DIABETES MELLITUS WITHOUT COMPLICATION, WITHOUT LONG-TERM CURRENT USE OF INSULIN (HCC): ICD-10-CM

## 2022-07-22 DIAGNOSIS — I10 ESSENTIAL HYPERTENSION: Primary | ICD-10-CM

## 2022-07-22 DIAGNOSIS — E03.9 ACQUIRED HYPOTHYROIDISM: ICD-10-CM

## 2022-07-22 DIAGNOSIS — Z12.5 PROSTATE CANCER SCREENING: ICD-10-CM

## 2022-07-22 DIAGNOSIS — G89.29 CHRONIC MIDLINE LOW BACK PAIN WITH LEFT-SIDED SCIATICA: ICD-10-CM

## 2022-07-22 DIAGNOSIS — L30.9 ECZEMA, UNSPECIFIED TYPE: ICD-10-CM

## 2022-07-22 DIAGNOSIS — M54.42 CHRONIC MIDLINE LOW BACK PAIN WITH LEFT-SIDED SCIATICA: ICD-10-CM

## 2022-07-22 PROBLEM — J01.91 ACUTE RECURRENT SINUSITIS: Status: RESOLVED | Noted: 2021-02-19 | Resolved: 2022-07-22

## 2022-07-22 PROCEDURE — 99214 OFFICE O/P EST MOD 30 MIN: CPT | Performed by: INTERNAL MEDICINE

## 2022-07-22 PROCEDURE — 3044F HG A1C LEVEL LT 7.0%: CPT | Performed by: INTERNAL MEDICINE

## 2022-07-22 RX ORDER — GABAPENTIN 300 MG/1
300 CAPSULE ORAL 2 TIMES DAILY
Qty: 60 CAPSULE | Refills: 3 | Status: SHIPPED | OUTPATIENT
Start: 2022-07-22 | End: 2022-10-03

## 2022-07-22 ASSESSMENT — ENCOUNTER SYMPTOMS
ABDOMINAL PAIN: 0
SORE THROAT: 0
NAUSEA: 0
COUGH: 0
ALLERGIC/IMMUNOLOGIC NEGATIVE: 1
CONSTIPATION: 0
BLURRED VISION: 0
SHORTNESS OF BREATH: 0
BLOOD IN STOOL: 0
WHEEZING: 0

## 2022-07-22 ASSESSMENT — COPD QUESTIONNAIRES: COPD: 1

## 2022-07-22 NOTE — PROGRESS NOTES
vision, chest pain, headaches, palpitations or shortness of breath. Risk factors for coronary artery disease include male gender, obesity, dyslipidemia, family history, diabetes mellitus and smoking/tobacco exposure. Past treatments include beta blockers, calcium channel blockers and angiotensin blockers. The current treatment provides significant improvement. There are no compliance problems. Hypertensive end-organ damage includes CAD/MI. Hyperlipidemia  This is a chronic problem. The current episode started more than 1 year ago. Recent lipid tests were reviewed and are variable. Exacerbating diseases include diabetes and obesity. Pertinent negatives include no chest pain, myalgias or shortness of breath. Current antihyperlipidemic treatment includes statins and fibric acid derivatives. There are no compliance problems. COPD  There is no cough, shortness of breath or wheezing. This is a chronic problem. The current episode started more than 1 year ago. The problem occurs rarely. Pertinent negatives include no appetite change, chest pain, ear pain, headaches, myalgias or sore throat. His symptoms are aggravated by URI, change in weather and exposure to smoke. His symptoms are alleviated by beta-agonist, steroid inhaler and ipratropium. He reports significant improvement on treatment. Risk factors for lung disease include smoking/tobacco exposure. His past medical history is significant for COPD. Rash  This is a chronic problem. The current episode started more than 1 year ago. The problem has been gradually worsening since onset. The affected locations include the left hand. The rash is characterized by itchiness, scaling and redness. Pertinent negatives include no congestion, cough, fatigue, shortness of breath or sore throat. Past treatments include moisturizer and topical steroids. The treatment provided mild relief.          Past Medical History:     Past Medical History:   Diagnosis Date    Asthma Bronchitis     CAD (coronary artery disease)     COPD (chronic obstructive pulmonary disease) ([address] 8/12/2011    Depression 8/12/2011    Diabetes mellitus ([address]     GERD (gastroesophageal reflux disease) 8/12/2011    Hyperlipidemia 8/12/2011    Hypertension     Hypothyroid 8/12/2011    Lower back pain     Nicotine addiction 8/12/2011    Sleep apnea       Reviewed all health maintenance requirements and orderedappropriate tests  Health Maintenance Due   Topic Date Due    Diabetic microalbuminuria test  08/18/2017    Prostate Specific Antigen (PSA) Screening or Monitoring  12/17/2019    Diabetic retinal exam  10/29/2020    Low dose CT lung screening  03/03/2022    COVID-19 Vaccine (4 - Booster for Moderna series) 03/26/2022    Lipids  08/07/2022       Past Surgical History:     Past Surgical History:   Procedure Laterality Date    CARDIAC CATHETERIZATION Left 01/21/15    Severe left main trunk disease extending into the ostium of the left anterior descending of 80% involving the ostium of the intermediate & atrioventricular branch of the circumflex. 60% disease in the atrioventricular branch of the circumflex in the midportion. 40% disease in the right coronary artery. Normal left ventricular function, ejection fraction of 60%    CARDIAC CATHETERIZATION Left 12/20/2017    Dr. Jennifer Main @ [facility]--70% left main trunck & ostial LAD & high lateral circumflex disease. Patent LIMA to the LAD. Patent vein graft to the high lateral circumflex. 60% disease in the AV branch of the circumflex that terminates in a large posterolateral branch with an FFR of 1.0, indicating no significant stenosis. 40% disease in the right coronary artery. Ejection fraction 50-55%.     COLONOSCOPY N/A 3/5/2019    COLONOSCOPY POLYPECTOMY HOT BIOPSY performed by Karuna Reddy MD at [id number] [facility] GRAFT  02/04/15    3 vessel CABG using left internal mammary artery to the left anterior descending coronary artery with a reverse saphenous vein aortocoronary sequential graft side to side to the ramus intermedius coronary artery & end to side to the first obtuse marginal coronary artery. OTHER SURGICAL HISTORY  03-21-16    I & D- cyst- posterior neck    SPINE SURGERY      THYROID LOBECTOMY      UPPER GASTROINTESTINAL ENDOSCOPY N/A 3/5/2019    EGD BIOPSY performed by Cricket Morgan MD at [facility] OR        Medications:       Prior to Admission medications    Medication Sig Start Date End Date Taking? Authorizing Provider   carbamide peroxide (DEBROX) 6.5 % otic solution Place 5 drops in ear(s) in the morning and 5 drops before bedtime. 7/22/22 8/21/22 Yes Stewart Lindquist MD   gabapentin (NEURONTIN) 300 MG capsule Take 1 capsule by mouth in the morning and 1 capsule before bedtime. Do all this for 30 days. Intended supply: 30 days. 7/22/22 8/21/22 Yes Stewart Lindquist MD   SYNTHROID 200 MCG tablet TAKE 1 TABLET DAILY 7/14/22  Yes Stewart Lindquist MD   fenofibrate (TRICOR) 145 MG tablet TAKE 1 TABLET DAILY 6/27/22  Yes Stewart Lindquist MD   fluocinonide (LIDEX) 0.05 % cream Apply topically 2 times daily.  5/20/22  Yes Stewart Lindquist MD   amLODIPine (NORVASC) 2.5 MG tablet TAKE 1 TABLET EVERY DAY 3/1/22  Yes Stewart Lindquist MD   budesonide-formoterol (SYMBICORT) 160-4.5 MCG/ACT AERO Inhale 2 puffs into the lungs 2 times daily 3/1/22  Yes Stewart Lindquist MD   metFORMIN (GLUCOPHAGE) 1000 MG tablet Take 1 tablet by mouth 2 times daily (with meals) 3/1/22  Yes Stewart Lindquist MD   metoprolol succinate (TOPROL XL) 50 MG extended release tablet TAKE 1 TABLET EVERY DAY 3/1/22  Yes Stewart Lindquist MD   allopurinol (ZYLOPRIM) 300 MG tablet TAKE 1 TABLET EVERY DAY 3/1/22  Yes Stewart Lindquist MD   losartan (COZAAR) 25 MG tablet TAKE 1 TABLET EVERY DAY 3/1/22  Yes Stewart Lindquist MD   PARoxetine (PAXIL) 20 MG tablet Take 1 tablet by mouth every morning 3/1/22  Yes Stewart Lindquist MD   glipiZIDE (GLUCOTROL) 5 MG tablet Take 1 tablet by mouth every morning 3/1/22  Yes Sloan Rodríguez MD   albuterol (PROVENTIL) (2.5 MG/3ML) 0.083% nebulizer solution INHALE THE CONTENTS OF 1 VIAL VIA NEBULIZER EVERY 4 HOURS AS NEEDED FOR WHEEZING OR SHORTNESS OF BREATH 1/11/22  Yes Sloan Rodríguez MD   naproxen (NAPROSYN) 500 MG tablet Take 1 tablet by mouth 2 times daily 12/9/21  Yes Ismael Goss MD   glipiZIDE (GLUCOTROL) 10 MG tablet Take 1 tablet by mouth 2 times daily 12/3/21  Yes Sloan Rodríguez MD   ipratropium-albuterol (DUONEB) 0.5-2.5 (3) MG/3ML SOLN nebulizer solution Inhale 3 mLs into the lungs 4 times daily 4/30/21  Yes Sloan Rodríguez MD   CPAP Machine MISC by Does not apply route 4/19/21  Yes Sloan Rodríguez MD   fluticasone (FLONASE) 50 MCG/ACT nasal spray 2 sprays by Nasal route daily 2/19/21  Yes Sloan Rodríguez MD   albuterol sulfate  (90 Base) MCG/ACT inhaler Inhale 2 puffs into the lungs every 6 hours as needed for Wheezing 2/19/21  Yes Sloan Rodríguez MD   [address] [place] [address] by Does not apply route   Yes Historical Provider, MD   ondansetron (ZOFRAN-ODT) 4 MG disintegrating tablet Take 1 tablet by mouth 3 times daily as needed for Nausea or Vomiting 11/17/20  Yes Sloan Rodríguez MD   Respiratory Therapy Supplies [facility] CPAP full face mask, hose, head gear and  filter 2/7/20  Yes Sloan Rodríguez MD   blood glucose monitor strips Test 2 times a day & as needed for symptoms of irregular blood glucose. 11/11/19  Yes Sloan Rodríguez MD   mometasone (ELOCON) 0.1 % cream Apply topically daily. 2/20/17  Yes Mar Andino MD   aspirin 81 MG tablet Take 81 mg by mouth daily. Yes Historical Provider, MD   lovastatin (MEVACOR) 40 MG tablet Take 1 tablet by mouth 2 times daily 3/1/22 5/30/22  Sloan Rodríguez MD        Allergies:       Patient has no known allergies. Social History:     Tobacco: reports that he quit smoking about 7 years ago. His smoking use included cigarettes. He has a 45.00 pack-year smoking history.  He has never used smokeless tobacco.  Alcohol:      reports no history of alcohol use. Drug Use:  reports no history of drug use. Family History:     Family History   Problem Relation Age of Onset    Diabetes Mother     Heart Disease Mother     Diabetes Father     Heart Disease Father     Heart Disease Brother        Review of Systems:         Review of Systems   Constitutional:  Negative for activity change, appetite change, fatigue and unexpected weight change. HENT:  Positive for hearing loss. Negative for congestion, ear discharge, ear pain and sore throat. Eyes:  Negative for blurred vision and visual disturbance. Respiratory:  Negative for cough, shortness of breath and wheezing. Cardiovascular:  Negative for chest pain, palpitations and leg swelling. Gastrointestinal:  Negative for abdominal pain, blood in stool, constipation and nausea. Endocrine: Negative for cold intolerance, heat intolerance, polydipsia and polyuria. Genitourinary:  Negative for difficulty urinating and dysuria. Musculoskeletal:  Positive for arthralgias and back pain. Negative for myalgias. Skin:  Positive for rash. Allergic/Immunologic: Negative. Neurological:  Negative for weakness and headaches. Psychiatric/Behavioral:  Negative for behavioral problems and dysphoric mood. The patient is not nervous/anxious. Physical Exam:     Vitals:  /72   Pulse 54   Resp 18   Wt (!) 314 lb (142.4 kg)   SpO2 96%   BMI 41.43 kg/m²       Physical Exam  Vitals reviewed. Constitutional:       General: He is not in acute distress. Appearance: He is well-developed. He is obese. HENT:      Head: Normocephalic and atraumatic. Right Ear: External ear normal.      Left Ear: External ear normal.      Mouth/Throat:      Mouth: Mucous membranes are moist.   Neck:      Thyroid: No thyromegaly. Cardiovascular:      Rate and Rhythm: Normal rate and regular rhythm. Heart sounds: Normal heart sounds.  No murmur heard. Pulmonary:      Effort: Pulmonary effort is normal.      Breath sounds: Normal breath sounds. No wheezing or rales. Abdominal:      General: Bowel sounds are normal. There is no distension. Palpations: Abdomen is soft. There is no mass. Tenderness: There is no abdominal tenderness. Musculoskeletal:         General: Normal range of motion. Right lower leg: No edema. Left lower leg: No edema. Lymphadenopathy:      Cervical: No cervical adenopathy. Skin:     General: Skin is warm and dry. Findings: Rash (left hand dorsal area with erythematous skin, areas of scaly skin, excoriations) present. Neurological:      General: No focal deficit present. Mental Status: He is alert and oriented to person, place, and time. Mental status is at baseline. Psychiatric:         Mood and Affect: Mood normal.         Behavior: Behavior normal.         Thought Content:  Thought content normal.             Data:     Lab Results   Component Value Date/Time     11/05/2021 09:24 AM    K 4.4 11/05/2021 09:24 AM     11/05/2021 09:24 AM    CO2 25 11/05/2021 09:24 AM    BUN 13 11/05/2021 09:24 AM    CREATININE 0.94 11/05/2021 09:24 AM    GLUCOSE 164 11/05/2021 09:24 AM    GLUCOSE 111 10/14/2011 08:30 AM    PROT 6.6 08/07/2021 07:42 AM    LABALBU 4.0 08/07/2021 07:42 AM    BILITOT 0.38 08/07/2021 07:42 AM    ALKPHOS 51 08/07/2021 07:42 AM    AST 33 08/07/2021 07:42 AM    ALT 34 08/07/2021 07:42 AM     Lab Results   Component Value Date/Time    WBC 8.2 11/05/2021 09:24 AM    RBC 4.72 11/05/2021 09:24 AM    HGB 14.7 11/05/2021 09:24 AM    HCT 43.7 11/05/2021 09:24 AM    MCV 92.7 11/05/2021 09:24 AM    MCH 31.2 11/05/2021 09:24 AM    MCHC 33.7 11/05/2021 09:24 AM    RDW 13.9 11/05/2021 09:24 AM     11/05/2021 09:24 AM    MPV NOT REPORTED 11/05/2021 09:24 AM     Lab Results   Component Value Date/Time    TSH 0.95 08/07/2021 07:42 AM     Lab Results   Component Value Date/Time CHOL 122 08/07/2021 07:42 AM    HDL 30 08/07/2021 07:42 AM    PSA 0.35 12/17/2018 10:11 AM    LABA1C 6.2 03/01/2022 03:43 PM          Assessment & Plan        Diagnosis Orders   1. Essential hypertension   Continue on current regimen, BP controlled Basic Metabolic Panel    CBC with Auto Differential      2. Acquired hypothyroidism   Continue on current dose of Synthroid        3. Chronic obstructive pulmonary disease, unspecified COPD type ([address]   Symptoms stable, continue on Duoneb, Albuterol HFA, Synthroid. 4. Type 2 diabetes mellitus without complication, without long-term current use of insulin (HCC)   Controlled, continue same dose Metformin and Glipizide       5. Mixed hyperlipidemia   Continue on Lovastatin and Tricor same dose , Lipid panel improved  Hepatic Function Panel      6. Prostate cancer screening  PSA Screening      7. Eczema, unspecified type   Refer to dermatologist for evaluation as did not benefit from topical steroids External Referral To Dermatology      8. Chronic midline low back pain with left-sided sciatica   Refill Gabapentin  gabapentin (NEURONTIN) 300 MG capsule                      Completed Refills   Requested Prescriptions     Signed Prescriptions Disp Refills    carbamide peroxide (DEBROX) 6.5 % otic solution 15 mL 1     Sig: Place 5 drops in ear(s) in the morning and 5 drops before bedtime. gabapentin (NEURONTIN) 300 MG capsule 60 capsule 3     Sig: Take 1 capsule by mouth in the morning and 1 capsule before bedtime. Do all this for 30 days. Intended supply: 30 days. No follow-ups on file. Orders Placed This Encounter   Medications    carbamide peroxide (DEBROX) 6.5 % otic solution     Sig: Place 5 drops in ear(s) in the morning and 5 drops before bedtime. Dispense:  15 mL     Refill:  1    gabapentin (NEURONTIN) 300 MG capsule     Sig: Take 1 capsule by mouth in the morning and 1 capsule before bedtime. Do all this for 30 days. Intended supply: 30 days. Dispense:  60 capsule     Refill:  3       Orders Placed This Encounter   Procedures    Basic Metabolic Panel     Standing Status:   Future     Standing Expiration Date:   7/22/2023    Hepatic Function Panel     Standing Status:   Future     Standing Expiration Date:   7/22/2023    CBC with Auto Differential     Standing Status:   Future     Standing Expiration Date:   7/22/2023    PSA Screening     Standing Status:   Future     Standing Expiration Date:   7/22/2023    External Referral To Dermatology     Referral Priority:   Routine     Referral Type:   Eval and Treat     Referral Reason:   Specialty Services Required     Referred to Provider:   Konrad Henriquez PA-C     Requested Specialty:   Dermatology     Number of Visits Requested:   1           Patient Instructions     SURVEY:    You may be receiving a survey from [employer] regarding your visit today. Please complete the survey to enable us to provide the highest quality of care to you and your family. If you cannot score us a very good on any question, please call the office to discuss how we could have made your experience a very good one. Thank you. MD Arabella Mae MD Lexie Pick, MD Lannis Fern, MD Macdonald Cedar, MD [facility], [facility], [place], [place]  Sahil Aiken, [address] [place] [address]    Electronically signed by José Myers MD on 7/23/2022 at 12:35 PM           Completed Refills      Requested Prescriptions     Signed Prescriptions Disp Refills    carbamide peroxide (DEBROX) 6.5 % otic solution 15 mL 1     Sig: Place 5 drops in ear(s) in the morning and 5 drops before bedtime. gabapentin (NEURONTIN) 300 MG capsule 60 capsule 3     Sig: Take 1 capsule by mouth in the morning and 1 capsule before bedtime. Do all this for 30 days. Intended supply: 30 days.

## 2022-07-22 NOTE — PATIENT INSTRUCTIONS
SURVEY:    You may be receiving a survey from [employer] regarding your visit today. Please complete the survey to enable us to provide the highest quality of care to you and your family. If you cannot score us a very good on any question, please call the office to discuss how we could have made your experience a very good one. Thank you.   MD Mercedez Pelletier, MD Juan Sigala, MD Carmel Noble, MD Ok Lopez, MD Grady Pendleton, Caren Earl Pang[place], [facility], [facility]  Sahil Molina, [address], [address] [facility]  Jacob Donohue, [address] [facility]  Katia Bunch, [address] [facility]

## 2022-07-23 ASSESSMENT — ENCOUNTER SYMPTOMS: BACK PAIN: 1

## 2022-07-26 DIAGNOSIS — E78.2 MIXED HYPERLIPIDEMIA: ICD-10-CM

## 2022-07-26 RX ORDER — FENOFIBRATE 145 MG/1
TABLET, COATED ORAL
Qty: 30 TABLET | Refills: 3 | Status: SHIPPED | OUTPATIENT
Start: 2022-07-26

## 2022-07-26 NOTE — TELEPHONE ENCOUNTER
Health Maintenance   Topic Date Due    Diabetic microalbuminuria test  08/18/2017    Prostate Specific Antigen (PSA) Screening or Monitoring  12/17/2019    Diabetic retinal exam  10/29/2020    Low dose CT lung screening  03/03/2022    COVID-19 Vaccine (4 - Booster for Moderna series) 03/26/2022    Lipids  08/07/2022    DTaP/Tdap/Td vaccine (1 - Tdap) 11/04/2022 (Originally 7/29/1974)    Shingles vaccine (1 of 2) 11/04/2022 (Originally 7/29/2007)    Flu vaccine (1) 09/01/2022    Diabetic foot exam  03/01/2023    A1C test (Diabetic or Prediabetic)  03/01/2023    Depression Monitoring  06/02/2023    Annual Wellness Visit (AWV)  06/03/2023    Pneumococcal 0-64 years Vaccine (3 - PPSV23 or PCV20) 10/08/2024    Colorectal Cancer Screen  03/05/2029    Hepatitis C screen  Completed    HIV screen  Completed    Hepatitis A vaccine  Aged Out    Hib vaccine  Aged Out    Meningococcal (ACWY) vaccine  Aged Out             (applicable per patient's age: Cancer Screenings, Depression Screening, Fall Risk Screening, Immunizations)    Hemoglobin A1C (%)   Date Value   03/01/2022 6.2   08/07/2021 7.1 (H)   07/20/2021 6.9     Microalb/Crt.  Ratio (mcg/mg creat)   Date Value   08/18/2016 6     LDL Cholesterol (mg/dL)   Date Value   08/07/2021 54     AST (U/L)   Date Value   08/07/2021 33     ALT (U/L)   Date Value   08/07/2021 34     BUN (mg/dL)   Date Value   11/05/2021 13      (goal A1C is < 7)   (goal LDL is <100) need 30-50% reduction from baseline     BP Readings from Last 3 Encounters:   07/22/22 126/72   06/02/22 128/84   05/19/22 132/84    (goal /80)      All Future Testing planned in [facility]:  Lab Frequency Next Occurrence   TSH with Reflex Once 07/09/2022   Comprehensive Metabolic Panel Once 95/93/7687   Vitamin D 25 Hydroxy Once 07/09/2022   Lipid Panel Once 07/09/2022   Magnesium Once 07/09/2022   EKG 12 Lead Once 07/09/2022   XR CHEST (2 VW) Once 89/37/4979   Basic Metabolic Panel Once 25/11/8065   Hepatic Function Panel Once 08/22/2022   CBC with Auto Differential Once 08/22/2022   PSA Screening Once 08/22/2022       Next Visit Date:  Future Appointments   Date Time Provider Debbie Izquierdo   8/4/2022 10:30 AM Dearl Castleman, MD Derald Profit [facility]   8/5/2022  9:30 AM MD Cadence Woodruff   6/5/2023  9:00 AM Luci Rendon MD [facility]            Patient Active Problem List:     COPD (chronic obstructive pulmonary disease) ([facility]     GERD (gastroesophageal reflux disease)     S/P CABG x 3     CAD (coronary artery disease)     Essential hypertension     Mixed hyperlipidemia     Dysthymia (or depressive neurosis)     Chronic gout involving toe     Acquired hypothyroidism     Vitamin D deficiency disease     Chronic low back pain with left-sided sciatica     Class 3 severe obesity due to excess calories without serious comorbidity with body mass index (BMI) of 40.0 to 44.9 in adult ([facility]     MINAL treated with BiPAP

## 2022-07-29 ENCOUNTER — OFFICE VISIT (OUTPATIENT)
Dept: FAMILY MEDICINE CLINIC | Age: 67
End: 2022-07-29
Payer: MEDICARE

## 2022-07-29 VITALS
BODY MASS INDEX: 41.69 KG/M2 | DIASTOLIC BLOOD PRESSURE: 68 MMHG | SYSTOLIC BLOOD PRESSURE: 122 MMHG | HEART RATE: 67 BPM | OXYGEN SATURATION: 96 % | RESPIRATION RATE: 18 BRPM | WEIGHT: 315 LBS

## 2022-07-29 DIAGNOSIS — H91.93 BILATERAL HEARING LOSS, UNSPECIFIED HEARING LOSS TYPE: ICD-10-CM

## 2022-07-29 DIAGNOSIS — H61.23 BILATERAL IMPACTED CERUMEN: Primary | ICD-10-CM

## 2022-07-29 PROCEDURE — 1123F ACP DISCUSS/DSCN MKR DOCD: CPT | Performed by: INTERNAL MEDICINE

## 2022-07-29 PROCEDURE — 99213 OFFICE O/P EST LOW 20 MIN: CPT | Performed by: INTERNAL MEDICINE

## 2022-07-29 ASSESSMENT — ENCOUNTER SYMPTOMS
ALLERGIC/IMMUNOLOGIC NEGATIVE: 1
WHEEZING: 0
ABDOMINAL PAIN: 0
CONSTIPATION: 0
COUGH: 0
SHORTNESS OF BREATH: 0
NAUSEA: 0
SORE THROAT: 0
BLOOD IN STOOL: 0

## 2022-07-29 NOTE — PROGRESS NOTES
HPI Notes    Name: López Carreon  : 1955         Chief Complaint:     Chief Complaint   Patient presents with    Cerumen Impaction     Patient in the office for an ear flushing. History of Present Illness:        Mr Sebas Fernandes presents to office for ear irrigation due to bilateral ear impaction  He also complains of hearing loss in both ears but more on the right side. He reports no earache  He has been using Debrox ear solution for the past 5 days. After direct visualization with the otoscope  cerumen impaction was identified in the Bilateral ear/s. The procedure for cerumen removal was discussed with Justice. The risks were discussed including pain, trauma to the external auditory canal, bleeding, and possible tympanic membrane perforation with loss of hearing. After verbal consent was obtained, Kurt Rosado was draped to prevent soiling of his clothing. With the use of a 50 cc syringe fitted with a flexible angiocath,  Bilateral ear/s was irrigated 5 times to displace the cerumen away from the TM. With the use of an otoscope to directly visualize the cerumen a cerumen stylet was used with complete removal of the cerumen. The tympanic membranes appeared normal post procedure. The external auditory canal appeared normal.  Justice tolerated the procedure well.  he was instructed to use 2 drops of baby oil in each ear two times a week to prevent ear wax accumulation.            Past Medical History:     Past Medical History:   Diagnosis Date    Asthma     Bronchitis     CAD (coronary artery disease)     COPD (chronic obstructive pulmonary disease) ([address] 2011    Depression 2011    Diabetes mellitus ([facility]     GERD (gastroesophageal reflux disease) 2011    Hyperlipidemia 2011    Hypertension     Hypothyroid 2011    Lower back pain     Nicotine addiction 2011    Sleep apnea       Reviewed all health maintenance requirements and orderedappropriate tests  Health Maintenance Admission medications    Medication Sig Start Date End Date Taking? Authorizing Provider   fenofibrate (TRICOR) 145 MG tablet TAKE 1 TABLET DAILY 7/26/22  Yes Galileo Villalobos MD   carbamide peroxide (DEBROX) 6.5 % otic solution Place 5 drops in ear(s) in the morning and 5 drops before bedtime. 7/22/22 8/21/22 Yes Galileo Villalobos MD   gabapentin (NEURONTIN) 300 MG capsule Take 1 capsule by mouth in the morning and 1 capsule before bedtime. Do all this for 30 days. Intended supply: 30 days. 7/22/22 8/21/22 Yes Galileo Villalobos MD   SYNTHROID 200 MCG tablet TAKE 1 TABLET DAILY 7/14/22  Yes Galileo Villalobos MD   fluocinonide (LIDEX) 0.05 % cream Apply topically 2 times daily.  5/20/22  Yes Galileo Villalobos MD   amLODIPine (NORVASC) 2.5 MG tablet TAKE 1 TABLET EVERY DAY 3/1/22  Yes Galileo Villalobos MD   budesonide-formoterol (SYMBICORT) 160-4.5 MCG/ACT AERO Inhale 2 puffs into the lungs 2 times daily 3/1/22  Yes Galileo Villalobos MD   metFORMIN (GLUCOPHAGE) 1000 MG tablet Take 1 tablet by mouth 2 times daily (with meals) 3/1/22  Yes Galileo Villalobos MD   metoprolol succinate (TOPROL XL) 50 MG extended release tablet TAKE 1 TABLET EVERY DAY 3/1/22  Yes Galileo Villalobos MD   allopurinol (ZYLOPRIM) 300 MG tablet TAKE 1 TABLET EVERY DAY 3/1/22  Yes Galileo Villalobos MD   losartan (COZAAR) 25 MG tablet TAKE 1 TABLET EVERY DAY 3/1/22  Yes Galileo Villalobos MD   PARoxetine (PAXIL) 20 MG tablet Take 1 tablet by mouth every morning 3/1/22  Yes Galileo Villalobos MD   albuterol (PROVENTIL) (2.5 MG/3ML) 0.083% nebulizer solution INHALE THE CONTENTS OF 1 VIAL VIA NEBULIZER EVERY 4 HOURS AS NEEDED FOR WHEEZING OR SHORTNESS OF BREATH 1/11/22  Yes Galileo Villalobos MD   naproxen (NAPROSYN) 500 MG tablet Take 1 tablet by mouth 2 times daily 12/9/21  Yes Barbie Mackenzie MD   glipiZIDE (GLUCOTROL) 10 MG tablet Take 1 tablet by mouth 2 times daily 12/3/21  Yes Galileo Villalobos MD   ipratropium-albuterol (DUONEB) 0.5-2.5 (3) MG/3ML SOLN nebulizer solution Inhale 3 mLs into the lungs 4 times daily 4/30/21  Yes Gera Estes MD   CPAP Machine MISC by Does not apply route 4/19/21  Yes Gera Estes MD   fluticasone (FLONASE) 50 MCG/ACT nasal spray 2 sprays by Nasal route daily 2/19/21  Yes Gera Estes MD   albuterol sulfate  (90 Base) MCG/ACT inhaler Inhale 2 puffs into the lungs every 6 hours as needed for Wheezing 2/19/21  Yes Gera Estes MD   [address] by Does not apply route   Yes Historical Provider, MD   ondansetron (ZOFRAN-ODT) 4 MG disintegrating tablet Take 1 tablet by mouth 3 times daily as needed for Nausea or Vomiting 11/17/20  Yes Gera Estes MD   Respiratory Therapy Supplies [facility] CPAP full face mask, hose, head gear and  filter 2/7/20  Yes Gera Estes MD   blood glucose monitor strips Test 2 times a day & as needed for symptoms of irregular blood glucose. 11/11/19  Yes Gera Estes MD   mometasone (ELOCON) 0.1 % cream Apply topically daily. 2/20/17  Yes Domonique Fierro MD   aspirin 81 MG tablet Take 81 mg by mouth daily. Yes Historical Provider, MD   lovastatin (MEVACOR) 40 MG tablet Take 1 tablet by mouth 2 times daily 3/1/22 5/30/22  Gera Estes MD        Allergies:       Patient has no known allergies. Social History:     Tobacco: reports that he quit smoking about 7 years ago. His smoking use included cigarettes. He has a 45.00 pack-year smoking history. He has never used smokeless tobacco.  Alcohol:      reports no history of alcohol use. Drug Use:  reports no history of drug use. Family History:     Family History   Problem Relation Age of Onset    Diabetes Mother     Heart Disease Mother     Diabetes Father     Heart Disease Father     Heart Disease Brother        Review of Systems:         Review of Systems   Constitutional:  Negative for activity change, appetite change and unexpected weight change.    HENT:  Negative for congestion, ear discharge, ear pain and Number of Visits Requested:   1         There are no Patient Instructions on file for this visit.     Electronically signed by Cristal Coleman MD on 7/29/2022 at 2:09 PM           Completed Refills      Requested Prescriptions      No prescriptions requested or ordered in this encounter

## 2022-08-01 ENCOUNTER — OFFICE VISIT (OUTPATIENT)
Dept: ENT CLINIC | Age: 67
End: 2022-08-01
Payer: MEDICARE

## 2022-08-01 VITALS
BODY MASS INDEX: 41.3 KG/M2 | SYSTOLIC BLOOD PRESSURE: 126 MMHG | WEIGHT: 313 LBS | DIASTOLIC BLOOD PRESSURE: 78 MMHG | RESPIRATION RATE: 18 BRPM | OXYGEN SATURATION: 94 % | HEART RATE: 62 BPM

## 2022-08-01 DIAGNOSIS — H60.8X3 CHRONIC ECZEMATOUS OTITIS EXTERNA OF BOTH EARS: Primary | ICD-10-CM

## 2022-08-01 DIAGNOSIS — L40.9 PSORIASIS: ICD-10-CM

## 2022-08-01 PROCEDURE — 99203 OFFICE O/P NEW LOW 30 MIN: CPT | Performed by: OTOLARYNGOLOGY

## 2022-08-01 PROCEDURE — 1123F ACP DISCUSS/DSCN MKR DOCD: CPT | Performed by: OTOLARYNGOLOGY

## 2022-08-01 RX ORDER — FLUOCINOLONE ACETONIDE 0.11 MG/ML
4 OIL AURICULAR (OTIC) 2 TIMES DAILY
Qty: 20 ML | Refills: 3 | Status: SHIPPED | OUTPATIENT
Start: 2022-08-01 | End: 2022-08-31

## 2022-08-01 ASSESSMENT — ENCOUNTER SYMPTOMS
ALLERGIC/IMMUNOLOGIC NEGATIVE: 1
GASTROINTESTINAL NEGATIVE: 1
RESPIRATORY NEGATIVE: 1
EYES NEGATIVE: 1

## 2022-08-01 NOTE — PROGRESS NOTES
[facility]  [address] [facility] [address]  Dept: [phone]  MD Odette Abreu 74 y.o. male     Patient presents with a chief complaint of Cerumen Impaction (Referred by PCP, attempted ear lavage performed at PCP visit. )       /78   Pulse 62   Resp 18   Wt (!) 313 lb (142 kg)   SpO2 94%   BMI 41.30 kg/m²       History of Presenting Illness: The patient/caregiver reports a history of complaint with the following features: Onset: started 3 months ago  Timing: new onset  Duration: months  Quality: era fullness, itching  Location: both ears  Severity: pain none  Risk factors: new skin flaking in patches, arthritis for a long time  Alleviating factors: nothing gives relief  Aggravating factors: nothing makes it worse  Associated factors: skin changes on hands    Review of systems covering 10 systems is reviewed as staff entry in other note and pertinent positives and negatives noted. Past Medical History:   Diagnosis Date    Asthma     Bronchitis     CAD (coronary artery disease)     COPD (chronic obstructive pulmonary disease) ([facility] 8/12/2011    Depression 8/12/2011    Diabetes mellitus ([facility]     GERD (gastroesophageal reflux disease) 8/12/2011    Hyperlipidemia 8/12/2011    Hypertension     Hypothyroid 8/12/2011    Lower back pain     Nicotine addiction 8/12/2011    Sleep apnea        Current Outpatient Medications:     fluocinolone (DERMOTIC) 0.01 % OIL oil, Place 4 drops in ear(s) 2 times daily, Disp: 20 mL, Rfl: 3    fenofibrate (TRICOR) 145 MG tablet, TAKE 1 TABLET DAILY, Disp: 30 tablet, Rfl: 3    carbamide peroxide (DEBROX) 6.5 % otic solution, Place 5 drops in ear(s) in the morning and 5 drops before bedtime. , Disp: 15 mL, Rfl: 1    gabapentin (NEURONTIN) 300 MG capsule, Take 1 capsule by mouth in the morning and 1 capsule before bedtime. Do all this for 30 days. times daily as needed for Nausea or Vomiting, Disp: 21 tablet, Rfl: 0    Respiratory Therapy Supplies MISC, CPAP full face mask, hose, head gear and  filter, Disp: 1 each, Rfl: 0    blood glucose monitor strips, Test 2 times a day & as needed for symptoms of irregular blood glucose., Disp: 100 strip, Rfl: 3    mometasone (ELOCON) 0.1 % cream, Apply topically daily. , Disp: 1 Tube, Rfl: 3    aspirin 81 MG tablet, Take 81 mg by mouth daily. , Disp: , Rfl:     lovastatin (MEVACOR) 40 MG tablet, Take 1 tablet by mouth 2 times daily, Disp: 180 tablet, Rfl: 3   No Known Allergies   Past Surgical History:   Procedure Laterality Date    CARDIAC CATHETERIZATION Left 01/21/15    Severe left main trunk disease extending into the ostium of the left anterior descending of 80% involving the ostium of the intermediate & atrioventricular branch of the circumflex. 60% disease in the atrioventricular branch of the circumflex in the midportion. 40% disease in the right coronary artery. Normal left ventricular function, ejection fraction of 60%    CARDIAC CATHETERIZATION Left 12/20/2017    Dr. Alea Hough @ [facility]--70% left main trunck & ostial LAD & high lateral circumflex disease. Patent LIMA to the LAD. Patent vein graft to the high lateral circumflex. 60% disease in the AV branch of the circumflex that terminates in a large posterolateral branch with an FFR of 1.0, indicating no significant stenosis. 40% disease in the right coronary artery. Ejection fraction 50-55%. COLONOSCOPY N/A 3/5/2019    COLONOSCOPY POLYPECTOMY HOT BIOPSY performed by Jenny Roldan MD at [address] [place] [address] GRAFT  02/04/15    3 vessel CABG using left internal mammary artery to the left anterior descending coronary artery with a reverse saphenous vein aortocoronary sequential graft side to side to the ramus intermedius coronary artery & end to side to the first obtuse marginal coronary artery.     OTHER SURGICAL HISTORY  03-21-16 I & D- cyst- posterior neck    SPINE SURGERY      THYROID LOBECTOMY      UPPER GASTROINTESTINAL ENDOSCOPY N/A 3/5/2019    EGD BIOPSY performed by Tra Lu MD at [facility] OR      Social History     Socioeconomic History    Marital status:      Spouse name: Not on file    Number of children: Not on file    Years of education: Not on file    Highest education level: Not on file   Occupational History    Not on file   Tobacco Use    Smoking status: Former     Packs/day: 1.00     Years: 45.00     Pack years: 45.00     Types: Cigarettes     Quit date: 2014     Years since quittin.6    Smokeless tobacco: Never   Vaping Use    Vaping Use: Never used   Substance and Sexual Activity    Alcohol use: No    Drug use: No    Sexual activity: Not on file   Other Topics Concern    Not on file   Social History Narrative    Not on file     Social Determinants of Health     Financial Resource Strain: Low Risk     Difficulty of Paying Living Expenses: Not hard at all   Food Insecurity: No Food Insecurity    Worried About Running Out of Food in the Last Year: Never true    [address] in the Last Year: Never true   Transportation Needs: Not on file   Physical Activity: Sufficiently Active    Days of Exercise per Week: 3 days    Minutes of Exercise per Session: 60 min   Stress: Not on file   Social Connections: Not on file   Intimate Partner Violence: Not on file   Housing Stability: Not on file     Family History   Problem Relation Age of Onset    Diabetes Mother     Heart Disease Mother     Diabetes Father     Heart Disease Father     Heart Disease Brother         PHYSICAL EXAM:    The patient was examined today 2022 with findings as follows:    CONSTITUTIONAL:    General Appearance: well-appearing, nontoxic, alert, no acute distress     Communication: understanding at normal conversational tones, normal voicing, speech intelligible    HEAD/FACE:    Head: atraumatic, normocephalic, no lesions    Facial Inspection: no lesions, healthy skin    Facial Strength: motor strength normal, symmetric strength, symmetric movement, motor strength    Sinuses: no sinus tenderness    Salivary Glands: no enlargements of parotid glands, no tenderness of parotid glands, no masses of parotid glands, clear salivary flow on palpation from Stensen's ducts, no duct stones of Stensen's duct, no enlargement of submandibular glands, no tenderness of submandibular glands, no masses of submandibular glands, clear salivary flow from [place]'s ducts, no stones of Ruth's ducts    Temporomandibular Joint: no crepitus with motion, no tenderness on palpation, no trismus, motion symmetric    EYES:    Pupils: PERRLA, extra-ocular movements intact, no nystagmus, sclera white, no redness of eyes, no watering of eyes    EARS:    Bilateral External Ears: no pits, no tags    Right External Ear: normally formed, no lesions, no mastoid tenderness    Left External Ear: normally formed, no lesions, no mastoid tenderness    Right External Auditory Canal: dry flaky skin, irritated, no obstructing cerumen, no discharge    Left External Auditory Canal: dry flaky skin, irritated, no obstructing cerumen, no discharge    Right Tympanic Membrane: normal landmarks, translucent, mobile to pneumatic otoscopy, no perforation    Left Tympanic Membrane: normal landmarks, translucent, mobile to pneumatic otoscopy, no perforation    Hearing: intact to spoken voice, intact to finger rub, Hill midline, Right Ear: Rinne AC>BC, Left Left Ear: Rinne AC>BC    NOSE:    Nasal Skin: no lesions, no lacerations, no scars    Nasal Dorsum: symmetric with no visible or palpable deformities    Nasal Tip: normal symmetric nasal tip, normal nasal valves    Nasal Mucosa: normal, pink and moist    Septum: not markedly deformed, midline, no exposed vessels, no bleeding, no septal granuloma    Turbinates: normal size and conformation    Nasopharynx: normal    ORAL CAVITY/MOUTH:    Lips, teeth, gums: normal lips, normal gums, dentition intact, no dental pain on palpation    Oral Mucosa: normal, moist, no lesions    Palate: normal hard palate, normal soft palate, symmetric palatal elevation    Floor of Mouth: normal floor of mouth    Tongue: normal tongue, no lesions, no edema, no masses, normal mucosa, mobile    Tonsils: normal tonsils, symmetric, no lesions    Posterior pharynx: normal    NECK:    Neck: no masses, trachea midline, normal range of motion, no cysts or pits, no tenderness to palpation    Thyroid: normal thyroid, no enlargement, no tenderness, no nodules    LYMPH NODES:    Cervical: no palpable lymph node enlargement    RESPIRATORY:    Inspection/Auscultation: good air movement, chest expands symmetrically, normal breath sounds, no wheezing, no stridor    CARDIOVASCULAR SYSTEM:    Auscultation: regular rate and rhythm, carotid pulse normal, no carotid thrills, no carotid bruits    Observation/Palpation of Peripheral Vascular System: no varicosities, no cyanosis, no edema    SKIN:    General Appearance: psoriatic patches over knuckles, back of hand worse on left, warm and dry, normal turgor, no bruising    NEUROLOGICAL SYSTEM:    Orientation: oriented to time, oriented to place, oriented to person    Cranial Nerves: Cranial Nerves II-XII intact, normal facial movement    PSYCHIATRIC:    Mood and affect: normal mood, normal affect        Assessment and Plan:    The patient and/or caregiver is advised on the use of any prescribed medication. The avoidance of water exposure to the ear and mechanical trauma such as the use of cotton tipped swabs or the insertion of objects into the ear is advised. The patient and/or caregiver is to notify the office if no improvement or worsening of symptoms is noted prior to the scheduled follow-up for sooner evaluation. The patient and/or caregiver is able to state an understanding of these recommendations and is agreeable to the treatment plan.     I suspect he may suffer psoriatic arthritis given his symptoms and findings. He is scheduled to see dermatology, but may also benefit form rheumatologic evaluation if his skin and joint symptoms worsen. Diagnosis Orders   1. Chronic eczematous otitis externa of both ears  fluocinolone (DERMOTIC) 0.01 % OIL oil      2. Psoriasis           Return if symptoms worsen or fail to improve. The patient and/or caregiver is to notify the office if no improvement or worsening of symptoms is noted prior to the scheduled follow-up for sooner evaluation. The patient and/or caregiver is able to state an understanding of these recommendations and is agreeable to the treatment plan. --Eva Storey MD on 8/1/2022 at 10:20 AM    An electronic signature was used to authenticate this note.

## 2022-08-01 NOTE — PATIENT INSTRUCTIONS
SURVEY:    You may be receiving a survey from [employer] regarding your visit today. Please complete the survey to enable us to provide the highest quality of care to you and your family. If you cannot score us a very good on any question, please call the office to discuss how we could have made your experience a very good one. Thank you.   MD Spencer Pelletier, MD Nohelia Coffman, MD Aurea Blancas, MD Roro Francis, MD Natividad Bustamante, [facility]  Mady Chandler, [facility], [address]  Sahil Molina, [address], [address]  Danilo House, [address]  Sheila Whiteside, [address]

## 2022-08-02 ENCOUNTER — HOSPITAL ENCOUNTER (OUTPATIENT)
Age: 67
Discharge: HOME | End: 2022-08-02
Payer: MEDICARE

## 2022-08-02 ENCOUNTER — HOSPITAL ENCOUNTER (OUTPATIENT)
Dept: GENERAL RADIOLOGY | Age: 67
Discharge: HOME | End: 2022-08-04
Payer: MEDICARE

## 2022-08-02 ENCOUNTER — HOSPITAL ENCOUNTER (OUTPATIENT)
Age: 67
Discharge: HOME | End: 2022-08-04
Payer: MEDICARE

## 2022-08-02 DIAGNOSIS — E78.2 MIXED HYPERLIPIDEMIA: ICD-10-CM

## 2022-08-02 DIAGNOSIS — I25.10 CORONARY ARTERY DISEASE INVOLVING NATIVE CORONARY ARTERY OF NATIVE HEART WITHOUT ANGINA PECTORIS: ICD-10-CM

## 2022-08-02 DIAGNOSIS — I10 ESSENTIAL HYPERTENSION: ICD-10-CM

## 2022-08-02 DIAGNOSIS — E55.9 VITAMIN D DEFICIENCY DISEASE: ICD-10-CM

## 2022-08-02 LAB
ALBUMIN SERPL-MCNC: 4.3 G/DL (ref 3.5–5.2)
ALP BLD-CCNC: 49 U/L (ref 40–129)
ALT SERPL-CCNC: 38 U/L (ref 5–41)
ANION GAP SERPL CALCULATED.3IONS-SCNC: 11 MMOL/L (ref 9–17)
AST SERPL-CCNC: 39 U/L
BILIRUB SERPL-MCNC: 0.42 MG/DL (ref 0.3–1.2)
BUN BLDV-MCNC: 12 MG/DL (ref 8–23)
BUN/CREAT BLD: 13 (ref 9–20)
CALCIUM SERPL-MCNC: 9.7 MG/DL (ref 8.6–10.4)
CHLORIDE BLD-SCNC: 101 MMOL/L (ref 98–107)
CHOLESTEROL/HDL RATIO: 4.6
CHOLESTEROL: 138 MG/DL
CO2: 24 MMOL/L (ref 20–31)
CREAT SERPL-MCNC: 0.91 MG/DL (ref 0.7–1.2)
GFR AFRICAN AMERICAN: >60 ML/MIN
GFR NON-AFRICAN AMERICAN: >60 ML/MIN
GFR SERPL CREATININE-BSD FRML MDRD: ABNORMAL ML/MIN/{1.73_M2}
GLUCOSE BLD-MCNC: 189 MG/DL (ref 70–99)
HDLC SERPL-MCNC: 30 MG/DL
LDL CHOLESTEROL: 58 MG/DL (ref 0–130)
MAGNESIUM: 2 MG/DL (ref 1.6–2.6)
PATIENT FASTING?: YES
POTASSIUM SERPL-SCNC: 4.7 MMOL/L (ref 3.7–5.3)
SODIUM BLD-SCNC: 136 MMOL/L (ref 135–144)
TOTAL PROTEIN: 6.8 G/DL (ref 6.4–8.3)
TRIGL SERPL-MCNC: 251 MG/DL
TSH SERPL DL<=0.05 MIU/L-ACNC: 2.42 UIU/ML (ref 0.3–5)
VITAMIN D 25-HYDROXY: 30.7 NG/ML

## 2022-08-02 PROCEDURE — 80053 COMPREHEN METABOLIC PANEL: CPT

## 2022-08-02 PROCEDURE — 82306 VITAMIN D 25 HYDROXY: CPT

## 2022-08-02 PROCEDURE — 83735 ASSAY OF MAGNESIUM: CPT

## 2022-08-02 PROCEDURE — 93005 ELECTROCARDIOGRAM TRACING: CPT

## 2022-08-02 PROCEDURE — 84443 ASSAY THYROID STIM HORMONE: CPT

## 2022-08-02 PROCEDURE — 80061 LIPID PANEL: CPT

## 2022-08-02 PROCEDURE — 36415 COLL VENOUS BLD VENIPUNCTURE: CPT

## 2022-08-02 PROCEDURE — 71046 X-RAY EXAM CHEST 2 VIEWS: CPT

## 2022-08-03 LAB
EKG ATRIAL RATE: 49 BPM
EKG P AXIS: 50 DEGREES
EKG P-R INTERVAL: 162 MS
EKG Q-T INTERVAL: 438 MS
EKG QRS DURATION: 90 MS
EKG QTC CALCULATION (BAZETT): 395 MS
EKG R AXIS: 41 DEGREES
EKG T AXIS: 75 DEGREES
EKG VENTRICULAR RATE: 49 BPM

## 2022-08-03 PROCEDURE — 93010 ELECTROCARDIOGRAM REPORT: CPT | Performed by: INTERNAL MEDICINE

## 2022-08-04 ENCOUNTER — OFFICE VISIT (OUTPATIENT)
Dept: CARDIOLOGY CLINIC | Age: 67
End: 2022-08-04
Payer: MEDICARE

## 2022-08-04 VITALS
DIASTOLIC BLOOD PRESSURE: 70 MMHG | OXYGEN SATURATION: 95 % | HEART RATE: 65 BPM | SYSTOLIC BLOOD PRESSURE: 120 MMHG | WEIGHT: 308 LBS | BODY MASS INDEX: 40.64 KG/M2

## 2022-08-04 DIAGNOSIS — I25.10 CORONARY ARTERY DISEASE INVOLVING NATIVE CORONARY ARTERY OF NATIVE HEART WITHOUT ANGINA PECTORIS: ICD-10-CM

## 2022-08-04 DIAGNOSIS — N18.1 OTHER SPECIFIED DIABETES MELLITUS WITH STAGE 1 CHRONIC KIDNEY DISEASE, UNSPECIFIED WHETHER LONG TERM INSULIN USE (HCC): ICD-10-CM

## 2022-08-04 DIAGNOSIS — E78.2 MIXED HYPERLIPIDEMIA: ICD-10-CM

## 2022-08-04 DIAGNOSIS — R06.02 SOB (SHORTNESS OF BREATH): Primary | ICD-10-CM

## 2022-08-04 DIAGNOSIS — I10 ESSENTIAL HYPERTENSION: ICD-10-CM

## 2022-08-04 DIAGNOSIS — E13.22 OTHER SPECIFIED DIABETES MELLITUS WITH STAGE 1 CHRONIC KIDNEY DISEASE, UNSPECIFIED WHETHER LONG TERM INSULIN USE (HCC): ICD-10-CM

## 2022-08-04 PROCEDURE — 99214 OFFICE O/P EST MOD 30 MIN: CPT | Performed by: INTERNAL MEDICINE

## 2022-08-04 PROCEDURE — 1123F ACP DISCUSS/DSCN MKR DOCD: CPT | Performed by: INTERNAL MEDICINE

## 2022-08-04 NOTE — LETTER
Ridge Tyson M.D. [address] [place] [address] [place] [address]  [phone]        2022        Santos Mccallum MD  [address] [facility], [address]    RE:   Stevenson Labor  :  1955    Dear Dr. Emma Pride:    CHIEF COMPLAINT:  1. Shortness of breath. 2.  Chest pain. 3.  Status post open heart surgery by Dr. Sherri Olmstead on 2015. HISTORY OF PRESENT ILLNESS:  I had the pleasure of seeing Mr. Ilan Wilde in our office on 2022. He is a pleasant 60-year-old gentleman who developed shortness of breath with chest pain in , which resulted in a stress test.  This was unremarkable; however, because of severe shortness of breath, I did a catheterization on 2015 that showed 80% distal left main trunk and ostium of the LAD, 60% circumflex, 40% RCA, with an EF of 60%. He had subsequent open heart surgery by Dr. Sherri Olmstead on 2015, with a LIMA to the LAD and a vein graft to the circumflex. I repeated a stress test on 10/22/2017, which was abnormal, and therefore I did a catheterization on 2017, that showed 70% disease in the ostium of the LAD with a distal left main trunk disease, patent LIMA to the LAD, patent vein graft to the high lateral circumflex, the right coronary artery had 40% disease, EF of 50% to 55%, medical therapy was recommended. He has done really very little activity. He developed marked shortness of breath with activity and also has had a marked loss of energy over the last 6 months. For the last two months, he could do really very little activity. He does have some edema in his left leg and he has developed episodes of becoming diaphoretic. He thinks that is why his Claria Maxcy is up. \"    He has had no syncope or near syncope. Denies any palpitations. He has had no hospitalizations or procedures. CARDIAC RISK FACTORS:  Known CAD:  Positive.   Bypass Surgery: Positive. Hypertension:  Positive. Hyperlipidemia:  Positive. Other Family Members:  Positive. Smoking:  Negative. Diabetes:  Positive. MEDICATIONS AT HOME:  He is currently on Proventil inhaler p.r.n., Zyloprim 300 mg daily, Norvasc 2.5 mg daily, aspirin 81 mg daily, Symbicort 2 puffs b.i.d., CPAP machine nightly, TriCor 145 mg daily, Flonase p.r.n., Neurontin 300 mg b.i.d., Glucotrol 10 mg b.i.d., DuoNeb q.i.d., Cozaar 25 mg daily, Mevacor 40 mg b.i.d., Glucophage 1000 mg b.i.d., Toprol-XL 50 mg daily, Paxil 20 mg daily, Synthroid 200 mcg daily. PAST MEDICAL AND SURGICAL HISTORY:  1. Thyroid lobectomy in , on thyroid replacement, euthyroid. 2.  Spine surgery many years ago. 3.  Chronic back pain, on disability. 4.  Non-insulin-dependent diabetes, with his hemoglobin A1c down to 6.2.  5.  Bypass surgery, as stated previously. 6.  Lower back pain. 7.  History of depression. FAMILY HISTORY:  Mother alive at 80, had an MI. Father  of MI.    SOCIAL HISTORY:  He is 74years old,  twice. Has five children. Quit smoking in 2014. Does not drink alcohol. 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 His brother, Trixie Muller, plays drums in a band. He had a  [employer], which his brother, Trixie Muller, completely redid. Trixie Muller wanted this motorcycle back and therefore he gave it back to him. He is thinking of getting a motorized scooter. REVIEW OF SYSTEMS:  Cardiac as above. Other systems reviewed including constitutional, eyes, ears, nose and throat, cardiovascular, respiratory, GI, , musculoskeletal, integumentary, neurologic, endocrine, hematologic and allergic/immunologic are negative except for what is described above. No weight loss or weight gain. No change in bowel habits. No blood in stools. No fevers, sweats or chills. PHYSICAL EXAMINATION:  VITAL SIGNS:  His blood pressure was 120/70 with a heart rate of 65 and regular.   Respiratory rate 18. O2 sat 95%. Weight 308 pounds. GENERAL:  He is a pleasant 81-year-old gentleman. Denied pain. He was oriented to person, place and time. Answered questions appropriately. SKIN:  No unusual skin changes. HEENT:  The pupils are equally round and intact. Mucous membranes were dry. NECK:  No JVD. Good carotid pulses. No carotid bruits. No lymphadenopathy or thyromegaly. CARDIOVASCULAR EXAM:  S1 and S2 were normal.  No S3 or S4. Soft systolic blowing type murmur. No diastolic murmur. PMI was normal.  No lift, thrust, or pericardial friction rub. LUNGS:  Clear to auscultation and percussion. ABDOMEN:  Soft and nontender. Good bowel sounds. EXTREMITIES:  Good femoral pulses. Good pedal pulses. No pedal edema. Skin was warm and dry. No calf tenderness. Nail beds pink. Good cap refill. PULSES:  Bilateral symmetrical radial, brachial and carotid pulses. No carotid bruits. Good femoral and pedal pulses. NEUROLOGIC EXAM:  Within normal limits. PSYCHIATRIC EXAM:  Within normal limits. LABORATORY DATA:  Sodium 136, potassium 4.7, BUN 12, creatinine 0.91, GFR greater than 60, magnesium 2.0, glucose was 189, calcium was 9.7. Cholesterol was 138 with an HDL of 30, LDL 58, triglycerides 251. ALT was 38, AST was 39. His hemoglobin A1c in March was 6.2.  TSH normal at 2.42. Vitamin D 30.7. EKG showed sinus rhythm with nonspecific ST changes. Chest x-ray was unremarkable. IMPRESSION:  1. Severe CAD. 2.  Catheterization on 01/21/2015 showing 80% left main trunk and proximal LAD, with 60% circumflex, mild right coronary artery, EF of 60%. 3.  Open heart surgery by Dr. Paul Louie on 02/04/2015, with a LIMA to the LAD and a vein graft to the high lateral branch of the circumflex.   4.  Catheterization on 12/20/2017, after chest pain with abnormal stress test, that showed patent LIMA to the LAD, patent vein graft to the high lateral circumflex, with 60% disease in the AV branch of the circumflex and 40% disease in the right coronary artery, EF of 50% to 55%. 5.  Marked shortness of breath and loss of energy over the last 6 months, which could be an anginal equivalent. 7.  COPD with reversibility. 8.  Normal LV function. 9.  Back pain, which limits his activity. PLAN:  1. We will do a Lexiscan stress test.  2.  We would do an echocardiogram.  3.  If there is any change in either the stress test or the echocardiogram, I would consider repeating his cardiac catheterization as it has been 7 years since his bypass surgery. DISCUSSION:  Mr. Raj Hooks is fairly discouraged on his exercise capacity. He unfortunately cannot exercise because of his back pain and also because of his shortness of breath. I think at least some of his shortness of breath is due to deconditioning. However, he is 7 years post bypass surgery. His right coronary artery was not bypassed as it had only mild disease in 2015. I suspect that we will need to consider repeating a cardiac catheterization, but we will see the results of the stress test and echocardiogram.    Thank you very much for allowing me the privilege of seeing Mr. Raj Hooks. If you have any questions on my thoughts, please do not hesitate to contact me.     Sincerely,      Mono Hernandez    D: 08/04/2022 10:59:23     T: 08/04/2022 11:04:49     [email or web address]  Job#: [id number]   Doc#: [id number]

## 2022-08-04 NOTE — PROGRESS NOTES
Ov Dr. Rickie Goncalves for one year follow up   Did not bring medications today   No hospitalizations/procedures/er visits  Occ sharp pain in chest   No palpitation   C/o sob with activity   C/o \"I have no energy \" x 6 months  Last 2 months cant even exercise   Per pt   Some edema in left leg  Clammy and sweaty today   Pt states \"I think my sugar is up\"   Occ dizziness   Sold his [place] to [employer] up for lexiscan stress test and echo   Will call with results    Follow up in 1 year

## 2022-08-07 NOTE — PROGRESS NOTES
Ashley Mac M.D. [address] [place] [address] [place] [address]  [phone]        2022        Enmanuel Tirado MD  [address] [facility], [address]    RE:   Rao Huynh  :  1955    Dear Dr. Jil Velasquez:    CHIEF COMPLAINT:  1. Shortness of breath. 2.  Chest pain. 3.  Status post open heart surgery by Dr. Laura Saeed on 2015. HISTORY OF PRESENT ILLNESS:  I had the pleasure of seeing Mr. Dipti Scherer in our office on 2022. He is a pleasant 77-year-old gentleman who developed shortness of breath with chest pain in , which resulted in a stress test.  This was unremarkable; however, because of severe shortness of breath, I did a catheterization on 2015 that showed 80% distal left main trunk and ostium of the LAD, 60% circumflex, 40% RCA, with an EF of 60%. He had subsequent open heart surgery by Dr. Laura Saeed on 2015, with a LIMA to the LAD and a vein graft to the circumflex. I repeated a stress test on 10/22/2017, which was abnormal, and therefore I did a catheterization on 2017, that showed 70% disease in the ostium of the LAD with a distal left main trunk disease, patent LIMA to the LAD, patent vein graft to the high lateral circumflex, the right coronary artery had 40% disease, EF of 50% to 55%, medical therapy was recommended. He has done really very little activity. He developed marked shortness of breath with activity and also has had a marked loss of energy over the last 6 months. For the last two months, he could do really very little activity. He does have some edema in his left leg and he has developed episodes of becoming diaphoretic. He thinks that is why his Arley Peak is up. \"    He has had no syncope or near syncope. Denies any palpitations. He has had no hospitalizations or procedures. CARDIAC RISK FACTORS:  Known CAD:  Positive.   Bypass Surgery: Positive. Hypertension:  Positive. Hyperlipidemia:  Positive. Other Family Members:  Positive. Smoking:  Negative. Diabetes:  Positive. MEDICATIONS AT HOME:  He is currently on Proventil inhaler p.r.n., Zyloprim 300 mg daily, Norvasc 2.5 mg daily, aspirin 81 mg daily, Symbicort 2 puffs b.i.d., CPAP machine nightly, TriCor 145 mg daily, Flonase p.r.n., Neurontin 300 mg b.i.d., Glucotrol 10 mg b.i.d., DuoNeb q.i.d., Cozaar 25 mg daily, Mevacor 40 mg b.i.d., Glucophage 1000 mg b.i.d., Toprol-XL 50 mg daily, Paxil 20 mg daily, Synthroid 200 mcg daily. PAST MEDICAL AND SURGICAL HISTORY:  1. Thyroid lobectomy in , on thyroid replacement, euthyroid. 2.  Spine surgery many years ago. 3.  Chronic back pain, on disability. 4.  Non-insulin-dependent diabetes, with his hemoglobin A1c down to 6.2.  5.  Bypass surgery, as stated previously. 6.  Lower back pain. 7.  History of depression. FAMILY HISTORY:  Mother alive at 80, had an MI. Father  of MI.    SOCIAL HISTORY:  He is 74years old,  twice. Has five children. Quit smoking in 2014. Does not drink alcohol. 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 His brother, Carolyn Shaver, plays drums in a band. He had a  [employer], which his brother, Carolyn Shaver, completely redid. Carolyn Shaver wanted this motorcycle back and therefore he gave it back to him. He is thinking of getting a motorized scooter. REVIEW OF SYSTEMS:  Cardiac as above. Other systems reviewed including constitutional, eyes, ears, nose and throat, cardiovascular, respiratory, GI, , musculoskeletal, integumentary, neurologic, endocrine, hematologic and allergic/immunologic are negative except for what is described above. No weight loss or weight gain. No change in bowel habits. No blood in stools. No fevers, sweats or chills. PHYSICAL EXAMINATION:  VITAL SIGNS:  His blood pressure was 120/70 with a heart rate of 65 and regular.   Respiratory rate 18. O2 sat 95%. Weight 308 pounds. GENERAL:  He is a pleasant 71-year-old gentleman. Denied pain. He was oriented to person, place and time. Answered questions appropriately. SKIN:  No unusual skin changes. HEENT:  The pupils are equally round and intact. Mucous membranes were dry. NECK:  No JVD. Good carotid pulses. No carotid bruits. No lymphadenopathy or thyromegaly. CARDIOVASCULAR EXAM:  S1 and S2 were normal.  No S3 or S4. Soft systolic blowing type murmur. No diastolic murmur. PMI was normal.  No lift, thrust, or pericardial friction rub. LUNGS:  Clear to auscultation and percussion. ABDOMEN:  Soft and nontender. Good bowel sounds. EXTREMITIES:  Good femoral pulses. Good pedal pulses. No pedal edema. Skin was warm and dry. No calf tenderness. Nail beds pink. Good cap refill. PULSES:  Bilateral symmetrical radial, brachial and carotid pulses. No carotid bruits. Good femoral and pedal pulses. NEUROLOGIC EXAM:  Within normal limits. PSYCHIATRIC EXAM:  Within normal limits. LABORATORY DATA:  Sodium 136, potassium 4.7, BUN 12, creatinine 0.91, GFR greater than 60, magnesium 2.0, glucose was 189, calcium was 9.7. Cholesterol was 138 with an HDL of 30, LDL 58, triglycerides 251. ALT was 38, AST was 39. His hemoglobin A1c in March was 6.2.  TSH normal at 2.42. Vitamin D 30.7. EKG showed sinus rhythm with nonspecific ST changes. Chest x-ray was unremarkable. IMPRESSION:  1. Severe CAD. 2.  Catheterization on 01/21/2015 showing 80% left main trunk and proximal LAD, with 60% circumflex, mild right coronary artery, EF of 60%. 3.  Open heart surgery by Dr. Jim Pope on 02/04/2015, with a LIMA to the LAD and a vein graft to the high lateral branch of the circumflex.   4.  Catheterization on 12/20/2017, after chest pain with abnormal stress test, that showed patent LIMA to the LAD, patent vein graft to the high lateral circumflex, with 60% disease in the AV branch of the circumflex and 40% disease in the right coronary artery, EF of 50% to 55%. 5.  Marked shortness of breath and loss of energy over the last 6 months, which could be an anginal equivalent. 7.  COPD with reversibility. 8.  Normal LV function. 9.  Back pain, which limits his activity. PLAN:  1. We will do a Lexiscan stress test.  2.  We would do an echocardiogram.  3.  If there is any change in either the stress test or the echocardiogram, I would consider repeating his cardiac catheterization as it has been 7 years since his bypass surgery. DISCUSSION:  Mr. Puja Romano is fairly discouraged on his exercise capacity. He unfortunately cannot exercise because of his back pain and also because of his shortness of breath. I think at least some of his shortness of breath is due to deconditioning. However, he is 7 years post bypass surgery. His right coronary artery was not bypassed as it had only mild disease in 2015. I suspect that we will need to consider repeating a cardiac catheterization, but we will see the results of the stress test and echocardiogram.    Thank you very much for allowing me the privilege of seeing Mr. Puja Romano. If you have any questions on my thoughts, please do not hesitate to contact me.     Sincerely,      Marci Verduzco    D: 08/04/2022 10:59:23     T: 08/04/2022 11:04:49     [email or web address]  Job#: [id number]   Doc#: [id number]

## 2022-08-08 ENCOUNTER — TELEPHONE (OUTPATIENT)
Dept: FAMILY MEDICINE CLINIC | Age: 67
End: 2022-08-08

## 2022-08-08 RX ORDER — AMOXICILLIN 500 MG/1
500 CAPSULE ORAL 2 TIMES DAILY
Qty: 14 CAPSULE | Refills: 0 | Status: SHIPPED | OUTPATIENT
Start: 2022-08-08 | End: 2022-08-15 | Stop reason: ALTCHOICE

## 2022-08-08 NOTE — TELEPHONE ENCOUNTER
Patient called and stated at his last appointment you discussed a referral for him to a rheumatologist, I don't see that request in his chart.  Please advise

## 2022-08-08 NOTE — TELEPHONE ENCOUNTER
Patient has an infected tooth and he can't get into his dentist for 3 weeks, patient is asking for an antibiotic for his infection  Please advise

## 2022-08-12 ENCOUNTER — HOSPITAL ENCOUNTER (OUTPATIENT)
Dept: NON INVASIVE DIAGNOSTICS | Age: 67
Discharge: HOME | End: 2022-08-12
Payer: MEDICARE

## 2022-08-12 DIAGNOSIS — R06.02 SOB (SHORTNESS OF BREATH): ICD-10-CM

## 2022-08-12 LAB
LV EF: 55 %
LVEF MODALITY: NORMAL

## 2022-08-12 PROCEDURE — 93306 TTE W/DOPPLER COMPLETE: CPT

## 2022-08-15 ENCOUNTER — HOSPITAL ENCOUNTER (OUTPATIENT)
Dept: NUCLEAR MEDICINE | Age: 67
Discharge: HOME | End: 2022-08-17
Payer: MEDICARE

## 2022-08-15 ENCOUNTER — HOSPITAL ENCOUNTER (OUTPATIENT)
Dept: NON INVASIVE DIAGNOSTICS | Age: 67
Discharge: HOME | End: 2022-08-15
Payer: MEDICARE

## 2022-08-15 VITALS — HEART RATE: 61 BPM | SYSTOLIC BLOOD PRESSURE: 115 MMHG | DIASTOLIC BLOOD PRESSURE: 69 MMHG

## 2022-08-15 DIAGNOSIS — R06.02 SOB (SHORTNESS OF BREATH): ICD-10-CM

## 2022-08-15 PROCEDURE — A9500 TC99M SESTAMIBI: HCPCS | Performed by: INTERNAL MEDICINE

## 2022-08-15 PROCEDURE — 78452 HT MUSCLE IMAGE SPECT MULT: CPT | Performed by: INTERNAL MEDICINE

## 2022-08-15 PROCEDURE — 3430000000 HC RX DIAGNOSTIC RADIOPHARMACEUTICAL: Performed by: INTERNAL MEDICINE

## 2022-08-15 PROCEDURE — 6360000002 HC RX W HCPCS: Performed by: INTERNAL MEDICINE

## 2022-08-15 PROCEDURE — 78452 HT MUSCLE IMAGE SPECT MULT: CPT

## 2022-08-15 PROCEDURE — 93017 CV STRESS TEST TRACING ONLY: CPT

## 2022-08-15 PROCEDURE — 93016 CV STRESS TEST SUPVJ ONLY: CPT | Performed by: INTERNAL MEDICINE

## 2022-08-15 PROCEDURE — 93018 CV STRESS TEST I&R ONLY: CPT | Performed by: INTERNAL MEDICINE

## 2022-08-15 RX ORDER — AMINOPHYLLINE DIHYDRATE 25 MG/ML
50 INJECTION, SOLUTION INTRAVENOUS PRN
Status: ACTIVE | OUTPATIENT
Start: 2022-08-15 | End: 2022-08-15

## 2022-08-15 RX ORDER — SODIUM CHLORIDE 0.9 % (FLUSH) 0.9 %
10 SYRINGE (ML) INJECTION PRN
Status: ACTIVE | OUTPATIENT
Start: 2022-08-15 | End: 2022-08-15

## 2022-08-15 RX ADMIN — TETRAKIS(2-METHOXYISOBUTYLISOCYANIDE)COPPER(I) TETRAFLUOROBORATE 30 MILLICURIE: 1 INJECTION, POWDER, LYOPHILIZED, FOR SOLUTION INTRAVENOUS at 08:25

## 2022-08-15 RX ADMIN — REGADENOSON 0.4 MG: 0.08 INJECTION, SOLUTION INTRAVENOUS at 08:24

## 2022-08-15 RX ADMIN — TETRAKIS(2-METHOXYISOBUTYLISOCYANIDE)COPPER(I) TETRAFLUOROBORATE 10 MILLICURIE: 1 INJECTION, POWDER, LYOPHILIZED, FOR SOLUTION INTRAVENOUS at 07:15

## 2022-08-15 NOTE — PROGRESS NOTES
Radha Dominguez started. C/O initial shortness of breath, denies any pain. 80 - Tolerating procedure well. Shortness of breath resolved. 0830 - Lexiscan completed. Tolerated procedure well. Pt provided with snack at this time.

## 2022-08-15 NOTE — PROCEDURES
[place] [address]                              CARDIAC STRESS TEST    PATIENT NAME: Steve Mcelroy                     :        1955  MED REC NO:   [id number]                              ROOM:  ACCOUNT NO:   [de-identified]                           ADMIT DATE: 08/15/2022  PROVIDER:     Treasure Magdaleno      DATE OF STUDY:  08/15/2022    Cardiovascular Diagnostics Department    Ordering Provider:  Nolan Claire MD    Primary Care Provider:  Griselda Ahle, MD    Interpreting Physician:   Treasure Magdaleno MD    MYOCARDIAL PERFUSION STRESS IMAGING    The stress ECG results are reported separately. NUCLEAR IMAGING RESULTS:  The overall quality of the study is excellent. Mild-to-moderate attenuation artifact was seen. There is not evidence  of abnormal lung uptake. Additionally, the right ventricle appears  normal.  The left ventricular cavity is noted to be normal in size on  stress images. There is not evidence of transient ischemic dilatation  (TID) of the left ventricle. Gated SPECT imaging demonstrates hypokinesis of the septal region. The  calculated left ventricular ejection fraction was 55%. The rest images demonstrated a small perfusion abnormality of mild  intensity in the anteroseptal region which is most likely due to  artifact. On stress imaging, a small-to-moderate perfusion abnormality of mild  intensity was noted in the lateral and inferior regions which may be due  to artifact. IMPRESSION:  1. Equivocal myocardial perfusion study. There is a small-to-moderate  perfusion defect of mild intensity in the lateral and inferior regions  during stress imaging, which is most consistent with ischemia but may be  artifact. 2.  Global left ventricular systolic function was normal without  regional wall motion abnormalities.     Overall these results are most consistent with a low-to-intermediate  risk for significant coronary artery disease. Depending on the patient's symptoms and level of clinical suspicion,  aggressive medical management vs. additional testing by coronary  angiography may be indicated.           Shauna Garcia    D: 08/15/2022 15:24:46       T: 08/15/2022 16:25:30     [email or web address]  Job#: [id number]     Doc#: Unknown    CC:  Marylu Jimenez

## 2022-08-16 ENCOUNTER — OFFICE VISIT (OUTPATIENT)
Dept: FAMILY MEDICINE CLINIC | Age: 67
End: 2022-08-16
Payer: MEDICARE

## 2022-08-16 VITALS
OXYGEN SATURATION: 94 % | RESPIRATION RATE: 18 BRPM | HEIGHT: 73 IN | DIASTOLIC BLOOD PRESSURE: 87 MMHG | SYSTOLIC BLOOD PRESSURE: 122 MMHG | HEART RATE: 63 BPM | BODY MASS INDEX: 41.75 KG/M2 | WEIGHT: 315 LBS

## 2022-08-16 DIAGNOSIS — L40.9 PSORIASIS: Primary | ICD-10-CM

## 2022-08-16 PROCEDURE — 99213 OFFICE O/P EST LOW 20 MIN: CPT | Performed by: INTERNAL MEDICINE

## 2022-08-16 PROCEDURE — 1123F ACP DISCUSS/DSCN MKR DOCD: CPT | Performed by: INTERNAL MEDICINE

## 2022-08-16 RX ORDER — CLOBETASOL PROPIONATE 0.5 MG/G
OINTMENT TOPICAL 2 TIMES DAILY
Qty: 45 G | Refills: 1 | Status: SHIPPED | OUTPATIENT
Start: 2022-08-16

## 2022-08-16 NOTE — PROCEDURES
[place] [address]                              CARDIAC STRESS TEST    PATIENT NAME: Steve Mcelroy                     :        1955  MED REC NO:   [id number]                              ROOM:  ACCOUNT NO:   [de-identified]                           ADMIT DATE: 08/15/2022  PROVIDER:     Alejandro Bocanegra      DATE OF STUDY:  08/15/2022    LEXISCAN CARDIOLITE STRESS TEST:    INDICATION:  Chest pain. IMPRESSION:  1. We gave 0.4 mg of Lexiscan intravenously. 2.  This was followed in 20 seconds by Cardiolite infusion. 3.  There was no chest pain. 4.  There was no ST depression. 5.  It was an overall negative Lexiscan stress test.  6.  Cardiolite to follow.         Racheal Ventura    D: 2022 7:15:29       T: 2022 8:35:06     [email or web address]  Job#: [id number]     Doc#: [id number]    CC:  Fallon Giron

## 2022-08-16 NOTE — PROGRESS NOTES
HPI Notes    Name: Donney Seip  : 1955         Chief Complaint:     Chief Complaint   Patient presents with    Referral - General     Patient is asking for a referral to rheumatology, per Dr Kellie Lieberman request for patient to follow up with PC       History of Present Illness:        Candis Valentin presents to office for evaluation of psoriasis     He has a h/o rash on both hands, forehead, ears for many years. Rash believed to be related to psoriasis. Was referred to dermatologist but cancelled his appointment. Was using Elocon cream but it did not help much. Had a lot of dryness and itching in his ears and was referred to ENT Dr. Kellie Lieberman. Diagnosed with eczema and psoriasis. Has associated arthralgia in multiple joints. He is asking for a referral to rheumatologist.                    Past Medical History:     Past Medical History:   Diagnosis Date    Asthma     Bronchitis     CAD (coronary artery disease)     COPD (chronic obstructive pulmonary disease) ([address] 2011    Depression 2011    Diabetes mellitus ([address]     GERD (gastroesophageal reflux disease) 2011    Hyperlipidemia 2011    Hypertension     Hypothyroid 2011    Lower back pain     Nicotine addiction 2011    Sleep apnea       Reviewed all health maintenance requirements and orderedappropriate tests  Health Maintenance Due   Topic Date Due    Diabetic microalbuminuria test  2017    Diabetic retinal exam  10/29/2020    Low dose CT lung screening  2022    AAA screen  2022       Past Surgical History:     Past Surgical History:   Procedure Laterality Date    CARDIAC CATHETERIZATION Left 01/21/15    Severe left main trunk disease extending into the ostium of the left anterior descending of 80% involving the ostium of the intermediate & atrioventricular branch of the circumflex. 60% disease in the atrioventricular branch of the circumflex in the midportion. 40% disease in the right coronary artery.   Normal left ventricular function, ejection fraction of 60%    CARDIAC CATHETERIZATION Left 12/20/2017    Dr. Regan Eddy @ [facility]--70% left main trunck & ostial LAD & high lateral circumflex disease. Patent LIMA to the LAD. Patent vein graft to the high lateral circumflex. 60% disease in the AV branch of the circumflex that terminates in a large posterolateral branch with an FFR of 1.0, indicating no significant stenosis. 40% disease in the right coronary artery. Ejection fraction 50-55%. COLONOSCOPY N/A 3/5/2019    COLONOSCOPY POLYPECTOMY HOT BIOPSY performed by Argentina Camacho MD at [address] [place] [address] GRAFT  02/04/15    3 vessel CABG using left internal mammary artery to the left anterior descending coronary artery with a reverse saphenous vein aortocoronary sequential graft side to side to the ramus intermedius coronary artery & end to side to the first obtuse marginal coronary artery. OTHER SURGICAL HISTORY  03-21-16    I & D- cyst- posterior neck    SPINE SURGERY      THYROID LOBECTOMY      UPPER GASTROINTESTINAL ENDOSCOPY N/A 3/5/2019    EGD BIOPSY performed by Argentina Camacho MD at [facility] OR        Medications:       Prior to Admission medications    Medication Sig Start Date End Date Taking? Authorizing Provider   clobetasol (TEMOVATE) 0.05 % ointment Apply topically 2 times daily Apply topically 2 times daily. 8/16/22  Yes Salvatore Chavez MD   fluocinolone (DERMOTIC) 0.01 % OIL oil Place 4 drops in ear(s) 2 times daily 8/1/22 8/31/22 Yes Mary Rosario MD   fenofibrate (TRICOR) 145 MG tablet TAKE 1 TABLET DAILY 7/26/22  Yes Salvatore Chavez MD   carbamide peroxide (DEBROX) 6.5 % otic solution Place 5 drops in ear(s) in the morning and 5 drops before bedtime. 7/22/22 8/21/22 Yes Salvatore Chavez MD   gabapentin (NEURONTIN) 300 MG capsule Take 1 capsule by mouth in the morning and 1 capsule before bedtime. Do all this for 30 days. Intended supply: 30 days.  7/22/22 8/21/22 Yes Uche Yoselin Gottlieb MD   SYNTHROID 200 MCG tablet TAKE 1 TABLET DAILY 7/14/22  Yes Salvatore Chavez MD   metoprolol succinate (TOPROL XL) 50 MG extended release tablet TAKE 1 TABLET EVERY DAY 3/1/22  Yes Salvatore Chavez MD   allopurinol (ZYLOPRIM) 300 MG tablet TAKE 1 TABLET EVERY DAY 3/1/22  Yes Salvatore Chavez MD   losartan (COZAAR) 25 MG tablet TAKE 1 TABLET EVERY DAY 3/1/22  Yes Salvatore Chavez MD   PARoxetine (PAXIL) 20 MG tablet Take 1 tablet by mouth every morning 3/1/22  Yes Salvatore Chavez MD   albuterol (PROVENTIL) (2.5 MG/3ML) 0.083% nebulizer solution INHALE THE CONTENTS OF 1 VIAL VIA NEBULIZER EVERY 4 HOURS AS NEEDED FOR WHEEZING OR SHORTNESS OF BREATH 1/11/22  Yes Salvatore Chavez MD   naproxen (NAPROSYN) 500 MG tablet Take 1 tablet by mouth 2 times daily 12/9/21  Yes Santos Capellan MD   glipiZIDE (GLUCOTROL) 10 MG tablet Take 1 tablet by mouth 2 times daily 12/3/21  Yes Salvatore Chavez MD   ipratropium-albuterol (DUONEB) 0.5-2.5 (3) MG/3ML SOLN nebulizer solution Inhale 3 mLs into the lungs 4 times daily 4/30/21  Yes Salvatore Chavez MD   CPAP Machine MISC by Does not apply route 4/19/21  Yes Salvatore Chavez MD   fluticasone (FLONASE) 50 MCG/ACT nasal spray 2 sprays by Nasal route daily 2/19/21  Yes Salvatore Chavez MD   albuterol sulfate  (90 Base) MCG/ACT inhaler Inhale 2 puffs into the lungs every 6 hours as needed for Wheezing 2/19/21  Yes Salvatore Chavez MD   [address] [place] [address] by Does not apply route   Yes Tova Melendez MD   ondansetron (ZOFRAN-ODT) 4 MG disintegrating tablet Take 1 tablet by mouth 3 times daily as needed for Nausea or Vomiting 11/17/20  Yes Salvatore Chavez MD   Respiratory Therapy Supplies [facility] CPAP full face mask, hose, head gear and  filter 2/7/20  Yes Salvatore Chavez MD   blood glucose monitor strips Test 2 times a day & as needed for symptoms of irregular blood glucose.  11/11/19  Yes Salvatore Chavez MD   mometasone (ELOCON) 0.1 % cream Apply topically daily. 2/20/17  Yes Mar Andino MD   aspirin 81 MG tablet Take 81 mg by mouth daily. Yes Historical Provider, MD   glipiZIDE (GLUCOTROL) 5 MG tablet TAKE 1 TABLET EVERY MORNING 8/17/22   Sloan Rodríguez MD   amLODIPine (NORVASC) 2.5 MG tablet TAKE 1 TABLET DAILY 8/17/22   Sloan Rodríguez MD   metFORMIN (GLUCOPHAGE) 1000 MG tablet TAKE 1 TABLET TWICE DAILY  WITH MEALS 8/17/22   Sloan Rodríguez MD   SYMBICORT 160-4.5 MCG/ACT AERO USE 2 INHALATIONS ORALLY   TWICE DAILY 8/17/22   Sloan Rodríguez MD   lovastatin (MEVACOR) 40 MG tablet Take 1 tablet by mouth 2 times daily 3/1/22 5/30/22  Sloan Rodríguez MD        Allergies:       Patient has no known allergies. Social History:     Tobacco: reports that he quit smoking about 7 years ago. His smoking use included cigarettes. He has a 45.00 pack-year smoking history. He has never used smokeless tobacco.  Alcohol:      reports no history of alcohol use. Drug Use:  reports no history of drug use. Family History:     Family History   Problem Relation Age of Onset    Diabetes Mother     Heart Disease Mother     Diabetes Father     Heart Disease Father     Heart Disease Brother        Review of Systems:         Review of Systems   Constitutional:  Negative for activity change, appetite change and unexpected weight change. HENT:  Negative for congestion, ear discharge, ear pain and sore throat. Eyes:  Negative for visual disturbance. Respiratory:  Negative for cough, shortness of breath and wheezing. Cardiovascular:  Negative for chest pain, palpitations and leg swelling. Gastrointestinal:  Negative for abdominal pain, blood in stool, constipation and nausea. Endocrine: Negative for cold intolerance, heat intolerance, polydipsia and polyuria. Genitourinary:  Negative for difficulty urinating and dysuria. Musculoskeletal:  Positive for arthralgias. Skin:  Positive for rash. Allergic/Immunologic: Negative.     Neurological: Negative for weakness and headaches. Psychiatric/Behavioral:  Negative for behavioral problems and dysphoric mood. The patient is not nervous/anxious. Physical Exam:     Vitals:  /87 (Site: Right Upper Arm)   Pulse 63   Resp 18   Ht 6' 1\" (1.854 m)   Wt (!) 315 lb 12.8 oz (143.2 kg)   SpO2 94%   BMI 41.66 kg/m²       Physical Exam  Vitals reviewed. Constitutional:       General: He is not in acute distress. Appearance: He is well-developed. HENT:      Head: Normocephalic and atraumatic. Right Ear: External ear normal.      Left Ear: External ear normal.   Neck:      Thyroid: No thyromegaly. Cardiovascular:      Rate and Rhythm: Normal rate and regular rhythm. Heart sounds: Normal heart sounds. No murmur heard. Pulmonary:      Effort: Pulmonary effort is normal.      Breath sounds: Normal breath sounds. No wheezing or rales. Abdominal:      General: Bowel sounds are normal. There is no distension. Palpations: Abdomen is soft. There is no mass. Tenderness: There is no abdominal tenderness. Musculoskeletal:         General: Normal range of motion. Right lower leg: No edema. Left lower leg: No edema. Lymphadenopathy:      Cervical: No cervical adenopathy. Skin:     General: Skin is warm and dry. Coloration: Skin is not pale. Findings: No rash. Comments: Erythematous patches on both hands, forehead,  behind ears with silverfish dry surface     Neurological:      General: No focal deficit present. Mental Status: He is alert and oriented to person, place, and time.    Psychiatric:         Mood and Affect: Mood normal.         Behavior: Behavior normal.             Data:     Lab Results   Component Value Date/Time     08/02/2022 08:06 AM    K 4.7 08/02/2022 08:06 AM     08/02/2022 08:06 AM    CO2 24 08/02/2022 08:06 AM    BUN 12 08/02/2022 08:06 AM    CREATININE 0.91 08/02/2022 08:06 AM    GLUCOSE 189 08/02/2022 08:06 AM GLUCOSE 111 10/14/2011 08:30 AM    PROT 6.8 08/02/2022 08:06 AM    LABALBU 4.3 08/02/2022 08:06 AM    BILITOT 0.42 08/02/2022 08:06 AM    ALKPHOS 49 08/02/2022 08:06 AM    AST 39 08/02/2022 08:06 AM    ALT 38 08/02/2022 08:06 AM     Lab Results   Component Value Date/Time    WBC 8.2 11/05/2021 09:24 AM    RBC 4.72 11/05/2021 09:24 AM    HGB 14.7 11/05/2021 09:24 AM    HCT 43.7 11/05/2021 09:24 AM    MCV 92.7 11/05/2021 09:24 AM    MCH 31.2 11/05/2021 09:24 AM    MCHC 33.7 11/05/2021 09:24 AM    RDW 13.9 11/05/2021 09:24 AM     11/05/2021 09:24 AM    MPV NOT REPORTED 11/05/2021 09:24 AM     Lab Results   Component Value Date/Time    TSH 2.42 08/02/2022 08:06 AM     Lab Results   Component Value Date/Time    CHOL 138 08/02/2022 08:06 AM    HDL 30 08/02/2022 08:06 AM    PSA 0.35 12/17/2018 10:11 AM    LABA1C 6.2 03/01/2022 03:43 PM          Assessment & Plan        Diagnosis Orders   1. Psoriasis   Will refer to dermatologist for evaluation and treatment of psoriasis. Prescribed Clobetasol cream for rash  External Referral To Dermatology    clobetasol (TEMOVATE) 0.05 % ointment                      Completed Refills   Requested Prescriptions     Signed Prescriptions Disp Refills    clobetasol (TEMOVATE) 0.05 % ointment 45 g 1     Sig: Apply topically 2 times daily Apply topically 2 times daily. Return in about 3 months (around 11/16/2022) for diabetes, HTN, copd. Orders Placed This Encounter   Medications    clobetasol (TEMOVATE) 0.05 % ointment     Sig: Apply topically 2 times daily Apply topically 2 times daily.      Dispense:  45 g     Refill:  1     Orders Placed This Encounter   Procedures    External Referral To Dermatology     Referral Priority:   Routine     Referral Type:   Eval and Treat     Referral Reason:   Specialty Services Required     Referred to Provider:   Clarisse Lundborg, PA-C     Requested Specialty:   Dermatology     Number of Visits Requested:   1         There are no Patient Instructions on file for this visit. Electronically signed by Antonio Knapp MD on 8/21/2022 at 11:26 AM           Completed Refills      Requested Prescriptions     Signed Prescriptions Disp Refills    clobetasol (TEMOVATE) 0.05 % ointment 45 g 1     Sig: Apply topically 2 times daily Apply topically 2 times daily.

## 2022-08-17 DIAGNOSIS — E11.9 TYPE 2 DIABETES MELLITUS WITHOUT COMPLICATION, WITHOUT LONG-TERM CURRENT USE OF INSULIN (HCC): ICD-10-CM

## 2022-08-17 DIAGNOSIS — I10 ESSENTIAL HYPERTENSION: ICD-10-CM

## 2022-08-17 RX ORDER — BUDESONIDE AND FORMOTEROL FUMARATE DIHYDRATE 160; 4.5 UG/1; UG/1
AEROSOL RESPIRATORY (INHALATION)
Qty: 30.6 G | Refills: 1 | Status: SHIPPED | OUTPATIENT
Start: 2022-08-17

## 2022-08-17 RX ORDER — GLIPIZIDE 5 MG/1
TABLET ORAL
Qty: 90 TABLET | Refills: 1 | Status: SHIPPED | OUTPATIENT
Start: 2022-08-17

## 2022-08-17 RX ORDER — AMLODIPINE BESYLATE 2.5 MG/1
TABLET ORAL
Qty: 90 TABLET | Refills: 1 | Status: SHIPPED | OUTPATIENT
Start: 2022-08-17

## 2022-08-21 ASSESSMENT — ENCOUNTER SYMPTOMS
ALLERGIC/IMMUNOLOGIC NEGATIVE: 1
SORE THROAT: 0
ABDOMINAL PAIN: 0
SHORTNESS OF BREATH: 0
NAUSEA: 0
COUGH: 0
WHEEZING: 0
BLOOD IN STOOL: 0
CONSTIPATION: 0

## 2022-10-03 ENCOUNTER — OFFICE VISIT (OUTPATIENT)
Dept: CARDIOLOGY CLINIC | Age: 67
End: 2022-10-03
Payer: MEDICARE

## 2022-10-03 VITALS
BODY MASS INDEX: 41.3 KG/M2 | HEART RATE: 74 BPM | DIASTOLIC BLOOD PRESSURE: 81 MMHG | WEIGHT: 313 LBS | SYSTOLIC BLOOD PRESSURE: 127 MMHG | OXYGEN SATURATION: 96 %

## 2022-10-03 DIAGNOSIS — E78.2 MIXED HYPERLIPIDEMIA: ICD-10-CM

## 2022-10-03 DIAGNOSIS — R06.02 SOB (SHORTNESS OF BREATH): Primary | ICD-10-CM

## 2022-10-03 DIAGNOSIS — I25.10 CORONARY ARTERY DISEASE INVOLVING NATIVE CORONARY ARTERY OF NATIVE HEART WITHOUT ANGINA PECTORIS: ICD-10-CM

## 2022-10-03 DIAGNOSIS — I10 ESSENTIAL HYPERTENSION: ICD-10-CM

## 2022-10-03 PROCEDURE — 99212 OFFICE O/P EST SF 10 MIN: CPT | Performed by: INTERNAL MEDICINE

## 2022-10-03 NOTE — LETTER
Michelle Machado MD  [id number] [facility]  [address], [place] [address]  [phone]      October 3, 2022      Dheeraj Barraza MD  [address] [facility], [address] [facility]      RE:   Twin Mayberry  :  1955      Dear Dr. Mattson Foot:    CHIEF COMPLAINT:  1. Shortness of breath. 2.  Loss of energy. 3.  Abnormal Lexiscan cardiac stress with an intermediate risk of coronary artery disease. HISTORY OF PRESENT ILLNESS:  I had the pleasure of seeing Twin Mayberry in our office on 10/03/2022. I trust you received my full H and P from 2022. As you know, he is status post open heart surgery by Dr. Eleazar Carrera on 2015, with a LIMA to the LAD and a vein graft to the circumflex. He had developed marked shortness of breath with loss of energy over the last 6 months. We therefore did an echocardiogram and a stress test.  His echocardiogram showed normal LV size and function with no significant valve abnormality. His stress test showed decreased perfusion inferiorly consistent with inferior wall ischemia. I brought him back to go over the results. I showed him his stress test and compared it to 2019. There has been a change from 2019. My recommendation would be to proceed with cardiac catheterization in view of his coronary artery disease and open heart surgery. He agrees and we will schedule him at [facility]. He does have bypass grafts and therefore, I usually do a femoral artery approach as he does have a left internal mammary bypass graft to the LAD. Thank you very much for allowing me the privilege of seeing Mr. David Devine. If you have any questions on my thoughts, please do not hesitate to contact me.     Sincerely,        Bebeto Tinoco    D: 10/03/2022 11:54:25     T: 10/03/2022 11:56:47     [email or web address]  Job#: [id number]   Doc#: [id number]

## 2022-10-25 ENCOUNTER — HOSPITAL ENCOUNTER (OUTPATIENT)
Age: 67
Discharge: HOME | End: 2022-10-25
Payer: MEDICARE

## 2022-10-25 DIAGNOSIS — E78.2 MIXED HYPERLIPIDEMIA: ICD-10-CM

## 2022-10-25 DIAGNOSIS — I10 ESSENTIAL HYPERTENSION: ICD-10-CM

## 2022-10-25 DIAGNOSIS — I25.10 CORONARY ARTERY DISEASE INVOLVING NATIVE CORONARY ARTERY OF NATIVE HEART WITHOUT ANGINA PECTORIS: ICD-10-CM

## 2022-10-25 DIAGNOSIS — R06.02 SOB (SHORTNESS OF BREATH): ICD-10-CM

## 2022-10-25 LAB
ABSOLUTE EOS #: 0.3 K/UL (ref 0–0.4)
ABSOLUTE LYMPH #: 2.3 K/UL (ref 1–4.8)
ABSOLUTE MONO #: 0.6 K/UL (ref 0–1)
ALBUMIN SERPL-MCNC: 4.6 G/DL (ref 3.5–5.2)
ALP BLD-CCNC: 45 U/L (ref 40–129)
ALT SERPL-CCNC: 40 U/L (ref 5–41)
ANION GAP SERPL CALCULATED.3IONS-SCNC: 11 MMOL/L (ref 9–17)
AST SERPL-CCNC: 43 U/L
BASOPHILS # BLD: 0 % (ref 0–2)
BASOPHILS ABSOLUTE: 0 K/UL (ref 0–0.2)
BILIRUB SERPL-MCNC: 0.5 MG/DL (ref 0.3–1.2)
BUN BLDV-MCNC: 14 MG/DL (ref 8–23)
BUN/CREAT BLD: 15 (ref 9–20)
CALCIUM SERPL-MCNC: 9.7 MG/DL (ref 8.6–10.4)
CHLORIDE BLD-SCNC: 102 MMOL/L (ref 98–107)
CHOLESTEROL/HDL RATIO: 3.9
CHOLESTEROL: 130 MG/DL
CO2: 24 MMOL/L (ref 20–31)
CREAT SERPL-MCNC: 0.95 MG/DL (ref 0.7–1.2)
DIFFERENTIAL TYPE: YES
EKG ATRIAL RATE: 62 BPM
EKG P AXIS: 66 DEGREES
EKG P-R INTERVAL: 156 MS
EKG Q-T INTERVAL: 408 MS
EKG QRS DURATION: 92 MS
EKG QTC CALCULATION (BAZETT): 414 MS
EKG R AXIS: 61 DEGREES
EKG T AXIS: 86 DEGREES
EKG VENTRICULAR RATE: 62 BPM
EOSINOPHILS RELATIVE PERCENT: 3 % (ref 0–5)
GFR SERPL CREATININE-BSD FRML MDRD: >60 ML/MIN/1.73M2
GLUCOSE BLD-MCNC: 159 MG/DL (ref 70–99)
HCT VFR BLD CALC: 45.3 % (ref 41–53)
HDLC SERPL-MCNC: 33 MG/DL
HEMOGLOBIN: 15.2 G/DL (ref 13.5–17.5)
LDL CHOLESTEROL: 49 MG/DL (ref 0–130)
LYMPHOCYTES # BLD: 27 % (ref 13–44)
MCH RBC QN AUTO: 31.5 PG (ref 26–34)
MCHC RBC AUTO-ENTMCNC: 33.5 G/DL (ref 31–37)
MCV RBC AUTO: 93.9 FL (ref 80–100)
MONOCYTES # BLD: 8 % (ref 5–9)
PATIENT FASTING?: YES
PDW BLD-RTO: 13.7 % (ref 12.1–15.2)
PLATELET # BLD: 248 K/UL (ref 140–450)
POTASSIUM SERPL-SCNC: 4.4 MMOL/L (ref 3.7–5.3)
RBC # BLD: 4.82 M/UL (ref 4.5–5.9)
SEG NEUTROPHILS: 62 % (ref 39–75)
SEGMENTED NEUTROPHILS ABSOLUTE COUNT: 5.2 K/UL (ref 2.1–6.5)
SODIUM BLD-SCNC: 137 MMOL/L (ref 135–144)
TOTAL PROTEIN: 7 G/DL (ref 6.4–8.3)
TRIGL SERPL-MCNC: 242 MG/DL
WBC # BLD: 8.4 K/UL (ref 3.5–11)

## 2022-10-25 PROCEDURE — 93005 ELECTROCARDIOGRAM TRACING: CPT

## 2022-10-25 PROCEDURE — 85025 COMPLETE CBC W/AUTO DIFF WBC: CPT

## 2022-10-25 PROCEDURE — 80061 LIPID PANEL: CPT

## 2022-10-25 PROCEDURE — 80053 COMPREHEN METABOLIC PANEL: CPT

## 2022-10-25 PROCEDURE — 93010 ELECTROCARDIOGRAM REPORT: CPT | Performed by: INTERNAL MEDICINE

## 2022-10-25 PROCEDURE — 36415 COLL VENOUS BLD VENIPUNCTURE: CPT

## 2022-11-07 ENCOUNTER — HOSPITAL ENCOUNTER (OUTPATIENT)
Age: 67
Discharge: HOME | End: 2022-11-07
Payer: MEDICARE

## 2022-11-07 DIAGNOSIS — E78.2 MIXED HYPERLIPIDEMIA: ICD-10-CM

## 2022-11-07 DIAGNOSIS — R06.02 SOB (SHORTNESS OF BREATH): ICD-10-CM

## 2022-11-07 DIAGNOSIS — I25.10 CORONARY ARTERY DISEASE INVOLVING NATIVE CORONARY ARTERY OF NATIVE HEART WITHOUT ANGINA PECTORIS: ICD-10-CM

## 2022-11-07 DIAGNOSIS — I10 ESSENTIAL HYPERTENSION: ICD-10-CM

## 2022-11-07 LAB
BUN BLDV-MCNC: 11 MG/DL (ref 8–23)
CREAT SERPL-MCNC: 1.04 MG/DL (ref 0.7–1.2)
GFR SERPL CREATININE-BSD FRML MDRD: >60 ML/MIN/1.73M2

## 2022-11-07 PROCEDURE — 84520 ASSAY OF UREA NITROGEN: CPT

## 2022-11-07 PROCEDURE — 36415 COLL VENOUS BLD VENIPUNCTURE: CPT

## 2022-11-07 PROCEDURE — 82565 ASSAY OF CREATININE: CPT

## 2022-11-09 ENCOUNTER — TELEPHONE (OUTPATIENT)
Dept: CARDIOLOGY CLINIC | Age: 67
End: 2022-11-09

## 2022-11-09 DIAGNOSIS — I20.9 ANGINA, CLASS III (HCC): Primary | ICD-10-CM

## 2022-11-09 NOTE — TELEPHONE ENCOUNTER
Pt called following heart cath   [id number] [facility] to restart metformin  Will refer to cardiac rehab.    Will see in 3 mth

## 2022-11-14 ENCOUNTER — HOSPITAL ENCOUNTER (OUTPATIENT)
Dept: CARDIAC REHAB | Age: 67
Setting detail: THERAPIES SERIES
Discharge: HOME | End: 2022-11-14
Payer: MEDICARE

## 2022-11-14 PROCEDURE — 93798 PHYS/QHP OP CAR RHAB W/ECG: CPT

## 2022-11-14 NOTE — PROGRESS NOTES
Cardiac Rehabilitation   Physician Order Form    Lisa Flanagan  1955  [id number]  11/14/2022    [x] Phase 2 ECG Monitored Cardiac Rehabilitation    [] MI   [] Percutaneous Coronary Intervention          [] Other:   [] CABG  [] Heart Valve Repair/Replaced   [x] Stable Angina [] Heart Failure: Onset Date: 11/4/22                    Cardiac Education Goals: (see individualized treatment plan for specific goals, progression & compliance)    [x] Hypertension [x] Physical Inactivity  [x] Cardiac A&P    [] Heart Failure [x] Medications                       [] Coping w/ Anxiety / Depression  [x] Diabetes  [x] Weight Management [x] Angina  [x] Hyperlipidemia       [x] Home Exercise             [x] Stress Reduction and Relaxation   [x] Medications           [] Smoking Cessation                                                Prescribed Exercise Plan:    Target Hr: 90 - 102 bpm       Duration: 31 - 60 Minutes  Frequency: 3 Days per week  Initial Met Level: 4.2 sub max  Limitations: none    Modalities:  [x]Treadmill   [x] UBE  [x] Seated Stepper  [x] Rowing Machine  [x] Weights/therabands  [] Elliptical    Aerobic exercise to total 31-60 minutes. Progressing by 1-2 minutes per week and/or 1-2 levels per week per patient tolerance using various modalities; according to Michael Scale 12-16 and THR  Introduce 8-12 bilateral UE and LE progressive resistance exercises at 1-3 sets per lift, on 2-3 non-consecutive days using weights/ bluye  therabands AND WTS TO 8-24 # for 8-15 reps to progressive overload by increasing resistance once reps progressed to at least 15 reps on at least 2 occasions                 Per patient symptoms use:  Appropriate ACLS Algorhythm for Cardiac Events. Nitroglycerine 0.4mg SLq 5mins X 3 for angina pain. 12 lead EKG for c/o chest pain or change in rhythm. Nasal O2 for SaO2 <90% or symptoms warranted. Blood sugar monitoring for Hyper/Hypoglycemia symptoms.     Electronically signed by Claudy Zapien Sujit Franco, RCP on 11/14/22 at 11:41 AM EST  Cardiac Rehab Staff

## 2022-11-14 NOTE — PROGRESS NOTES
Cardiac Rehab Initial History and Assessment    Brodie Zuniga   1955  [id number]  [id number]/31/0331    Pre-cert Verification: COMPLETED WITH COPY OF PRECERT SHEET GIVEN / PT VOICES UNDERSTANDING OF SEEING $45 CO-PAY / VISIT DISPLAYED    Primary Diagnosis: STABLE ANGINA PECTORIS  Onset: 11/4/22  Living Will: [] Yes   [x] No  On File: [] Yes   [x] No   [] N/A  Durable Power of : [] Yes   [x] No    Medical History  Past Medical History:   Diagnosis Date    Asthma     Bronchitis     CAD (coronary artery disease)     COPD (chronic obstructive pulmonary disease) ([facility] [address] 8/12/2011    Depression 8/12/2011    Diabetes mellitus ([facility] [address]     GERD (gastroesophageal reflux disease) 8/12/2011    Hyperlipidemia 8/12/2011    Hypertension     Hypothyroid 8/12/2011    Lower back pain     Nicotine addiction 8/12/2011    Sleep apnea      Past Surgical History:   Procedure Laterality Date    CARDIAC CATHETERIZATION Left 01/21/2015    Severe left main trunk disease extending into the ostium of the left anterior descending of 80% involving the ostium of the intermediate & atrioventricular branch of the circumflex. 60% disease in the atrioventricular branch of the circumflex in the midportion. 40% disease in the right coronary artery. Normal left ventricular function, ejection fraction of 60%    CARDIAC CATHETERIZATION Left 12/20/2017    Dr. Ramsey Sandoval @ [address] [place] [address] Dr [facility]--70% left main trunck & ostial LAD & high lateral circumflex disease. Patent LIMA to the LAD. Patent vein graft to the high lateral circumflex. 60% disease in the AV branch of the circumflex that terminates in a large posterolateral branch with an FFR of 1.0, indicating no significant stenosis. 40% disease in the right coronary artery. Ejection fraction 50-55%.     CARDIAC CATHETERIZATION Left 11/04/2022    Dr. Ramsey Sandoval @ [address] [place] [address] Dr [facility]--Continue medical therapy    COLONOSCOPY N/A 03/05/2019    COLONOSCOPY POLYPECTOMY HOT BIOPSY performed by Lisa Landin MD at [address] [place] [address] CORONARY ARTERY BYPASS GRAFT  02/04/2015    3 vessel CABG using left internal mammary artery to the left anterior descending coronary artery with a reverse saphenous vein aortocoronary sequential graft side to side to the ramus intermedius coronary artery & end to side to the first obtuse marginal coronary artery. OTHER SURGICAL HISTORY  03/21/2016    I & D- cyst- posterior neck    SPINE SURGERY      THYROID LOBECTOMY      UPPER GASTROINTESTINAL ENDOSCOPY N/A 03/05/2019    EGD BIOPSY performed by Marlene Palma MD at [facility] OR       Family History  Family History   Problem Relation Age of Onset    Diabetes Mother     Heart Disease Mother     Diabetes Father     Heart Disease Father     Heart Disease Brother          Symptoms:  1. Angina   [] None                                  [x] Loss of Energy / Fatigue   [] Tightness   [x] Shortness of Breath   [] Pressure    [] Nausea   [x] Sharp, Stabbing  [] Pallor   [] Indigestion, Heartburn [x] Sweaty \"AT TIMES\"    Where was discomfort located? LEFT CHEST  Precipitating Factors? RANDOM ONSET  Relieved by? IMPROVED W/ MED MGT    2. Arrhythmia   [] None                                  [] Heart Racing   [x] Irregular Beats (RACES) [] Pacer    [] Atrial Fibrillation  [] AICD    On any Antiarrhythmia Medications? TOPROL XL 50 MG, QD    3. Congestive Heart Failure   [] None                                  [] Distended Abdomen   [x] Pedal Edema  [] Unusual weight gain   [] SOB with mild exertion [] Fatigue   [] Extra Pillows (Number?):    4. Vascular   [x] None   [] Carotid Narrowing  [] R [] L   [] Peripheral claudication [] R [] L    5. Musculoskeletal    [] None   [x] Back Pain  Where? CHRONIC-CONSTANT LBP \"DULL ACHE TO SHARPLY SORE\"   [x] Joint discomfort Where? LEGS AND FEET \"ACHE, I BELIEVE R/T MY BACK\"   [] Other  Where?      6. Limitations to Home or Work Activities:                [x] Yes               []  No  If yes, what?: ADLs AND DAILY WORK ROUTINE Current Exercise               [] Yes - type/frequency/intensity/duration:                [x] No    6. Neurological   [] None   [] Numbness / Tingling  Where? [x] Peripheral Neuropathy        Where? [] Stroke / TIA                         Deficit / Where? Nutrition Assessment     Diet / Special Diet: DIABETIC / HEART HEALTHY--\"COULD BE BETTER\"   Appetite:  []  Too Good    []  Good  [x]  Fair  []  Poor    Eating Out or Takeout <1 times/wk  [] Dietary Supplement:  [] Yes [x] No  If yes, what? Diet Medications (if applicable)    Willing to complete a food diary?:        [] Yes        [x] No    Weight Loss / Gain:  [] Maintain Current Weight  [x] Loss  [] Gain     Rate Your Plate: IN-PROCESS     Alcohol Consumption: [] Yes [x] No     Type:  Frequency:    Caffeine: [] Yes [x] No     Water / Other Fluids: [] Cups  [] Glasses  [x] Bottles /day  5-8    Vitamins/Natural herbal products:  [] Yes [x] No  If so, what? DIABETES / PREDIABETES:  [x] Yes  [] No  How Lon+ YRS  Do you check your blood sugar  [x] Yes  [] No  How often: RANDOMLY \"A COUPLE TIMES PER WK  What does your blood sugar usually run?: 140 - 180  Have you seen a dietician or diabetic educator?        [x] Yes  [] No                               Diabetes Medication: METFORMIN 1,000 MG, BID / 5 MG GLIPIZIDE qAM    PULMONARY:  COPD  [facility] DYSPNEA SCORE:  2    Fall Risk Assessment:  History of falls with or without injury  [] Yes   [x] No   Use of ambulatory aid  [] Yes  [x] No   Difficulty walking/impaired gait  [] Yes  [x] No   Numbness in feet  [] Yes   [x] No   Vision changes  [] Yes  [x] No   Dizziness  [] Yes  [x] No   Shortness of breath  [x] Yes  [] No   Medications  [] Anticoagulant  [x] Betablocker /ACE/ARB  [x] Antidepressant  [] Seizure medication   [] NA  Risk of fall  [x] Low (0 - 3 of above + Consider Recent Fall Hx to Determine Higher Risk)  [] Medium (4 above + Consider Recent Fall Hx to Determine Higher Risk)  [] High (5 or more above)    Medication Compliance (stated):   [x] 100%  [] 75%           [] 50%  [] 25%      [] 0%      Tobacco Use  Social History     Tobacco Use   Smoking Status Former    Packs/day: 1.00    Years: 45.00    Pack years: 45.00    Types: Cigarettes    Quit date: 2014    Years since quittin.9   Smokeless Tobacco Never     Current smokers:  Longest quit attempt:  Tobacco triggers:  Barriers to successful cessation:  [] Smoking cessation medication:    Psychological    [] Depression  [] Tearful  [] Fearful  [x] Cheerful  [] Anxious  [x] Motivated  [] Overwhelmed    Treatment: PAXIL    Stress    Source: \"UNSURE\"  Relaxation techniques: \"NOT REALLY\"  Hobbies: COLLECTION OF DIE-CAST TRUCKS, TRACTORS, ETC    Depression Screening:  PHQ-9 SCORE: 7  CAROLYN-7 ANXIETY SCORE 11    Socio-Economic  Marital Status:   Family Concerns: PT DENIES      Level of Education    [] 8th Grade  [] Associates  [] Masters  [] High School [] Bachelor  [x]  Other:  11 TH GRADE    Cardiovascular Knowledge Assessment Score:  60%  Education Needs:  CR STD CURRICULUM / COPD EDU AND T2DM EDU INCLUDED      Physical Findings  Weight:308 LB  Height: 73 IN (3.43 M2)  BMI: 40.8  Waist Circumference: 54 IN    Cardiovascular- No edema, S1-S2 and apically regular to auscultation, strong bilateral upper and lower extremity pulses at +2, no carotid bruits. Monitor rhythm-  VR- 67  NM- 0.14  QRS- 0.10  QT- 0.36        Ejection Fraction- 55%  Pulmonary- Clear to auscultation bilaterally, DIMINISHED through out. / COPD  [facility] DYSPNEA SCORE:  2  Neurological- No deficit noted or reported. Peripheral Vascular- No deficit noted or reported. Muscular Skeletal-  [x] Back Pain  Where? CHRONIC-CONSTANT LBP \"DULL ACHE TO SHARPLY SORE\"   [x] Joint discomfort Where?  LEGS AND FEET \"ACHE, I BELIEVE R/T MY BACK\"  Pain Assessment-   Pain Level Tolerable <4+/10 on 10 point Likert scale  Location/ radiation/ Frequency/ Duration- Muscular Skeletal-  [x] Back Pain  Where? CHRONIC-CONSTANT LBP \"DULL ACHE TO SHARPLY SORE\"   [x] Joint discomfort Where? LEGS AND FEET \"ACHE, I BELIEVE R/T MY BACK\"  Endocrine- T2DM  Gastrointestinal- No deficit noted or reported. Genitourinary- No deficit noted or reported.   Physical limitations- only as per above     Goals:     Overall Personal Program Goal:  \" IMPROVE MY BREATHING \" To improve dyspnea and increase walking capacity while decreasing s.o.b. with increased PA as evidenced by improved survey scores and pt self-report  [x] Initial Goal   [] Progressing to Goal  [] Not Meeting--Needs Reinforcement  [] Goal Met  [] Goal Not Met    To increase stamina, strength, and flexibility by exercising 31-50 total minutes by engaging in aerobic, resistance, and flexibility workout modalities with the goal of progressively achieving at least 0.5 to 1.0 metabolic equivalant improvement in the next 30 days as evidenced by daily session reports / [x] Initial Goal     [] Progressing to Goal  [] Not Meeting--Needs Reinforcement   [] Goal Met  [] Goal Not Met    To achieve and progress prescribed exercise frequency, intensity, time, and type in the next 30 days based upon initial evaluation and submaximal graded exercise results as evidenced by the attaining and maintaining the prescribed target heart rate range, a Michael rating of perceived exertion between 11 and 16, duration of >30 - 50 minutes using multiple exercise modes for at least 3 - 5 days per week to accumulate a minimum total of 2.5 hours per week of moderate aerobic intensity exercise, as tolerated, evidenced by daily session reports and home workout log /         [x] Initial Goal   [] Progressing to Goal  [] Not Meeting--Needs Reinforcement   [] Goal Met  [] Goal Not Met       To gradually and progressively lose 2 - 4 lb of body weight in the next 30 days through moderating nutrional intake and performing regular aerobic and strength training exercises as prescribed with improvement evidenced by daily session report comparison / [x] Initial Goal   [] Progressing to Goal  [] Not Meeting--Needs Reinforcement  [] Goal Met  [] Goal Not Met    To decrease waist circumference by 5% by program completion if waist measurement is > or = 40 inches (male) / > or = 35 inches (female) as evidence by the [employer] /     [x] Initial Goal   [] Progressing to Goal  [] Not Meeting--Needs Reinforcement  [] Goal Met  [] Goal Not Met     To introduce and progress 8-10 different bilateral UE and LE progressive resistance exercises focused on major muscle groups using 1-3 sets each per lift, on 2-3 non-consecutive days implementing free and machine weights and BLUE therabands, as appropriate, with a resistance of 40-60% 1-repetition maximum or 10 -15 repetitions to progressive overload and increasing resistance once repetitions have progressed to 15 reps and feel fairly light on 2 prior occasions in the next 30 days / [x] Initial Goal   [] Progressing to Goal  [] Not Meeting--Needs Reinforcement  [] Goal Met  [] Goal Not Met     To achieve and maintain an optimal average resting blood pressure of <130 / 80 mmHg, or as indicated by this patient's referring provider, in the next 30 days / [x] Initial Goal   [] Progressing to Goal  [] Not Meeting--Needs Reinforcement  [] Goal Met  [] Goal Not Met     To strive for blood lipid optimization with an LDL-C of <100 mg/dL or  LDL 70 mg/dL, an HDL-C of > or = 40 mg/dL for men and > or = 50 mg/dL for women, and a triglyceride level of <150 mg/dL via lifestyle education, behavioral modification, and medication compliance / [x] Initial Goal   [] Progressing to Goal  [] Not Meeting--Needs Reinforcement  [] Goal Met  [] Goal Not Met     -To develop regular home aerobic exercise program for 20 - 60 minutes at least 2 non-rehab days per week, excluding  5 - 10 minutes warm-up and cool-down periods, within the next 30 days, being tracked on home workout log / [x] Initial Goal   [] Progressing to Goal  [] Not Meeting--Needs Reinforcement  [] Goal Met  [] Goal Not Met    To strive for a lower daily fasting blood glucose measures to <131 and random after meal measures to <181, if diabetic, at home via lifestyle educaton, behavior modification, and medication compliance in the next 30 days as tracked per patient's self-report / [x] Initial Goal   [] Progressing to Goal  [] Not Meeting--Needs Reinforcement  [] Goal Met  [] Goal Not Met    To reduce self-reported psycho-social feelings of stress in the next 30 days as evidenced by pre- and post- surveys and by routine rounding with patient to ascertain subjective improvements / [] Initial Goal   [] Progressing to Goal  [] Not Meeting--Needs Reinforcement  [] Goal Met  [] Goal Not Met    In the next 30-days, to eat on average at least 2 servings of fruit per day and 4-5 servings of vegetables per day as evidenced by pre- and post-program nutriton survey and routine rounding with patient to ascertain progression toward goal per patient's self report, food diary, and Rate-your-Plate screening survey / [x] Initial Goal   [] Progressing to Goal  [] Not Meeting--Needs Reinforcement  [] Goal Met  [] Goal Not Met    To strive to eat < or = 30% of daily caloric intake of total fat and <10% of daily caloric saturated fat tracked by patient's self-report, food diary, and Rate-your-Plate screening survey / [x] Initial Goal   [] Progressing to Goal  [] Not Meeting--Needs Reinforcement  [] Goal Met  [] Goal Not Met    To have patient demonstrate knowledge about risk factor reduction, lifestyle modification, and heart health strategies with > / = 80% accuracy via pre- and post-program Test your [address] screening tool / [x] Initial Goal   [] Progressing to Goal  [] Not Meeting--Needs Reinforcement  [] Goal Met  [] Goal Not Met      Electronically signed by Lisa Marquez RN on 11/14/22 at 10:41 AM EST

## 2022-11-14 NOTE — PROGRESS NOTES
Phase II Cardiac Rehab Individualized Treatment Plan-Initial     Patient Name: Emily Reddy  ACCOUNT #: [de-identified]  Date of Initial Assessment: 11/14/2022  Diagnosis: Stable Angina Pectoris   Onset Date: 11/4/22  Referring Physician: DR Russell Roman  Risk Stratification: MODERATE  Session Number: 1   EXERCISE    Stages of Change:   [] pre-contemplation  [x] Action   [] Contemplate   [] Maintainence   [x] Prep   [] Relapse       [] SUBMAXIMAL GXT:   [x] TM  [] Stationary Ergometer   Speed: 1.7 MPH  Incline: 10 %  Max HR: 100  O2: RA  RPE 13  RPD: 3  SPO2 93%  MET Level: 4.2           Exercise Prescription:  Mode: [x] TM [x] UBE [x] STP [] EL [x] RW  [x] RC  Frequency: 3 DAYS PER WEEK  Duration: 31-60 MINUTES  Intensity: 4.5 AVG METS  Target HR  BPM   Progression: INCREASE DURATION PER ABOVE F.I.T.T. Rx  PARAMETERS ON AVG OF 5-10 MIN / 1-2 WEEKS FOR THE FIRST 4-6 WEEKS. AFTER 3-4 WEEKS ARE COMPLETED, CONTINUE TO GRADUALLY INCREASE F. I.T. PARAMETERS GRADUALLY UPWARD AT THE ESTABLISHED DURATION ON AVERAGE 0.5-1.0 METS PER 30 DAYS OVER THE COARSE OF REMAINING PROGRAM AS ESTABLISHED BY PT-CENTERED GOALS AND GUIDELINES. Plan/Goal: Increase 1-2 levels/week or 1-2 min/week to achieve target HR and RPE   12-16.    [] Angina with Exertion   [x] Resistance Training  Introduce and progress 8-12 bilateral UE and LE progressive resistance exercises at 1-3 sets per lift, on 2-3 non-consecutive days using weights/ BLUE therabands AND WTS TO 8-24 # for 8-15 reps to progressive overload by increasing resistance once reps progressed to at least 15 reps on at least 2 occasions     Hypertension:  [x] Yes  [] No  Resting BP: 132/78  Peak Exercise BP: 144/60  BP Meds: 25 mg cozaar qd, 50 mg toprol xl qd, 2.5 mg norvasc qd    Intervention:  Home Exercise:  Type: WALKING  Duration: 20 - 60 MIN  Frequency: AT LEAST 2 NO REHAB DAYS PER WEEK   [x] Resistance Training   Introduce and progress 8-12 bilateral UE and LE progressive resistance exercises at 1-3 sets per lift, on 2-3 non-consecutive days using weights/ BLUE therabands AND WTS TO 8-24 # for 8-15 reps to progressive overload by increasing resistance once reps progressed to at least 15 reps on at least 2 occasions     Education:   [x] Equipment Pioneer  [x] Self pulse   [x] Proper use weights/therabands   [x] S/S to report  [x] Low Na Diet    [x] Warm up/ Cool down  [x] BP Medication    [x]RPE Scale   [x] Understand BP   [x] Ex Safety   [x] Exercise specialist class-Home Exercise       Target Goal:   -Individual Exercise Plan  -BP<130/80  -Aerobic active 30 + minutes 5-7 days per week    Nutrition    Stages of Change:   [] pre-contemplation  [x] Action   [] Contemplate   [] Maintainence   [x] Prep   [] Relapse    Lipids:   Lipid Panel  Order: [id number]  Status: Final result    Visible to patient: Yes (not seen)    Next appt: 02/06/2023 at 10:00 AM in Cardiology Jimmy Ramsey MD)    Dx: Mixed hyperlipidemia; Essential hyper. ..    0 Result Notes    1  Topic  Component Ref Range & Units 10/25/22 1108 8/2/22 0806 8/7/21 0742 8/6/20 0954 10/15/19 0904 10/22/18 0951 11/22/17 0956   Cholesterol <200 mg/dL 130  138 CM  122 CM  122 CM  153 CM  160 CM  174 CM    Comment:     Cholesterol Guidelines:       <200  Desirable    200-240  Borderline       >240  Undesirable        HDL >40 mg/dL 33 Low   30 Low  CM  30 Low  CM  30 Low  CM  28 Low  CM  35 Low  CM  37 Low  CM    Comment:     HDL Guidelines:     <40     Undesirable    40-59    Borderline     >59     Desirable        LDL Cholesterol 0 - 130 mg/dL 49  58 CM  54 CM  42 CM       CM  79 CM  102 CM    Comment:     LDL Guidelines:      <100    Desirable    100-129   Near to/above Desirable    130-159   Borderline       >159   Undesirable       Direct (measured) LDL and calculated LDL are not interchangeable tests.     Chol/HDL Ratio <5 3.9  4.6 CM  4.1 CM  4.1 CM  5.5 High  CM  4.6 CM  4.7 CM    Comment:        Triglycerides <150 mg/dL 242 High   251 High  CM  192 High  CM  252 High  CM  478 High  CM  230 High  CM  173 High  CM    Comment:     Triglyceride Guidelines:      <150   Desirable    150-199  Borderline    200-499  High      >499   Very high    Based on AHA Guidelines for fasting triglyceride, October 2012. VLDL    NOT REPORTED High  R  NOT REPORTED High  R   R  NOT REPORTED R  NOT [address] [facility]              Specimen Collected: 10/25/22 11:08 EDT Last Resulted: 10/25/22 18:33 EDT           Lipid Meds: 145 mg tricor qd, 80 mg mevacor nightly      Diabetes / Prediabetes:  [x] Yes  [] No  FBS: 180  Component Ref Range & Units 3/1/22 1543 8/7/21 0742 7/20/21 0859 2/19/21 0817 2/7/20 0934 10/17/19 1157 10/20/17 0828   Hemoglobin A1C % 6.2  7.1 High  R  6.9  9.0  6.5  8.3 High  R  5.4 R    Resulting [address] [place] [address] Lab              Specimen Collected: 03/01/22 15:43 EST Last Resulted: 03/01/22 15:43 EST           Diabetes Medication:  [] Monitor BS at home   Frequency?:     Weight Management:  Last Weight: 308 LB  Height: 73 IN   BMI: 40.6  Waist Circumference: 54 IN  Wt Goal: 1-2 lbs/wk  [x] Loss  [] Gain  [] Maintain Current Wt.   Alcohol:   Social History     Substance and Sexual Activity   Alcohol Use No       Diet Assessment Tool: RATE MY PLATE 55  Special Diet: Special Diet:  1,800 Kcal / day, 2 GM Na+, 30% total fat, <10% saturated fat, 25-35 GM fiber, cholesterol reduced, balanced nutrition plan to be promoted and taught in rehab      Intervention:   [] Dietitian Consult       [x] Nurse/Patient Discussion     [x] Diet Class           [] Referred to Diabetes Education     Education:  [] S&S hypo/hyperglycemia  [x] Low fat/low cholesterol diet  [x] Weight loss methods      [] Relate Diabetes/CAD     [x] Eating heart healthy handout    Target Goal:  -LDL-C<100 if triglycerides are > 200  -LDL-C < 70 for high risk patients  -HbA1c < 7%  -BMI < 25   Education    Stages of Change:    [] pre-contemplation  [x] Action   [] Contemplate   [] Maintainence   [x] Prep   [] Relapse    Learning Barriers:   [] Speech   [] Cognitive   [] Literacy   [] Visions   [] Hearing    [x] Ready Learn    Knowledge test score: 60%      Family support: [x] Yes  [] No    Tobacco use:   Social History     Tobacco Use   Smoking Status Former    Packs/day: 1.00    Years: 45.00    Pack years: 45.00    Types: Cigarettes    Quit date: 2014    Years since quittin.9   Smokeless Tobacco Never         Intervention:  [] Referred to smoking cessation counselor     [] Individual education and counseling  [] Tobacco adjunct  [] Informed of education class schedule     Education:   [x] Risk Factors/Modifications  [x] Psychological aspects  [x] Angina         [x] Medications  [] CHF                                    [] Diabetes     [x] Cardiac A&P                                 [] Smoking Cessation  [x] Stress Reduction and Relaxation                     [] Weight Management    Target Goal:  -Complete cessation of tobacco use (if applicable)  -Continued risk factor modifications  -Recognizing signs/symptoms to report  -Proper use of meds    Psychosocial  Stages of Change:    [] pre-contemplation  [x] Action   [] Contemplate   [] Maintainence   [x] Prep   [] Relapse    Psychosocial Test:  Tool Used: Chaya Sanchez Quality of Life  Overall Score: 21.3  Depression screening score PHQ-9: 7 - Somewhat difficult   Anxiety screening score CAROLYN-7: 11 - Somewhat difficult    Intervention:   [] Psych Consult/  [x] Uses stress management skills    [] Physician Referral    [x] Stress management class  Medications: 20 mg paxil    Education:    [x] Coping Techniques   [x] Relaxation techniques   [x] S/S of Depression    Target Goal:  -Assess presence or absence of depression using a valid screening tool. -Maximize coping skills.  -Positive support system. Preventative Medication:   [x] Aspirin       [x] Beta Blockade      [x] Statin or other lipid lowering agent     [] Clopidogrel   [x] ACE Inhibitor   [x] ACE Receptor Blocker   [] Antianginal    [] Calcium Channel Blocker   [] Other anticoagulation medications     Fall Risk assess: [x] Yes  [] No / low FALL RISK  Assistive Device:   [] Cane  [] Walker [] Wheel Chair  [] Gait belt    Target Goal:  -Assess presence or absence of depression using a valid screening tool. -Maximize coping skills.  -Positive support system.     Patient/Program Goals:   Overall Personal Program Goal:  \" IMPROVE MY BREATHING \"   [x] Initial Goal   [] Progressing to Goal  [] Not Meeting--Needs Reinforcement  [] Goal Met  [] Goal Not Met     To increase stamina, strength, and flexibility by exercising 31-50 total minutes by engaging in aerobic, resistance, and flexibility workout modalities with the goal of progressively achieving at least 0.5 to 1.0 metabolic equivalant improvement in the next 30 days as evidenced by daily session reports / [x] Initial Goal     [] Progressing to Goal  [] Not Meeting--Needs Reinforcement   [] Goal Met  [] Goal Not Met     To achieve and progress prescribed exercise frequency, intensity, time, and type in the next 30 days based upon initial evaluation and submaximal graded exercise results as evidenced by the attaining and maintaining the prescribed target heart rate range, a Michael rating of perceived exertion between 11 and 16, duration of >30 - 50 minutes using multiple exercise modes for at least 3 - 5 days per week to accumulate a minimum total of 2.5 hours per week of moderate aerobic intensity exercise, as tolerated, evidenced by daily session reports and home workout log /         [x] Initial Goal   [] Progressing to Goal  [] Not Meeting--Needs Reinforcement   [] Goal Met  [] Goal Not Met        To gradually and progressively lose 2 - 4 lb of body weight in the next 30 days through moderating nutrional intake and performing regular aerobic and strength training exercises as prescribed with improvement evidenced by daily session report comparison / [x] Initial Goal   [] Progressing to Goal  [] Not Meeting--Needs Reinforcement  [] Goal Met  [] Goal Not Met     To decrease waist circumference by 5% by program completion if waist measurement is > or = 40 inches (male) / > or = 35 inches (female) as evidence by the [employer] /     [x] Initial Goal   [] Progressing to Goal  [] Not Meeting--Needs Reinforcement  [] Goal Met  [] Goal Not Met     To introduce and progress 8-10 different bilateral UE and LE progressive resistance exercises focused on major muscle groups using 1-3 sets each per lift, on 2-3 non-consecutive days implementing free and machine weights and Blue therabands, as appropriate, with a resistance of 40-60% 1-repetition maximum or 10 -15 repetitions to progressive overload and increasing resistance once repetitions have progressed to 15 reps and feel fairly light on 2 prior occasions in the next 30 days / [x] Initial Goal   [] Progressing to Goal  [] Not Meeting--Needs Reinforcement  [] Goal Met  [] Goal Not Met     To achieve and maintain an optimal average resting blood pressure of <130 / 80 mmHg, or as indicated by this patient's referring provider, in the next 30 days / [x] Initial Goal   [] Progressing to Goal  [] Not Meeting--Needs Reinforcement  [] Goal Met  [] Goal Not Met     To strive for blood lipid optimization with an LDL-C of <100 mg/dL or  LDL 70 mg/dL, an HDL-C of > or = 40 mg/dL for men and > or = 50 mg/dL for women, and a triglyceride level of <150 mg/dL via lifestyle education, behavioral modification, and medication compliance / [x] Initial Goal   [] Progressing to Goal  [] Not Meeting--Needs Reinforcement  [] Goal Met  [] Goal Not Met     -To develop regular home aerobic exercise program for 20 - 60 minutes at least 2 non-rehab days per week, excluding  5 - 10 minutes warm-up and cool-down periods, within the next 30 days, being tracked on home workout log / [x] Initial Goal   [] Progressing to Goal  [] Not Meeting--Needs Reinforcement  [] Goal Met  [] Goal Not Met     To strive for a lower daily fasting blood glucose measures to <131 and random after meal measures to <181, if diabetic, at home via lifestyle educaton, behavior modification, and medication compliance in the next 30 days as tracked per patient's self-report / [x] Initial Goal   [] Progressing to Goal  [] Not Meeting--Needs Reinforcement  [] Goal Met  [] Goal Not Met     To reduce self-reported psycho-social feelings of stress in the next 30 days as evidenced by pre- and post- surveys and by routine rounding with patient to ascertain subjective improvements / [] Initial Goal   [] Progressing to Goal  [] Not Meeting--Needs Reinforcement  [] Goal Met  [] Goal Not Met     In the next 30-days, to eat on average at least 2 servings of fruit per day and 4-5 servings of vegetables per day as evidenced by pre- and post-program nutriton survey and routine rounding with patient to ascertain progression toward goal per patient's self report, food diary, and Rate-your-Plate screening survey / [x] Initial Goal   [] Progressing to Goal  [] Not Meeting--Needs Reinforcement  [] Goal Met  [] Goal Not Met     To strive to eat < or = 30% of daily caloric intake of total fat and <10% of daily caloric saturated fat tracked by patient's self-report, food diary, and Rate-your-Plate screening survey / [x] Initial Goal   [] Progressing to Goal  [] Not Meeting--Needs Reinforcement  [] Goal Met  [] Goal Not Met     To have patient demonstrate knowledge about risk factor reduction, lifestyle modification, and heart health strategies with > / = 80% accuracy via pre- and post-program Test your [address] screening tool / [x] Initial Goal   [] Progressing to Goal  [] Not Meeting--Needs Reinforcement  [] Goal Met  [] Goal Not Met       Physician Changes/Comments:    Electronically signed by Miky Kruse RCP on 11/14/22 at 11:44 AM EST  Cardiopulmonary Rehab Staff

## 2022-11-16 ENCOUNTER — HOSPITAL ENCOUNTER (OUTPATIENT)
Dept: CARDIAC REHAB | Age: 67
Setting detail: THERAPIES SERIES
Discharge: HOME | End: 2022-11-16
Payer: MEDICARE

## 2022-11-16 PROCEDURE — 93798 PHYS/QHP OP CAR RHAB W/ECG: CPT

## 2022-11-18 ENCOUNTER — HOSPITAL ENCOUNTER (OUTPATIENT)
Dept: CARDIAC REHAB | Age: 67
Setting detail: THERAPIES SERIES
Discharge: HOME | End: 2022-11-18
Payer: MEDICARE

## 2022-11-18 PROCEDURE — 93798 PHYS/QHP OP CAR RHAB W/ECG: CPT

## 2022-11-21 ENCOUNTER — HOSPITAL ENCOUNTER (OUTPATIENT)
Dept: CARDIAC REHAB | Age: 67
Setting detail: THERAPIES SERIES
Discharge: HOME | End: 2022-11-21
Payer: MEDICARE

## 2022-11-21 PROCEDURE — 93798 PHYS/QHP OP CAR RHAB W/ECG: CPT

## 2022-11-23 ENCOUNTER — HOSPITAL ENCOUNTER (OUTPATIENT)
Dept: CARDIAC REHAB | Age: 67
Setting detail: THERAPIES SERIES
Discharge: HOME | End: 2022-11-23
Payer: MEDICARE

## 2022-11-23 PROCEDURE — 93798 PHYS/QHP OP CAR RHAB W/ECG: CPT

## 2022-11-25 ENCOUNTER — HOSPITAL ENCOUNTER (OUTPATIENT)
Dept: CARDIAC REHAB | Age: 67
Setting detail: THERAPIES SERIES
Discharge: HOME | End: 2022-11-25
Payer: MEDICARE

## 2022-11-25 PROCEDURE — 93798 PHYS/QHP OP CAR RHAB W/ECG: CPT

## 2022-11-28 ENCOUNTER — HOSPITAL ENCOUNTER (OUTPATIENT)
Dept: CARDIAC REHAB | Age: 67
Setting detail: THERAPIES SERIES
Discharge: HOME | End: 2022-11-28
Payer: MEDICARE

## 2022-11-28 PROCEDURE — 93798 PHYS/QHP OP CAR RHAB W/ECG: CPT

## 2022-11-30 ENCOUNTER — HOSPITAL ENCOUNTER (OUTPATIENT)
Dept: CARDIAC REHAB | Age: 67
Setting detail: THERAPIES SERIES
Discharge: HOME | End: 2022-11-30
Payer: MEDICARE

## 2022-11-30 PROCEDURE — 93798 PHYS/QHP OP CAR RHAB W/ECG: CPT

## 2022-12-01 ENCOUNTER — OFFICE VISIT (OUTPATIENT)
Dept: FAMILY MEDICINE CLINIC | Age: 67
End: 2022-12-01
Payer: MEDICARE

## 2022-12-01 VITALS
OXYGEN SATURATION: 97 % | RESPIRATION RATE: 18 BRPM | HEART RATE: 56 BPM | SYSTOLIC BLOOD PRESSURE: 139 MMHG | BODY MASS INDEX: 41.69 KG/M2 | DIASTOLIC BLOOD PRESSURE: 85 MMHG | WEIGHT: 314.6 LBS | HEIGHT: 73 IN

## 2022-12-01 DIAGNOSIS — I10 ESSENTIAL HYPERTENSION: ICD-10-CM

## 2022-12-01 DIAGNOSIS — E03.9 ACQUIRED HYPOTHYROIDISM: ICD-10-CM

## 2022-12-01 DIAGNOSIS — E66.01 OBESITY, CLASS III, BMI 40-49.9 (MORBID OBESITY) (HCC): ICD-10-CM

## 2022-12-01 DIAGNOSIS — E11.9 TYPE 2 DIABETES MELLITUS WITHOUT COMPLICATION, WITHOUT LONG-TERM CURRENT USE OF INSULIN (HCC): Primary | ICD-10-CM

## 2022-12-01 DIAGNOSIS — J44.9 CHRONIC OBSTRUCTIVE PULMONARY DISEASE, UNSPECIFIED COPD TYPE (HCC): ICD-10-CM

## 2022-12-01 DIAGNOSIS — M54.50 MIDLINE LOW BACK PAIN WITHOUT SCIATICA, UNSPECIFIED CHRONICITY: ICD-10-CM

## 2022-12-01 LAB — HBA1C MFR BLD: 6.8 %

## 2022-12-01 PROCEDURE — 99214 OFFICE O/P EST MOD 30 MIN: CPT | Performed by: INTERNAL MEDICINE

## 2022-12-01 PROCEDURE — 83036 HEMOGLOBIN GLYCOSYLATED A1C: CPT | Performed by: INTERNAL MEDICINE

## 2022-12-01 PROCEDURE — 3044F HG A1C LEVEL LT 7.0%: CPT | Performed by: INTERNAL MEDICINE

## 2022-12-01 PROCEDURE — 3078F DIAST BP <80 MM HG: CPT | Performed by: INTERNAL MEDICINE

## 2022-12-01 PROCEDURE — 3074F SYST BP LT 130 MM HG: CPT | Performed by: INTERNAL MEDICINE

## 2022-12-01 PROCEDURE — 1123F ACP DISCUSS/DSCN MKR DOCD: CPT | Performed by: INTERNAL MEDICINE

## 2022-12-01 RX ORDER — KETOCONAZOLE 20 MG/G
CREAM TOPICAL
COMMUNITY
Start: 2022-11-08

## 2022-12-01 ASSESSMENT — ENCOUNTER SYMPTOMS
COUGH: 1
BACK PAIN: 1
WHEEZING: 1
CHEST TIGHTNESS: 0
SHORTNESS OF BREATH: 1

## 2022-12-01 ASSESSMENT — COPD QUESTIONNAIRES: COPD: 1

## 2022-12-01 NOTE — PROGRESS NOTES
HPI Notes    Name: Jesús Fernández  : 1955         Chief Complaint:     Chief Complaint   Patient presents with    Back Pain     Patient states he has a cough, possible pulled muscle in back     Diabetes    Hypertension    COPD       History of Present Illness:        Salvador Rodríguez presents to office to follow up for DM, HTN, Hypothyroidism, COPD. He also c/o upper back pain started about 4-5 weeks ago. Had no trauma, no falls. Says has a h/o low back pain due to herniated disk . Used to follow up with a pain doctor in [facility], does not remember the name. Last visit was about one year ago. He reports getting injections to his lower back and \" burning nerves\". Back Pain  This is a new problem. The current episode started 1 to 4 weeks ago. The problem occurs constantly. The problem is unchanged. The pain is present in the thoracic spine. The quality of the pain is described as aching. The pain does not radiate. The symptoms are aggravated by coughing (deep breaths, moving certain ways). Pertinent negatives include no chest pain, fever, numbness, perianal numbness or weight loss. Risk factors include obesity. He has tried NSAIDs for the symptoms. The treatment provided mild relief. Diabetes  He presents for his follow-up diabetic visit. He has type 2 diabetes mellitus. There are no hypoglycemic associated symptoms. Pertinent negatives for hypoglycemia include no dizziness. Associated symptoms include foot paresthesias. Pertinent negatives for diabetes include no chest pain, no fatigue and no weight loss. There are no hypoglycemic complications. Diabetic complications include heart disease. Pertinent negatives for diabetic complications include no CVA. Risk factors for coronary artery disease include diabetes mellitus, dyslipidemia, family history, male sex, obesity, hypertension and tobacco exposure. Current diabetic treatment includes oral agent (monotherapy).  He is compliant with treatment all of the time. Meal planning includes avoidance of concentrated sweets. An ACE inhibitor/angiotensin II receptor blocker is being taken. Hypertension  This is a chronic problem. The current episode started more than 1 year ago. The problem is unchanged. The problem is controlled. Associated symptoms include shortness of breath. Pertinent negatives include no chest pain, palpitations or peripheral edema. There are no associated agents to hypertension. Past treatments include calcium channel blockers, angiotensin blockers and beta blockers. The current treatment provides significant improvement. There are no compliance problems. Hypertensive end-organ damage includes CAD/MI. There is no history of CVA. COPD  He complains of cough, shortness of breath and wheezing. This is a chronic problem. The current episode started more than 1 year ago. The problem occurs intermittently. The problem has been unchanged. The cough is non-productive. Pertinent negatives include no appetite change, chest pain, fever, nasal congestion or weight loss. His symptoms are aggravated by change in weather and strenuous activity. His symptoms are alleviated by ipratropium, beta-agonist and steroid inhaler. He reports significant improvement on treatment. Risk factors for lung disease include smoking/tobacco exposure. His past medical history is significant for COPD.          Past Medical History:     Past Medical History:   Diagnosis Date    Asthma     Bronchitis     CAD (coronary artery disease)     COPD (chronic obstructive pulmonary disease) ([facility] [address] 8/12/2011    Depression 8/12/2011    Di[facility]     GERD (gastroesophageal reflux disease) 8/12/2011    Hyperlipidemia 8/12/2011    Hypertension     Hypothyroid 8/12/2011    Lower back pain     Nicotine addiction 8/12/2011    Sleep apnea       Reviewed all health maintenance requirements and orderedappropriate tests  Health Maintenance Due   Topic Date Due    DTaP/Tdap/Td vaccine (1 - Tdap) Never done    Shingles vaccine (1 of 2) Never done    Diabetic microalbuminuria test  08/18/2017    Diabetic retinal exam  10/29/2020    Low dose CT lung screening  03/03/2022    AAA screen  07/29/2022       Past Surgical History:     Past Surgical History:   Procedure Laterality Date    CARDIAC CATHETERIZATION Left 01/21/2015    Severe left main trunk disease extending into the ostium of the left anterior descending of 80% involving the ostium of the intermediate & atrioventricular branch of the circumflex. 60% disease in the atrioventricular branch of the circumflex in the midportion. 40% disease in the right coronary artery. Normal left ventricular function, ejection fraction of 60%    CARDIAC CATHETERIZATION Left 12/20/2017    Dr. Mehdi Ruiz @ [facility]--70% left main trunck & ostial LAD & high lateral circumflex disease. Patent LIMA to the LAD. Patent vein graft to the high lateral circumflex. 60% disease in the AV branch of the circumflex that terminates in a large posterolateral branch with an FFR of 1.0, indicating no significant stenosis. 40% disease in the right coronary artery. Ejection fraction 50-55%. CARDIAC CATHETERIZATION Left 11/04/2022    Dr. Mehdi Ruiz @ [facility]--Continue medical therapy    COLONOSCOPY N/A 03/05/2019    COLONOSCOPY POLYPECTOMY HOT BIOPSY performed by Lysle Meigs, MD at [address] [place] [address] GRAFT  02/04/2015    3 vessel CABG using left internal mammary artery to the left anterior descending coronary artery with a reverse saphenous vein aortocoronary sequential graft side to side to the ramus intermedius coronary artery & end to side to the first obtuse marginal coronary artery.     OTHER SURGICAL HISTORY  03/21/2016    I & D- cyst- posterior neck    SPINE SURGERY      THYROID LOBECTOMY      UPPER GASTROINTESTINAL ENDOSCOPY N/A 03/05/2019    EGD BIOPSY performed by Lysle Meigs, MD at [facility] OR        Medications:       Prior to Admission medications Medication Sig Start Date End Date Taking? Authorizing Provider   ketoconazole (NIZORAL) 2 % cream appy to affected area(s) on hand TWICE DAILY until clear, repeat AS NEEDED 11/8/22  Yes Historical Provider, MD   amLODIPine (NORVASC) 2.5 MG tablet TAKE 1 TABLET DAILY 8/17/22  Yes Siomara Craig MD   metFORMIN (GLUCOPHAGE) 1000 MG tablet TAKE 1 TABLET TWICE DAILY  WITH MEALS 8/17/22  Yes Siomara Craig MD   SYMBICORT 160-4.5 MCG/ACT AERO USE 2 INHALATIONS ORALLY   TWICE DAILY 8/17/22  Yes Siomara Craig MD   clobetasol (TEMOVATE) 0.05 % ointment Apply topically 2 times daily Apply topically 2 times daily.  8/16/22  Yes Siomara Craig MD   fenofibrate (TRICOR) 145 MG tablet TAKE 1 TABLET DAILY 7/26/22  Yes Siomara Craig MD   SYNTHROID 200 MCG tablet TAKE 1 TABLET DAILY 7/14/22  Yes Siomara Craig MD   metoprolol succinate (TOPROL XL) 50 MG extended release tablet TAKE 1 TABLET EVERY DAY 3/1/22  Yes Siomara Craig MD   losartan (COZAAR) 25 MG tablet TAKE 1 TABLET EVERY DAY 3/1/22  Yes Siomara Craig MD   PARoxetine (PAXIL) 20 MG tablet Take 1 tablet by mouth every morning 3/1/22  Yes Siomara Craig MD   albuterol (PROVENTIL) (2.5 MG/3ML) 0.083% nebulizer solution INHALE THE CONTENTS OF 1 VIAL VIA NEBULIZER EVERY 4 HOURS AS NEEDED FOR WHEEZING OR SHORTNESS OF BREATH 1/11/22  Yes Siomara Craig MD   naproxen (NAPROSYN) 500 MG tablet Take 1 tablet by mouth 2 times daily 12/9/21  Yes Brandie Carcamo MD   ipratropium-albuterol (DUONEB) 0.5-2.5 (3) MG/3ML SOLN nebulizer solution Inhale 3 mLs into the lungs 4 times daily 4/30/21  Yes Siomara Craig MD   CPAP Machine MISC by Does not apply route 4/19/21  Yes Siomara Craig MD   fluticasone (FLONASE) 50 MCG/ACT nasal spray 2 sprays by Nasal route daily 2/19/21  Yes Siomara Craig MD   albuterol sulfate  (90 Base) MCG/ACT inhaler Inhale 2 puffs into the lungs every 6 hours as needed for Wheezing 2/19/21  Yes Siomara Craig MD   [facility] All-In-One MISC by Does not apply route   Yes Historical Provider, MD   ondansetron (ZOFRAN-ODT) 4 MG disintegrating tablet Take 1 tablet by mouth 3 times daily as needed for Nausea or Vomiting 11/17/20  Yes Robinette Claude, MD   Respiratory Therapy Supplies MISC CPAP full face mask, hose, head gear and  filter 2/7/20  Yes Robinette Claude, MD   blood glucose monitor strips Test 2 times a day & as needed for symptoms of irregular blood glucose. 11/11/19  Yes Robinette Claude, MD   mometasone (ELOCON) 0.1 % cream Apply topically daily. 2/20/17  Yes Baron Fuentes MD   aspirin 81 MG tablet Take 81 mg by mouth daily. Yes Historical Provider, MD   fluocinolone (DERMOTIC) 0.01 % OIL oil Place 4 drops in ear(s) 2 times daily 8/1/22 8/31/22  Brit Richey MD   gabapentin (NEURONTIN) 300 MG capsule Take 1 capsule by mouth in the morning and 1 capsule before bedtime. Do all this for 30 days. Intended supply: 30 days. 7/22/22 10/3/22  Robinette Claude, MD   lovastatin (MEVACOR) 40 MG tablet Take 1 tablet by mouth 2 times daily  Patient taking differently: Take 80 mg by mouth nightly 3/1/22 10/3/22  Robinette Claude, MD        Allergies:       Patient has no known allergies. Social History:     Tobacco: reports that he quit smoking about 8 years ago. His smoking use included cigarettes. He has a 45.00 pack-year smoking history. He has never used smokeless tobacco.  Alcohol:      reports no history of alcohol use. Drug Use:  reports no history of drug use. Family History:     Family History   Problem Relation Age of Onset    Diabetes Mother     Heart Disease Mother     Diabetes Father     Heart Disease Father     Heart Disease Brother        Review of Systems:         Review of Systems   Constitutional:  Negative for appetite change, fatigue, fever and weight loss. Respiratory:  Positive for cough, shortness of breath and wheezing. Negative for chest tightness.     Cardiovascular:  Negative for chest pain and palpitations. Musculoskeletal:  Positive for arthralgias and back pain. Neurological:  Negative for dizziness and numbness. Psychiatric/Behavioral:  Negative for dysphoric mood, self-injury and sleep disturbance. Physical Exam:     Vitals:  /85 (Site: Right Upper Arm, Position: Sitting, Cuff Size: Large Adult)   Pulse 56   Resp 18   Ht 6' 1\" (1.854 m)   Wt (!) 314 lb 9.6 oz (142.7 kg)   SpO2 97%   BMI 41.51 kg/m²       Physical Exam  Vitals reviewed. Constitutional:       General: He is not in acute distress. Appearance: Normal appearance. He is well-developed. He is obese. He is not ill-appearing. HENT:      Head: Normocephalic and atraumatic. Mouth/Throat:      Mouth: Mucous membranes are moist.   Neck:      Thyroid: No thyromegaly. Cardiovascular:      Rate and Rhythm: Normal rate and regular rhythm. Heart sounds: Normal heart sounds. No murmur heard. Pulmonary:      Effort: Pulmonary effort is normal.      Breath sounds: Normal breath sounds. No wheezing or rales. Abdominal:      General: Bowel sounds are normal. There is no distension. Palpations: Abdomen is soft. There is no mass. Tenderness: There is no abdominal tenderness. Musculoskeletal:         General: Tenderness (upper back tenderness thoracic area more on the right side) present. Normal range of motion. Right lower leg: No edema. Left lower leg: No edema. Lymphadenopathy:      Cervical: No cervical adenopathy. Skin:     General: Skin is warm and dry. Coloration: Skin is not jaundiced or pale. Findings: No rash. Neurological:      General: No focal deficit present. Mental Status: He is alert and oriented to person, place, and time. Mental status is at baseline. Psychiatric:         Mood and Affect: Mood normal.         Behavior: Behavior normal.         Thought Content:  Thought content normal.             Data:     Lab Results   Component Value Date/Time    NA 137 10/25/2022 11:08 AM    K 4.4 10/25/2022 11:08 AM     10/25/2022 11:08 AM    CO2 24 10/25/2022 11:08 AM    BUN 11 11/07/2022 09:52 AM    CREATININE 1.04 11/07/2022 09:52 AM    GLUCOSE 159 10/25/2022 11:08 AM    GLUCOSE 111 10/14/2011 08:30 AM    PROT 7.0 10/25/2022 11:08 AM    LABALBU 4.6 10/25/2022 11:08 AM    BILITOT 0.5 10/25/2022 11:08 AM    ALKPHOS 45 10/25/2022 11:08 AM    AST 43 10/25/2022 11:08 AM    ALT 40 10/25/2022 11:08 AM     Lab Results   Component Value Date/Time    WBC 8.4 10/25/2022 11:08 AM    RBC 4.82 10/25/2022 11:08 AM    HGB 15.2 10/25/2022 11:08 AM    HCT 45.3 10/25/2022 11:08 AM    MCV 93.9 10/25/2022 11:08 AM    MCH 31.5 10/25/2022 11:08 AM    MCHC 33.5 10/25/2022 11:08 AM    RDW 13.7 10/25/2022 11:08 AM     10/25/2022 11:08 AM    MPV NOT REPORTED 11/05/2021 09:24 AM     Lab Results   Component Value Date/Time    TSH 2.42 08/02/2022 08:06 AM     Lab Results   Component Value Date/Time    CHOL 130 10/25/2022 11:08 AM    HDL 33 10/25/2022 11:08 AM    PSA 0.35 12/17/2018 10:11 AM    LABA1C 6.8 12/01/2022 09:53 AM          Assessment & Plan        Diagnosis Orders   1. Type 2 diabetes mellitus without complication, without long-term current use of insulin (HCC)   Well controlled, A1C is 6.8%, continue on Metformin POCT glycosylated hemoglobin (Hb A1C)      2. Essential hypertension   Blood pressure controlled, continue on current meds       3. Acquired hypothyroidism   Continue on Synthroid, TSH normal       4. Chronic obstructive pulmonary disease, unspecified COPD type ([facility] [address]   Symptoms stable, no recent exacerbations, continue on current plan of treatment       5. Midline low back pain without sciatica, unspecified chronicity   Will order thoracic spine x-ray for evaluation. Patient advised to contact his pain management doctor in [facility] for evaluation.   Will await results of the x-ray for further recommendations XR THORACIC SPINE (3 VIEWS)      6. Obesity, Class III, BMI 40-49.9 (morbid obesity) ([facility] [address]   Patient encouraged to follow 1800-calorie diet, avoid processed/refined food, exercise more to lose weight. Completed Refills   Requested Prescriptions      No prescriptions requested or ordered in this encounter     Return in about 3 months (around 3/1/2023) for HTN, diabetes, copd. No orders of the defined types were placed in this encounter. Orders Placed This Encounter   Procedures    XR THORACIC SPINE (3 VIEWS)     Standing Status:   Future     Standing Expiration Date:   12/1/2023    POCT glycosylated hemoglobin (Hb A1C)         There are no Patient Instructions on file for this visit.     Electronically signed by Alfred Sweeney MD on 12/1/2022 at 10:10 AM           Completed Refills      Requested Prescriptions      No prescriptions requested or ordered in this encounter

## 2022-12-02 ENCOUNTER — HOSPITAL ENCOUNTER (OUTPATIENT)
Dept: GENERAL RADIOLOGY | Age: 67
End: 2022-12-02
Payer: MEDICARE

## 2022-12-02 ENCOUNTER — HOSPITAL ENCOUNTER (OUTPATIENT)
Age: 67
End: 2022-12-02
Payer: MEDICARE

## 2022-12-02 ENCOUNTER — HOSPITAL ENCOUNTER (OUTPATIENT)
Dept: CARDIAC REHAB | Age: 67
Setting detail: THERAPIES SERIES
Discharge: HOME | End: 2022-12-02
Payer: MEDICARE

## 2022-12-02 DIAGNOSIS — M54.50 MIDLINE LOW BACK PAIN WITHOUT SCIATICA, UNSPECIFIED CHRONICITY: ICD-10-CM

## 2022-12-02 PROCEDURE — 93798 PHYS/QHP OP CAR RHAB W/ECG: CPT

## 2022-12-02 PROCEDURE — 72072 X-RAY EXAM THORAC SPINE 3VWS: CPT

## 2022-12-05 ENCOUNTER — HOSPITAL ENCOUNTER (OUTPATIENT)
Dept: CARDIAC REHAB | Age: 67
Setting detail: THERAPIES SERIES
Discharge: HOME | End: 2022-12-05
Payer: MEDICARE

## 2022-12-05 PROCEDURE — 93798 PHYS/QHP OP CAR RHAB W/ECG: CPT

## 2022-12-07 ENCOUNTER — HOSPITAL ENCOUNTER (OUTPATIENT)
Dept: CARDIAC REHAB | Age: 67
Setting detail: THERAPIES SERIES
Discharge: HOME | End: 2022-12-07
Payer: MEDICARE

## 2022-12-07 PROCEDURE — 93798 PHYS/QHP OP CAR RHAB W/ECG: CPT

## 2022-12-09 ENCOUNTER — HOSPITAL ENCOUNTER (OUTPATIENT)
Dept: CARDIAC REHAB | Age: 67
Setting detail: THERAPIES SERIES
Discharge: HOME | End: 2022-12-09
Payer: MEDICARE

## 2022-12-09 PROCEDURE — 93798 PHYS/QHP OP CAR RHAB W/ECG: CPT

## 2022-12-12 ENCOUNTER — HOSPITAL ENCOUNTER (OUTPATIENT)
Dept: GENERAL RADIOLOGY | Age: 67
Discharge: HOME | End: 2022-12-14
Payer: MEDICARE

## 2022-12-12 ENCOUNTER — HOSPITAL ENCOUNTER (OUTPATIENT)
Dept: CARDIAC REHAB | Age: 67
Setting detail: THERAPIES SERIES
Discharge: HOME | End: 2022-12-12
Payer: MEDICARE

## 2022-12-12 ENCOUNTER — OFFICE VISIT (OUTPATIENT)
Dept: FAMILY MEDICINE CLINIC | Age: 67
End: 2022-12-12
Payer: MEDICARE

## 2022-12-12 ENCOUNTER — HOSPITAL ENCOUNTER (OUTPATIENT)
Age: 67
Discharge: HOME | End: 2022-12-14
Payer: MEDICARE

## 2022-12-12 VITALS
WEIGHT: 310 LBS | SYSTOLIC BLOOD PRESSURE: 124 MMHG | BODY MASS INDEX: 41.08 KG/M2 | RESPIRATION RATE: 22 BRPM | HEART RATE: 76 BPM | OXYGEN SATURATION: 92 % | DIASTOLIC BLOOD PRESSURE: 76 MMHG | HEIGHT: 73 IN

## 2022-12-12 DIAGNOSIS — R05.1 ACUTE COUGH: ICD-10-CM

## 2022-12-12 DIAGNOSIS — J44.1 COPD EXACERBATION (HCC): Primary | ICD-10-CM

## 2022-12-12 PROCEDURE — 1123F ACP DISCUSS/DSCN MKR DOCD: CPT | Performed by: INTERNAL MEDICINE

## 2022-12-12 PROCEDURE — 3078F DIAST BP <80 MM HG: CPT | Performed by: INTERNAL MEDICINE

## 2022-12-12 PROCEDURE — 99214 OFFICE O/P EST MOD 30 MIN: CPT | Performed by: INTERNAL MEDICINE

## 2022-12-12 PROCEDURE — 93798 PHYS/QHP OP CAR RHAB W/ECG: CPT

## 2022-12-12 PROCEDURE — 71046 X-RAY EXAM CHEST 2 VIEWS: CPT

## 2022-12-12 PROCEDURE — 3074F SYST BP LT 130 MM HG: CPT | Performed by: INTERNAL MEDICINE

## 2022-12-12 RX ORDER — TERBINAFINE HYDROCHLORIDE 250 MG/1
TABLET ORAL
COMMUNITY
Start: 2022-12-09

## 2022-12-12 RX ORDER — LEVOFLOXACIN 500 MG/1
500 TABLET, FILM COATED ORAL DAILY
Qty: 7 TABLET | Refills: 0 | Status: SHIPPED | OUTPATIENT
Start: 2022-12-12 | End: 2022-12-19

## 2022-12-12 RX ORDER — GUAIFENESIN 600 MG/1
600 TABLET, EXTENDED RELEASE ORAL 2 TIMES DAILY
Qty: 30 TABLET | Refills: 0 | Status: SHIPPED | OUTPATIENT
Start: 2022-12-12 | End: 2022-12-27

## 2022-12-12 RX ORDER — PREDNISONE 20 MG/1
TABLET ORAL
Qty: 10 TABLET | Refills: 0 | Status: SHIPPED | OUTPATIENT
Start: 2022-12-12

## 2022-12-12 ASSESSMENT — ENCOUNTER SYMPTOMS
RHINORRHEA: 1
SHORTNESS OF BREATH: 1
WHEEZING: 1
SORE THROAT: 0
COUGH: 1

## 2022-12-12 NOTE — PROGRESS NOTES
Phase II Cardiac Rehab Individualized Treatment Plan-30 Day      Patient Name: Rosa Luis  ACCOUNT #: [de-identified]   Date of Initial Assessment: 11/14/2022  Diagnosis: Stable Angina Pectoris   Onset Date: 11/4/22  Referring Physician: DR Yara Ríos  Risk Stratification: MODERATE  Session Number: 12  EXERCISE    Stages of Change:   [] pre-contemplation  [x] Action   [] Contemplate   [] Maintainence   [] Prep   [] Relapse          Exercise Prescription:  Mode: [x] TM [x] UBE [x] STP [] EL [x] R  Frequency: 3 DAYS PER WEEK  Duration: 31-60 MINUTES  Intensity: 3.9 avg mets  Plan/Goal: Increase 1-2 levels/week or 1-2 min/week to achieve target HR and RPE   12-16. Progression: INCREASE DURATION PER ABOVE F.I.T.T. Rx  PARAMETERS ON AVG OF 5-10 MIN / 1-2 WEEKS FOR THE FIRST 4-6 WEEKS. AFTER 3-4 WEEKS ARE COMPLETED, CONTINUE TO GRADUALLY INCREASE F. I.T. PARAMETERS GRADUALLY UPWARD AT THE ESTABLISHED DURATION ON AVERAGE 0.5-1.0 METS PER 30 DAYS OVER THE COARSE OF REMAINING PROGRAM AS ESTABLISHED BY PT-CENTERED GOALS AND GUIDELINES. Target HR 90 - 102 bpm      [] Angina with Exertion    [x] Resistance Training  Introduce and progress 8-12 bilateral UE and LE progressive resistance exercises at 1-3 sets per lift, on 2-3 non-consecutive days using weights/ blue  therabands AND WTS TO 8-24 # for 8-15 reps to progressive overload by increasing resistance once reps progressed to at least 15 reps on at least 2 occasions       Hypertension:  [x] Yes  [] No  Resting BP: 132/72  Peak Exercise BP: 148/64  [] Med change?     Intervention:  Home Exercise:  Type: Walking  Duration: 30-60 MINUTES  Frequency: 2-3 DAYS PER WEEK   [x] Resistance Training  introduce 8-12 bilateral UE and LE progressive resistance exercises at 1-3 sets per lift, on 2-3 non-consecutive days using weights/ blue  therabands AND WTS TO 8-24 # for 8-15 reps to progressive overload by increasing resistance once reps progressed to at least 15 reps on at least 2 occasions     Education:   [x] Equipment [place]  [x] Self pulse   [x] Proper use weights/therabands   [x] S/S to report  [x] Low Na Diet    [x] Warm up/ Cool down  [x] BP Medication    [x] RPE Scale   [x] Understand BP   [x] Ex Safety   [x] Exercise specialist class-Home Exercise       Target Goal:   -Individual Exercise Plan  -Bp<130/80  -Aerobic active 30 + minutes 5-7 days per week    Nutrition    Stages of Change:   [] pre-contemplation  [x] Action   [] Contemplate   [] Maintainence   [] Prep   [] Relapse    Lipids:   Status: Final result    Visible to patient: Yes (not seen)    Next appt: 02/06/2023 at 10:00 AM in Cardiology Rober Ayers MD)    Dx: Mixed hyperlipidemia; Essential hyper. ..    0 Result Notes    1  Topic  Component Ref Range & Units 10/25/22 1108 8/2/22 0806 8/7/21 0742 8/6/20 0954 10/15/19 0904 10/22/18 0951 11/22/17 0956   Cholesterol <200 mg/dL 130  138 CM  122 CM  122 CM  153 CM  160 CM  174 CM    Comment:     Cholesterol Guidelines:       <200  Desirable    200-240  Borderline       >240  Undesirable        HDL >40 mg/dL 33 Low   30 Low  CM  30 Low  CM  30 Low  CM  28 Low  CM  35 Low  CM  37 Low  CM    Comment:     HDL Guidelines:     <40     Undesirable    40-59    Borderline     >59     Desirable        LDL Cholesterol 0 - 130 mg/dL 49  58 CM  54 CM  42 CM       CM  79 CM  102 CM    Comment:     LDL Guidelines:      <100    Desirable    100-129   Near to/above Desirable    130-159   Borderline       >159   Undesirable       Direct (measured) LDL and calculated LDL are not interchangeable tests.     Chol/HDL Ratio <5 3.9  4.6 CM  4.1 CM  4.1 CM  5.5 High  CM  4.6 CM  4.7 CM    Comment:        Triglycerides <150 mg/dL 242 High   251 High  CM  192 High  CM  252 High  CM  478 High  CM  230 High  CM  173 High  CM    Comment:     Triglyceride Guidelines:      <150   Desirable    150-199  Borderline    200-499  High      >499   Very high    Based on AHA Guidelines for fasting triglyceride, October 2012. VLDL       NOT REPORTED High  R  NOT REPORTED High  R   R  NOT REPORTED R  NOT [facility] [address]                Specimen Collected: 10/25/22 11:08 EDT Last Resulted: 10/25/22 18:33 EDT             Diabetes / Prediabetes:  [x] Yes  [] No  Random Bs: 180  HbA1c: 6.2%  [] Med Change? Weight Management:  Current Wt 308 lb  Wt Goal: 1-2 lbs/wk [x] Loss  [] Gain  [] Maintain Current Wt.      Intervention:   [] Dietitian Consult       [x] Nurse/Patient Discussion     [x] Diet Class           [] Referred to Diabetes Education     Education:  [] S&S hypo/hyperglycemia  [x] Low fat/low cholesterol diet  [x] Weight loss methods      [] Relate Diabetes/CAD     [x] Eating heart healthy handout    Target Goal:  -LDL-C<100 if triglycerides are > 200  -LDL-C < 70 for high risk patients  -HbA1c < 7%  -BMI < 25   Education    Stages of Change:    [] pre-contemplation  [x] Action   [] Contemplate   [] Maintainence   [] Prep   [] Relapse    Family support: [x] Yes  [] No    Tobacco use: [] Yes  [x] No    Intervention:  [] Referred to smoking cessation counselor     [] Individual education and counseling  [] Tobacco adjunct  [] Informed of education class schedule     Education:   [x] Risk Factors/Modifications  [x] Psychological aspects  [x] Angina         [x] Medications  [] CHF                                    [] Diabetes     [x] Cardiac A&P                                 [] Smoking Cessation  [x] Stress Reduction and Relaxation                     [] Weight Management    Target Goal:  -Complete cessation of tobacco use (if applicable)  -Continued risk factor modifications  -Recognizing signs/symptoms to report  -Proper use of meds    Psychosocial  Stages of Change:    [] pre-contemplation  [x] Action   [] Contemplate   [] Maintainence   [] Prep   [] Relapse      Intervention:   [] Psych Consult/  [x] Uses stress management skills    [] Physician Referral    [x] Stress management class   [] Med Change? Education:    [x] Coping Techniques   [x] Relaxation techniques   [x] S/S of Depression    Target Goal:  -Assess presence or absence of depression using a valid screening tool. -Maximize coping skills.  -Positive support system.     Preventative Medication:   [x] Aspirin       [x] Beta Blockade      [x] Statin or other lipid lowering agent     [] Clopidogrel   [x] ACE Inhibitor   [x] ACE Receptor Blocker   [] Antianginal    [] Calcium Channel Blocker   [] Other anticoagulation medications     Fall Risk assess: [x] Yes  [] No / REMAINS low RISK  Assistive Device:   [] Cane  [] Walker [] Wheel Chair  [] Gait belt    Patient/Program goal:   Overall Personal Program Goal:  \" IMPROVE MY BREATHING \"   [] Initial Goal   [x] Progressing to Goal  [] Not Meeting--Needs Reinforcement  [] Goal Met  [] Goal Not Met     To increase stamina, strength, and flexibility by exercising 31-50 total minutes by engaging in aerobic, resistance, and flexibility workout modalities with the goal of progressively achieving at least 0.5 to 1.0 metabolic equivalant improvement in the next 30 days as evidenced by daily session reports / [] Initial Goal     [x] Progressing to Goal  [] Not Meeting--Needs Reinforcement   [] Goal Met  [] Goal Not Met     To achieve and progress prescribed exercise frequency, intensity, time, and type in the next 30 days based upon initial evaluation and submaximal graded exercise results as evidenced by the attaining and maintaining the prescribed target heart rate range, a Michael rating of perceived exertion between 11 and 16, duration of >30 - 50 minutes using multiple exercise modes for at least 3 - 5 days per week to accumulate a minimum total of 2.5 hours per week of moderate aerobic intensity exercise, as tolerated, evidenced by daily session reports and home workout log /         [] Initial Goal   [x] Progressing to Goal  [] Not Meeting--Needs Reinforcement   [] Goal Met  [] Goal Not Met        To gradually and progressively lose 2 - 4 lb of body weight in the next 30 days through moderating nutrional intake and performing regular aerobic and strength training exercises as prescribed with improvement evidenced by daily session report comparison / [x] Initial Goal   [] Progressing to Goal  [x] Not Meeting--Needs Reinforcement  [] Goal Met  [] Goal Not Met     To decrease waist circumference by 5% by program completion if waist measurement is > or = 40 inches (male) / > or = 35 inches (female) as evidence by the [employer] /     [x] Initial Goal   [] Progressing to Goal  [] Not Meeting--Needs Reinforcement  [] Goal Met  [] Goal Not Met     To introduce and progress 8-10 different bilateral UE and LE progressive resistance exercises focused on major muscle groups using 1-3 sets each per lift, on 2-3 non-consecutive days implementing free and machine weights and Blue therabands, as appropriate, with a resistance of 40-60% 1-repetition maximum or 10 -15 repetitions to progressive overload and increasing resistance once repetitions have progressed to 15 reps and feel fairly light on 2 prior occasions in the next 30 days / [x] Initial Goal   [] Progressing to Goal  [] Not Meeting--Needs Reinforcement  [] Goal Met  [] Goal Not Met     To achieve and maintain an optimal average resting blood pressure of <130 / 80 mmHg, or as indicated by this patient's referring provider, in the next 30 days / [] Initial Goal   [x] Progressing to Goal  [] Not Meeting--Needs Reinforcement  [] Goal Met  [] Goal Not Met     To strive for blood lipid optimization with an LDL-C of <100 mg/dL or  LDL 70 mg/dL, an HDL-C of > or = 40 mg/dL for men and > or = 50 mg/dL for women, and a triglyceride level of <150 mg/dL via lifestyle education, behavioral modification, and medication compliance / [x] Initial Goal   [] Progressing to Goal  [] Not Meeting--Needs Reinforcement  [] Goal Met  [] Goal Not Met     -To develop regular home aerobic exercise program for 20 - 60 minutes at least 2 non-rehab days per week, excluding  5 - 10 minutes warm-up and cool-down periods, within the next 30 days, being tracked on home workout log / [x] Initial Goal   [] Progressing to Goal  [] Not Meeting--Needs Reinforcement  [] Goal Met  [] Goal Not Met     To strive for a lower daily fasting blood glucose measures to <131 and random after meal measures to <181, if diabetic, at home via lifestyle educaton, behavior modification, and medication compliance in the next 30 days as tracked per patient's self-report / [] Initial Goal   [] Progressing to Goal  [x] Not Meeting--Needs Reinforcement  [] Goal Met  [] Goal Not Met     To reduce self-reported psycho-social feelings of stress in the next 30 days as evidenced by pre- and post- surveys and by routine rounding with patient to ascertain subjective improvements / [x] Initial Goal   [] Progressing to Goal  [] Not Meeting--Needs Reinforcement  [] Goal Met  [] Goal Not Met     In the next 30-days, to eat on average at least 2 servings of fruit per day and 4-5 servings of vegetables per day as evidenced by pre- and post-program nutriton survey and routine rounding with patient to ascertain progression toward goal per patient's self report, food diary, and Rate-your-Plate screening survey / [x] Initial Goal   [] Progressing to Goal  [] Not Meeting--Needs Reinforcement  [] Goal Met  [] Goal Not Met     To strive to eat < or = 30% of daily caloric intake of total fat and <10% of daily caloric saturated fat tracked by patient's self-report, food diary, and Rate-your-Plate screening survey / [x] Initial Goal   [] Progressing to Goal  [] Not Meeting--Needs Reinforcement  [] Goal Met  [] Goal Not Met     To have patient demonstrate knowledge about risk factor reduction, lifestyle modification, and heart health strategies with > / = 80% accuracy via pre- and post-program Test your [address] screening tool / [x] Initial Goal   [] Progressing to Goal  [] Not Meeting--Needs Reinforcement  [] Goal Met  [] Goal Not Met    Physician Changes/Comments:    Electronically signed by Jose D Helm RCP on 12/12/22 at 7:20 AM EST  Cardiac Rehab Staff

## 2022-12-12 NOTE — PROGRESS NOTES
HPI Notes    Name: Garth Shaver  : 1955         Chief Complaint:     Chief Complaint   Patient presents with    Congestion     Patient has a cough, drainage and some SOB        History of Present Illness:        Andreas presents to office for evaluation of cough, congestion, SOB, wheezing. Symptoms started about 4-5 days ago and getting worse. Started with sneezing and drainage from his nose. Had no sick contacts. Did Covid home test yesterday and it was negative. Has been using his nebulizers every 4 hours, took OTC Mucinex and nothing is helping. Cough  This is a new problem. The current episode started in the past 7 days. The problem has been gradually worsening. The cough is Productive of sputum. Associated symptoms include ear congestion, nasal congestion, rhinorrhea, shortness of breath and wheezing. Pertinent negatives include no chills, ear pain, fever, headaches, myalgias or sore throat. The symptoms are aggravated by lying down. He has tried steroid inhaler, a beta-agonist inhaler and ipratropium inhaler for the symptoms. The treatment provided mild relief. His past medical history is significant for COPD.          Past Medical History:     Past Medical History:   Diagnosis Date    Asthma     Bronchitis     CAD (coronary artery disease)     COPD (chronic obstructive pulmonary disease) ([address] 2011    Depression 2011    Diabetes mellitus ([address]     GERD (gastroesophageal reflux disease) 2011    Hyperlipidemia 2011    Hypertension     Hypothyroid 2011    Lower back pain     Nicotine addiction 2011    Sleep apnea       Reviewed all health maintenance requirements and orderedappropriate tests  Health Maintenance Due   Topic Date Due    DTaP/Tdap/Td vaccine (1 - Tdap) Never done    Shingles vaccine (1 of 2) Never done    Diabetic microalbuminuria test  2017    Diabetic retinal exam  10/29/2020    Low dose CT lung screening  2022    AAA screen  2022 Past Surgical History:     Past Surgical History:   Procedure Laterality Date    CARDIAC CATHETERIZATION Left 01/21/2015    Severe left main trunk disease extending into the ostium of the left anterior descending of 80% involving the ostium of the intermediate & atrioventricular branch of the circumflex. 60% disease in the atrioventricular branch of the circumflex in the midportion. 40% disease in the right coronary artery. Normal left ventricular function, ejection fraction of 60%    CARDIAC CATHETERIZATION Left 12/20/2017    Dr. Topher Torres @ [facility]--70% left main trunck & ostial LAD & high lateral circumflex disease. Patent LIMA to the LAD. Patent vein graft to the high lateral circumflex. 60% disease in the AV branch of the circumflex that terminates in a large posterolateral branch with an FFR of 1.0, indicating no significant stenosis. 40% disease in the right coronary artery. Ejection fraction 50-55%. CARDIAC CATHETERIZATION Left 11/04/2022    Dr. Topher Torres @ [facility]--Continue medical therapy    COLONOSCOPY N/A 03/05/2019    COLONOSCOPY POLYPECTOMY HOT BIOPSY performed by Marlene Palma MD at [address] [place] [address] GRAFT  02/04/2015    3 vessel CABG using left internal mammary artery to the left anterior descending coronary artery with a reverse saphenous vein aortocoronary sequential graft side to side to the ramus intermedius coronary artery & end to side to the first obtuse marginal coronary artery. OTHER SURGICAL HISTORY  03/21/2016    I & D- cyst- posterior neck    SPINE SURGERY      THYROID LOBECTOMY      UPPER GASTROINTESTINAL ENDOSCOPY N/A 03/05/2019    EGD BIOPSY performed by Marlene Pamla MD at [facility] OR        Medications:       Prior to Admission medications    Medication Sig Start Date End Date Taking?  Authorizing Provider   terbinafine (LAMISIL) 250 MG tablet TAKE 1 TABLET BY MOUTH DAILY FOR 10 DAYS 12/9/22  Yes Historical Provider, MD   levoFLOXacin (LEVAQUIN) 500 MG tablet Take 1 tablet by mouth daily for 7 days 12/12/22 12/19/22 Yes Dheeraj Barraza MD   predniSONE (DELTASONE) 20 MG tablet Take 2 tablets po daily for 5 days then 1 tab po daily x 55 days 12/12/22  Yes Dheeraj Barraza MD   guaiFENesin ([address]) 600 MG extended release tablet Take 1 tablet by mouth 2 times daily for 15 days 12/12/22 12/27/22 Yes Dheeraj Barraza MD   ketoconazole (NIZORAL) 2 % cream appy to affected area(s) on hand TWICE DAILY until clear, repeat AS NEEDED 11/8/22  Yes Historical Provider, MD   amLODIPine (NORVASC) 2.5 MG tablet TAKE 1 TABLET DAILY 8/17/22  Yes Dheeraj Barraza MD   metFORMIN (GLUCOPHAGE) 1000 MG tablet TAKE 1 TABLET TWICE DAILY  WITH MEALS 8/17/22  Yes Dheeraj Barraza MD   SYMBICORT 160-4.5 MCG/ACT AERO USE 2 INHALATIONS ORALLY   TWICE DAILY 8/17/22  Yes Dheeraj Barraza MD   clobetasol (TEMOVATE) 0.05 % ointment Apply topically 2 times daily Apply topically 2 times daily.  8/16/22  Yes Dheeraj Barraza MD   fenofibrate (TRICOR) 145 MG tablet TAKE 1 TABLET DAILY 7/26/22  Yes Dheeraj Barraza MD   SYNTHROID 200 MCG tablet TAKE 1 TABLET DAILY 7/14/22  Yes Dheeraj Barraza MD   metoprolol succinate (TOPROL XL) 50 MG extended release tablet TAKE 1 TABLET EVERY DAY 3/1/22  Yes Dheeraj Barraza MD   losartan (COZAAR) 25 MG tablet TAKE 1 TABLET EVERY DAY 3/1/22  Yes Dheeraj Barraza MD   PARoxetine (PAXIL) 20 MG tablet Take 1 tablet by mouth every morning 3/1/22  Yes Dheeraj Barraza MD   albuterol (PROVENTIL) (2.5 MG/3ML) 0.083% nebulizer solution INHALE THE CONTENTS OF 1 VIAL VIA NEBULIZER EVERY 4 HOURS AS NEEDED FOR WHEEZING OR SHORTNESS OF BREATH 1/11/22  Yes Dheeraj Barraza MD   naproxen (NAPROSYN) 500 MG tablet Take 1 tablet by mouth 2 times daily 12/9/21  Yes Jimmy Song MD   ipratropium-albuterol (DUONEB) 0.5-2.5 (3) MG/3ML SOLN nebulizer solution Inhale 3 mLs into the lungs 4 times daily 4/30/21  Yes Dheeraj Barraza MD   CPAP Machine MISC by Does not apply route 4/19/21  Yes Milton Art MD   fluticasone (FLONASE) 50 MCG/ACT nasal spray 2 sprays by Nasal route daily 2/19/21  Yes Milton Art MD   albuterol sulfate  (90 Base) MCG/ACT inhaler Inhale 2 puffs into the lungs every 6 hours as needed for Wheezing 2/19/21  Yes Milton Art MD   [address] by Does not apply route   Yes Historical Provider, MD   ondansetron (ZOFRAN-ODT) 4 MG disintegrating tablet Take 1 tablet by mouth 3 times daily as needed for Nausea or Vomiting 11/17/20  Yes Milton Art MD   Respiratory Therapy Supplies [facility] CPAP full face mask, hose, head gear and  filter 2/7/20  Yes Milton Art MD   blood glucose monitor strips Test 2 times a day & as needed for symptoms of irregular blood glucose. 11/11/19  Yes Milton Art MD   mometasone (ELOCON) 0.1 % cream Apply topically daily. 2/20/17  Yes Ruben Carl MD   aspirin 81 MG tablet Take 81 mg by mouth daily. Yes Historical Provider, MD   fluocinolone (DERMOTIC) 0.01 % OIL oil Place 4 drops in ear(s) 2 times daily 8/1/22 8/31/22  Avril Leblanc MD   gabapentin (NEURONTIN) 300 MG capsule Take 1 capsule by mouth in the morning and 1 capsule before bedtime. Do all this for 30 days. Intended supply: 30 days. 7/22/22 10/3/22  Milton Art MD   lovastatin (MEVACOR) 40 MG tablet Take 1 tablet by mouth 2 times daily  Patient taking differently: Take 80 mg by mouth nightly 3/1/22 10/3/22  Milton Art MD        Allergies:       Patient has no known allergies. Social History:     Tobacco: reports that he quit smoking about 8 years ago. His smoking use included cigarettes. He has a 45.00 pack-year smoking history. He has never used smokeless tobacco.  Alcohol:      reports no history of alcohol use. Drug Use:  reports no history of drug use.     Family History:     Family History   Problem Relation Age of Onset    Diabetes Mother     Heart Disease Mother     Diabetes Father     Heart Disease Father     Heart Disease Brother        Review of Systems:         Review of Systems   Constitutional:  Negative for chills and fever. HENT:  Positive for rhinorrhea. Negative for ear pain and sore throat. Respiratory:  Positive for cough, shortness of breath and wheezing. Musculoskeletal:  Negative for myalgias. Neurological:  Negative for headaches. Physical Exam:     Vitals:  /76 (Site: Right Upper Arm, Position: Sitting, Cuff Size: Large Adult)   Pulse 76   Resp 22   Ht 6' 1\" (1.854 m)   Wt (!) 310 lb (140.6 kg)   SpO2 92%   BMI 40.90 kg/m²       Physical Exam  Vitals reviewed. Constitutional:       General: He is not in acute distress. Appearance: He is well-developed. He is obese. HENT:      Head: Normocephalic and atraumatic. Right Ear: Tympanic membrane, ear canal and external ear normal.      Left Ear: Tympanic membrane, ear canal and external ear normal.      Nose: Congestion and rhinorrhea present. Mouth/Throat:      Mouth: Mucous membranes are moist.      Pharynx: Posterior oropharyngeal erythema present. Eyes:      General:         Right eye: No discharge. Left eye: No discharge. Conjunctiva/sclera: Conjunctivae normal.   Neck:      Thyroid: No thyromegaly. Cardiovascular:      Rate and Rhythm: Normal rate and regular rhythm. Heart sounds: Normal heart sounds. No murmur heard. Pulmonary:      Effort: Pulmonary effort is normal.      Breath sounds: Wheezing and rhonchi present. No rales. Abdominal:      General: Bowel sounds are normal. There is no distension. Palpations: Abdomen is soft. There is no mass. Tenderness: There is no abdominal tenderness. Musculoskeletal:         General: Normal range of motion. Lymphadenopathy:      Cervical: No cervical adenopathy. Skin:     General: Skin is warm and dry. Coloration: Skin is not pale. Findings: No rash. Neurological:      General: No focal deficit present. Mental Status: He is alert and oriented to person, place, and time. Psychiatric:         Mood and Affect: Mood normal.         Behavior: Behavior normal.         Thought Content: Thought content normal.             Data:     Lab Results   Component Value Date/Time     10/25/2022 11:08 AM    K 4.4 10/25/2022 11:08 AM     10/25/2022 11:08 AM    CO2 24 10/25/2022 11:08 AM    BUN 11 11/07/2022 09:52 AM    CREATININE 1.04 11/07/2022 09:52 AM    GLUCOSE 159 10/25/2022 11:08 AM    GLUCOSE 111 10/14/2011 08:30 AM    PROT 7.0 10/25/2022 11:08 AM    LABALBU 4.6 10/25/2022 11:08 AM    BILITOT 0.5 10/25/2022 11:08 AM    ALKPHOS 45 10/25/2022 11:08 AM    AST 43 10/25/2022 11:08 AM    ALT 40 10/25/2022 11:08 AM     Lab Results   Component Value Date/Time    WBC 8.4 10/25/2022 11:08 AM    RBC 4.82 10/25/2022 11:08 AM    HGB 15.2 10/25/2022 11:08 AM    HCT 45.3 10/25/2022 11:08 AM    MCV 93.9 10/25/2022 11:08 AM    MCH 31.5 10/25/2022 11:08 AM    MCHC 33.5 10/25/2022 11:08 AM    RDW 13.7 10/25/2022 11:08 AM     10/25/2022 11:08 AM    MPV NOT REPORTED 11/05/2021 09:24 AM     Lab Results   Component Value Date/Time    TSH 2.42 08/02/2022 08:06 AM     Lab Results   Component Value Date/Time    CHOL 130 10/25/2022 11:08 AM    HDL 33 10/25/2022 11:08 AM    PSA 0.35 12/17/2018 10:11 AM    LABA1C 6.8 12/01/2022 09:53 AM          Assessment & Plan        Diagnosis Orders   1. COPD exacerbation (HCC)   Prescribed prednisone course, Levaquin, Mucinex, advised to use his nebulizers every 4 hours while awake. Also advised to go to the emergency room should his symptoms worsen. Will order chest x-ray to make sure patient does not have pneumonia. Follow-up in 1 week or sooner if needed levoFLOXacin (LEVAQUIN) 500 MG tablet    predniSONE (DELTASONE) 20 MG tablet    DE NEBULIZER ADMINISTRATION SET    guaiFENesin (MUCINEX) 600 MG extended release tablet      2.  Acute cough  XR CHEST (2 VW)                      Completed Refills   Requested Prescriptions     Signed Prescriptions Disp Refills    levoFLOXacin (LEVAQUIN) 500 MG tablet 7 tablet 0     Sig: Take 1 tablet by mouth daily for 7 days    predniSONE (DELTASONE) 20 MG tablet 10 tablet 0     Sig: Take 2 tablets po daily for 5 days then 1 tab po daily x 55 days    guaiFENesin (MUCINEX) 600 MG extended release tablet 30 tablet 0     Sig: Take 1 tablet by mouth 2 times daily for 15 days     Return in about 1 week (around 12/19/2022). Orders Placed This Encounter   Medications    levoFLOXacin (LEVAQUIN) 500 MG tablet     Sig: Take 1 tablet by mouth daily for 7 days     Dispense:  7 tablet     Refill:  0    predniSONE (DELTASONE) 20 MG tablet     Sig: Take 2 tablets po daily for 5 days then 1 tab po daily x 55 days     Dispense:  10 tablet     Refill:  0    guaiFENesin (MUCINEX) 600 MG extended release tablet     Sig: Take 1 tablet by mouth 2 times daily for 15 days     Dispense:  30 tablet     Refill:  0     Orders Placed This Encounter   Procedures    XR CHEST (2 VW)     Standing Status:   Future     Number of Occurrences:   1     Standing Expiration Date:   12/12/2023    CT NEBULIZER ADMINISTRATION SET         Patient Instructions     SURVEY:    You may be receiving a survey from [employer] regarding your visit today. Please complete the survey to enable us to provide the highest quality of care to you and your family. If you cannot score us a very good on any question, please call the office to discuss how we could have made your experience a very good one. Thank you.   MD Renato Pelletier MD Armida Romney, MD Leslye Pound, [facility], PM  ARRON Burciaga, [address] [facility] Noah Juarez, [address] [facility] [place]  Halina Barthel, [facility], [place]    Electronically signed by Rosalia Child MD on 12/12/2022 at 3:57 PM           Completed Refills      Requested Prescriptions     Signed Prescriptions Disp Refills    levoFLOXacin (LEVAQUIN) 500 MG tablet 7 tablet 0     Sig: Take 1 tablet by mouth daily for 7 days    predniSONE (DELTASONE) 20 MG tablet 10 tablet 0     Sig: Take 2 tablets po daily for 5 days then 1 tab po daily x 55 days    guaiFENesin (MUCINEX) 600 MG extended release tablet 30 tablet 0     Sig: Take 1 tablet by mouth 2 times daily for 15 days

## 2022-12-12 NOTE — PATIENT INSTRUCTIONS
SURVEY:    You may be receiving a survey from [employer] regarding your visit today. Please complete the survey to enable us to provide the highest quality of care to you and your family. If you cannot score us a very good on any question, please call the office to discuss how we could have made your experience a very good one. Thank you.   MD Irma Pelletier MD Claybon Neat, MD Loise Madeira, DO  Mady Sapp, ARRON Clement, [address]

## 2022-12-13 ENCOUNTER — TELEPHONE (OUTPATIENT)
Dept: FAMILY MEDICINE CLINIC | Age: 67
End: 2022-12-13

## 2022-12-13 NOTE — TELEPHONE ENCOUNTER
----- Message from Derrick Torrez MD sent at 12/12/2022  8:02 PM EST -----  Please let the patient know his CXR showed no pneumonia  Thanks    ----- Message -----  From: Nat Olmos Incoming Radiant Results From [employer]  Sent: 12/12/2022   5:06 PM EST  To: Derrick Torrez MD

## 2022-12-14 ENCOUNTER — HOSPITAL ENCOUNTER (OUTPATIENT)
Dept: CARDIAC REHAB | Age: 67
Setting detail: THERAPIES SERIES
End: 2022-12-14
Payer: MEDICARE

## 2022-12-14 RX ORDER — NEBULIZER ACCESSORIES
1 KIT MISCELLANEOUS EVERY 4 HOURS PRN
Qty: 1 KIT | Refills: 0 | Status: SHIPPED | OUTPATIENT
Start: 2022-12-14

## 2022-12-16 ENCOUNTER — HOSPITAL ENCOUNTER (OUTPATIENT)
Dept: CARDIAC REHAB | Age: 67
Setting detail: THERAPIES SERIES
Discharge: HOME | End: 2022-12-16
Payer: MEDICARE

## 2022-12-16 PROCEDURE — 93798 PHYS/QHP OP CAR RHAB W/ECG: CPT

## 2022-12-19 ENCOUNTER — HOSPITAL ENCOUNTER (OUTPATIENT)
Dept: CARDIAC REHAB | Age: 67
Setting detail: THERAPIES SERIES
Discharge: HOME | End: 2022-12-19
Payer: MEDICARE

## 2022-12-19 PROCEDURE — 93798 PHYS/QHP OP CAR RHAB W/ECG: CPT

## 2022-12-21 ENCOUNTER — HOSPITAL ENCOUNTER (OUTPATIENT)
Dept: CARDIAC REHAB | Age: 67
Setting detail: THERAPIES SERIES
Discharge: HOME | End: 2022-12-21
Payer: MEDICARE

## 2022-12-21 PROCEDURE — 93798 PHYS/QHP OP CAR RHAB W/ECG: CPT

## 2022-12-26 ENCOUNTER — HOSPITAL ENCOUNTER (OUTPATIENT)
Dept: CARDIAC REHAB | Age: 67
Setting detail: THERAPIES SERIES
End: 2022-12-26
Payer: MEDICARE

## 2022-12-28 ENCOUNTER — HOSPITAL ENCOUNTER (OUTPATIENT)
Dept: CARDIAC REHAB | Age: 67
Setting detail: THERAPIES SERIES
Discharge: HOME | End: 2022-12-28
Payer: MEDICARE

## 2022-12-28 PROCEDURE — 93798 PHYS/QHP OP CAR RHAB W/ECG: CPT

## 2022-12-30 ENCOUNTER — HOSPITAL ENCOUNTER (OUTPATIENT)
Dept: CARDIAC REHAB | Age: 67
Setting detail: THERAPIES SERIES
Discharge: HOME | End: 2022-12-30
Payer: MEDICARE

## 2022-12-30 PROCEDURE — 93798 PHYS/QHP OP CAR RHAB W/ECG: CPT

## 2023-01-02 ENCOUNTER — APPOINTMENT (OUTPATIENT)
Dept: CARDIAC REHAB | Age: 68
End: 2023-01-02
Payer: MEDICARE

## 2023-01-04 ENCOUNTER — HOSPITAL ENCOUNTER (OUTPATIENT)
Dept: CARDIAC REHAB | Age: 68
Setting detail: THERAPIES SERIES
Discharge: HOME | End: 2023-01-04
Payer: MEDICARE

## 2023-01-04 PROCEDURE — 93798 PHYS/QHP OP CAR RHAB W/ECG: CPT

## 2023-01-06 ENCOUNTER — HOSPITAL ENCOUNTER (OUTPATIENT)
Dept: CARDIAC REHAB | Age: 68
Setting detail: THERAPIES SERIES
Discharge: HOME | End: 2023-01-06
Payer: MEDICARE

## 2023-01-06 PROCEDURE — 93798 PHYS/QHP OP CAR RHAB W/ECG: CPT

## 2023-01-09 ENCOUNTER — HOSPITAL ENCOUNTER (OUTPATIENT)
Dept: CARDIAC REHAB | Age: 68
Setting detail: THERAPIES SERIES
Discharge: HOME | End: 2023-01-09
Payer: MEDICARE

## 2023-01-09 DIAGNOSIS — I10 ESSENTIAL HYPERTENSION: ICD-10-CM

## 2023-01-09 DIAGNOSIS — E78.2 MIXED HYPERLIPIDEMIA: ICD-10-CM

## 2023-01-09 PROCEDURE — 93798 PHYS/QHP OP CAR RHAB W/ECG: CPT

## 2023-01-09 RX ORDER — LOVASTATIN 40 MG/1
40 TABLET ORAL 2 TIMES DAILY
Qty: 180 TABLET | Refills: 3 | Status: SHIPPED | OUTPATIENT
Start: 2023-01-09 | End: 2023-04-09

## 2023-01-09 RX ORDER — LOSARTAN POTASSIUM 25 MG/1
TABLET ORAL
Qty: 90 TABLET | Refills: 3 | Status: SHIPPED | OUTPATIENT
Start: 2023-01-09

## 2023-01-09 NOTE — PROGRESS NOTES
Phase II Cardiac Rehab Individualized Treatment Plan-60 Day      Patient Name: Gricelda Joya  ACCOUNT #: [de-identified]   Date of Initial Assessment: 11/14/2022  Diagnosis: Stable Angina Pectoris   Onset Date: 11/4/22  Referring Physician: DR Kyler Perkins  Risk Stratification: MODERATE  Session Number: 12  EXERCISE    Stages of Change:   [] pre-contemplation  [x] Action   [] Contemplate   [] Maintainence   [] Prep   [] Relapse          Exercise Prescription:  Mode: [x] TM [x] UBE [x] STP [] EL [x] R  Frequency: 3 DAYS PER WEEK  Duration: 31-60 MINUTES  Intensity: 4.0 avg mets  Plan/Goal: Increase 1-2 levels/week or 1-2 min/week to achieve target HR and RPE   12-16. Progression: INCREASE DURATION PER ABOVE F.I.T.T. Rx  PARAMETERS ON AVG OF 5-10 MIN / 1-2 WEEKS FOR THE FIRST 4-6 WEEKS. AFTER 3-4 WEEKS ARE COMPLETED, CONTINUE TO GRADUALLY INCREASE F. I.T. PARAMETERS GRADUALLY UPWARD AT THE ESTABLISHED DURATION ON AVERAGE 0.5-1.0 METS PER 30 DAYS OVER THE COARSE OF REMAINING PROGRAM AS ESTABLISHED BY PT-CENTERED GOALS AND GUIDELINES. Target HR 90 - 102 bpm      [] Angina with Exertion    [x] Resistance Training  Introduce and progress 8-12 bilateral UE and LE progressive resistance exercises at 1-3 sets per lift, on 2-3 non-consecutive days using weights/ green  therabands AND WTS TO 8-24 # for 8-15 reps to progressive overload by increasing resistance once reps progressed to at least 15 reps on at least 2 occasions       Hypertension:  [x] Yes  [] No  Resting BP: 128/82  Peak Exercise BP: 144/74  [] Med change?     Intervention:  Home Exercise:  Type: Walking  Duration: 30-60 MINUTES  Frequency: 2-3 DAYS PER WEEK   [x] Resistance Training  introduce 8-12 bilateral UE and LE progressive resistance exercises at 1-3 sets per lift, on 2-3 non-consecutive days using weights/ green  therabands AND WTS TO 8-24 # for 8-15 reps to progressive overload by increasing resistance once reps progressed to at least 15 reps on at least 2 occasions     Education:   [x] Equipment [place]  [x] Self pulse   [x] Proper use weights/therabands   [x] S/S to report  [x] Low Na Diet    [x] Warm up/ Cool down  [x] BP Medication    [x] RPE Scale   [x] Understand BP   [x] Ex Safety   [x] Exercise specialist class-Home Exercise       Target Goal:   -Individual Exercise Plan  -Bp<130/80  -Aerobic active 30 + minutes 5-7 days per week    Nutrition    Stages of Change:   [] pre-contemplation  [x] Action   [] Contemplate   [] Maintainence   [] Prep   [] Relapse    Lipids:   Status: Final result    Visible to patient: Yes (not seen)    Next appt: 02/06/2023 at 10:00 AM in Cardiology Star Burton MD)    Dx: Mixed hyperlipidemia; Essential hyper. ..    0 Result Notes    1  Topic  Component Ref Range & Units 10/25/22 1108 8/2/22 0806 8/7/21 0742 8/6/20 0954 10/15/19 0904 10/22/18 0951 11/22/17 0956   Cholesterol <200 mg/dL 130  138 CM  122 CM  122 CM  153 CM  160 CM  174 CM    Comment:     Cholesterol Guidelines:       <200  Desirable    200-240  Borderline       >240  Undesirable        HDL >40 mg/dL 33 Low   30 Low  CM  30 Low  CM  30 Low  CM  28 Low  CM  35 Low  CM  37 Low  CM    Comment:     HDL Guidelines:     <40     Undesirable    40-59    Borderline     >59     Desirable        LDL Cholesterol 0 - 130 mg/dL 49  58 CM  54 CM  42 CM       CM  79 CM  102 CM    Comment:     LDL Guidelines:      <100    Desirable    100-129   Near to/above Desirable    130-159   Borderline       >159   Undesirable       Direct (measured) LDL and calculated LDL are not interchangeable tests.     Chol/HDL Ratio <5 3.9  4.6 CM  4.1 CM  4.1 CM  5.5 High  CM  4.6 CM  4.7 CM    Comment:        Triglycerides <150 mg/dL 242 High   251 High  CM  192 High  CM  252 High  CM  478 High  CM  230 High  CM  173 High  CM    Comment:     Triglyceride Guidelines:      <150   Desirable    150-199  Borderline    200-499  High      >499   Very high    Based on AHA Guidelines for fasting triglyceride, October 2012.        VLDL       NOT REPORTED High  R  NOT REPORTED High  R   R  NOT REPORTED R  NOT REPORTED R    Resulting Agency   [employer] [facility]                Specimen Collected: 10/25/22 11:08 EDT Last Resulted: 10/25/22 18:33 EDT           [] Med Change?     Diabetes / Prediabetes:  [] Yes  [x] No  Random Bs: 142  HbA1c: 6.2%  [] Med Change?    Weight Management:  Current Wt 314 lb  Wt Goal: 1-2 lbs/wk [x] Loss  [] Gain  [] Maintain Current Wt.     Intervention:   [] Dietitian Consult       [x] Nurse/Patient Discussion     [x] Diet Class           [] Referred to Diabetes Education     Education:  [] S&S hypo/hyperglycemia  [x] Low fat/low cholesterol diet  [x] Weight loss methods      [] Relate Diabetes/CAD     [x] Eating heart healthy handout    Target Goal:  -LDL-C<100 if triglycerides are > 200  -LDL-C < 70 for high risk patients  -HbA1c < 7%  -BMI < 25   Education    Stages of Change:    [] pre-contemplation  [x] Action   [] Contemplate   [] Maintainence   [] Prep   [] Relapse    Family support: [x] Yes  [] No    Tobacco use: [] Yes  [x] No    Intervention:  [] Referred to smoking cessation counselor     [] Individual education and counseling  [] Tobacco adjunct  [] Informed of education class schedule     Education:   [x] Risk Factors/Modifications  [x] Psychological aspects  [x] Angina         [x] Medications  [] CHF                                    [] Diabetes     [x] Cardiac A&P                                 [] Smoking Cessation  [x] Stress Reduction and Relaxation                     [] Weight Management    Target Goal:  -Complete cessation of tobacco use (if applicable)  -Continued risk factor modifications  -Recognizing signs/symptoms to report  -Proper use of meds    Psychosocial  Stages of Change:    []  pre-contemplation  [x] Action   [] Contemplate   [] Maintainence   [] Prep   [] Relapse      Intervention:   [] Psych Consult/  [x] Uses stress management skills    [] Physician Referral    [x] Stress management class   [] Med Change? Education:    [x] Coping Techniques   [x] Relaxation techniques   [x] S/S of Depression    Target Goal:  -Assess presence or absence of depression using a valid screening tool. -Maximize coping skills.  -Positive support system.     Preventative Medication:   [x] Aspirin       [x] Beta Blockade      [x] Statin or other lipid lowering agent     [] Clopidogrel   [x] ACE Inhibitor   [x] ACE Receptor Blocker   [] Antianginal    [] Calcium Channel Blocker   [] Other anticoagulation medications     Fall Risk assess: [x] Yes  [] No / REMAINS low RISK  Assistive Device:   [] Cane  [] Walker [] Wheel Chair  [] Gait belt    Patient/Program goal:   Overall Personal Program Goal:  \" IMPROVE MY BREATHING \"   [] Initial Goal   [x] Progressing to Goal  [] Not Meeting--Needs Reinforcement  [] Goal Met  [] Goal Not Met     To increase stamina, strength, and flexibility by exercising 31-50 total minutes by engaging in aerobic, resistance, and flexibility workout modalities with the goal of progressively achieving at least 0.5 to 1.0 metabolic equivalant improvement in the next 30 days as evidenced by daily session reports / [] Initial Goal     [x] Progressing to Goal  [] Not Meeting--Needs Reinforcement   [] Goal Met  [] Goal Not Met     To achieve and progress prescribed exercise frequency, intensity, time, and type in the next 30 days based upon initial evaluation and submaximal graded exercise results as evidenced by the attaining and maintaining the prescribed target heart rate range, a Michael rating of perceived exertion between 11 and 16, duration of >30 - 50 minutes using multiple exercise modes for at least 3 - 5 days per week to accumulate a minimum total of 2.5 hours per week of moderate aerobic intensity exercise, as tolerated, evidenced by daily session reports and home workout log /         [] Initial Goal   [x] Progressing to Goal  [] Not Meeting--Needs Reinforcement   [] Goal Met  [] Goal Not Met        To gradually and progressively lose 2 - 4 lb of body weight in the next 30 days through moderating nutrional intake and performing regular aerobic and strength training exercises as prescribed with improvement evidenced by daily session report comparison / [x] Initial Goal   [] Progressing to Goal  [x] Not Meeting--Needs Reinforcement  [] Goal Met  [] Goal Not Met     To decrease waist circumference by 5% by program completion if waist measurement is > or = 40 inches (male) / > or = 35 inches (female) as evidence by the [employer] /     [x] Initial Goal   [] Progressing to Goal  [] Not Meeting--Needs Reinforcement  [] Goal Met  [] Goal Not Met     To introduce and progress 8-10 different bilateral UE and LE progressive resistance exercises focused on major muscle groups using 1-3 sets each per lift, on 2-3 non-consecutive days implementing free and machine weights and Blue therabands, as appropriate, with a resistance of 40-60% 1-repetition maximum or 10 -15 repetitions to progressive overload and increasing resistance once repetitions have progressed to 15 reps and feel fairly light on 2 prior occasions in the next 30 days / [x] Initial Goal   [] Progressing to Goal  [] Not Meeting--Needs Reinforcement  [] Goal Met  [] Goal Not Met     To achieve and maintain an optimal average resting blood pressure of <130 / 80 mmHg, or as indicated by this patient's referring provider, in the next 30 days / [] Initial Goal   [x] Progressing to Goal  [] Not Meeting--Needs Reinforcement  [] Goal Met  [] Goal Not Met     To strive for blood lipid optimization with an LDL-C of <100 mg/dL or  LDL 70 mg/dL, an HDL-C of > or = 40 mg/dL for men and > or = 50 mg/dL for women, and a triglyceride level of <150 mg/dL via lifestyle education, behavioral modification, and medication compliance / [x] Initial Goal   [] Progressing to Goal  [] Not Meeting--Needs Reinforcement  [] Goal Met  [] Goal Not Met     -To develop regular home aerobic exercise program for 20 - 60 minutes at least 2 non-rehab days per week, excluding  5 - 10 minutes warm-up and cool-down periods, within the next 30 days, being tracked on home workout log / [x] Initial Goal   [] Progressing to Goal  [] Not Meeting--Needs Reinforcement  [] Goal Met  [] Goal Not Met     To strive for a lower daily fasting blood glucose measures to <131 and random after meal measures to <181, if diabetic, at home via lifestyle educaton, behavior modification, and medication compliance in the next 30 days as tracked per patient's self-report / [] Initial Goal   [] Progressing to Goal  [x] Not Meeting--Needs Reinforcement  [] Goal Met  [] Goal Not Met     To reduce self-reported psycho-social feelings of stress in the next 30 days as evidenced by pre- and post- surveys and by routine rounding with patient to ascertain subjective improvements / [x] Initial Goal   [] Progressing to Goal  [] Not Meeting--Needs Reinforcement  [] Goal Met  [] Goal Not Met     In the next 30-days, to eat on average at least 2 servings of fruit per day and 4-5 servings of vegetables per day as evidenced by pre- and post-program nutriton survey and routine rounding with patient to ascertain progression toward goal per patient's self report, food diary, and Rate-your-Plate screening survey / [x] Initial Goal   [] Progressing to Goal  [] Not Meeting--Needs Reinforcement  [] Goal Met  [] Goal Not Met     To strive to eat < or = 30% of daily caloric intake of total fat and <10% of daily caloric saturated fat tracked by patient's self-report, food diary, and Rate-your-Plate screening survey / [x] Initial Goal   [] Progressing to Goal  [] Not Meeting--Needs Reinforcement  [] Goal Met  [] Goal Not Met     To have patient demonstrate knowledge about risk factor reduction, lifestyle modification, and heart health strategies with > / = 80% accuracy via pre- and post-program Test your [address] screening tool / [x] Initial Goal   [] Progressing to Goal  [] Not Meeting--Needs Reinforcement  [] Goal Met  [] Goal Not Met    Physician Changes/Comments:    Electronically signed by Sirena Simeon RCP on 1/9/23 at 11:04 AM EST  Cardiac Rehab Staff

## 2023-01-09 NOTE — TELEPHONE ENCOUNTER
Health Maintenance   Topic Date Due    Diabetic Alb to Cr ratio (uACR) test  Never done    DTaP/Tdap/Td vaccine (1 - Tdap) Never done    Shingles vaccine (1 of 2) Never done    Diabetic retinal exam  10/29/2020    Low dose CT lung screening  03/03/2022    AAA screen  07/29/2022    Diabetic foot exam  03/01/2023    Depression Monitoring  06/02/2023    Annual Wellness Visit (AWV)  06/03/2023    Lipids  10/25/2023    GFR test (Diabetes, CKD 3-4, OR last GFR 15-59)  11/07/2023    A1C test (Diabetic or Prediabetic)  12/01/2023    Pneumococcal 65+ years Vaccine (3 - PPSV23 if available, else PCV20) 10/08/2024    Colorectal Cancer Screen  03/05/2029    Flu vaccine  Completed    COVID-19 Vaccine  Completed    Hepatitis C screen  Completed    HIV screen  Completed    Hepatitis A vaccine  Aged Out    Hib vaccine  Aged Out    Meningococcal (ACWY) vaccine  Aged Out             (applicable per patient's age: Cancer Screenings, Depression Screening, Fall Risk Screening, Immunizations)    Hemoglobin A1C (%)   Date Value   12/01/2022 6.8   03/01/2022 6.2   08/07/2021 7.1 (H)     Microalb/Crt.  Ratio (mcg/mg creat)   Date Value   08/18/2016 6     LDL Cholesterol (mg/dL)   Date Value   10/25/2022 49     AST (U/L)   Date Value   10/25/2022 43 (H)     ALT (U/L)   Date Value   10/25/2022 40     BUN (mg/dL)   Date Value   11/07/2022 11      (goal A1C is < 7)   (goal LDL is <100) need 30-50% reduction from baseline     BP Readings from Last 3 Encounters:   12/12/22 124/76   12/01/22 139/85   10/03/22 127/81    (goal /80)      All Future Testing planned in [facility]:  Lab Frequency Next Occurrence   Basic Metabolic Panel Once 73/16/5984   Hepatic Function Panel Once 08/22/2022   CBC with Auto Differential Once 08/22/2022   PSA Screening Once 08/22/2022   CBC with Auto Differential Once 08/04/2023   Comprehensive Metabolic Panel Once 39/25/2698   Lipid Panel Once 08/04/2023   TSH with Reflex Once 08/04/2023   Magnesium Once 08/04/2023 EKG 12 Lead Once 08/04/2023   XR CHEST (2 VW) Once 08/04/2023       Next Visit Date:  Future Appointments   Date Time Provider Debbie Izquierdo   1/9/2023 11:00 AM [address] THERAPIST 3 [facility] CARD Judeth De   1/11/2023 11:00 AM [facility] CARD REHAB THERAPIST 3 [facility] CARD Judeth De   1/12/2023 11:00 AM DO DANIEL Rios [facility]   1/13/2023 11:00 AM [facility] CARD REHAB THERAPIST 3 [facility] CARD Judeth De   1/16/2023 11:00 AM [facility] CARD REHAB THERAPIST 3 [facility] CARD Judeth De   1/18/2023 11:00 AM [facility] CARD REHAB THERAPIST 3 [facility] CARD Judeth De   1/20/2023 11:00 AM [facility] CARD REHAB THERAPIST 3 [facility] CARD Judeth De   1/23/2023 11:00 AM [facility] CARD REHAB THERAPIST 3 [facility] CARD Judeth De   1/25/2023 11:00 AM [facility] CARD REHAB THERAPIST 3 [facility] CARD Judeth De   1/27/2023 11:00 AM [facility] CARD REHAB THERAPIST 3 [facility] CARD Judeth De   1/30/2023 11:00 AM [facility] [place] [facility] CARD REHAB THERAPIST 3 [facility] CARD Judeth De   2/1/2023 11:00 AM [facility] CARD REHAB THERAPIST 3 [facility] CARD Judeth De   2/3/2023 11:00 AM [facility] CARD REHAB THERAPIST 3 [facility] CARD Judeth De   2/6/2023 10:00 AM MD Stewart Bueno [facility]   2/6/2023 11:00 AM [facility] [place] [facility] CARD REHAB THERAPIST 3 [facility] CARD Judeth De   2/8/2023 11:00 AM [facility] 3 [facility] CARD Judeth De   2/10/2023 11:00 AM [facility]AP[facility] 3 [facility] CARD Judeth De   2/13/2023 11:00 AM [facility]AP[facility] 3 [facility] CARD Judeth De   2/15/2023 11:00 AM [facility]AP[facility] 3 [facility] CARD Judeth De   2/17/2023 11:00 AM [facility] THERAP[facility] 3 [facility] CARD Judeth De   2/20/2023 11:00 AM [facility]ERAP[facility] 3 [facility] CARD Judeth De   2/22/2023 11:00 AM [facility] REHAB THERAPIST 3 [facility] CARD Judeth De   2/24/2023 11:00 AM [facility] CARD REHAB THERAPIST 3 [facility] CARD Judeth De   2/27/2023 11:00 AM [facility] 3 [facility] CARD Judeth De   3/1/2023 11:00 AM [facility] [place] [facility] CARD REHAB THERAPIST 3 [facility] CARD Judeth De   3/3/2023  9:00 AM [facility]   6/5/2023  9:00 AM MD Cadence Chávez TOP   8/3/2023 10:30 AM MD Stewart Bueno [facility] [[email or web address] Patient Active Problem List:     COPD (chronic obstructive pulmonary disease) ([facility])     GERD (gastroesophageal reflux disease)     S/P CABG x 3     CAD (coronary artery disease)     Essential hypertension     Mixed hyperlipidemia     Type 2 diabetes mellitus without complication, without long-term current use of insulin ([facility])     Dysthymia (or depressive neurosis)     Chronic gout involving toe     Acquired hypothyroidism     Vitamin D deficiency disease     Chronic low back pain with left-sided sciatica     Class 3 severe obesity due to excess calories without serious comorbidity with body mass index (BMI) of 40.0 to 44.9 in adult ([facility])     MINAL treated with BiPAP

## 2023-01-10 DIAGNOSIS — E11.9 TYPE 2 DIABETES MELLITUS WITHOUT COMPLICATION, WITHOUT LONG-TERM CURRENT USE OF INSULIN (HCC): ICD-10-CM

## 2023-01-10 DIAGNOSIS — E03.9 ACQUIRED HYPOTHYROIDISM: ICD-10-CM

## 2023-01-10 RX ORDER — LEVOTHYROXINE SODIUM 0.2 MG/1
TABLET ORAL
Qty: 90 TABLET | Refills: 0 | Status: SHIPPED | OUTPATIENT
Start: 2023-01-10

## 2023-01-10 NOTE — TELEPHONE ENCOUNTER
Health Maintenance   Topic Date Due    Diabetic Alb to Cr ratio (uACR) test  Never done    DTaP/Tdap/Td vaccine (1 - Tdap) Never done    Shingles vaccine (1 of 2) Never done    Diabetic retinal exam  10/29/2020    Low dose CT lung screening  03/03/2022    AAA screen  07/29/2022    Diabetic foot exam  03/01/2023    Depression Monitoring  06/02/2023    Annual Wellness Visit (AWV)  06/03/2023    Lipids  10/25/2023    GFR test (Diabetes, CKD 3-4, OR last GFR 15-59)  11/07/2023    A1C test (Diabetic or Prediabetic)  12/01/2023    Pneumococcal 65+ years Vaccine (3 - PPSV23 if available, else PCV20) 10/08/2024    Colorectal Cancer Screen  03/05/2029    Flu vaccine  Completed    COVID-19 Vaccine  Completed    Hepatitis C screen  Completed    HIV screen  Completed    Hepatitis A vaccine  Aged Out    Hib vaccine  Aged Out    Meningococcal (ACWY) vaccine  Aged Out             (applicable per patient's age: Cancer Screenings, Depression Screening, Fall Risk Screening, Immunizations)    Hemoglobin A1C (%)   Date Value   12/01/2022 6.8   03/01/2022 6.2   08/07/2021 7.1 (H)     Microalb/Crt.  Ratio (mcg/mg creat)   Date Value   08/18/2016 6     LDL Cholesterol (mg/dL)   Date Value   10/25/2022 49     AST (U/L)   Date Value   10/25/2022 43 (H)     ALT (U/L)   Date Value   10/25/2022 40     BUN (mg/dL)   Date Value   11/07/2022 11      (goal A1C is < 7)   (goal LDL is <100) need 30-50% reduction from baseline     BP Readings from Last 3 Encounters:   12/12/22 124/76   12/01/22 139/85   10/03/22 127/81    (goal /80)      All Future Testing planned in [facility]:  Lab Frequency Next Occurrence   Basic Metabolic Panel Once 69/54/2810   Hepatic Function Panel Once 08/22/2022   CBC with Auto Differential Once 08/22/2022   PSA Screening Once 08/22/2022   CBC with Auto Differential Once 08/04/2023   Comprehensive Metabolic Panel Once 08/40/1198   Lipid Panel Once 08/04/2023   TSH with Reflex Once 08/04/2023   Magnesium Once 08/04/2023 EKG 12 Lead Once 08/04/2023   XR CHEST (2 VW) Once 08/04/2023       Next Visit Date:  Future Appointments   Date Time Provider Debbie Felecia   1/11/2023 11:00 AM [address] THERAPIST 3 [facility] CARD Janeece Blizzard   1/12/2023 11:00 AM DO DANIEL Rodarte [facility]   1/13/2023 11:00 AM [place] [facility]ARD REHAB THERAPIST 3 [facility] CARD Janeece Blizzard   1/16/2023 11:00 AM [place] CARD REHAB THERAPIST 3 [facility] CARD Janeece Blizzard   1/18/2023 11:00 AM [place] CARD REHAB THERAPIST 3 [facility] CARD Janeece Blizzard   1/20/2023 11:00 AM [place] CARD REHAB THERAPIST 3 [facility] CARD Janeece Blizzard   1/23/2023 11:00 AM [place] CARD REHAB THERAPIST 3 [facility] CARD Janeece Blizzard   1/25/2023 11:00 AM [place] CARD REHAB THERAPIST 3 [facility] CARD Janeece Blizzard   1/27/2023 11:00 AM [place] CARD REHAB THERAPIST 3 [facility] CARD Janeece Blizzard   1/30/2023 11:00 AM [place] CARD REHAB THERAPIST 3 [facility] CARD Janeece Blizzard   2/1/2023 11:00 AM [place] CARD REHAB THERAPIST 3 [facility] CARD Janeece Blizzard   2/3/2023 11:00 AM [place] CARD REHAB THERAPIST 3 [facility] CARD Janeece Blizzard   2/6/2023 10:00 AM MD Ekaterina Jaeger W   2/6/2023 11:00 AM [place] CARD REHAB THERAPIST 3 [facility] CARD Janeece Blizzard   2/8/2023 11:00 AM [place] CARD REHAB THERAPIST 3 [facility] CARD Janeece Blizzard   2/10/2023 11:00 AM [place] CARD REHAB THERAPIST 3 [facility] CARD Janeece Blizzard   2/13/2023 11:00 AM [place] CARD REHAB THERAPIST 3 [facility] CARD Janeece Blizzard   2/15/2023 11:00 AM [place] CARD REHAB THERAPIST 3 [facility] CARD Janeece Blizzard   2/17/2023 11:00 AM [place] CARD REHAB THERAPIST 3 [facility] CARD Janeece Blizzard   2/20/2023 11:00 AM [place] CARD REHAB THERAPIST 3 [facility] CARD Janeece Blizzard   2/22/2023 11:00 AM [place] CARD REHAB THERAPIST 3 [facility] CARD Janeece Blizzard   2/24/2023 11:00 AM [place] CARD REHAB THERAPIST 3 [facility] CARD Janeece Blizzard   2/27/2023 11:00 AM [place] CARD REHAB THERAPIST 3 [facility] CARD Janeece Blizzard   3/1/2023 11:00 AM [place] CARD REHAB THERAPIST 3 [facility] CARD Janeece Blizzard   3/3/2023  9:00 AM MD Cadence August [facility]   6/5/2023  9:00 AM MD Cadence August [facility]   8/3/2023 10:30 AM MD Ekaterina Jaeger [facility]            Patient Active Problem List:     COPD (chronic obstructive pulmonary disease) (HCC)     GERD (gastroesophageal reflux disease)     S/P CABG x 3     CAD (coronary artery disease)     Essential hypertension     Mixed hyperlipidemia     Type 2 diabetes mellitus without complication, without long-term current use of insulin ([facility])     Dysthymia (or depressive neurosis)     Chronic gout involving toe     Acquired hypothyroidism     Vitamin D deficiency disease     Chronic low back pain with left-sided sciatica     Class 3 severe obesity due to excess calories without serious comorbidity with body mass index (BMI) of 40.0 to 44.9 in adult ([facility])     MINAL treated with BiPAP

## 2023-01-11 ENCOUNTER — HOSPITAL ENCOUNTER (OUTPATIENT)
Dept: CARDIAC REHAB | Age: 68
Setting detail: THERAPIES SERIES
Discharge: HOME | End: 2023-01-11
Payer: MEDICARE

## 2023-01-11 PROCEDURE — 93798 PHYS/QHP OP CAR RHAB W/ECG: CPT

## 2023-01-12 ENCOUNTER — OFFICE VISIT (OUTPATIENT)
Dept: PAIN MANAGEMENT | Age: 68
End: 2023-01-12
Payer: MEDICARE

## 2023-01-12 VITALS
HEART RATE: 72 BPM | WEIGHT: 312 LBS | BODY MASS INDEX: 41.35 KG/M2 | OXYGEN SATURATION: 97 % | HEIGHT: 73 IN | RESPIRATION RATE: 19 BRPM

## 2023-01-12 DIAGNOSIS — M54.6 CHRONIC BILATERAL THORACIC BACK PAIN: ICD-10-CM

## 2023-01-12 DIAGNOSIS — G89.29 CHRONIC BILATERAL THORACIC BACK PAIN: ICD-10-CM

## 2023-01-12 DIAGNOSIS — E66.01 OBESITY, CLASS III, BMI 40-49.9 (MORBID OBESITY) (HCC): ICD-10-CM

## 2023-01-12 DIAGNOSIS — M54.50 CHRONIC BILATERAL LOW BACK PAIN WITHOUT SCIATICA: ICD-10-CM

## 2023-01-12 DIAGNOSIS — G89.29 CHRONIC BILATERAL LOW BACK PAIN WITHOUT SCIATICA: ICD-10-CM

## 2023-01-12 DIAGNOSIS — M47.817 LUMBOSACRAL SPONDYLOSIS WITHOUT MYELOPATHY: Primary | ICD-10-CM

## 2023-01-12 DIAGNOSIS — M47.814 THORACIC SPONDYLOSIS: ICD-10-CM

## 2023-01-12 PROCEDURE — G8417 CALC BMI ABV UP PARAM F/U: HCPCS | Performed by: STUDENT IN AN ORGANIZED HEALTH CARE EDUCATION/TRAINING PROGRAM

## 2023-01-12 PROCEDURE — G8484 FLU IMMUNIZE NO ADMIN: HCPCS | Performed by: STUDENT IN AN ORGANIZED HEALTH CARE EDUCATION/TRAINING PROGRAM

## 2023-01-12 PROCEDURE — 1123F ACP DISCUSS/DSCN MKR DOCD: CPT | Performed by: STUDENT IN AN ORGANIZED HEALTH CARE EDUCATION/TRAINING PROGRAM

## 2023-01-12 PROCEDURE — 3017F COLORECTAL CA SCREEN DOC REV: CPT | Performed by: STUDENT IN AN ORGANIZED HEALTH CARE EDUCATION/TRAINING PROGRAM

## 2023-01-12 PROCEDURE — 99204 OFFICE O/P NEW MOD 45 MIN: CPT | Performed by: STUDENT IN AN ORGANIZED HEALTH CARE EDUCATION/TRAINING PROGRAM

## 2023-01-12 PROCEDURE — G8427 DOCREV CUR MEDS BY ELIG CLIN: HCPCS | Performed by: STUDENT IN AN ORGANIZED HEALTH CARE EDUCATION/TRAINING PROGRAM

## 2023-01-12 PROCEDURE — 1036F TOBACCO NON-USER: CPT | Performed by: STUDENT IN AN ORGANIZED HEALTH CARE EDUCATION/TRAINING PROGRAM

## 2023-01-12 NOTE — PATIENT INSTRUCTIONS
SURVEY:    You may be receiving a survey from [employer] regarding your visit today. Please complete the survey to enable us to provide the highest quality of care to you and your family. If you cannot score us a very good on any question, please call the office to discuss how we could have made your experience a very good one. Thank you.   MD Katlin Pelletier MD Hanley Clipper, MD Waldo Dear, DO Mady Joy, PM  ARRON Burciaga, [address] [place] [address]

## 2023-01-12 NOTE — PROGRESS NOTES
Chronic Pain Clinic Note     Encounter Date: 1/12/2023     SUBJECTIVE:  Chief Complaint   Patient presents with    Back Pain     History of Present Illness:   Shea Rick is a 74 y.o. male who presents with thoracic and left lumbar. Patient previously followed with [facility]em[facility] Escalante pain management     Medication Refill: N/A    Current Complaints of Pain:   Location: left lumbar, thoracic   Radiation: LLE  Severity: moderate  Pain Numerical Score -    Average: 6    Highest: 10  Lowest: 4  Character/Quality: Complains of pain that is aching  Timing: Constant  Associated symptoms: none  Numbness: no  Weakness: no  Exacerbating factors: laying on his back, deep breaths   Alleviating factors:   Length of time pain has been present: Started on chronic, years ago   Inciting event/injury: Fracture after a fall at home   Bowel/Bladder incontinence: no  Falls: none recent   Physical Therapy: yes    History of Interventions:   Surgery: No previous lumbar/cervical surgeries  Injections: ablations    Imaging:    Thoracic XR 12/2/2022    Impression       1. Moderately severe degenerative changes in the lower thoracic spine. 2. No fracture, lytic or blastic lesion. Past Medical History:   Diagnosis Date    Asthma     Bronchitis     CAD (coronary artery disease)     COPD (chronic obstructive pulmonary disease) ([facility] [address] 8/12/2011    Depression 8/12/2011    Diabetes mellitus ([facility] [address]     GERD (gastroesophageal reflux disease) 8/12/2011    Hyperlipidemia 8/12/2011    Hypertension     Hypothyroid 8/12/2011    Lower back pain     Nicotine addiction 8/12/2011    Sleep apnea        Past Surgical History:   Procedure Laterality Date    CARDIAC CATHETERIZATION Left 01/21/2015    Severe left main trunk disease extending into the ostium of the left anterior descending of 80% involving the ostium of the intermediate & atrioventricular branch of the circumflex.   60% disease in the atrioventricular branch of the circumflex in the midportion. 40% disease in the right coronary artery. Normal left ventricular function, ejection fraction of 60%    CARDIAC CATHETERIZATION Left 2017    Dr. Alvarado Mc @ [facility]--70% left main trunck & ostial LAD & high lateral circumflex disease. Patent LIMA to the LAD. Patent vein graft to the high lateral circumflex. 60% disease in the AV branch of the circumflex that terminates in a large posterolateral branch with an FFR of 1.0, indicating no significant stenosis. 40% disease in the right coronary artery. Ejection fraction 50-55%. CARDIAC CATHETERIZATION Left 2022    Dr. Alvarado Mc @ [facility]--Continue medical therapy    COLONOSCOPY N/A 2019    COLONOSCOPY POLYPECTOMY HOT BIOPSY performed by Luanne Stack MD at [address] [place] [address] GRAFT  2015    3 vessel CABG using left internal mammary artery to the left anterior descending coronary artery with a reverse saphenous vein aortocoronary sequential graft side to side to the ramus intermedius coronary artery & end to side to the first obtuse marginal coronary artery.     OTHER SURGICAL HISTORY  2016    I & D- cyst- posterior neck    SPINE SURGERY      THYROID LOBECTOMY      UPPER GASTROINTESTINAL ENDOSCOPY N/A 2019    EGD BIOPSY performed by Luanne Stack MD at [facility] OR       Family History   Problem Relation Age of Onset    Diabetes Mother     Heart Disease Mother     Diabetes Father     Heart Disease Father     Heart Disease Brother        Social History     Socioeconomic History    Marital status:      Spouse name: Not on file    Number of children: Not on file    Years of education: Not on file    Highest education level: Not on file   Occupational History    Not on file   Tobacco Use    Smoking status: Former     Packs/day: 1.00     Years: 45.00     Pack years: 45.00     Types: Cigarettes     Quit date: 2014     Years since quittin.1    Smokeless tobacco: Never   Vaping Use Vaping Use: Never used   Substance and Sexual Activity    Alcohol use: No    Drug use: No    Sexual activity: Not on file   Other Topics Concern    Not on file   Social History Narrative    Not on file     Social Determinants of Health     Financial Resource Strain: Low Risk     Difficulty of Paying Living Expenses: Not hard at all   Food Insecurity: No Food Insecurity    Worried About Running Out of Food in the Last Year: Never true    Ran Out of Food in the Last Year: Never true   Transportation Needs: Not on file   Physical Activity: Sufficiently Active    Days of Exercise per Week: 3 days    Minutes of Exercise per Session: 60 min   Stress: Not on file   Social Connections: Not on file   Intimate Partner Violence: Not on file   Housing Stability: Not on file       Medications & Allergies:   Current Outpatient Medications   Medication Instructions    albuterol (PROVENTIL) (2.5 MG/3ML) 0.083% nebulizer solution INHALE THE CONTENTS OF 1 VIAL VIA NEBULIZER EVERY 4 HOURS AS NEEDED FOR WHEEZING OR SHORTNESS OF BREATH    albuterol sulfate  (90 Base) MCG/ACT inhaler 2 puffs, Inhalation, EVERY 6 HOURS PRN    amLODIPine (NORVASC) 2.5 MG tablet TAKE 1 TABLET DAILY    aspirin 81 mg, Oral, DAILY    blood glucose monitor strips Test 2 times a day & as needed for symptoms of irregular blood glucose. clobetasol (TEMOVATE) 0.05 % ointment Topical, 2 TIMES DAILY, Apply topically 2 times daily.     CPAP Machine MISC Does not apply    fenofibrate (TRICOR) 145 MG tablet TAKE 1 TABLET DAILY    fluocinolone (DERMOTIC) 0.01 % OIL oil 4 drops, Otic, 2 TIMES DAILY    fluticasone (FLONASE) 50 MCG/ACT nasal spray 2 sprays, Nasal, DAILY    gabapentin (NEURONTIN) 300 mg, Oral, 2 TIMES DAILY, Intended supply: 30 days    ipratropium-albuterol (DUONEB) 0.5-2.5 (3) MG/3ML SOLN nebulizer solution 3 mLs, Inhalation, 4 TIMES DAILY    ketoconazole (NIZORAL) 2 % cream appy to affected area(s) on hand TWICE DAILY until clear, repeat AS NEEDED levothyroxine (SYNTHROID) 200 MCG tablet TAKE 1 TABLET DAILY    losartan (COZAAR) 25 MG tablet TAKE 1 TABLET EVERY DAY    lovastatin (MEVACOR) 40 mg, Oral, 2 TIMES DAILY    metFORMIN (GLUCOPHAGE) 1000 MG tablet TAKE 1 TABLET TWICE DAILY  WITH MEALS    metoprolol succinate (TOPROL XL) 50 MG extended release tablet TAKE 1 TABLET EVERY DAY    mometasone (ELOCON) 0.1 % cream Apply topically daily. naproxen (NAPROSYN) 500 mg, Oral, 2 TIMES DAILY    Nebulizer System All-In-One MISC Does not apply    ondansetron (ZOFRAN-ODT) 4 mg, Oral, 3 TIMES DAILY PRN    PARoxetine (PAXIL) 20 mg, Oral, EVERY MORNING    predniSONE (DELTASONE) 20 MG tablet Take 2 tablets po daily for 5 days then 1 tab po daily x 55 days    Respiratory Therapy Supplies (NEBULIZER/TUBING/MOUTHPIECE) KIT 1 kit, Does not apply, EVERY 4 HOURS PRN, Patient needs new nebulizer tubing.     Respiratory Therapy Supplies [facility] CPAP full face mask, hose, head gear and  filter    SYMBICORT 160-4.5 MCG/ACT AERO USE 2 INHALATIONS ORALLY   TWICE DAILY    terbinafine (LAMISIL) 250 MG tablet TAKE 1 TABLET BY MOUTH DAILY FOR 10 DAYS       No Known Allergies    Review of Systems:   Constitutional: negative for weight changes or fevers  Cardiovascular: negative for chest pain, palpitations, irregular heart beat  Respiratory: negative for dyspnea, cough, wheezing  Gastrointestinal: negative for constipation, diarrhea, nausea  Genitourinary: negative for bowel/bladder incontinence   Musculoskeletal: positive for low back pain  Neurological: negative for radicular leg pain, leg weakness or numbness/tingling  Behavioral/Psych: negative for anxiety/depression   Hematological: negative for abnormal bleeding, anticoagulation use or antiplatelet use  All other systems reviewed and are negative    OBJECTIVE:    Vitals:    01/12/23 1113   Pulse: 72   Resp: 19   SpO2: 97%       PHYSICAL EXAM    GENERAL: No acute distress, pleasant, well-appearing  HEENT: Normocephalic, atraumatic, Pupils equal and round  CARDIOVASCULAR: Well perfused, No peripheral cyanosis  PULMONARY: Good chest wall excursion, breathing unlabored  PSYCH: Appropriate affect and insight, non-pressured speech  SKIN: No rashes or lesions  MUSCULOSKELETAL:  Inspection: The back and extremities are symmetric and aligned. Muscle bulk is normal in appearance. Palpation: There is tenderness to palpation along the thoracic and lumbar paraspinal musculature bilaterally  Lumbar range of motion is full  NEUROMUSCULAR:  Patient ambulates unassisted  Gait is nonantalgic  Sensation to light touch is intact in lower extremities  Strength is full in lower extremities  No ankle clonus    Special Tests:  Lumbar facet loading is positive bilaterally  Seated straight leg raise is negative bilaterally    DIAGNOSIS:    ICD-10-CM    1. Lumbosacral spondylosis without myelopathy  M47.817 XR LUMBAR SPINE (2-3 VIEWS)     MRI LUMBAR SPINE WO CONTRAST      2. Obesity, Class III, BMI 40-49.9 (morbid obesity) ([facility])  E66.01       3. Thoracic spondylosis  M47.814 MRI THORACIC SPINE WO CONTRAST      4. Chronic bilateral low back pain without sciatica  M54.50     G89.29       5. Chronic bilateral thoracic back pain  M54.6     G89.29            ASSESSMENT:    Dena Mcdonald is a 74 y. o.male who presents with chronic thoracic and low back pain. To review, patient has had symptoms for over 5 years. He denies any thoracic or low back surgeries. He was previously receiving lumbar medial branch radiofrequency ablation procedures for his low back pain which was very helpful. The patient's history and physical examination are consistent with thoracic and lumbar spondylosis. His thoracic x-ray reveals multilevel degenerative changes. His low back pain is consistent with lumbosacral spondylosis as the patient has axial low back pain that is mechanical in nature.  There is tenderness to palpation along the lumbar paraspinal musculature with positive lumbar facet loading bilaterally. I will order a thoracic and lumbar MRI for further evaluation and treatment as well as consideration for interventional spine injection. Neurologically, it appears the patient has full strength and normal sensation. There is no evidence of radiculopathy or myelopathy on examination. There are no red flags in the patient's history. The patient has failed conservative measures including outpatient physical therapy, greater than 3 medications for pain relief, a self-directed therapy program, as well as activity modification all within the last 6 weeks over 3 months. The patient's pain has been causing worsening quality of life and function. PLAN:  Medications: For nonopioid therapy, the following medications were prescribed:    -Continue medication prescribed by primary care physician    Opioid therapy:  -Not indicated    Interventions:  -Consider lumbar medial branch blocks after imaging    Imaging:  -Ordered thoracic MRI  -Ordered lumbar x-ray and MRI  -Reviewed thoracic x-ray with patient in room    Behavioral Therapies:  -Continue daily stretching and home exercise program    Referrals:  -None    Follow-up Plan:  -After imaging    Patient was offered intervention where appropriate. Multi-modal Pain Therapy: The patient was explicitly considered for multimodal and interdisciplinary therapy. Non-opioid and non-pharmacological opportunities to enhance analgesia and quality of life have been and will continue to be pursued.     Magan Devlin, DO  Interventional Pain Management/PM&R   [facility]    Orders Placed This Encounter    XR LUMBAR SPINE (2-3 VIEWS)     Standing Status:   Future     Standing Expiration Date:   1/12/2024    MRI LUMBAR SPINE WO CONTRAST     Standing Status:   Future     Standing Expiration Date:   1/12/2024    MRI THORACIC SPINE WO CONTRAST     Standing Status:   Future     Standing Expiration Date:   1/12/2024

## 2023-01-13 ENCOUNTER — HOSPITAL ENCOUNTER (OUTPATIENT)
Dept: CARDIAC REHAB | Age: 68
Setting detail: THERAPIES SERIES
Discharge: HOME | End: 2023-01-13
Payer: MEDICARE

## 2023-01-13 PROCEDURE — 93798 PHYS/QHP OP CAR RHAB W/ECG: CPT

## 2023-01-16 ENCOUNTER — HOSPITAL ENCOUNTER (OUTPATIENT)
Dept: CARDIAC REHAB | Age: 68
Setting detail: THERAPIES SERIES
Discharge: HOME | End: 2023-01-16
Payer: MEDICARE

## 2023-01-16 PROCEDURE — 93798 PHYS/QHP OP CAR RHAB W/ECG: CPT

## 2023-01-18 ENCOUNTER — HOSPITAL ENCOUNTER (OUTPATIENT)
Dept: CARDIAC REHAB | Age: 68
Setting detail: THERAPIES SERIES
Discharge: HOME | End: 2023-01-18
Payer: MEDICARE

## 2023-01-18 PROCEDURE — 93798 PHYS/QHP OP CAR RHAB W/ECG: CPT

## 2023-01-19 ENCOUNTER — HOSPITAL ENCOUNTER (OUTPATIENT)
Age: 68
Discharge: HOME | End: 2023-01-19
Payer: MEDICARE

## 2023-01-19 ENCOUNTER — TELEPHONE (OUTPATIENT)
Dept: FAMILY MEDICINE CLINIC | Age: 68
End: 2023-01-19

## 2023-01-19 ENCOUNTER — OFFICE VISIT (OUTPATIENT)
Dept: FAMILY MEDICINE CLINIC | Age: 68
End: 2023-01-19
Payer: MEDICARE

## 2023-01-19 VITALS
HEART RATE: 87 BPM | DIASTOLIC BLOOD PRESSURE: 72 MMHG | HEIGHT: 73 IN | BODY MASS INDEX: 41.48 KG/M2 | RESPIRATION RATE: 18 BRPM | WEIGHT: 313 LBS | OXYGEN SATURATION: 96 % | SYSTOLIC BLOOD PRESSURE: 120 MMHG

## 2023-01-19 DIAGNOSIS — R19.7 DIARRHEA, UNSPECIFIED TYPE: Primary | ICD-10-CM

## 2023-01-19 DIAGNOSIS — Z86.010 HISTORY OF COLON POLYPS: ICD-10-CM

## 2023-01-19 DIAGNOSIS — R19.7 DIARRHEA, UNSPECIFIED TYPE: ICD-10-CM

## 2023-01-19 LAB
ABSOLUTE EOS #: 0.2 K/UL (ref 0–0.4)
ABSOLUTE LYMPH #: 1.2 K/UL (ref 1–4.8)
ABSOLUTE MONO #: 0.7 K/UL (ref 0–1)
ANION GAP SERPL CALCULATED.3IONS-SCNC: 13 MMOL/L (ref 9–17)
BASOPHILS # BLD: 0 % (ref 0–2)
BASOPHILS ABSOLUTE: 0 K/UL (ref 0–0.2)
BUN BLDV-MCNC: 10 MG/DL (ref 8–23)
BUN/CREAT BLD: 12 (ref 9–20)
C DIFF AG + TOXIN: NEGATIVE
CALCIUM SERPL-MCNC: 9.2 MG/DL (ref 8.6–10.4)
CHLORIDE BLD-SCNC: 96 MMOL/L (ref 98–107)
CO2: 24 MMOL/L (ref 20–31)
CREAT SERPL-MCNC: 0.84 MG/DL (ref 0.7–1.2)
DIFFERENTIAL TYPE: YES
EOSINOPHILS RELATIVE PERCENT: 2 % (ref 0–5)
GFR SERPL CREATININE-BSD FRML MDRD: >60 ML/MIN/1.73M2
GLUCOSE BLD-MCNC: 201 MG/DL (ref 70–99)
HCT VFR BLD CALC: 45 % (ref 41–53)
HEMOGLOBIN: 15.1 G/DL (ref 13.5–17.5)
LYMPHOCYTES # BLD: 13 % (ref 13–44)
MAGNESIUM: 1.3 MG/DL (ref 1.6–2.6)
MCH RBC QN AUTO: 31.2 PG (ref 26–34)
MCHC RBC AUTO-ENTMCNC: 33.5 G/DL (ref 31–37)
MCV RBC AUTO: 93.1 FL (ref 80–100)
MONOCYTES # BLD: 8 % (ref 5–9)
PDW BLD-RTO: 13.3 % (ref 12.1–15.2)
PLATELET # BLD: 221 K/UL (ref 140–450)
POTASSIUM SERPL-SCNC: 4.2 MMOL/L (ref 3.7–5.3)
RBC # BLD: 4.83 M/UL (ref 4.5–5.9)
SEG NEUTROPHILS: 77 % (ref 39–75)
SEGMENTED NEUTROPHILS ABSOLUTE COUNT: 7.2 K/UL (ref 2.1–6.5)
SODIUM BLD-SCNC: 133 MMOL/L (ref 135–144)
SPECIMEN DESCRIPTION: NORMAL
WBC # BLD: 9.3 K/UL (ref 3.5–11)

## 2023-01-19 PROCEDURE — 87324 CLOSTRIDIUM AG IA: CPT

## 2023-01-19 PROCEDURE — 85025 COMPLETE CBC W/AUTO DIFF WBC: CPT

## 2023-01-19 PROCEDURE — 36415 COLL VENOUS BLD VENIPUNCTURE: CPT

## 2023-01-19 PROCEDURE — 80048 BASIC METABOLIC PNL TOTAL CA: CPT

## 2023-01-19 PROCEDURE — G8417 CALC BMI ABV UP PARAM F/U: HCPCS | Performed by: INTERNAL MEDICINE

## 2023-01-19 PROCEDURE — 3074F SYST BP LT 130 MM HG: CPT | Performed by: INTERNAL MEDICINE

## 2023-01-19 PROCEDURE — G8484 FLU IMMUNIZE NO ADMIN: HCPCS | Performed by: INTERNAL MEDICINE

## 2023-01-19 PROCEDURE — 3078F DIAST BP <80 MM HG: CPT | Performed by: INTERNAL MEDICINE

## 2023-01-19 PROCEDURE — 1036F TOBACCO NON-USER: CPT | Performed by: INTERNAL MEDICINE

## 2023-01-19 PROCEDURE — 1123F ACP DISCUSS/DSCN MKR DOCD: CPT | Performed by: INTERNAL MEDICINE

## 2023-01-19 PROCEDURE — 83735 ASSAY OF MAGNESIUM: CPT

## 2023-01-19 PROCEDURE — G8427 DOCREV CUR MEDS BY ELIG CLIN: HCPCS | Performed by: INTERNAL MEDICINE

## 2023-01-19 PROCEDURE — 3017F COLORECTAL CA SCREEN DOC REV: CPT | Performed by: INTERNAL MEDICINE

## 2023-01-19 PROCEDURE — 99214 OFFICE O/P EST MOD 30 MIN: CPT | Performed by: INTERNAL MEDICINE

## 2023-01-19 PROCEDURE — 87449 NOS EACH ORGANISM AG IA: CPT

## 2023-01-19 RX ORDER — GREEN TEA/HOODIA GORDONII 315-12.5MG
1 CAPSULE ORAL 2 TIMES DAILY
Qty: 60 TABLET | Refills: 0 | Status: SHIPPED | OUTPATIENT
Start: 2023-01-19 | End: 2023-02-18

## 2023-01-19 RX ORDER — GLIMEPIRIDE 2 MG/1
2 TABLET ORAL
Qty: 30 TABLET | Refills: 3 | Status: SHIPPED | OUTPATIENT
Start: 2023-01-19

## 2023-01-19 RX ORDER — LOPERAMIDE HYDROCHLORIDE 2 MG/1
2 CAPSULE ORAL 4 TIMES DAILY PRN
Qty: 20 CAPSULE | Refills: 2 | Status: SHIPPED | OUTPATIENT
Start: 2023-01-19 | End: 2023-01-29

## 2023-01-19 ASSESSMENT — ENCOUNTER SYMPTOMS
VOMITING: 0
FLATUS: 0
ABDOMINAL PAIN: 0
BLOATING: 1
DIARRHEA: 1

## 2023-01-19 ASSESSMENT — PATIENT HEALTH QUESTIONNAIRE - PHQ9
9. THOUGHTS THAT YOU WOULD BE BETTER OFF DEAD, OR OF HURTING YOURSELF: 0
2. FEELING DOWN, DEPRESSED OR HOPELESS: 0
5. POOR APPETITE OR OVEREATING: 0
3. TROUBLE FALLING OR STAYING ASLEEP: 0
SUM OF ALL RESPONSES TO PHQ9 QUESTIONS 1 & 2: 0
6. FEELING BAD ABOUT YOURSELF - OR THAT YOU ARE A FAILURE OR HAVE LET YOURSELF OR YOUR FAMILY DOWN: 0
8. MOVING OR SPEAKING SO SLOWLY THAT OTHER PEOPLE COULD HAVE NOTICED. OR THE OPPOSITE, BEING SO FIGETY OR RESTLESS THAT YOU HAVE BEEN MOVING AROUND A LOT MORE THAN USUAL: 0
7. TROUBLE CONCENTRATING ON THINGS, SUCH AS READING THE NEWSPAPER OR WATCHING TELEVISION: 0
1. LITTLE INTEREST OR PLEASURE IN DOING THINGS: 0
SUM OF ALL RESPONSES TO PHQ QUESTIONS 1-9: 0
4. FEELING TIRED OR HAVING LITTLE ENERGY: 0
SUM OF ALL RESPONSES TO PHQ QUESTIONS 1-9: 0
10. IF YOU CHECKED OFF ANY PROBLEMS, HOW DIFFICULT HAVE THESE PROBLEMS MADE IT FOR YOU TO DO YOUR WORK, TAKE CARE OF THINGS AT HOME, OR GET ALONG WITH OTHER PEOPLE: 0

## 2023-01-19 NOTE — TELEPHONE ENCOUNTER
----- Message from Polo Boeck, MD sent at 1/19/2023 12:34 PM EST -----    Called and left a very detailed message to patient's phone regarding low magnesium and also new prescription for magnesium pills that he needs to take once a day. I asked the patient to call us back to discuss his lab results on the phone.   Please get hold of the patient to make sure that he understood the instructions regarding medication  Thank you    ----- Message -----  From: Nat Olmos Incoming Lab Results From [facility]  Sent: 1/19/2023  11:44 AM EST  To: Polo Boeck, MD

## 2023-01-19 NOTE — PROGRESS NOTES
HPI Notes    Name: Hayley Knapp  : 1955         Chief Complaint:     Chief Complaint   Patient presents with    Diarrhea     Patient states everything he eats makes him has loose stools, prior he thought it was RX (metformin) but it cleared up and this time its wont stop for him. Patient states this has been ongoing for 3 plus weeks. History of Present Illness:        Ramsey Rivera presents to office for evaluation of Diarrhea    Symptoms started about one month ago but he had similar problem in the past.   He thinks it started with Metformin but in the past it resolved on its own  Was on Antibiotics about one month ago. Has no history of GI problems. He had a Colonoscopy on 3/5/19:  POSTOPERATIVE DIAGNOSES:  1. Diffuse gastritis with duodenitis and superficial ulcerations. 2.  Multiple small sessile polyps x6 (proximal transverse colon x1,  sigmoid x5). 3.  Ascending colon lipoma. OPERATION:  1. Esophagogastroduodenoscopy. 2.  Prepyloric antral biopsies. 3.  Colonoscopy, anus to cecum. 4.  Biopsy ascending colon lipoma. 5.  Sessile polypectomies x6 (proximal transverse x1, sigmoid x5). Follow-up colonoscopy was recommended in 4 to 5 years. Diarrhea   This is a recurrent problem. The current episode started 1 to 4 weeks ago. The problem occurs 5 to 10 times per day. The problem has been unchanged. The stool consistency is described as Mucous and watery. The patient states that diarrhea awakens him from sleep. Associated symptoms include bloating. Pertinent negatives include no abdominal pain, fever, increased  flatus, vomiting or weight loss. Exacerbated by: eating. Risk factors include recent antibiotic use. He has tried increased fluids and anti-motility drug for the symptoms. The treatment provided no relief. There is no history of bowel resection.          Past Medical History:     Past Medical History:   Diagnosis Date    Asthma     Bronchitis     CAD (coronary artery disease) COPD (chronic obstructive pulmonary disease) ([facility] 8/12/2011    Depression 8/12/2011    Diabetes mellitus ([facility]     GERD (gastroesophageal reflux disease) 8/12/2011    Hyperlipidemia 8/12/2011    Hypertension     Hypothyroid 8/12/2011    Lower back pain     Nicotine addiction 8/12/2011    Sleep apnea       Reviewed all health maintenance requirements and orderedappropriate tests  Health Maintenance Due   Topic Date Due    DTaP/Tdap/Td vaccine (1 - Tdap) Never done    Shingles vaccine (1 of 2) Never done    Diabetic Alb to Cr ratio (uACR) test  08/18/2017    Diabetic retinal exam  10/29/2020    Low dose CT lung screening  03/03/2022    AAA screen  07/29/2022       Past Surgical History:     Past Surgical History:   Procedure Laterality Date    CARDIAC CATHETERIZATION Left 01/21/2015    Severe left main trunk disease extending into the ostium of the left anterior descending of 80% involving the ostium of the intermediate & atrioventricular branch of the circumflex. 60% disease in the atrioventricular branch of the circumflex in the midportion. 40% disease in the right coronary artery. Normal left ventricular function, ejection fraction of 60%    CARDIAC CATHETERIZATION Left 12/20/2017    Dr. Elissa Santiago @ [facility]--70% left main trunck & ostial LAD & high lateral circumflex disease. Patent LIMA to the LAD. Patent vein graft to the high lateral circumflex. 60% disease in the AV branch of the circumflex that terminates in a large posterolateral branch with an FFR of 1.0, indicating no significant stenosis. 40% disease in the right coronary artery. Ejection fraction 50-55%.     CARDIAC CATHETERIZATION Left 11/04/2022    Dr. Elissa Santiago @ [facility]--Continue medical therapy    COLONOSCOPY N/A 03/05/2019    COLONOSCOPY POLYPECTOMY HOT BIOPSY performed by Yahaira Payne MD at [address] [place] [address] GRAFT  02/04/2015    3 vessel CABG using left internal mammary artery to the left anterior descending coronary artery with a reverse saphenous vein aortocoronary sequential graft side to side to the ramus intermedius coronary artery & end to side to the first obtuse marginal coronary artery. OTHER SURGICAL HISTORY  03/21/2016    I & D- cyst- posterior neck    SPINE SURGERY      THYROID LOBECTOMY      UPPER GASTROINTESTINAL ENDOSCOPY N/A 03/05/2019    EGD BIOPSY performed by Hodan Hernandez MD at [facility] OR        Medications:       Prior to Admission medications    Medication Sig Start Date End Date Taking? Authorizing Provider   glimepiride (AMARYL) 2 MG tablet Take 1 tablet by mouth every morning (before breakfast) 1/19/23  Yes Louis Colin MD   Probiotic Acidophilus [facility]) TABS Take 1 tablet by mouth 2 times daily 1/19/23 2/18/23 Yes Loius Colin MD   loperamide (RA ANTI-DIARRHEAL) 2 MG capsule Take 1 capsule by mouth 4 times daily as needed for Diarrhea 1/19/23 1/29/23 Yes Louis Colin MD   metFORMIN (GLUCOPHAGE) 1000 MG tablet TAKE 1 TABLET TWICE DAILY  WITH MEALS 1/10/23  Yes Louis Colin MD   levothyroxine (SYNTHROID) 200 MCG tablet TAKE 1 TABLET DAILY 1/10/23  Yes Louis Colin MD   lovastatin (MEVACOR) 40 MG tablet Take 1 tablet by mouth 2 times daily 1/9/23 4/9/23 Yes Louis Colin MD   losartan (COZAAR) 25 MG tablet TAKE 1 TABLET EVERY DAY 1/9/23  Yes Louis Colin MD   Respiratory Therapy Supplies (NEBULIZER/TUBING/MOUTHPIECE) KIT 1 kit by Does not apply route every 4 hours as needed (SOB, wheezing) Patient needs new nebulizer tubing.  12/14/22  Yes Louis Colin MD   terbinafine (LAMISIL) 250 MG tablet TAKE 1 TABLET BY MOUTH DAILY FOR 10 DAYS 12/9/22  Yes Historical Provider, MD   ketoconazole (NIZORAL) 2 % cream appy to affected area(s) on hand TWICE DAILY until clear, repeat AS NEEDED 11/8/22  Yes Historical Provider, MD   amLODIPine (NORVASC) 2.5 MG tablet TAKE 1 TABLET DAILY 8/17/22  Yes Louis Colin MD   SYMBICORT 160-4.5 MCG/ACT AERO USE 2 INHALATIONS ORALLY TWICE DAILY 8/17/22  Yes Uche Hawkins MD   clobetasol (TEMOVATE) 0.05 % ointment Apply topically 2 times daily Apply topically 2 times daily. 8/16/22  Yes Uche Hawkins MD   fenofibrate (TRICOR) 145 MG tablet TAKE 1 TABLET DAILY 7/26/22  Yes Uche Hawkins MD   metoprolol succinate (TOPROL XL) 50 MG extended release tablet TAKE 1 TABLET EVERY DAY 3/1/22  Yes Uche Hawkins MD   PARoxetine (PAXIL) 20 MG tablet Take 1 tablet by mouth every morning 3/1/22  Yes Uche Hawkins MD   albuterol (PROVENTIL) (2.5 MG/3ML) 0.083% nebulizer solution INHALE THE CONTENTS OF 1 VIAL VIA NEBULIZER EVERY 4 HOURS AS NEEDED FOR WHEEZING OR SHORTNESS OF BREATH 1/11/22  Yes Uche Hawkins MD   naproxen (NAPROSYN) 500 MG tablet Take 1 tablet by mouth 2 times daily 12/9/21  Yes Jaime Evans MD   ipratropium-albuterol (DUONEB) 0.5-2.5 (3) MG/3ML SOLN nebulizer solution Inhale 3 mLs into the lungs 4 times daily 4/30/21  Yes Uche Hawkins MD   CPAP Machine MISC by Does not apply route 4/19/21  Yes Uche Hawkins MD   fluticasone (FLONASE) 50 MCG/ACT nasal spray 2 sprays by Nasal route daily 2/19/21  Yes Uche Hawkins MD   albuterol sulfate  (90 Base) MCG/ACT inhaler Inhale 2 puffs into the lungs every 6 hours as needed for Wheezing 2/19/21  Yes Uche Hawkins MD   Nebulizer System All-In-One MISC by Does not apply route   Yes Historical Provider, MD   ondansetron (ZOFRAN-ODT) 4 MG disintegrating tablet Take 1 tablet by mouth 3 times daily as needed for Nausea or Vomiting 11/17/20  Yes Uche Hawkins MD   Respiratory Therapy Supplies MISC CPAP full face mask, hose, head gear and  filter 2/7/20  Yes Uche Hawkins MD   blood glucose monitor strips Test 2 times a day & as needed for symptoms of irregular blood glucose. 11/11/19  Yes Uche Hawkins MD   mometasone (ELOCON) 0.1 % cream Apply topically daily. 2/20/17  Yes Colin aClloway MD   aspirin 81 MG tablet Take 81 mg by mouth daily.   Yes Historical  Provider, MD   fluocinolone (DERMOTIC) 0.01 % OIL oil Place 4 drops in ear(s) 2 times daily 8/1/22 8/31/22  Alexandria Murrell MD   gabapentin (NEURONTIN) 300 MG capsule Take 1 capsule by mouth in the morning and 1 capsule before bedtime. Do all this for 30 days. Intended supply: 30 days. 7/22/22 10/3/22  Harriett Napoles MD        Allergies:       Patient has no known allergies. Social History:     Tobacco: reports that he quit smoking about 8 years ago. His smoking use included cigarettes. He has a 45.00 pack-year smoking history. He has never used smokeless tobacco.  Alcohol:      reports no history of alcohol use. Drug Use:  reports no history of drug use. Family History:     Family History   Problem Relation Age of Onset    Diabetes Mother     Heart Disease Mother     Diabetes Father     Heart Disease Father     Heart Disease Brother        Review of Systems:         Review of Systems   Constitutional:  Negative for fever and weight loss. Gastrointestinal:  Positive for bloating and diarrhea. Negative for abdominal pain, flatus and vomiting. Physical Exam:     Vitals:  /72 (Site: Right Upper Arm, Position: Sitting, Cuff Size: Medium Adult)   Pulse 87   Resp 18   Ht 6' 1\" (1.854 m)   Wt (!) 313 lb (142 kg)   SpO2 96%   BMI 41.30 kg/m²       Physical Exam  Vitals reviewed. Constitutional:       General: He is not in acute distress. Appearance: He is well-developed. He is obese. He is not ill-appearing. HENT:      Head: Normocephalic and atraumatic. Right Ear: External ear normal.      Left Ear: External ear normal.      Mouth/Throat:      Mouth: Mucous membranes are moist.      Pharynx: Oropharynx is clear. Eyes:      General:         Right eye: No discharge. Left eye: No discharge. Neck:      Thyroid: No thyromegaly. Cardiovascular:      Rate and Rhythm: Normal rate and regular rhythm. Heart sounds: Normal heart sounds. No murmur heard.   Pulmonary: Effort: Pulmonary effort is normal.      Breath sounds: Normal breath sounds. No wheezing or rales. Abdominal:      General: Bowel sounds are normal. There is no distension. Palpations: Abdomen is soft. There is no mass. Tenderness: There is no abdominal tenderness. There is no right CVA tenderness, left CVA tenderness or rebound. Musculoskeletal:         General: Normal range of motion. Right lower leg: No edema. Left lower leg: No edema. Lymphadenopathy:      Cervical: No cervical adenopathy. Skin:     General: Skin is warm and dry. Coloration: Skin is not pale. Findings: No rash. Neurological:      General: No focal deficit present. Mental Status: He is alert and oriented to person, place, and time. Mental status is at baseline.    Psychiatric:         Mood and Affect: Mood normal.         Behavior: Behavior normal.             Data:     Lab Results   Component Value Date/Time     10/25/2022 11:08 AM    K 4.4 10/25/2022 11:08 AM     10/25/2022 11:08 AM    CO2 24 10/25/2022 11:08 AM    BUN 11 11/07/2022 09:52 AM    CREATININE 1.04 11/07/2022 09:52 AM    GLUCOSE 159 10/25/2022 11:08 AM    GLUCOSE 111 10/14/2011 08:30 AM    PROT 7.0 10/25/2022 11:08 AM    LABALBU 4.6 10/25/2022 11:08 AM    BILITOT 0.5 10/25/2022 11:08 AM    ALKPHOS 45 10/25/2022 11:08 AM    AST 43 10/25/2022 11:08 AM    ALT 40 10/25/2022 11:08 AM     Lab Results   Component Value Date/Time    WBC 8.4 10/25/2022 11:08 AM    RBC 4.82 10/25/2022 11:08 AM    HGB 15.2 10/25/2022 11:08 AM    HCT 45.3 10/25/2022 11:08 AM    MCV 93.9 10/25/2022 11:08 AM    MCH 31.5 10/25/2022 11:08 AM    MCHC 33.5 10/25/2022 11:08 AM    RDW 13.7 10/25/2022 11:08 AM     10/25/2022 11:08 AM    MPV NOT REPORTED 11/05/2021 09:24 AM     Lab Results   Component Value Date/Time    TSH 2.42 08/02/2022 08:06 AM     Lab Results   Component Value Date/Time    CHOL 130 10/25/2022 11:08 AM    HDL 33 10/25/2022 11:08 AM PSA 0.35 12/17/2018 10:11 AM    LABA1C 6.8 12/01/2022 09:53 AM          Assessment & Plan        Diagnosis Orders   1. Diarrhea, unspecified type   Advised the patient to start taking metformin. Prescribed Amaryl for his  diabetes management. Recently treated with antibiotics and for this reason we will check C. difficile toxin. We will also check labs due to reported multiple episodes of diarrhea per day. Prescribed probiotics. Also Imodium, however advised not to take it until we have CT results back. He is advised to avoid spicy, greasy food, fiber. We will follow-up in 1 week Clostridium Difficile Toxin/Antigen    Basic Metabolic Panel    CBC with Auto Differential    Magnesium    Probiotic Acidophilus (FLORANEX) TABS    loperamide (RA ANTI-DIARRHEAL) 2 MG capsule      2. History of colon polyps   Patient will be referred to GI for repeat colonoscopy Rex Hay MD, Gastroenterology, [facility]                      Completed Refills   Requested Prescriptions     Signed Prescriptions Disp Refills    glimepiride (AMARYL) 2 MG tablet 30 tablet 3     Sig: Take 1 tablet by mouth every morning (before breakfast)    Probiotic Acidophilus (FLORANEX) TABS 60 tablet 0     Sig: Take 1 tablet by mouth 2 times daily    loperamide (RA ANTI-DIARRHEAL) 2 MG capsule 20 capsule 2     Sig: Take 1 capsule by mouth 4 times daily as needed for Diarrhea     Return in about 1 week (around 1/26/2023), or diarrhea.      Orders Placed This Encounter   Medications    glimepiride (AMARYL) 2 MG tablet     Sig: Take 1 tablet by mouth every morning (before breakfast)     Dispense:  30 tablet     Refill:  3    Probiotic Acidophilus (FLORANEX) TABS     Sig: Take 1 tablet by mouth 2 times daily     Dispense:  60 tablet     Refill:  0    loperamide (RA ANTI-DIARRHEAL) 2 MG capsule     Sig: Take 1 capsule by mouth 4 times daily as needed for Diarrhea     Dispense:  20 capsule     Refill:  2     Orders Placed This Encounter Procedures    Clostridium Difficile Toxin/Antigen     Standing Status:   Future     Standing Expiration Date:   8/69/8641    Basic Metabolic Panel     Standing Status:   Future     Standing Expiration Date:   1/19/2024    CBC with Auto Differential     Standing Status:   Future     Standing Expiration Date:   1/19/2024    Magnesium     Standing Status:   Future     Standing Expiration Date:   1/19/2024    Cesar Bradford MD, Gastroenterology, [facility]     Referral Priority:   Routine     Referral Type:   Eval and Treat     Referral Reason:   Specialty Services Required     Referred to Provider:   Jadyn Ortiz MD     Requested Specialty:   Internal Medicine     Number of Visits Requested:   1         Patient Instructions     SURVEY:    You may be receiving a survey from [employer] regarding your visit today. Please complete the survey to enable us to provide the highest quality of care to you and your family. If you cannot score us a very good on any question, please call the office to discuss how we could have made your experience a very good one. Thank you. MD Ciaran Pelletier, MD Oziel Lopez, MD Syeda Pollack,   Mady Joy, PM  Sahil Molina, ARRON [place], On license of [facility] .   Electronically signed by Mike Paz MD on 1/19/2023 at 10:50 AM           Completed Refills      Requested Prescriptions     Signed Prescriptions Disp Refills    glimepiride (AMARYL) 2 MG tablet 30 tablet 3     Sig: Take 1 tablet by mouth every morning (before breakfast)    Probiotic Acidophilus (FLORANEX) TABS 60 tablet 0     Sig: Take 1 tablet by mouth 2 times daily    loperamide (RA ANTI-DIARRHEAL) 2 MG capsule 20 capsule 2     Sig: Take 1 capsule by mouth 4 times daily as needed for Diarrhea

## 2023-01-19 NOTE — PATIENT INSTRUCTIONS
SURVEY:    You may be receiving a survey from [employer] regarding your visit today. Please complete the survey to enable us to provide the highest quality of care to you and your family. If you cannot score us a very good on any question, please call the office to discuss how we could have made your experience a very good one. Thank you.   MD Gilles Pelletier MD Kimberley Heading, MD Lauren Smith, DO  Mady Joy, PM  ARRON [place], [address] [place] [address], [address] [place] [address] .

## 2023-01-20 ENCOUNTER — TELEPHONE (OUTPATIENT)
Dept: FAMILY MEDICINE CLINIC | Age: 68
End: 2023-01-20

## 2023-01-20 ENCOUNTER — HOSPITAL ENCOUNTER (OUTPATIENT)
Dept: CARDIAC REHAB | Age: 68
Setting detail: THERAPIES SERIES
Discharge: HOME | End: 2023-01-20
Payer: MEDICARE

## 2023-01-20 PROCEDURE — 93798 PHYS/QHP OP CAR RHAB W/ECG: CPT

## 2023-01-20 NOTE — TELEPHONE ENCOUNTER
----- Message from Issa Freire MD sent at 1/19/2023  4:09 PM EST -----  Please let the patient know his Cdiff is negative  Thanks    ----- Message -----  From: Nta Olmos Incoming Lab Results From [facility]  Sent: 1/19/2023  11:44 AM EST  To: Issa Freire MD

## 2023-01-23 ENCOUNTER — HOSPITAL ENCOUNTER (OUTPATIENT)
Dept: CARDIAC REHAB | Age: 68
Setting detail: THERAPIES SERIES
Discharge: HOME | End: 2023-01-23
Payer: MEDICARE

## 2023-01-23 PROCEDURE — 93798 PHYS/QHP OP CAR RHAB W/ECG: CPT

## 2023-01-24 ENCOUNTER — HOSPITAL ENCOUNTER (OUTPATIENT)
Dept: MRI IMAGING | Age: 68
Discharge: HOME | End: 2023-01-26
Payer: MEDICARE

## 2023-01-24 ENCOUNTER — TELEPHONE (OUTPATIENT)
Dept: FAMILY MEDICINE CLINIC | Age: 68
End: 2023-01-24

## 2023-01-24 ENCOUNTER — HOSPITAL ENCOUNTER (OUTPATIENT)
Age: 68
Discharge: HOME | End: 2023-01-26
Payer: MEDICARE

## 2023-01-24 ENCOUNTER — HOSPITAL ENCOUNTER (OUTPATIENT)
Dept: GENERAL RADIOLOGY | Age: 68
Discharge: HOME | End: 2023-01-26
Payer: MEDICARE

## 2023-01-24 DIAGNOSIS — M47.816 LUMBAR SPONDYLOSIS: Primary | ICD-10-CM

## 2023-01-24 DIAGNOSIS — M47.817 LUMBOSACRAL SPONDYLOSIS WITHOUT MYELOPATHY: ICD-10-CM

## 2023-01-24 DIAGNOSIS — M47.814 THORACIC SPONDYLOSIS: ICD-10-CM

## 2023-01-24 PROCEDURE — 72100 X-RAY EXAM L-S SPINE 2/3 VWS: CPT

## 2023-01-24 RX ORDER — ALPRAZOLAM 0.5 MG/1
0.5 TABLET ORAL ONCE
Qty: 1 TABLET | Refills: 0 | Status: SHIPPED | OUTPATIENT
Start: 2023-01-24 | End: 2023-01-24

## 2023-01-24 NOTE — TELEPHONE ENCOUNTER
Radiology called to inform office that patient showed up to get MRI today and patient is claustrophobic and was unable to get MRI done today. Patient r/s for 2/2/23 but asking for medication to help. Please advise.

## 2023-01-25 ENCOUNTER — HOSPITAL ENCOUNTER (OUTPATIENT)
Dept: CARDIAC REHAB | Age: 68
Setting detail: THERAPIES SERIES
Discharge: HOME | End: 2023-01-25
Payer: MEDICARE

## 2023-01-25 PROCEDURE — 93798 PHYS/QHP OP CAR RHAB W/ECG: CPT

## 2023-01-27 ENCOUNTER — HOSPITAL ENCOUNTER (OUTPATIENT)
Dept: CARDIAC REHAB | Age: 68
Setting detail: THERAPIES SERIES
Discharge: HOME | End: 2023-01-27
Payer: MEDICARE

## 2023-01-27 PROCEDURE — 93798 PHYS/QHP OP CAR RHAB W/ECG: CPT

## 2023-01-30 ENCOUNTER — HOSPITAL ENCOUNTER (OUTPATIENT)
Dept: CARDIAC REHAB | Age: 68
Setting detail: THERAPIES SERIES
Discharge: HOME | End: 2023-01-30
Payer: MEDICARE

## 2023-01-30 DIAGNOSIS — I10 ESSENTIAL HYPERTENSION: ICD-10-CM

## 2023-01-30 DIAGNOSIS — E78.2 MIXED HYPERLIPIDEMIA: ICD-10-CM

## 2023-01-30 PROCEDURE — 93798 PHYS/QHP OP CAR RHAB W/ECG: CPT

## 2023-01-30 RX ORDER — FENOFIBRATE 145 MG/1
TABLET, COATED ORAL
Qty: 30 TABLET | Refills: 3 | Status: SHIPPED | OUTPATIENT
Start: 2023-01-30

## 2023-01-30 RX ORDER — METOPROLOL SUCCINATE 50 MG/1
TABLET, EXTENDED RELEASE ORAL
Qty: 90 TABLET | Refills: 3 | Status: SHIPPED | OUTPATIENT
Start: 2023-01-30

## 2023-01-30 NOTE — TELEPHONE ENCOUNTER
Health Maintenance   Topic Date Due    DTaP/Tdap/Td vaccine (1 - Tdap) Never done    Shingles vaccine (1 of 2) Never done    Diabetic Alb to Cr ratio (uACR) test  08/18/2017    Diabetic retinal exam  10/29/2020    Low dose CT lung screening  03/03/2022    AAA screen  07/29/2022    Diabetic foot exam  03/01/2023    Annual Wellness Visit (AWV)  06/03/2023    Lipids  10/25/2023    A1C test (Diabetic or Prediabetic)  12/01/2023    Depression Monitoring  01/19/2024    GFR test (Diabetes, CKD 3-4, OR last GFR 15-59)  01/19/2024    Pneumococcal 65+ years Vaccine (3 - PPSV23 if available, else PCV20) 10/08/2024    Colorectal Cancer Screen  03/05/2029    Flu vaccine  Completed    COVID-19 Vaccine  Completed    Hepatitis C screen  Completed    HIV screen  Completed    Hepatitis A vaccine  Aged Out    Hib vaccine  Aged Out    Meningococcal (ACWY) vaccine  Aged Out             (applicable per patient's age: Cancer Screenings, Depression Screening, Fall Risk Screening, Immunizations)    Hemoglobin A1C (%)   Date Value   12/01/2022 6.8   03/01/2022 6.2   08/07/2021 7.1 (H)     Microalb/Crt.  Ratio (mcg/mg creat)   Date Value   08/18/2016 6     LDL Cholesterol (mg/dL)   Date Value   10/25/2022 49     AST (U/L)   Date Value   10/25/2022 43 (H)     ALT (U/L)   Date Value   10/25/2022 40     BUN (mg/dL)   Date Value   01/19/2023 10      (goal A1C is < 7)   (goal LDL is <100) need 30-50% reduction from baseline     BP Readings from Last 3 Encounters:   01/19/23 120/72   12/12/22 124/76   12/01/22 139/85    (goal /80)      All Future Testing planned in [facility]:  Lab Frequency Next Occurrence   Basic Metabolic Panel Once 47/13/0615   Hepatic Function Panel Once 08/22/2022   CBC with Auto Differential Once 08/22/2022   PSA Screening Once 08/22/2022   CBC with Auto Differential Once 08/04/2023   Comprehensive Metabolic Panel Once 60/77/4717   Lipid Panel Once 08/04/2023   TSH with Reflex Once 08/04/2023   Magnesium Once 08/04/2023 EKG 12 Lead Once 08/04/2023   XR CHEST (2 VW) Once 08/04/2023       Next Visit Date:  Future Appointments   Date Time Provider Debbie Izquierdo   1/30/2023 11:00 AM [address] THERAPIST 3 [facility] CARD Donnalee Hires   2/1/2023 11:00 AM [facility] CARD REHAB THERAPIST 3 [facility] CARD Donnalee Hires   2/2/2023  8:50 AM [facility] MOBILE VAN UNIT [facility] MOBILE  Dennis HOD   2/2/2023  9:30 AM [facility] MRI SCANNER DENNIS [facility] MRI [facility] Rad   2/2/2023 10:30 AM [facility] MRI SCANNER DENNIS [facility] MRI MW Rad   2/2/2023  3:20 PM [facility]   2/3/2023 11:00 AM [facility] CARD REHAB THERAPIST 3 [facility] CARD Donnalee Hires   2/6/2023 10:00 AM MD Bernie Abreu W   2/6/2023 11:00 AM [place] CARD REHAB THERAPIST 3 [facility] CARD Donnalee Hires   2/8/2023 11:00 AM [facility] THERAPIST 3 [facility] CARD Donnalee Hires   2/10/2023 11:00 AM [facility] C[facility] REHAB THERAPIST 3 [facility] CARD Donnalee Hires   2/13/2023 11:00 AM [facility] CARD REHAB THERAPIST 3 [facility] CARD Donnalee Hires   2/15/2023 11:00 AM [facility] CARD REHAB THERAPIST 3 [facility] CARD Donnalee Hires   2/17/2023 11:00 AM [place] CARD REHAB THERAPIST 3 [facility] CARD Donnalee Hires   2/20/2023 11:00 AM [facility] CARD REHAB THERAPIST 3 [facility] CARD Donnalee Hires   2/22/2023 11:00 AM [facility] CARD REHAB THERAPIST 3 [facility] CARD Donnalee Hires   2/24/2023 11:00 AM [facility] CARD REHAB THERAPIST 3 [facility] CARD Donnalee Hires   2/27/2023 11:00 AM [facility] CARD REHAB THERAPIST 3 [facility] CARD Donnalee Hires   3/1/2023 11:00 AM [place] CARD REHAB THERAPIST 3 [facility] CARD Donnalee Hires   3/3/2023  9:00 AM MD Cadence Beckwith [facility]   6/5/2023  9:00 AM MD Cadence Beckwith [facility]   8/3/2023 10:30 AM MD Bernie Abreu [facility]            Patient Active Problem List:     COPD (chronic obstructive pulmonary disease) (HCC)     GERD (gastroesophageal reflux disease)     S/P CABG x 3     CAD (coronary artery disease)     Essential hypertension     Mixed hyperlipidemia     Type 2 diabetes mellitus without complication, without long-term current use of insulin (HCC)     Dysthymia (or depressive neurosis)     Chronic gout involving toe     Acquired hypothyroidism     Vitamin D deficiency disease     Chronic low back pain with left-sided sciatica     Class 3 severe obesity due to excess calories without serious comorbidity with body mass index (BMI) of 40.0 to 44.9 in adult (HCC)     MINAL treated with BiPAP

## 2023-02-01 ENCOUNTER — HOSPITAL ENCOUNTER (OUTPATIENT)
Dept: CARDIAC REHAB | Age: 68
Setting detail: THERAPIES SERIES
Discharge: HOME | End: 2023-02-01
Payer: MEDICARE

## 2023-02-01 PROCEDURE — 93798 PHYS/QHP OP CAR RHAB W/ECG: CPT

## 2023-02-02 ENCOUNTER — HOSPITAL ENCOUNTER (OUTPATIENT)
Dept: MRI IMAGING | Age: 68
Discharge: HOME | End: 2023-02-04
Payer: MEDICARE

## 2023-02-02 PROCEDURE — 72146 MRI CHEST SPINE W/O DYE: CPT

## 2023-02-02 PROCEDURE — 72148 MRI LUMBAR SPINE W/O DYE: CPT

## 2023-02-03 ENCOUNTER — HOSPITAL ENCOUNTER (OUTPATIENT)
Dept: CARDIAC REHAB | Age: 68
Setting detail: THERAPIES SERIES
Discharge: HOME | End: 2023-02-03
Payer: MEDICARE

## 2023-02-03 PROCEDURE — 93798 PHYS/QHP OP CAR RHAB W/ECG: CPT

## 2023-02-06 ENCOUNTER — HOSPITAL ENCOUNTER (OUTPATIENT)
Dept: CARDIAC REHAB | Age: 68
Setting detail: THERAPIES SERIES
Discharge: HOME | End: 2023-02-06
Payer: MEDICARE

## 2023-02-06 ENCOUNTER — OFFICE VISIT (OUTPATIENT)
Dept: CARDIOLOGY CLINIC | Age: 68
End: 2023-02-06
Payer: MEDICARE

## 2023-02-06 VITALS
BODY MASS INDEX: 41.56 KG/M2 | OXYGEN SATURATION: 94 % | SYSTOLIC BLOOD PRESSURE: 120 MMHG | WEIGHT: 315 LBS | DIASTOLIC BLOOD PRESSURE: 60 MMHG | HEART RATE: 62 BPM

## 2023-02-06 DIAGNOSIS — E13.22 OTHER SPECIFIED DIABETES MELLITUS WITH STAGE 1 CHRONIC KIDNEY DISEASE, UNSPECIFIED WHETHER LONG TERM INSULIN USE (HCC): ICD-10-CM

## 2023-02-06 DIAGNOSIS — I10 ESSENTIAL HYPERTENSION: Primary | ICD-10-CM

## 2023-02-06 DIAGNOSIS — I25.10 CORONARY ARTERY DISEASE INVOLVING NATIVE CORONARY ARTERY OF NATIVE HEART WITHOUT ANGINA PECTORIS: ICD-10-CM

## 2023-02-06 DIAGNOSIS — N18.1 OTHER SPECIFIED DIABETES MELLITUS WITH STAGE 1 CHRONIC KIDNEY DISEASE, UNSPECIFIED WHETHER LONG TERM INSULIN USE (HCC): ICD-10-CM

## 2023-02-06 DIAGNOSIS — E78.2 MIXED HYPERLIPIDEMIA: ICD-10-CM

## 2023-02-06 PROCEDURE — 3046F HEMOGLOBIN A1C LEVEL >9.0%: CPT | Performed by: INTERNAL MEDICINE

## 2023-02-06 PROCEDURE — 1036F TOBACCO NON-USER: CPT | Performed by: INTERNAL MEDICINE

## 2023-02-06 PROCEDURE — 3074F SYST BP LT 130 MM HG: CPT | Performed by: INTERNAL MEDICINE

## 2023-02-06 PROCEDURE — 93798 PHYS/QHP OP CAR RHAB W/ECG: CPT

## 2023-02-06 PROCEDURE — 3078F DIAST BP <80 MM HG: CPT | Performed by: INTERNAL MEDICINE

## 2023-02-06 PROCEDURE — 99214 OFFICE O/P EST MOD 30 MIN: CPT | Performed by: INTERNAL MEDICINE

## 2023-02-06 PROCEDURE — 1123F ACP DISCUSS/DSCN MKR DOCD: CPT | Performed by: INTERNAL MEDICINE

## 2023-02-06 PROCEDURE — G8417 CALC BMI ABV UP PARAM F/U: HCPCS | Performed by: INTERNAL MEDICINE

## 2023-02-06 PROCEDURE — 3017F COLORECTAL CA SCREEN DOC REV: CPT | Performed by: INTERNAL MEDICINE

## 2023-02-06 PROCEDURE — 2022F DILAT RTA XM EVC RTNOPTHY: CPT | Performed by: INTERNAL MEDICINE

## 2023-02-06 PROCEDURE — G8484 FLU IMMUNIZE NO ADMIN: HCPCS | Performed by: INTERNAL MEDICINE

## 2023-02-06 PROCEDURE — G8428 CUR MEDS NOT DOCUMENT: HCPCS | Performed by: INTERNAL MEDICINE

## 2023-02-06 NOTE — PROGRESS NOTES
Ov Dr. Naman Hinds for 3 month f/u   From cath   Currently in cardiac rehab  Dr. Rosa Mulligan treating him for   Stomach issue   Diarrhea after eating   Will be seeing Dr. Willie Mayo   No chest pain   No palpations   Sob same     No changes    Follow up in 1 yr

## 2023-02-06 NOTE — LETTER
Jeannie Harrell MD  [id number] [facility]  [address] [phone]      2023      Guillaume Pickens MD  [address] [facility], [address] [facility]    RE:   Stefanie Salinas  :  1955      Dear Dr. Vickey Beach:    CHIEF COMPLAINT:  1. Coronary artery disease. 2.  Status post cardiac catheterization on 2022, that showed patent LIMA to the LAD and patent vein graft to the high lateral branch of the circumflex with 60% to 70% disease in the AV branch of the circumflex with an iFR of 0.97 and normal right coronary artery, EF of 55%. HISTORY OF PRESENT ILLNESS:  I had the pleasure of seeing Stefanie Salinas in our office on 2023. He is a pleasant 47-year-old gentleman who developed shortness of breath and chest pain in , which resulted in a stress test.  Because of his continued symptoms, I did a catheterization on 2015, that showed 80% distal left main trunk and ostium of the LAD, with 60% circumflex, 40% RCA, with an EF of 60%. He had subsequent open heart surgery by Dr. Libby Charles on 2015, with a LIMA to the LAD and a vein graft to the high lateral branch of the circumflex. I repeated a catheterization on 2017, that showed 70% disease in the ostium of the LAD, with a distal left main trunk lesion, patent LIMA to the LAD, patent vein graft to the high lateral circumflex, the right coronary artery had 40% disease in the midportion, EF of 50% to 55%. In 2022, he began developing shortness of breath and chest pain. This continued and therefore I did a Lexiscan stress test and echocardiogram on 2022. His stress test was abnormal and showed a small to moderate perfusion defect in the lateral, inferolateral region consistent with ischemia. His echocardiogram showed normal LV function with an EF of 55%.     We tried medical therapy first, but he continued to have his symptoms and therefore I did a cardiac catheterization on 2022, at [place] in [place]. His LIMA was patent to his LAD and his vein graft was patent to the high lateral branch of the circumflex. His AV branch of the circumflex, which filled the posterolateral branch, had a 60% to 70% lesion with an iFR of 0.97. This showed that it was not hemodynamically significant, his right coronary artery had mild plaque disease. His EF was normal at 55%, medical therapy was recommended. He has been in cardiac rehab and has done excellent. He has had no chest pain. His shortness of breath is improving. He does have GI issues with diarrhea and will be seeing Dr. Judit Jones in the near future. He does have a colonoscopy scheduled by her in two months. He denies any syncope or near syncope. Overall, he feels he is improving. He has set up his exercise room again with the treadmill, bicycle and weights. CARDIAC RISK FACTORS:  Known CAD:  Positive. Bypass Surgery:  Positive. Hypertension:  Positive. Hyperlipidemia:  Positive. Other Family Members:  Positive. Smoking:  Negative. Diabetes:  Positive. MEDICATIONS AT THIS TIME:  He is on albuterol inhaler every 4 hours p.r.n., Norvasc 2.5 mg daily, aspirin 81 mg daily, CPAP machine nightly, TriCor 145 mg daily, Flonase p.r.n., Neurontin 300 mg b.i.d., Amaryl 2 mg daily, Synthroid 200 mcg daily, Cozaar 25 mg daily, Mevacor 40 mg b.i.d., magnesium 400 mg daily, Toprol-XL 50 mg daily, Zofran p.r.n., Paxil 20 mg daily, Symbicort 2 puffs b.i.d., Lamisil 250 mg daily. PAST MEDICAL AND SURGICAL HISTORY:  1. Thyroid lobectomy in , on thyroid replacement, euthyroid. 2.  Spine surgery many years ago. 3.  Chronic back pain, on disability. 4.  Non-insulin-dependent diabetes. 5.  Bypass surgery, as stated previously. 6.  Lower back pain. 7.  Depression. FAMILY HISTORY:  Mother alive at 80, had an MI.   Father  of an MI.    SOCIAL HISTORY:  He is 74years old,  twice. Has 5 children. Quit smoking in 12/2014. Does not drink alcohol. 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 Brother, Sarah Heredia, plays drums in a band. He had a 2003 [employer] that his brother, Sarah Heredia, redid and therefore, this was given back to Sarah Heredia. REVIEW OF SYSTEMS:  Cardiac as above. Other systems reviewed including constitutional, eyes, ears, nose and throat, cardiovascular, respiratory, GI, , musculoskeletal, integumentary, neurologic, endocrine, hematologic and allergic/immunologic are negative except for what is described above. No weight loss or weight gain. No change in bowel habits. No blood in stools. No fevers, sweats or chills. PHYSICAL EXAMINATION:  VITAL SIGNS:  His blood pressure was 120/60 with a heart rate of 62 and regular. Respirations 18. O2 sat 94%. Weight 315 pounds. GENERAL:  He is a pleasant 29-year-old gentleman. Denied pain. He was oriented to person, place and time. Answered questions appropriately. SKIN:  No unusual skin changes. HEENT:  The pupils are equally round and intact. Mucous membranes were dry. NECK:  No JVD. Good carotid pulses. No carotid bruits. No lymphadenopathy or thyromegaly. CARDIOVASCULAR EXAM:  S1 and S2 were normal.  No S3 or S4. Soft systolic blowing type murmur. No diastolic murmur. PMI was normal.  No lift, thrust, or pericardial friction rub. LUNGS:  Clear to auscultation and percussion. ABDOMEN:  Soft and nontender. Good bowel sounds. EXTREMITIES:  Good femoral pulses. Good pedal pulses. No pedal edema. Skin was warm and dry. No calf tenderness. Nail beds pink. Good cap refill. PULSES:  Bilateral symmetrical radial, brachial and carotid pulses. No carotid bruits. Good femoral and pedal pulses. NEUROLOGIC EXAM:  Within normal limits. PSYCHIATRIC EXAM:  Within normal limits.     LABORATORY DATA:  His sodium was 133, with potassium of 4.2, BUN was 10, creatinine 0.84, magnesium 1.3, calcium 9.2. His white count was 9.3, hemoglobin 15.1 with a platelet count of 120,442. IMPRESSION:  1. Status post last cardiac catheterization on 11/04/2022, that showed widely patent LIMA to the LAD and widely patent vein graft to the high lateral branch of the circumflex, with 60% to 70% disease in the AV branch of the circumflex, which fed a posterolateral branch of the circumflex, with an unremarkable dominant right coronary artery, EF of 55%, medical therapy recommended. 2.  Status post cardiac catheterization on 01/21/2015, showing 80% left main trunk and proximal LAD, with 60% high lateral branch of the circumflex with mild right coronary artery disease, EF of 60%. 3.  His open heart surgery on 02/04/2015, with a LIMA to the LAD, vein graft to the high lateral branch of the circumflex. 4.  Catheterization on 12/20/2017, after chest pain, that showed patent LIMA to the LAD, patent vein graft to the high lateral circumflex, with 60% disease in the AV branch of the circumflex, 40% RCA, EF of 55%. 5.  COPD with reversibility. 6.  Normal LV function. 7.  Back pain, which limits his activity. 8.  Hyperlipidemia, well controlled. 9.  Diarrhea, pending workup with Dr. Dylan Mcallister now. PLAN:  1. Continue same medications. 2.  See in one year. DISCUSSION:  Mr. Lea Barrera overall is doing excellent. He does have disease in the AV branch of the circumflex, which fills the posterolateral branch but the iFR of 0.97 indicated no significant obstructive disease. He has normal LV function. He is on an exercise program.  His risk factors are very nicely modified. I made no change in medications. I will plan on seeing him in one year unless a problem would develop. Thank you very much for allowing me the privilege of seeing Mr. Lea Barrera. If you have any questions on my thoughts, please do not hesitate to contact me.     Sincerely,        Christopher Henry MD    D: 02/06/2023 10:44:47     T: 02/06/2023 10:49:21     [email or web address]  Job#: [id number]   Doc#: [id number]

## 2023-02-06 NOTE — PROGRESS NOTES
Phase II Cardiac Rehab Individualized Treatment Plan-90 Day      Patient Name: Kristine Weir  ACCOUNT #: [de-identified]   Date of Initial Assessment: 11/14/2022  Diagnosis: Stable Angina Pectoris   Onset Date: 11/4/22  Referring Physician: DR Dave Monroe  Risk Stratification: MODERATE  Session Number: 33  EXERCISE    Stages of Change:   [] pre-contemplation  [x] Action   [] Contemplate   [] Maintainence   [] Prep   [] Relapse          Exercise Prescription:  Mode: [x] TM [x] UBE [x] STP [] EL [x] R  Frequency: 3 DAYS PER WEEK  Duration: 31-60 MINUTES  Intensity: 4.5 avg nets  Plan/Goal: Increase 1-2 levels/week or 1-2 min/week to achieve target HR and RPE   12-16. Progression: INCREASE DURATION PER ABOVE F.I.T.T. Rx  PARAMETERS ON AVG OF 5-10 MIN / 1-2 WEEKS FOR THE FIRST 4-6 WEEKS. AFTER 3-4 WEEKS ARE COMPLETED, CONTINUE TO GRADUALLY INCREASE F. I.T. PARAMETERS GRADUALLY UPWARD AT THE ESTABLISHED DURATION ON AVERAGE 0.5-1.0 METS PER 30 DAYS OVER THE COARSE OF REMAINING PROGRAM AS ESTABLISHED BY PT-CENTERED GOALS AND GUIDELINES. Target HR 90 - 1020 bpm      [] Angina with Exertion    [x] Resistance Training  Introduce and progress 8-12 bilateral UE and LE progressive resistance exercises at 1-3 sets per lift, on 2-3 non-consecutive days using weights/ green  therabands AND WTS TO 8-24 # for 8-15 reps to progressive overload by increasing resistance once reps progressed to at least 15 reps on at least 2 occasions       Hypertension:  [x] Yes  [] No  Resting BP: 120/76  Peak Exercise BP: 140/66  [] Med change?     Intervention:  Home Exercise:  Type: Walking  Duration: 30-60 MINUTES  Frequency: 2-3 DAYS PER WEEK   [x] Resistance Training  introduce 8-12 bilateral UE and LE progressive resistance exercises at 1-3 sets per lift, on 2-3 non-consecutive days using weights/ green  therabands AND WTS TO 8-24 # for 8-15 reps to progressive overload by increasing resistance once reps progressed to at least 15 reps on at least 2 occasions     Education:   [x] Equipment Ennice  [x] Self pulse   [x] Proper use weights/therabands   [x] S/S to report  [x] Low Na Diet    [x] Warm up/ Cool down  [x] BP Medication    [x] RPE Scale   [x] Understand BP   [x] Ex Safety   [x] Exercise specialist class-Home Exercise       Target Goal:   -Individual Exercise Plan  -Bp<130/80  -Aerobic active 30 + minutes 5-7 days per week    Nutrition    Stages of Change:   [] pre-contemplation  [x] Action   [] Contemplate   [] Maintainence   [] Prep   [] Relapse    Lipids:   Status: Final result    Visible to patient: Yes (not seen)    Next appt: 02/06/2023 at 10:00 AM in Cardiology Angelica MD [facility])    Dx: Mixed hyperlipidemia; Essential hyper. ..    0 Result Notes    1  Topic  Component Ref Range & Units 10/25/22 1108 8/2/22 0806 8/7/21 0742 8/6/20 0954 10/15/19 0904 10/22/18 0951 11/22/17 0956   Cholesterol <200 mg/dL 130  138 CM  122 CM  122 CM  153 CM  160 CM  174 CM    Comment:     Cholesterol Guidelines:       <200  Desirable    200-240  Borderline       >240  Undesirable        HDL >40 mg/dL 33 Low   30 Low  CM  30 Low  CM  30 Low  CM  28 Low  CM  35 Low  CM  37 Low  CM    Comment:     HDL Guidelines:     <40     Undesirable    40-59    Borderline     >59     Desirable        LDL Cholesterol 0 - 130 mg/dL 49  58 CM  54 CM  42 CM       CM  79 CM  102 CM    Comment:     LDL Guidelines:      <100    Desirable    100-129   Near to/above Desirable    130-159   Borderline       >159   Undesirable       Direct (measured) LDL and calculated LDL are not interchangeable tests.     Chol/HDL Ratio <5 3.9  4.6 CM  4.1 CM  4.1 CM  5.5 High  CM  4.6 CM  4.7 CM    Comment:        Triglycerides <150 mg/dL 242 High   251 High  CM  192 High  CM  252 High  CM  478 High  CM  230 High  CM  173 High  CM    Comment:     Triglyceride Guidelines:      <150   Desirable    150-199  Borderline    200-499  High      >499   Very high    Based on AHA Guidelines for fasting triglyceride, October 2012. VLDL       NOT REPORTED High  R  NOT REPORTED High  R   R  NOT REPORTED R  NOT [place] [address]                Specimen Collected: 10/25/22 11:08 EDT Last Resulted: 10/25/22 18:33 EDT           [] Med Change? Diabetes / Prediabetes:  [x] Yes  [] No  Random Bs: 192  HbA1c: 6.2%  [] Med Change? Weight Management:  Current Wt 313 lb  Wt Goal: 1-2 lbs/wk [x] Loss  [] Gain  [] Maintain Current Wt.      Intervention:   [] Dietitian Consult       [x] Nurse/Patient Discussion     [x] Diet Class           [] Referred to Diabetes Education     Education:  [] S&S hypo/hyperglycemia  [x] Low fat/low cholesterol diet  [x] Weight loss methods      [] Relate Diabetes/CAD     [x] Eating heart healthy handout    Target Goal:  -LDL-C<100 if triglycerides are > 200  -LDL-C < 70 for high risk patients  -HbA1c < 7%  -BMI < 25   Education    Stages of Change:    [] pre-contemplation  [x] Action   [] Contemplate   [] Maintainence   [] Prep   [] Relapse    Family support: [x] Yes  [] No    Tobacco use: [] Yes  [x] No    Intervention:  [] Referred to smoking cessation counselor     [] Individual education and counseling  [] Tobacco adjunct  [] Informed of education class schedule     Education:   [x] Risk Factors/Modifications  [x] Psychological aspects  [x] Angina         [x] Medications  [] CHF                                    [] Diabetes     [x] Cardiac A&P                                 [] Smoking Cessation  [x] Stress Reduction and Relaxation                     [] Weight Management    Target Goal:  -Complete cessation of tobacco use (if applicable)  -Continued risk factor modifications  -Recognizing signs/symptoms to report  -Proper use of meds    Psychosocial  Stages of Change:    [] pre-contemplation  [x] Action   [] Contemplate   [] Maintainence   [] Prep   [] Relapse      Intervention:   [] Psych Consult/  [x] Uses stress management skills    [] Physician Referral    [x] Stress management class   [] Med Change? Education:    [x] Coping Techniques   [x] Relaxation techniques   [x] S/S of Depression    Target Goal:  -Assess presence or absence of depression using a valid screening tool. -Maximize coping skills.  -Positive support system.     Preventative Medication:   [x] Aspirin       [x] Beta Blockade      [x] Statin or other lipid lowering agent     [] Clopidogrel   [x] ACE Inhibitor   [x] ACE Receptor Blocker   [] Antianginal    [] Calcium Channel Blocker   [] Other anticoagulation medications     Fall Risk assess: [x] Yes  [] No / REMAINS low RISK  Assistive Device:   [] Cane  [] Walker [] Wheel Chair  [] Gait belt    Patient/Program goal:   Overall Personal Program Goal:  \" IMPROVE MY BREATHING \"   [] Initial Goal   [x] Progressing to Goal  [] Not Meeting--Needs Reinforcement  [] Goal Met  [] Goal Not Met     To increase stamina, strength, and flexibility by exercising 31-50 total minutes by engaging in aerobic, resistance, and flexibility workout modalities with the goal of progressively achieving at least 0.5 to 1.0 metabolic equivalant improvement in the next 30 days as evidenced by daily session reports / [] Initial Goal     [x] Progressing to Goal  [] Not Meeting--Needs Reinforcement   [] Goal Met  [] Goal Not Met     To achieve and progress prescribed exercise frequency, intensity, time, and type in the next 30 days based upon initial evaluation and submaximal graded exercise results as evidenced by the attaining and maintaining the prescribed target heart rate range, a Michael rating of perceived exertion between 11 and 16, duration of >30 - 50 minutes using multiple exercise modes for at least 3 - 5 days per week to accumulate a minimum total of 2.5 hours per week of moderate aerobic intensity exercise, as tolerated, evidenced by daily session reports and home workout log /         [] Initial Goal   [x] Progressing to Goal  [] Not Meeting--Needs Reinforcement   [] Goal Met  [] Goal Not Met        To gradually and progressively lose 2 - 4 lb of body weight in the next 30 days through moderating nutrional intake and performing regular aerobic and strength training exercises as prescribed with improvement evidenced by daily session report comparison / [x] Initial Goal   [] Progressing to Goal  [x] Not Meeting--Needs Reinforcement  [] Goal Met  [] Goal Not Met     To decrease waist circumference by 5% by program completion if waist measurement is > or = 40 inches (male) / > or = 35 inches (female) as evidence by the [employer] /     [x] Initial Goal   [] Progressing to Goal  [] Not Meeting--Needs Reinforcement  [] Goal Met  [] Goal Not Met     To introduce and progress 8-10 different bilateral UE and LE progressive resistance exercises focused on major muscle groups using 1-3 sets each per lift, on 2-3 non-consecutive days implementing free and machine weights and Blue therabands, as appropriate, with a resistance of 40-60% 1-repetition maximum or 10 -15 repetitions to progressive overload and increasing resistance once repetitions have progressed to 15 reps and feel fairly light on 2 prior occasions in the next 30 days / [x] Initial Goal   [] Progressing to Goal  [] Not Meeting--Needs Reinforcement  [] Goal Met  [] Goal Not Met     To achieve and maintain an optimal average resting blood pressure of <130 / 80 mmHg, or as indicated by this patient's referring provider, in the next 30 days / [] Initial Goal   [x] Progressing to Goal  [] Not Meeting--Needs Reinforcement  [] Goal Met  [] Goal Not Met     To strive for blood lipid optimization with an LDL-C of <100 mg/dL or  LDL 70 mg/dL, an HDL-C of > or = 40 mg/dL for men and > or = 50 mg/dL for women, and a triglyceride level of <150 mg/dL via lifestyle education, behavioral modification, and medication compliance / [x] Initial Goal   [] Progressing to Goal  [] Not Meeting--Needs Reinforcement  [] Goal Met  [] Goal Not Met     -To develop regular home aerobic exercise program for 20 - 60 minutes at least 2 non-rehab days per week, excluding  5 - 10 minutes warm-up and cool-down periods, within the next 30 days, being tracked on home workout log / [x] Initial Goal   [] Progressing to Goal  [] Not Meeting--Needs Reinforcement  [] Goal Met  [] Goal Not Met     To strive for a lower daily fasting blood glucose measures to <131 and random after meal measures to <181, if diabetic, at home via lifestyle educaton, behavior modification, and medication compliance in the next 30 days as tracked per patient's self-report / [] Initial Goal   [] Progressing to Goal  [x] Not Meeting--Needs Reinforcement  [] Goal Met  [] Goal Not Met     To reduce self-reported psycho-social feelings of stress in the next 30 days as evidenced by pre- and post- surveys and by routine rounding with patient to ascertain subjective improvements / [x] Initial Goal   [] Progressing to Goal  [] Not Meeting--Needs Reinforcement  [] Goal Met  [] Goal Not Met     In the next 30-days, to eat on average at least 2 servings of fruit per day and 4-5 servings of vegetables per day as evidenced by pre- and post-program nutriton survey and routine rounding with patient to ascertain progression toward goal per patient's self report, food diary, and Rate-your-Plate screening survey / [x] Initial Goal   [] Progressing to Goal  [] Not Meeting--Needs Reinforcement  [] Goal Met  [] Goal Not Met     To strive to eat < or = 30% of daily caloric intake of total fat and <10% of daily caloric saturated fat tracked by patient's self-report, food diary, and Rate-your-Plate screening survey / [x] Initial Goal   [] Progressing to Goal  [] Not Meeting--Needs Reinforcement  [] Goal Met  [] Goal Not Met     To have patient demonstrate knowledge about risk factor reduction, lifestyle modification, and heart health strategies with > / = 80% accuracy via pre- and post-program Test your [address] screening tool / [x] Initial Goal   [] Progressing to Goal  [] Not Meeting--Needs Reinforcement  [] Goal Met  [] Goal Not Met    Physician Changes/Comments:    Electronically signed by Dhara Gonzales RCP on 2/6/23 at 1:25 PM EST  Cardiac Rehab Staff

## 2023-02-08 ENCOUNTER — HOSPITAL ENCOUNTER (OUTPATIENT)
Dept: CARDIAC REHAB | Age: 68
Setting detail: THERAPIES SERIES
Discharge: HOME | End: 2023-02-08
Payer: MEDICARE

## 2023-02-08 PROCEDURE — 93798 PHYS/QHP OP CAR RHAB W/ECG: CPT

## 2023-02-08 NOTE — PROGRESS NOTES
Papa De Santiago MD  [facility]  [address] [phone]      2023      Arley Navarro MD  [address] [facility], [address] [facility]    RE:   Ender Mclaughlin  :  1955      Dear Dr. Florentin Marx:    CHIEF COMPLAINT:  1. Coronary artery disease. 2.  Status post cardiac catheterization on 2022, that showed patent LIMA to the LAD and patent vein graft to the high lateral branch of the circumflex with 60% to 70% disease in the AV branch of the circumflex with an iFR of 0.97 and normal right coronary artery, EF of 55%. HISTORY OF PRESENT ILLNESS:  I had the pleasure of seeing Ender Mclaughlin in our office on 2023. He is a pleasant 79-year-old gentleman who developed shortness of breath and chest pain in , which resulted in a stress test.  Because of his continued symptoms, I did a catheterization on 2015, that showed 80% distal left main trunk and ostium of the LAD, with 60% circumflex, 40% RCA, with an EF of 60%. He had subsequent open heart surgery by Dr. Mami Bunch on 2015, with a LIMA to the LAD and a vein graft to the high lateral branch of the circumflex. I repeated a catheterization on 2017, that showed 70% disease in the ostium of the LAD, with a distal left main trunk lesion, patent LIMA to the LAD, patent vein graft to the high lateral circumflex, the right coronary artery had 40% disease in the midportion, EF of 50% to 55%. In 2022, he began developing shortness of breath and chest pain. This continued and therefore I did a Lexiscan stress test and echocardiogram on 2022. His stress test was abnormal and showed a small to moderate perfusion defect in the lateral, inferolateral region consistent with ischemia. His echocardiogram showed normal LV function with an EF of 55%.     We tried medical therapy first, but he continued to have his symptoms and therefore I did a cardiac catheterization on 2022, at [facility] in [place]. His LIMA was patent to his LAD and his vein graft was patent to the high lateral branch of the circumflex. His AV branch of the circumflex, which filled the posterolateral branch, had a 60% to 70% lesion with an iFR of 0.97. This showed that it was not hemodynamically significant, his right coronary artery had mild plaque disease. His EF was normal at 55%, medical therapy was recommended. He has been in cardiac rehab and has done excellent. He has had no chest pain. His shortness of breath is improving. He does have GI issues with diarrhea and will be seeing Dr. Kina Hector in the near future. He does have a colonoscopy scheduled by her in two months. He denies any syncope or near syncope. Overall, he feels he is improving. He has set up his exercise room again with the treadmill, bicycle and weights. CARDIAC RISK FACTORS:  Known CAD:  Positive. Bypass Surgery:  Positive. Hypertension:  Positive. Hyperlipidemia:  Positive. Other Family Members:  Positive. Smoking:  Negative. Diabetes:  Positive. MEDICATIONS AT THIS TIME:  He is on albuterol inhaler every 4 hours p.r.n., Norvasc 2.5 mg daily, aspirin 81 mg daily, CPAP machine nightly, TriCor 145 mg daily, Flonase p.r.n., Neurontin 300 mg b.i.d., Amaryl 2 mg daily, Synthroid 200 mcg daily, Cozaar 25 mg daily, Mevacor 40 mg b.i.d., magnesium 400 mg daily, Toprol-XL 50 mg daily, Zofran p.r.n., Paxil 20 mg daily, Symbicort 2 puffs b.i.d., Lamisil 250 mg daily. PAST MEDICAL AND SURGICAL HISTORY:  1. Thyroid lobectomy in , on thyroid replacement, euthyroid. 2.  Spine surgery many years ago. 3.  Chronic back pain, on disability. 4.  Non-insulin-dependent diabetes. 5.  Bypass surgery, as stated previously. 6.  Lower back pain. 7.  Depression. FAMILY HISTORY:  Mother alive at 80, had an MI.   Father  of an MI.    SOCIAL HISTORY:  He is 74years old,  twice. Has 5 children. Quit smoking in 12/2014. Does not drink alcohol. 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 Brother, Alicia Oconnor, plays drums in a band. He had a 2003 [employer] that his brother, Alicia Oconnor, redid and therefore, this was given back to Alicia Oconnor. REVIEW OF SYSTEMS:  Cardiac as above. Other systems reviewed including constitutional, eyes, ears, nose and throat, cardiovascular, respiratory, GI, , musculoskeletal, integumentary, neurologic, endocrine, hematologic and allergic/immunologic are negative except for what is described above. No weight loss or weight gain. No change in bowel habits. No blood in stools. No fevers, sweats or chills. PHYSICAL EXAMINATION:  VITAL SIGNS:  His blood pressure was 120/60 with a heart rate of 62 and regular. Respirations 18. O2 sat 94%. Weight 315 pounds. GENERAL:  He is a pleasant 62-year-old gentleman. Denied pain. He was oriented to person, place and time. Answered questions appropriately. SKIN:  No unusual skin changes. HEENT:  The pupils are equally round and intact. Mucous membranes were dry. NECK:  No JVD. Good carotid pulses. No carotid bruits. No lymphadenopathy or thyromegaly. CARDIOVASCULAR EXAM:  S1 and S2 were normal.  No S3 or S4. Soft systolic blowing type murmur. No diastolic murmur. PMI was normal.  No lift, thrust, or pericardial friction rub. LUNGS:  Clear to auscultation and percussion. ABDOMEN:  Soft and nontender. Good bowel sounds. EXTREMITIES:  Good femoral pulses. Good pedal pulses. No pedal edema. Skin was warm and dry. No calf tenderness. Nail beds pink. Good cap refill. PULSES:  Bilateral symmetrical radial, brachial and carotid pulses. No carotid bruits. Good femoral and pedal pulses. NEUROLOGIC EXAM:  Within normal limits. PSYCHIATRIC EXAM:  Within normal limits.     LABORATORY DATA:  His sodium was 133, with potassium of 4.2, BUN was 10, creatinine 0.84, magnesium 1.3, calcium 9.2. His white count was 9.3, hemoglobin 15.1 with a platelet count of 673,080. IMPRESSION:  1. Status post last cardiac catheterization on 11/04/2022, that showed widely patent LIMA to the LAD and widely patent vein graft to the high lateral branch of the circumflex, with 60% to 70% disease in the AV branch of the circumflex, which fed a posterolateral branch of the circumflex, with an unremarkable dominant right coronary artery, EF of 55%, medical therapy recommended. 2.  Status post cardiac catheterization on 01/21/2015, showing 80% left main trunk and proximal LAD, with 60% high lateral branch of the circumflex with mild right coronary artery disease, EF of 60%. 3.  His open heart surgery on 02/04/2015, with a LIMA to the LAD, vein graft to the high lateral branch of the circumflex. 4.  Catheterization on 12/20/2017, after chest pain, that showed patent LIMA to the LAD, patent vein graft to the high lateral circumflex, with 60% disease in the AV branch of the circumflex, 40% RCA, EF of 55%. 5.  COPD with reversibility. 6.  Normal LV function. 7.  Back pain, which limits his activity. 8.  Hyperlipidemia, well controlled. 9.  Diarrhea, pending workup with Dr. Guillaume Castelan now. PLAN:  1. Continue same medications. 2.  See in one year. DISCUSSION:  Mr. Jinny Curling overall is doing excellent. He does have disease in the AV branch of the circumflex, which fills the posterolateral branch but the iFR of 0.97 indicated no significant obstructive disease. He has normal LV function. He is on an exercise program.  His risk factors are very nicely modified. I made no change in medications. I will plan on seeing him in one year unless a problem would develop. Thank you very much for allowing me the privilege of seeing Mr. Jinny Curling. If you have any questions on my thoughts, please do not hesitate to contact me.     Sincerely,        Rosaura Haddad MD    D: 02/06/2023 10:44:47     T: 02/06/2023 10:49:21     [email or web address]  Job#: [id number]   Doc#: [id number]

## 2023-02-09 ENCOUNTER — HOSPITAL ENCOUNTER (EMERGENCY)
Age: 68
Discharge: HOME | End: 2023-02-09
Attending: EMERGENCY MEDICINE
Payer: MEDICARE

## 2023-02-09 ENCOUNTER — APPOINTMENT (OUTPATIENT)
Dept: GENERAL RADIOLOGY | Age: 68
End: 2023-02-09
Payer: MEDICARE

## 2023-02-09 VITALS
HEART RATE: 66 BPM | SYSTOLIC BLOOD PRESSURE: 119 MMHG | DIASTOLIC BLOOD PRESSURE: 68 MMHG | WEIGHT: 315 LBS | HEIGHT: 73 IN | RESPIRATION RATE: 18 BRPM | BODY MASS INDEX: 41.75 KG/M2 | OXYGEN SATURATION: 92 % | TEMPERATURE: 98.3 F

## 2023-02-09 DIAGNOSIS — S29.011A MUSCLE STRAIN OF CHEST WALL, INITIAL ENCOUNTER: ICD-10-CM

## 2023-02-09 DIAGNOSIS — J40 BRONCHITIS: Primary | ICD-10-CM

## 2023-02-09 LAB
ABSOLUTE EOS #: 0.1 K/UL (ref 0–0.4)
ABSOLUTE LYMPH #: 1.5 K/UL (ref 1–4.8)
ABSOLUTE MONO #: 1 K/UL (ref 0–1)
ALBUMIN SERPL-MCNC: 4.3 G/DL (ref 3.5–5.2)
ALP SERPL-CCNC: 57 U/L (ref 40–129)
ALT SERPL-CCNC: 39 U/L (ref 5–41)
ANION GAP SERPL CALCULATED.3IONS-SCNC: 14 MMOL/L (ref 9–17)
AST SERPL-CCNC: 43 U/L
BASOPHILS # BLD: 0 % (ref 0–2)
BASOPHILS ABSOLUTE: 0 K/UL (ref 0–0.2)
BILIRUB SERPL-MCNC: 0.6 MG/DL (ref 0.3–1.2)
BUN SERPL-MCNC: 10 MG/DL (ref 8–23)
BUN/CREAT BLD: 10 (ref 9–20)
CALCIUM SERPL-MCNC: 9.8 MG/DL (ref 8.6–10.4)
CHLORIDE SERPL-SCNC: 94 MMOL/L (ref 98–107)
CO2 SERPL-SCNC: 24 MMOL/L (ref 20–31)
CREAT SERPL-MCNC: 1 MG/DL (ref 0.7–1.2)
DIFFERENTIAL TYPE: YES
EOSINOPHILS RELATIVE PERCENT: 1 % (ref 0–5)
GFR SERPL CREATININE-BSD FRML MDRD: >60 ML/MIN/1.73M2
GLUCOSE SERPL-MCNC: 325 MG/DL (ref 70–99)
HCT VFR BLD AUTO: 43.8 % (ref 41–53)
HGB BLD-MCNC: 14.7 G/DL (ref 13.5–17.5)
LYMPHOCYTES # BLD: 14 % (ref 13–44)
MCH RBC QN AUTO: 31.1 PG (ref 26–34)
MCHC RBC AUTO-ENTMCNC: 33.6 G/DL (ref 31–37)
MCV RBC AUTO: 92.6 FL (ref 80–100)
MONOCYTES # BLD: 9 % (ref 5–9)
PDW BLD-RTO: 13.6 % (ref 12.1–15.2)
PLATELET # BLD AUTO: 203 K/UL (ref 140–450)
POTASSIUM SERPL-SCNC: 4.3 MMOL/L (ref 3.7–5.3)
PROT SERPL-MCNC: 7.1 G/DL (ref 6.4–8.3)
RBC # BLD: 4.73 M/UL (ref 4.5–5.9)
SEG NEUTROPHILS: 76 % (ref 39–75)
SEGMENTED NEUTROPHILS ABSOLUTE COUNT: 7.9 K/UL (ref 2.1–6.5)
SODIUM SERPL-SCNC: 132 MMOL/L (ref 135–144)
TROPONIN I SERPL DL<=0.01 NG/ML-MCNC: 14 NG/L (ref 0–22)
TROPONIN I SERPL DL<=0.01 NG/ML-MCNC: 17 NG/L (ref 0–22)
WBC # BLD AUTO: 10.5 K/UL (ref 3.5–11)

## 2023-02-09 PROCEDURE — 36415 COLL VENOUS BLD VENIPUNCTURE: CPT

## 2023-02-09 PROCEDURE — 71046 X-RAY EXAM CHEST 2 VIEWS: CPT

## 2023-02-09 PROCEDURE — 99285 EMERGENCY DEPT VISIT HI MDM: CPT

## 2023-02-09 PROCEDURE — 80053 COMPREHEN METABOLIC PANEL: CPT

## 2023-02-09 PROCEDURE — 84484 ASSAY OF TROPONIN QUANT: CPT

## 2023-02-09 PROCEDURE — 6370000000 HC RX 637 (ALT 250 FOR IP): Performed by: EMERGENCY MEDICINE

## 2023-02-09 PROCEDURE — 85025 COMPLETE CBC W/AUTO DIFF WBC: CPT

## 2023-02-09 PROCEDURE — 93005 ELECTROCARDIOGRAM TRACING: CPT | Performed by: EMERGENCY MEDICINE

## 2023-02-09 RX ORDER — AZITHROMYCIN 250 MG/1
TABLET, FILM COATED ORAL
Qty: 1 PACKET | Refills: 0 | Status: SHIPPED | OUTPATIENT
Start: 2023-02-09

## 2023-02-09 RX ORDER — IBUPROFEN 200 MG
600 TABLET ORAL ONCE
Status: COMPLETED | OUTPATIENT
Start: 2023-02-09 | End: 2023-02-09

## 2023-02-09 RX ORDER — IBUPROFEN 600 MG/1
600 TABLET ORAL EVERY 6 HOURS PRN
Qty: 20 TABLET | Refills: 0 | Status: SHIPPED | OUTPATIENT
Start: 2023-02-09

## 2023-02-09 RX ADMIN — IBUPROFEN 600 MG: 200 TABLET, FILM COATED ORAL at 13:27

## 2023-02-09 ASSESSMENT — ENCOUNTER SYMPTOMS
COUGH: 1
SORE THROAT: 0
VOMITING: 0
COLOR CHANGE: 0
TROUBLE SWALLOWING: 0
DIARRHEA: 0
NAUSEA: 0
EYE REDNESS: 0
ABDOMINAL PAIN: 0
EYE PAIN: 0
BACK PAIN: 1
SHORTNESS OF BREATH: 1

## 2023-02-09 ASSESSMENT — PAIN DESCRIPTION - ORIENTATION
ORIENTATION: RIGHT
ORIENTATION: RIGHT

## 2023-02-09 ASSESSMENT — LIFESTYLE VARIABLES
HOW OFTEN DO YOU HAVE A DRINK CONTAINING ALCOHOL: NEVER
HOW OFTEN DO YOU HAVE A DRINK CONTAINING ALCOHOL: NEVER
HOW MANY STANDARD DRINKS CONTAINING ALCOHOL DO YOU HAVE ON A TYPICAL DAY: PATIENT DOES NOT DRINK

## 2023-02-09 ASSESSMENT — PAIN SCALES - GENERAL
PAINLEVEL_OUTOF10: 7

## 2023-02-09 ASSESSMENT — PAIN DESCRIPTION - DESCRIPTORS: DESCRIPTORS: DISCOMFORT

## 2023-02-09 ASSESSMENT — PAIN DESCRIPTION - LOCATION
LOCATION: SHOULDER
LOCATION: OTHER (COMMENT)
LOCATION: SHOULDER

## 2023-02-09 ASSESSMENT — PAIN - FUNCTIONAL ASSESSMENT: PAIN_FUNCTIONAL_ASSESSMENT: 0-10

## 2023-02-09 ASSESSMENT — PAIN DESCRIPTION - PAIN TYPE: TYPE: ACUTE PAIN

## 2023-02-09 NOTE — ED PROVIDER NOTES
[facility] ED  eMERGENCY dEPARTMENT eNCOUnter      Pt Name: Mark Hogue  MRN: [id number]  [place] 1955  Date of evaluation: 2/9/2023  Provider: Tre Bruce MD    [address] [place] [address]       Chief Complaint   Patient presents with    Back Pain     Back pain for awhile on the right side. Hurts when he takes a deep breath. Patient is a 80-year-old male who presents to the emergency department complaining of right back pain. Patient states he has some pain in the right flank and back and states it hurts when he takes a deep breath or coughs. He states he has had a viral upper respiratory type infection over the past few days and states it hurts more when he takes a deep breath or coughs. He denies any fever or chills currently. He denies nausea or vomiting. He states he has slight shortness of breath. Nursing Notes were reviewed. REVIEW OF SYSTEMS    (2-9 systems for level 4, 10 or more for level 5)     Review of Systems   Constitutional:  Negative for chills and fever. HENT:  Negative for ear pain, sore throat and trouble swallowing. Eyes:  Negative for pain and redness. Respiratory:  Positive for cough and shortness of breath. Cardiovascular:  Negative for chest pain and palpitations. Gastrointestinal:  Negative for abdominal pain, diarrhea, nausea and vomiting. Genitourinary:  Negative for dysuria and frequency. Musculoskeletal:  Positive for back pain. Negative for neck pain. Skin:  Negative for color change and rash. Neurological:  Negative for dizziness, syncope and headaches. Psychiatric/Behavioral:  Negative for hallucinations and suicidal ideas. Except as noted above the remainder of the review of systems was reviewed and negative.        PAST MEDICAL HISTORY     Past Medical History:   Diagnosis Date    Asthma     Bronchitis     CAD (coronary artery disease)     COPD (chronic obstructive pulmonary disease) ([facility] [address] 8/12/2011    Depression 8/12/2011 Diabetes mellitus ([facility] [address]     GERD (gastroesophageal reflux disease) 8/12/2011    Hyperlipidemia 8/12/2011    Hypertension     Hypothyroid 8/12/2011    Lower back pain     Nicotine addiction 8/12/2011    Sleep apnea          SURGICAL HISTORY       Past Surgical History:   Procedure Laterality Date    CARDIAC CATHETERIZATION Left 01/21/2015    Severe left main trunk disease extending into the ostium of the left anterior descending of 80% involving the ostium of the intermediate & atrioventricular branch of the circumflex. 60% disease in the atrioventricular branch of the circumflex in the midportion. 40% disease in the right coronary artery. Normal left ventricular function, ejection fraction of 60%    CARDIAC CATHETERIZATION Left 12/20/2017    Dr. Siria Patel @ [facility]--70% left main trunck & ostial LAD & high lateral circumflex disease. Patent LIMA to the LAD. Patent vein graft to the high lateral circumflex. 60% disease in the AV branch of the circumflex that terminates in a large posterolateral branch with an FFR of 1.0, indicating no significant stenosis. 40% disease in the right coronary artery. Ejection fraction 50-55%. CARDIAC CATHETERIZATION Left 11/04/2022    Dr. Siria Patel @ [facility]--Continue medical therapy    COLONOSCOPY N/A 03/05/2019    COLONOSCOPY POLYPECTOMY HOT BIOPSY performed by Laura Pettit MD at [address] [place] [address] GRAFT  02/04/2015    3 vessel CABG using left internal mammary artery to the left anterior descending coronary artery with a reverse saphenous vein aortocoronary sequential graft side to side to the ramus intermedius coronary artery & end to side to the first obtuse marginal coronary artery.     OTHER SURGICAL HISTORY  03/21/2016    I & D- cyst- posterior neck    SPINE SURGERY      THYROID LOBECTOMY      UPPER GASTROINTESTINAL ENDOSCOPY N/A 03/05/2019    EGD BIOPSY performed by Laura Pettit MD at [place] [address]     Patient has no known allergies. FAMILY HISTORY       Family History   Problem Relation Age of Onset    Diabetes Mother     Heart Disease Mother     Diabetes Father     Heart Disease Father     Heart Disease Brother           SOCIAL HISTORY       Social History     Socioeconomic History    Marital status:      Spouse name: None    Number of children: None    Years of education: None    Highest education level: None   Tobacco Use    Smoking status: Former     Packs/day: 1.00     Years: 45.00     Pack years: 45.00     Types: Cigarettes     Quit date: 2014     Years since quittin.1    Smokeless tobacco: Never   Vaping Use    Vaping Use: Never used   Substance and Sexual Activity    Alcohol use: No    Drug use: No     Social Determinants of Health     Financial Resource Strain: Low Risk     Difficulty of Paying Living Expenses: Not hard at all   Food Insecurity: No Food Insecurity    Worried About Leadjini in the Last Year: Never true    Ran Out of Food in the Last Year: Never true   Physical Activity: Sufficiently Active    Days of Exercise per Week: 3 days    Minutes of Exercise per Session: 60 min           PHYSICAL EXAM    (up to 7 for level 4, 8 ormore for level 5)     ED Triage Vitals [23 1323]   BP Temp Temp Source Heart Rate Resp SpO2 Height Weight   (!) 155/84 98.3 °F (36.8 °C) Oral 76 20 96 % -- (!) 317 lb 8 oz (144 kg)       Physical Exam    Physical    Vital signs and nursing notes were reviewed as well as the social, family, and past medical history. Gen. appearance: Patient is alert and oriented and in no acute distress    Head: Atraumatic, normocephalic    Neck: Supple, trachea/thyroid normal    EENT: PERRLA, EOMI, conjunctiva normal.    Skin: Warm and dry with no rash    Cardiovascular: Heart RRR, no gallops or rubs, no aortic enlargement or bruits noted. Respiratory: Lungs clear, no wheezing, no rales, normal breath sounds.     Gastrointestinal: Abdomen nontender, bowel sounds normal, no rebound/guarding/distention or mass    Musculoskeletal: No tenderness in the extremities, no back or hip pain. Neurological: Patient is alert and oriented ×3, no focal motor or sensory deficits noted    DIAGNOSTIC RESULTS         LABS:  Labs Reviewed   CBC WITH AUTO DIFFERENTIAL - Abnormal; Notable for the following components:       Result Value    Seg Neutrophils 76 (*)     Segs Absolute 7.90 (*)     All other components within normal limits   COMPREHENSIVE METABOLIC PANEL - Abnormal; Notable for the following components:    Glucose 325 (*)     Sodium 132 (*)     Chloride 94 (*)     AST 43 (*)     All other components within normal limits   TROPONIN   TROPONIN       All other labs were within normal range or not returned as of this dictation. EMERGENCY DEPARTMENT COURSE and DIFFERENTIAL DIAGNOSIS/MDM:   Vitals:    Vitals:    02/09/23 1445 02/09/23 1500 02/09/23 1515 02/09/23 1530   BP: (!) 126/97 133/67 (!) 143/73 124/62   Pulse: 69 67 65 66   Resp: 14 13 13 16   Temp:       TempSrc:       SpO2: 91% 93% 91% 94%   Weight:       Height:                     REASSESSMENT      ED patient's chest x-ray was negative and did not show any pneumonia or other abnormalities. Given patient's cough and symptoms of an upper respiratory infection we will treat as bronchitis with Zithromax. Patient's labs including troponin were negative. I discussed with the patient we will discharge to home    PROCEDURES:  Unless otherwise noted below, none     Procedures    FINAL IMPRESSION      1. Bronchitis    2. Muscle strain of chest wall, initial encounter          DISPOSITION/PLAN   DISPOSITION Decision To Discharge 02/09/2023 04:21:52 PM      PATIENT REFERRED TO:  Cristal Hernandez MD  [address] [place] [address]  [phone]    In 2 days      DISCHARGE MEDICATIONS:  New Prescriptions    AZITHROMYCIN (ZITHROMAX Z-SALVATORE) 250 MG TABLET    Take 1 z-salvatore as directed.     IBUPROFEN (ADVIL;MOTRIN) 600 MG TABLET    Take 1 tablet by mouth every 6 hours as needed for Pain          (Please note that portions ofthis note were completed with a voice recognition program.  Efforts were made to edit the dictations but occasionally words are mis-transcribed.)    Tre Bruce MD(electronically signed)  Attending Emergency Physician            Tre Bruce MD  02/09/23 1019

## 2023-02-09 NOTE — ED NOTES
Ticket to ride completed.  The following information was reported off:  Name  Allergies  Orientation Level  Destination  Safety Issues  Code Status  Oxygen Requirements  Special needs including mobility, language, communication         Johann Mendoza RN  02/09/23 9451

## 2023-02-10 ENCOUNTER — HOSPITAL ENCOUNTER (OUTPATIENT)
Dept: CARDIAC REHAB | Age: 68
Setting detail: THERAPIES SERIES
Discharge: HOME | End: 2023-02-10
Payer: MEDICARE

## 2023-02-10 LAB
EKG ATRIAL RATE: 73 BPM
EKG P AXIS: 52 DEGREES
EKG P-R INTERVAL: 160 MS
EKG Q-T INTERVAL: 382 MS
EKG QRS DURATION: 90 MS
EKG QTC CALCULATION (BAZETT): 420 MS
EKG R AXIS: 44 DEGREES
EKG T AXIS: 85 DEGREES
EKG VENTRICULAR RATE: 73 BPM

## 2023-02-10 PROCEDURE — 93798 PHYS/QHP OP CAR RHAB W/ECG: CPT

## 2023-02-10 PROCEDURE — 93010 ELECTROCARDIOGRAM REPORT: CPT | Performed by: INTERNAL MEDICINE

## 2023-02-13 ENCOUNTER — HOSPITAL ENCOUNTER (OUTPATIENT)
Dept: CARDIAC REHAB | Age: 68
Setting detail: THERAPIES SERIES
Discharge: HOME | End: 2023-02-13
Payer: MEDICARE

## 2023-02-13 PROCEDURE — 93798 PHYS/QHP OP CAR RHAB W/ECG: CPT

## 2023-02-15 ENCOUNTER — HOSPITAL ENCOUNTER (OUTPATIENT)
Dept: CARDIAC REHAB | Age: 68
Setting detail: THERAPIES SERIES
Discharge: HOME | End: 2023-02-15
Payer: MEDICARE

## 2023-02-15 NOTE — PROGRESS NOTES
Phase II Cardiac Rehab Individualized Treatment Plan-Discharge    Patient Name: Justice Torres  ACCOUNT #: [id number]  Date of Initial Assessment: 11/14/2022  Diagnosis: Stable Angina Pectoris   Onset Date: 11/4/22  Referring Physician: DR NORTON  Risk Stratification: MODERATE  Session Number: 36  EXERCISE    Stages of Change:   [] pre-contemplation  [x] Action   [] Contemplate   [] Maintainence   [] Prep   [] Relapse          Exercise Prescription:  Mode: [x] TM [x] UBE [x] STP [] EL [x] R  Frequency: 3 DAYS PER WEEK  Duration: 31-60 MINUTES  Intensity: 4.6 avg    Progression: INCREASE DURATION PER ABOVE F.I.T.T. Rx  PARAMETERS ON AVG OF 5-10 MIN / 1-2 WEEKS FOR THE FIRST 4-6 WEEKS.  AFTER 3-4 WEEKS ARE COMPLETED, CONTINUE TO GRADUALLY INCREASE F.I.T. PARAMETERS GRADUALLY UPWARD AT THE ESTABLISHED DURATION ON AVERAGE 0.5-1.0 METS PER 30 DAYS OVER THE COARSE OF REMAINING PROGRAM AS ESTABLISHED BY PT-CENTERED GOALS AND GUIDELINES.    Target HR 90 - 102 bpm               [] Angina with Exertion           [x] Resistance Training  Introduce and progress 8-12 bilateral UE and LE progressive resistance exercises at 1-3 sets per lift, on 2-3 non-consecutive days using weights/ green  therabands AND WTS TO 8-24 # for 8-15 reps to progressive overload by increasing resistance once reps progressed to at least 15 reps on at least 2 occasions     Hypertension:  [x] Yes  [] No  Resting BP: 148/68  Peak Exercise BP: 168/78  [] Med Change?    Intervention:  Home Exercise:  Type: Walking  Duration: 30-60 minutes  Frequency: 2-3 DAYS PER WEEK   [x] Resistance Training  Introduce and progress 8-12 bilateral UE and LE progressive resistance exercises at 1-3 sets per lift, on 2-3 non-consecutive days using weights/ green  therabands AND WTS TO 8-24 # for 8-15 reps to progressive overload by increasing resistance once reps progressed to at least 15 reps on at least 2 occasions     Depression Screening:  PHQ-9 SCORE: 2    Education:    [x] Education Goals met        Target Goal:   -Individual Exercise Plan  -Bp< 130/80  -Aerobic active 30 + minutes 5-7 days per week    Nutrition    Stages of Change:   [] pre-contemplation  [x] Action   [] Contemplate   [] Maintainenc   [] Prep   [] Relapse    Lipids:   0 Result Notes    1  Topic  Component Ref Range & Units 10/25/22 1108 8/2/22 0806 8/7/21 0742 8/6/20 0954 10/15/19 0904 10/22/18 0951 11/22/17 0956   Cholesterol <200 mg/dL 130  138 CM  122 CM  122 CM  153 CM  160 CM  174 CM    Comment:     Cholesterol Guidelines:       <200  Desirable    200-240  Borderline       >240  Undesirable        HDL >40 mg/dL 33 Low   30 Low  CM  30 Low  CM  30 Low  CM  28 Low  CM  35 Low  CM  37 Low  CM    Comment:     HDL Guidelines:     <40     Undesirable    40-59    Borderline     >59     Desirable        LDL Cholesterol 0 - 130 mg/dL 49  58 CM  54 CM  42 CM       CM  79 CM  102 CM    Comment:     LDL Guidelines:      <100    Desirable    100-129   Near to/above Desirable    130-159   Borderline       >159   Undesirable       Direct (measured) LDL and calculated LDL are not interchangeable tests. Chol/HDL Ratio <5 3.9  4.6 CM  4.1 CM  4.1 CM  5.5 High  CM  4.6 CM  4.7 CM    Comment:        Triglycerides <150 mg/dL 242 High   251 High  CM  192 High  CM  252 High  CM  478 High  CM  230 High  CM  173 High  CM    Comment:     Triglyceride Guidelines:      <150   Desirable    150-199  Borderline    200-499  High      >499   Very high    Based on AHA Guidelines for fasting triglyceride, October 2012. VLDL    NOT REPORTED High  R  NOT REPORTED High  R   R  NOT REPORTED R  NOT [email or web address]              Specimen Collected: 10/25/22 11:08 EDT Last Resulted: 10/25/22 18:33 EDT           [] Med Change?      Diabetes / Prediabetes:  [x] Yes  [] No  FBS: 184      Weight Management:  Weight: 308  Height: 73 IN (3.43 M2)  BMI: 40.8  Waist Circumference: 54.5 IN  Wt Goal: 1-2 lbs/wk  [x] Loss  [] Gain  [] Maintain Current Wt. Special Diet: Special Diet:  1,800 Kcal / day, 2 GM Na+, 30% total fat, <10% saturated fat, 25-35 GM fiber, cholesterol reduced, balanced nutrition plan to be promoted and taught in rehab    Rate My Plate: 53  Intervention:   [] Dietitian Consult       [x] Nurse/Patient Discussion     [x] Diet Class           [] Referred to Diabetes Education     Education:  [x] Education Goals met    Target Goal:  -LDL-C<100 if triglycerides are > 200  -LDL-C < 70 for high risk patients  -HbA1c < 7%  -BMI < 25   Education    Stages of Change:    [] pre-contemplation  [x] Action   [] Contemplate   [] Maintainence   [] Prep   [] Relapse    Knowledge test score: 100%      Family support: [x] Yes  [] No    Tobacco use: [] Yes  [x] No    Intervention:  [] Referred to smoking cessation counselor     [] Individual education and counseling  [] Tobacco adjunct  [] Informed of education class schedule     Education:   [x] Education goals met    Target Goal:  -Complete cessation of tobacco use (if applicable)  -Continued risk factor modifications  -Recognizing signs/symptoms to report  -Proper use of meds    Psychosocial  Stages of Change:    [] pre-contemplation  [x] Action   [] Contemplate   [] Maintainence   [] Prep   [] Relapse    Psychosocial Test:  Tool Used: [employer] Life  Score: 24.5  Depression screening score PHQ-9: 2  []Medication change? NONE    Education:    [x] Education Goals met    Target Goal:  -Assess presence or absence of depression using a valid screening tool. -Maximize coping skills.  -Positive support system.     Preventative Medication:   [x] Aspirin       [x] Beta Blockade      [x] Statin or other lipid lowering agent     [] Clopidogrel   [x] ACE Inhibitor   [x] ACE Receptor Blocker   [] Antianginal    [] Calcium Channel Blocker   [] Other anticoagulation medications     Fall Risk assess: [x] Yes  [] No / LOW FALL RISK  Assistive Device:   [] Cane  [] Eldemetriusa Shivi [] Wheel Chair  [] Gait belt    Discharge Patient/Program Goal Accomplishments:   Overall Personal Program Goal:  \" IMPROVE MY BREATHING \"   [] Initial Goal   [] Progressing to Goal  [] Not Meeting--Needs Reinforcement  [x] Goal Met  [] Goal Not Met     To increase stamina, strength, and flexibility by exercising 31-50 total minutes by engaging in aerobic, resistance, and flexibility workout modalities with the goal of progressively achieving at least 0.5 to 1.0 metabolic equivalant improvement in the next 30 days as evidenced by daily session reports / [] Initial Goal     [] Progressing to Goal  [] Not Meeting--Needs Reinforcement   [x] Goal Met  [] Goal Not Met  Date: 11/14/22 2/13/23     PRE-PROGRAM POST-PROGRAM % Improvement   AVERAGE ENDURANCE LEVEL (mets): 3.5 4.6 31%   MAXIMUM ENDURANCE LEVEL (mets): 4.2 7.1 69%        To achieve and progress prescribed exercise frequency, intensity, time, and type in the next 30 days based upon initial evaluation and submaximal graded exercise results as evidenced by the attaining and maintaining the prescribed target heart rate range, a Michael rating of perceived exertion between 11 and 16, duration of >30 - 50 minutes using multiple exercise modes for at least 3 - 5 days per week to accumulate a minimum total of 2.5 hours per week of moderate aerobic intensity exercise, as tolerated, evidenced by daily session reports and home workout log /         [] Initial Goal   [] Progressing to Goal  [] Not Meeting--Needs Reinforcement   [x] Goal Met  [] Goal Not Met        To gradually and progressively lose 2 - 4 lb of body weight in the next 30 days through moderating nutrional intake and performing regular aerobic and strength training exercises as prescribed with improvement evidenced by daily session report comparison / [] Initial Goal   [] Progressing to Goal  [] Not Meeting--Needs Reinforcement  [] Goal Met  [x] Goal Not Met  BODY WEIGHT (lbs) 308 311 -1%        To decrease waist circumference by 5% by program completion if waist measurement is > or = 40 inches (male) / > or = 35 inches (female) as evidence by the [employer] /     [] Initial Goal   [] Progressing to Goal  [] Not Meeting--Needs Reinforcement  [] Goal Met  [x] Goal Not Met  WAIST CIRCUMFERENCE (inches) 54 54.5 -1%        To introduce and progress 8-10 different bilateral UE and LE progressive resistance exercises focused on major muscle groups using 1-3 sets each per lift, on 2-3 non-consecutive days implementing free and machine weights and Blue therabands, as appropriate, with a resistance of 40-60% 1-repetition maximum or 10 -15 repetitions to progressive overload and increasing resistance once repetitions have progressed to 15 reps and feel fairly light on 2 prior occasions in the next 30 days / [] Initial Goal   [] Progressing to Goal  [] Not Meeting--Needs Reinforcement  [x] Goal Met  [] Goal Not Met     To achieve and maintain an optimal average resting blood pressure of <130 / 80 mmHg, or as indicated by this patient's referring provider, in the next 30 days / [] Initial Goal   [] Progressing to Goal  [] Not Meeting--Needs Reinforcement  [x] Goal Met  [] Goal Not Met     To strive for blood lipid optimization with an LDL-C of <100 mg/dL or  LDL 70 mg/dL, an HDL-C of > or = 40 mg/dL for men and > or = 50 mg/dL for women, and a triglyceride level of <150 mg/dL via lifestyle education, behavioral modification, and medication compliance / [x] Initial Goal   [] Progressing to Goal  [] Not Meeting--Needs Reinforcement  [] Goal Met  [] Goal Not Met  NO NEW VALUES     -To develop regular home aerobic exercise program for 20 - 60 minutes at least 2 non-rehab days per week, excluding  5 - 10 minutes warm-up and cool-down periods, within the next 30 days, being tracked on home workout log / [] Initial Goal   [] Progressing to Goal  [] Not Meeting--Needs Reinforcement  [x] Goal Met  [] Goal Not Met     To strive for a lower daily fasting blood glucose measures to <131 and random after meal measures to <181, if diabetic, at home via lifestyle educaton, behavior modification, and medication compliance in the next 30 days as tracked per patient's self-report / [] Initial Goal   [] Progressing to Goal  [] Not Meeting--Needs Reinforcement  [] Goal Met  [x] Goal Not Met  184     To reduce self-reported psycho-social feelings of stress in the next 30 days as evidenced by pre- and post- surveys and by routine rounding with patient to ascertain subjective improvements / [] Initial Goal   [] Progressing to Goal  [] Not Meeting--Needs Reinforcement  [x] Goal Met  [] Goal Not Met  Q.OL. HEALTH & FUNCTIONING RATING 18.20 24.60 35%   Q.O.L. SOCIAL & ECONOMIC RATING 22.40 22.10 -1%   Q.O.L. PSYCHOLOGICAL/SPIRITUAL RATING 23.60 26.40 12%   Q. O.L FAMILY SELF-RATING 25.90 25.80 0%   Q.O.L. OVERALL INDEX SELF-RATING 21.30 24.50 15%   HEART KNOWLEDGE TEST (% correct) 60.00 60.00 0%   PHQ-9 DEPRESSION AND MOOD RATING 7.00 2.00 71%   CAROLYN - 7 ANXIETY RATING 11.00 7.00 36%        In the next 30-days, to eat on average at least 2 servings of fruit per day and 4-5 servings of vegetables per day as evidenced by pre- and post-program nutriton survey and routine rounding with patient to ascertain progression toward goal per patient's self report, food diary, and Rate-your-Plate screening survey / [] Initial Goal   [] Progressing to Goal  [] Not Meeting--Needs Reinforcement  [x] Goal Met  [] Goal Not Met     To strive to eat < or = 30% of daily caloric intake of total fat and <10% of daily caloric saturated fat tracked by patient's self-report, food diary, and Rate-your-Plate screening survey / [] Initial Goal   [] Progressing to Goal  [] Not Meeting--Needs Reinforcement  [] Goal Met  [x] Goal Not Met     To have patient demonstrate knowledge about risk factor reduction, lifestyle modification, and heart health strategies with > / = 80% accuracy via pre- and post-program Test your [address] screening tool / [] Initial Goal   [] Progressing to Goal  [] Not Meeting--Needs Reinforcement  [] Goal Met  [x] Goal Not Met  HEART KNOWLEDGE TEST (% correct) 60.00 60.00 0%       Physician Changes/Comments:    Electronically signed by Sharon Robison RCP on 2/15/23 at 3:06 PM EST  Cardiac Rehab Staff

## 2023-02-17 ENCOUNTER — APPOINTMENT (OUTPATIENT)
Dept: CARDIAC REHAB | Age: 68
End: 2023-02-17
Payer: MEDICARE

## 2023-02-20 ENCOUNTER — APPOINTMENT (OUTPATIENT)
Dept: CARDIAC REHAB | Age: 68
End: 2023-02-20
Payer: MEDICARE

## 2023-02-22 ENCOUNTER — APPOINTMENT (OUTPATIENT)
Dept: CARDIAC REHAB | Age: 68
End: 2023-02-22
Payer: MEDICARE

## 2023-02-24 ENCOUNTER — APPOINTMENT (OUTPATIENT)
Dept: CARDIAC REHAB | Age: 68
End: 2023-02-24
Payer: MEDICARE

## 2023-02-27 ENCOUNTER — APPOINTMENT (OUTPATIENT)
Dept: CARDIAC REHAB | Age: 68
End: 2023-02-27
Payer: MEDICARE

## 2023-02-27 DIAGNOSIS — E78.2 MIXED HYPERLIPIDEMIA: ICD-10-CM

## 2023-02-27 RX ORDER — FENOFIBRATE 145 MG/1
TABLET, COATED ORAL
Qty: 30 TABLET | Refills: 3 | Status: SHIPPED | OUTPATIENT
Start: 2023-02-27

## 2023-03-03 ENCOUNTER — OFFICE VISIT (OUTPATIENT)
Dept: FAMILY MEDICINE CLINIC | Age: 68
End: 2023-03-03

## 2023-03-03 VITALS
DIASTOLIC BLOOD PRESSURE: 64 MMHG | OXYGEN SATURATION: 94 % | SYSTOLIC BLOOD PRESSURE: 118 MMHG | RESPIRATION RATE: 18 BRPM | WEIGHT: 314 LBS | HEIGHT: 73 IN | HEART RATE: 68 BPM | BODY MASS INDEX: 41.62 KG/M2

## 2023-03-03 DIAGNOSIS — E11.9 TYPE 2 DIABETES MELLITUS WITHOUT COMPLICATION, WITHOUT LONG-TERM CURRENT USE OF INSULIN (HCC): Primary | ICD-10-CM

## 2023-03-03 DIAGNOSIS — J44.1 COPD EXACERBATION (HCC): ICD-10-CM

## 2023-03-03 DIAGNOSIS — I10 ESSENTIAL HYPERTENSION: ICD-10-CM

## 2023-03-03 DIAGNOSIS — F41.9 ANXIETY: ICD-10-CM

## 2023-03-03 DIAGNOSIS — E03.9 ACQUIRED HYPOTHYROIDISM: ICD-10-CM

## 2023-03-03 DIAGNOSIS — E78.2 MIXED HYPERLIPIDEMIA: ICD-10-CM

## 2023-03-03 DIAGNOSIS — J44.9 CHRONIC OBSTRUCTIVE PULMONARY DISEASE, UNSPECIFIED COPD TYPE (HCC): ICD-10-CM

## 2023-03-03 LAB — HBA1C MFR BLD: 10 %

## 2023-03-03 RX ORDER — LANOLIN ALCOHOL/MO/W.PET/CERES
CREAM (GRAM) TOPICAL
COMMUNITY
Start: 2023-02-15

## 2023-03-03 RX ORDER — GLIPIZIDE 5 MG/1
5 TABLET ORAL 2 TIMES DAILY
Qty: 60 TABLET | Refills: 3 | Status: SHIPPED | OUTPATIENT
Start: 2023-03-03

## 2023-03-03 RX ORDER — PAROXETINE HYDROCHLORIDE 20 MG/1
20 TABLET, FILM COATED ORAL 2 TIMES DAILY
Qty: 180 TABLET | Refills: 3 | Status: SHIPPED | OUTPATIENT
Start: 2023-03-03

## 2023-03-03 RX ORDER — LOPERAMIDE HYDROCHLORIDE 2 MG/1
CAPSULE ORAL
COMMUNITY
Start: 2023-02-27

## 2023-03-03 RX ORDER — ALPRAZOLAM 0.5 MG/1
TABLET ORAL
COMMUNITY
Start: 2023-01-24

## 2023-03-03 SDOH — ECONOMIC STABILITY: INCOME INSECURITY: HOW HARD IS IT FOR YOU TO PAY FOR THE VERY BASICS LIKE FOOD, HOUSING, MEDICAL CARE, AND HEATING?: NOT HARD AT ALL

## 2023-03-03 SDOH — ECONOMIC STABILITY: FOOD INSECURITY: WITHIN THE PAST 12 MONTHS, YOU WORRIED THAT YOUR FOOD WOULD RUN OUT BEFORE YOU GOT MONEY TO BUY MORE.: NEVER TRUE

## 2023-03-03 SDOH — ECONOMIC STABILITY: HOUSING INSECURITY
IN THE LAST 12 MONTHS, WAS THERE A TIME WHEN YOU DID NOT HAVE A STEADY PLACE TO SLEEP OR SLEPT IN A SHELTER (INCLUDING NOW)?: NO

## 2023-03-03 SDOH — ECONOMIC STABILITY: FOOD INSECURITY: WITHIN THE PAST 12 MONTHS, THE FOOD YOU BOUGHT JUST DIDN'T LAST AND YOU DIDN'T HAVE MONEY TO GET MORE.: NEVER TRUE

## 2023-03-03 ASSESSMENT — ENCOUNTER SYMPTOMS
CONSTIPATION: 0
HOARSE VOICE: 0
DIFFICULTY BREATHING: 1
BACK PAIN: 1
BLURRED VISION: 0
NAUSEA: 0
ABDOMINAL PAIN: 0
COUGH: 1
BLOOD IN STOOL: 0
SORE THROAT: 0
ALLERGIC/IMMUNOLOGIC NEGATIVE: 1
WHEEZING: 1
SHORTNESS OF BREATH: 1

## 2023-03-03 ASSESSMENT — COPD QUESTIONNAIRES: COPD: 1

## 2023-03-03 NOTE — PROGRESS NOTES
HPI Notes    Name: Arya Fay  : 1955         Chief Complaint:     Chief Complaint   Patient presents with    Diabetes     Patient states his levels are up and down, not consistent. Hypertension    Anxiety       History of Present Illness:        Ho Mendez presents to office to follow up for DM, HTN, Anxiety , COPD, Hypothyroidism     States since we stopped Metformin due to him having diarrhea, his blood sugar levels went up into 200s-300s. Currently on Amaryl and it's not helping    Laura Valdez presents as a follow up on anxiety. Current medication for anxiety includes Paxil. Laura Valdez has been on this medication for many years. The medication is  being tolerated well. Since starting the medication the symptoms of anxiety have improved but lately not helping as much as before  Laura Valdez  is not in counciling. Diabetes  He presents for his follow-up diabetic visit. He has type 2 diabetes mellitus. His disease course has been worsening. There are no hypoglycemic associated symptoms. Pertinent negatives for hypoglycemia include no headaches or nervousness/anxiousness. Pertinent negatives for diabetes include no blurred vision, no chest pain, no polydipsia, no polyuria and no weakness. There are no hypoglycemic complications. Symptoms are worsening. Diabetic complications include heart disease and peripheral neuropathy. Risk factors for coronary artery disease include diabetes mellitus, dyslipidemia, hypertension, male sex, obesity and family history. Current diabetic treatment includes oral agent (monotherapy). He is compliant with treatment all of the time. His weight is stable. He is following a generally healthy diet. His home blood glucose trend is increasing steadily. His breakfast blood glucose range is generally >200 mg/dl. His dinner blood glucose range is generally >200 mg/dl. His overall blood glucose range is >200 mg/dl. An ACE inhibitor/angiotensin II receptor blocker is being taken.  Eye exam is current. Hypertension  This is a chronic problem. The current episode started more than 1 year ago. The problem is unchanged. The problem is controlled. Associated symptoms include shortness of breath. Pertinent negatives include no blurred vision, chest pain, headaches, palpitations or peripheral edema. There are no associated agents to hypertension. Past treatments include angiotensin blockers, beta blockers and calcium channel blockers. The current treatment provides significant improvement. There are no compliance problems. Hypertensive end-organ damage includes CAD/MI. COPD  He complains of cough, difficulty breathing, shortness of breath and wheezing. There is no hoarse voice. This is a chronic problem. The current episode started more than 1 year ago. The problem occurs every several days. The problem has been gradually worsening. The cough is non-productive. Associated symptoms include dyspnea on exertion. Pertinent negatives include no appetite change, chest pain, ear pain, headaches or sore throat. His symptoms are aggravated by change in weather, strenuous activity, URI, any activity and lying down. His symptoms are alleviated by beta-agonist, steroid inhaler and oral steroids. He reports significant improvement on treatment. His past medical history is significant for COPD.          Past Medical History:     Past Medical History:   Diagnosis Date    Asthma     Bronchitis     CAD (coronary artery disease)     COPD (chronic obstructive pulmonary disease) ([facility] [address] 8/12/2011    Depression 8/12/2011    Di[facility]     GERD (gastroesophageal reflux disease) 8/12/2011    Hyperlipidemia 8/12/2011    Hypertension     Hypothyroid 8/12/2011    Lower back pain     Nicotine addiction 8/12/2011    Sleep apnea       Reviewed all health maintenance requirements and orderedappropriate tests  Health Maintenance Due   Topic Date Due    DTaP/Tdap/Td vaccine (1 - Tdap) Never done    Shingles vaccine (1 of 2) Never done    Diabetic Alb to Cr ratio (uACR) test  08/18/2017    Diabetic retinal exam  10/29/2020    Low dose CT lung screening  03/03/2022    AAA screen  07/29/2022    Diabetic foot exam  03/01/2023       Past Surgical History:     Past Surgical History:   Procedure Laterality Date    CARDIAC CATHETERIZATION Left 01/21/2015    Severe left main trunk disease extending into the ostium of the left anterior descending of 80% involving the ostium of the intermediate & atrioventricular branch of the circumflex. 60% disease in the atrioventricular branch of the circumflex in the midportion. 40% disease in the right coronary artery. Normal left ventricular function, ejection fraction of 60%    CARDIAC CATHETERIZATION Left 12/20/2017    Dr. Kristina Schmidt @ [facility]--70% left main trunck & ostial LAD & high lateral circumflex disease. Patent LIMA to the LAD. Patent vein graft to the high lateral circumflex. 60% disease in the AV branch of the circumflex that terminates in a large posterolateral branch with an FFR of 1.0, indicating no significant stenosis. 40% disease in the right coronary artery. Ejection fraction 50-55%. CARDIAC CATHETERIZATION Left 11/04/2022    Dr. Kristina Schmidt @ [facility]--Continue medical therapy    COLONOSCOPY N/A 03/05/2019    COLONOSCOPY POLYPECTOMY HOT BIOPSY performed by Susan Francis MD at [address] [place] [address] GRAFT  02/04/2015    3 vessel CABG using left internal mammary artery to the left anterior descending coronary artery with a reverse saphenous vein aortocoronary sequential graft side to side to the ramus intermedius coronary artery & end to side to the first obtuse marginal coronary artery.     OTHER SURGICAL HISTORY  03/21/2016    I & D- cyst- posterior neck    SPINE SURGERY      THYROID LOBECTOMY      UPPER GASTROINTESTINAL ENDOSCOPY N/A 03/05/2019    EGD BIOPSY performed by Susan Francis MD at [facility] OR        Medications:       Prior to Admission medications    Medication Sig Start Date End Date Taking? Authorizing Provider   ALPRAZolam [place]) 0.5 MG tablet Take 1 tablet by mouth once for 1 dose. Take 30 minutes prior to imaging. Max Daily Amount 0.5 mg 1/24/23  Yes Historical Provider, MD   loperamide (IMODIUM) 2 MG capsule TAKE 1 CAPSULE BY MOUTH FOUR TIMES DAILY AS NEEDED FOR DIARRHEA 2/27/23  Yes Historical Provider, MD   magnesium oxide (MAG-OX) 400 (240 Mg) MG tablet TAKE 1 CAPSULE BY MOUTH DAILY 2/15/23  Yes Historical Provider, MD   metFORMIN (GLUCOPHAGE) 500 MG tablet Take 1 tablet by mouth 2 times daily (with meals) 3/3/23  Yes Guillaume Armas MD   glipiZIDE (GLUCOTROL) 5 MG tablet Take 1 tablet by mouth 2 times daily 3/3/23  Yes Guillaume Armas MD   PARoxetine (PAXIL) 20 MG tablet Take 1 tablet by mouth in the morning and at bedtime 3/3/23  Yes Guillaume Armas MD   fenofibrate (TRICOR) 145 MG tablet TAKE 1 TABLET DAILY 2/27/23  Yes Guillaume Armas MD   ibuprofen (ADVIL;MOTRIN) 600 MG tablet Take 1 tablet by mouth every 6 hours as needed for Pain 2/9/23  Yes Tricia cMkeon MD   metoprolol succinate (TOPROL XL) 50 MG extended release tablet TAKE 1 TABLET EVERY DAY 1/30/23  Yes Guillaume Armas MD   Magnesium 400 MG CAPS Take 400 mg by mouth daily 1/19/23  Yes Guillaume Armas MD   levothyroxine (SYNTHROID) 200 MCG tablet TAKE 1 TABLET DAILY 1/10/23  Yes Guillaume Armas MD   lovastatin (MEVACOR) 40 MG tablet Take 1 tablet by mouth 2 times daily 1/9/23 4/9/23 Yes Guillaume Armas MD   losartan (COZAAR) 25 MG tablet TAKE 1 TABLET EVERY DAY 1/9/23  Yes Guillaume Armas MD   Respiratory Therapy Supplies (NEBULIZER/TUBING/MOUTHPIECE) KIT 1 kit by Does not apply route every 4 hours as needed (SOB, wheezing) Patient needs new nebulizer tubing.  12/14/22  Yes Guillaume Armas MD   terbinafine (LAMISIL) 250 MG tablet TAKE 1 TABLET BY MOUTH DAILY FOR 10 DAYS 12/9/22  Yes Historical Provider, MD   ketoconazole (NIZORAL) 2 % cream appy to affected area(s) on hand TWICE DAILY until clear, repeat AS NEEDED 11/8/22  Yes Historical Provider, MD   amLODIPine (NORVASC) 2.5 MG tablet TAKE 1 TABLET DAILY 8/17/22  Yes Arpan Martinez MD   SYMBICORT 160-4.5 MCG/ACT AERO USE 2 INHALATIONS ORALLY   TWICE DAILY 8/17/22  Yes Arpan Martinez MD   clobetasol (TEMOVATE) 0.05 % ointment Apply topically 2 times daily Apply topically 2 times daily. 8/16/22  Yes Arpan Martinez MD   gabapentin (NEURONTIN) 300 MG capsule Take 1 capsule by mouth in the morning and 1 capsule before bedtime. Do all this for 30 days. Intended supply: 30 days. 7/22/22 3/3/23 Yes Arpan Martinez MD   albuterol (PROVENTIL) (2.5 MG/3ML) 0.083% nebulizer solution INHALE THE CONTENTS OF 1 VIAL VIA NEBULIZER EVERY 4 HOURS AS NEEDED FOR WHEEZING OR SHORTNESS OF BREATH 1/11/22  Yes Arpan Martinez MD   naproxen (NAPROSYN) 500 MG tablet Take 1 tablet by mouth 2 times daily 12/9/21  Yes Mauricio Mora MD   ipratropium-albuterol (DUONEB) 0.5-2.5 (3) MG/3ML SOLN nebulizer solution Inhale 3 mLs into the lungs 4 times daily 4/30/21  Yes Arpan Martinez MD   CPAP Machine MISC by Does not apply route 4/19/21  Yes Arpan Martinez MD   fluticasone (FLONASE) 50 MCG/ACT nasal spray 2 sprays by Nasal route daily 2/19/21  Yes Arpan Martinez MD   albuterol sulfate  (90 Base) MCG/ACT inhaler Inhale 2 puffs into the lungs every 6 hours as needed for Wheezing 2/19/21  Yes Arpan Martinez MD   [facility] [address] by Does not apply route   Yes Historical Provider, MD   ondansetron (ZOFRAN-ODT) 4 MG disintegrating tablet Take 1 tablet by mouth 3 times daily as needed for Nausea or Vomiting 11/17/20  Yes Arpan Martinez MD   Respiratory Therapy Supplies [facility] [place] full face mask, hose, head gear and  filter 2/7/20  Yes Arpan Martinez MD   blood glucose monitor strips Test 2 times a day & as needed for symptoms of irregular blood glucose.  11/11/19  Yes Arpan Martinez MD   mometasone (ELOCON) 0.1 % cream Apply topically daily. 2/20/17  Yes Forrest Butler MD   aspirin 81 MG tablet Take 81 mg by mouth daily. Yes Historical Provider, MD   fluocinolone (DERMOTIC) 0.01 % OIL oil Place 4 drops in ear(s) 2 times daily 8/1/22 8/31/22  Ladarius Bustillos MD        Allergies:       Patient has no known allergies. Social History:     Tobacco: reports that he quit smoking about 8 years ago. His smoking use included cigarettes. He has a 45.00 pack-year smoking history. He has never used smokeless tobacco.  Alcohol:      reports no history of alcohol use. Drug Use:  reports no history of drug use. Family History:     Family History   Problem Relation Age of Onset    Diabetes Mother     Heart Disease Mother     Diabetes Father     Heart Disease Father     Heart Disease Brother        Review of Systems:         Review of Systems   Constitutional:  Negative for activity change, appetite change and unexpected weight change. HENT:  Negative for congestion, ear discharge, ear pain, hoarse voice and sore throat. Eyes:  Negative for blurred vision and visual disturbance. Respiratory:  Positive for cough, shortness of breath and wheezing. Cardiovascular:  Positive for dyspnea on exertion. Negative for chest pain, palpitations and leg swelling. Gastrointestinal:  Negative for abdominal pain, blood in stool, constipation and nausea. Endocrine: Negative for cold intolerance, heat intolerance, polydipsia and polyuria. Genitourinary:  Negative for difficulty urinating and dysuria. Musculoskeletal:  Positive for arthralgias and back pain. Skin:  Negative for rash. Allergic/Immunologic: Negative. Neurological:  Negative for weakness and headaches. Psychiatric/Behavioral:  Negative for behavioral problems and dysphoric mood. The patient is not nervous/anxious.         Physical Exam:     Vitals:  /64 (Site: Right Upper Arm, Position: Sitting, Cuff Size: Medium Adult)   Pulse 68   Resp 18   Ht 6' 1\" (1.854 m) Wt (!) 314 lb (142.4 kg)   SpO2 94%   BMI 41.43 kg/m²       Physical Exam  Vitals reviewed. Constitutional:       General: He is not in acute distress. Appearance: He is well-developed. He is obese. HENT:      Head: Normocephalic and atraumatic. Neck:      Thyroid: No thyromegaly. Cardiovascular:      Rate and Rhythm: Normal rate and regular rhythm. Heart sounds: Normal heart sounds. No murmur heard. Pulmonary:      Effort: Pulmonary effort is normal.      Breath sounds: Normal breath sounds. No wheezing or rales. Abdominal:      General: Bowel sounds are normal. There is no distension. Palpations: Abdomen is soft. There is no mass. Tenderness: There is no abdominal tenderness. Musculoskeletal:         General: Normal range of motion. Right lower leg: No edema. Left lower leg: No edema. Lymphadenopathy:      Cervical: No cervical adenopathy. Skin:     General: Skin is warm and dry. Coloration: Skin is not pale. Findings: No rash. Neurological:      General: No focal deficit present. Mental Status: He is alert and oriented to person, place, and time.    Psychiatric:         Mood and Affect: Mood normal.         Behavior: Behavior normal.             Data:     Lab Results   Component Value Date/Time     02/09/2023 02:14 PM    K 4.3 02/09/2023 02:14 PM    CL 94 02/09/2023 02:14 PM    CO2 24 02/09/2023 02:14 PM    BUN 10 02/09/2023 02:14 PM    CREATININE 1.00 02/09/2023 02:14 PM    GLUCOSE 325 02/09/2023 02:14 PM    GLUCOSE 111 10/14/2011 08:30 AM    PROT 7.1 02/09/2023 02:14 PM    LABALBU 4.3 02/09/2023 02:14 PM    BILITOT 0.6 02/09/2023 02:14 PM    ALKPHOS 57 02/09/2023 02:14 PM    AST 43 02/09/2023 02:14 PM    ALT 39 02/09/2023 02:14 PM     Lab Results   Component Value Date/Time    WBC 10.5 02/09/2023 02:14 PM    RBC 4.73 02/09/2023 02:14 PM    HGB 14.7 02/09/2023 02:14 PM    HCT 43.8 02/09/2023 02:14 PM    MCV 92.6 02/09/2023 02:14 PM    MCH 31.1 02/09/2023 02:14 PM    MCHC 33.6 02/09/2023 02:14 PM    RDW 13.6 02/09/2023 02:14 PM     02/09/2023 02:14 PM    MPV NOT REPORTED 11/05/2021 09:24 AM     Lab Results   Component Value Date/Time    TSH 2.42 08/02/2022 08:06 AM     Lab Results   Component Value Date/Time    CHOL 130 10/25/2022 11:08 AM    HDL 33 10/25/2022 11:08 AM    PSA 0.35 12/17/2018 10:11 AM    LABA1C 10.0 03/03/2023 10:00 AM          Assessment & Plan        Diagnosis Orders   1. Type 2 diabetes mellitus without complication, without long-term current use of insulin (HCC)   Worsening course since stopping Metformin few months ago. Will resume at 500 mg bid ( due to having diarrhea), add Glipizide 5 mg bid. Advised low carb diet, increase activities to lose weight . Follow up in 3 months  POCT glycosylated hemoglobin (Hb A1C)    metFORMIN (GLUCOPHAGE) 500 MG tablet    glipiZIDE (GLUCOTROL) 5 MG tablet      2. Essential hypertension   Blood pressure stable, continue on current meds       3. Mixed hyperlipidemia   Lipid panel stable, continue on Mevacor and Tricor       4. Anxiety   Will increase Paxil to 20 mg bid for better symptom control. PARoxetine (PAXIL) 20 MG tablet      5. Acquired hypothyroidism   Stable, continue on current dose of Synthroid       6. Chronic obstructive pulmonary disease, unspecified COPD type ([facility] [address]   Continue on Duoneb  nebulizers, Symbicort. 7. COPD exacerbation (HCC)                        Completed Refills   Requested Prescriptions     Signed Prescriptions Disp Refills    metFORMIN (GLUCOPHAGE) 500 MG tablet 180 tablet 1     Sig: Take 1 tablet by mouth 2 times daily (with meals)    glipiZIDE (GLUCOTROL) 5 MG tablet 60 tablet 3     Sig: Take 1 tablet by mouth 2 times daily    PARoxetine (PAXIL) 20 MG tablet 180 tablet 3     Sig: Take 1 tablet by mouth in the morning and at bedtime     Return in about 3 months (around 6/3/2023) for diabetes, copd.      Orders Placed This Encounter   Medications metFORMIN (GLUCOPHAGE) 500 MG tablet     Sig: Take 1 tablet by mouth 2 times daily (with meals)     Dispense:  180 tablet     Refill:  1    glipiZIDE (GLUCOTROL) 5 MG tablet     Sig: Take 1 tablet by mouth 2 times daily     Dispense:  60 tablet     Refill:  3    PARoxetine (PAXIL) 20 MG tablet     Sig: Take 1 tablet by mouth in the morning and at bedtime     Dispense:  180 tablet     Refill:  3     Orders Placed This Encounter   Procedures    POCT glycosylated hemoglobin (Hb A1C)         There are no Patient Instructions on file for this visit.     Electronically signed by Guillaume Pickens MD on 3/3/2023 at 10:22 PM           Completed Refills      Requested Prescriptions     Signed Prescriptions Disp Refills    metFORMIN (GLUCOPHAGE) 500 MG tablet 180 tablet 1     Sig: Take 1 tablet by mouth 2 times daily (with meals)    glipiZIDE (GLUCOTROL) 5 MG tablet 60 tablet 3     Sig: Take 1 tablet by mouth 2 times daily    PARoxetine (PAXIL) 20 MG tablet 180 tablet 3     Sig: Take 1 tablet by mouth in the morning and at bedtime

## 2023-03-15 RX ORDER — LOPERAMIDE HYDROCHLORIDE 2 MG/1
CAPSULE ORAL
Qty: 60 CAPSULE | Refills: 1 | Status: SHIPPED | OUTPATIENT
Start: 2023-03-15

## 2023-03-15 RX ORDER — LANOLIN ALCOHOL/MO/W.PET/CERES
400 CREAM (GRAM) TOPICAL DAILY
Qty: 30 TABLET | Refills: 5 | Status: SHIPPED | OUTPATIENT
Start: 2023-03-15

## 2023-03-15 NOTE — TELEPHONE ENCOUNTER
Health Maintenance   Topic Date Due    DTaP/Tdap/Td vaccine (1 - Tdap) Never done    Shingles vaccine (1 of 2) Never done    Diabetic Alb to Cr ratio (uACR) test  08/18/2017    Diabetic retinal exam  10/29/2020    Low dose CT lung screening  03/03/2022    AAA screen  07/29/2022    Diabetic foot exam  03/01/2023    A1C test (Diabetic or Prediabetic)  06/03/2023    Annual Wellness Visit (AWV)  06/03/2023    Lipids  10/25/2023    Depression Monitoring  01/19/2024    GFR test (Diabetes, CKD 3-4, OR last GFR 15-59)  02/09/2024    Pneumococcal 65+ years Vaccine (3 - PPSV23 if available, else PCV20) 10/08/2024    Colorectal Cancer Screen  03/05/2029    Flu vaccine  Completed    COVID-19 Vaccine  Completed    Hepatitis C screen  Completed    HIV screen  Completed    Hepatitis A vaccine  Aged Out    Hib vaccine  Aged Out    Meningococcal (ACWY) vaccine  Aged Out             (applicable per patient's age: Cancer Screenings, Depression Screening, Fall Risk Screening, Immunizations)    Hemoglobin A1C (%)   Date Value   03/03/2023 10.0   12/01/2022 6.8   03/01/2022 6.2     Microalb/Crt.  Ratio (mcg/mg creat)   Date Value   08/18/2016 6     LDL Cholesterol (mg/dL)   Date Value   10/25/2022 49     AST (U/L)   Date Value   02/09/2023 43 (H)     ALT (U/L)   Date Value   02/09/2023 39     BUN (mg/dL)   Date Value   02/09/2023 10      (goal A1C is < 7)   (goal LDL is <100) need 30-50% reduction from baseline     BP Readings from Last 3 Encounters:   03/03/23 118/64   02/09/23 119/68   02/06/23 120/60    (goal /80)      All Future Testing planned in [facility]:  Lab Frequency Next Occurrence   Basic Metabolic Panel Once 19/03/4111   Hepatic Function Panel Once 08/22/2022   CBC with Auto Differential Once 08/22/2022   PSA Screening Once 08/22/2022   CBC with Auto Differential Once 02/06/2024   Comprehensive Metabolic Panel Once 93/47/1806   Lipid Panel Once 02/06/2024   TSH with Reflex Once 02/06/2024   Magnesium Once 02/06/2024 EKG 12 Lead Once 02/06/2024   XR CHEST (2 VW) Once 02/06/2024       Next Visit Date:  Future Appointments   Date Time Provider Debbie Izquierdo   3/30/2023 11:00 AM DO Agustina Rosado PAIN [facility]   4/27/2023  2:50 PM MD Agustina Escobar GI [facility]   6/5/2023  9:00 AM Dejuan Hidalgo MD [facility] Shelly Prudent   2/5/2024 10:30 AM MD Corrina Bowser [facility]            Patient Active Problem List:     COP[facility]chronic obstructive [facility] disease) [facility]     GERD (gastroesophageal reflux disease)     S/P CABG x 3     CAD (coronary artery disease)     Essential hypertension     Mixed hyperlipidemia     Type 2 diabetes mellitus without complication, without long-term current use of insulin (HCC)     Dysthymia (or depressive neurosis)     Chronic gout involving toe     Acquired hypothyroidism     Vitamin D deficiency disease     Chronic low back pain with left-sided sciatica     Class 3 severe obesity due to excess calories without serious comorbidity with body mass index (BMI) of 40.0 to 44.9 in adult ([facility]     MINAL treated with BiPAP

## 2023-03-30 ENCOUNTER — OFFICE VISIT (OUTPATIENT)
Dept: PAIN MANAGEMENT | Age: 68
End: 2023-03-30
Payer: MEDICARE

## 2023-03-30 VITALS
HEART RATE: 60 BPM | BODY MASS INDEX: 41.75 KG/M2 | HEIGHT: 73 IN | OXYGEN SATURATION: 96 % | WEIGHT: 315 LBS | RESPIRATION RATE: 18 BRPM

## 2023-03-30 DIAGNOSIS — M54.50 CHRONIC BILATERAL LOW BACK PAIN WITHOUT SCIATICA: ICD-10-CM

## 2023-03-30 DIAGNOSIS — E66.01 OBESITY, CLASS III, BMI 40-49.9 (MORBID OBESITY) (HCC): ICD-10-CM

## 2023-03-30 DIAGNOSIS — M47.817 LUMBOSACRAL SPONDYLOSIS WITHOUT MYELOPATHY: Primary | ICD-10-CM

## 2023-03-30 DIAGNOSIS — G89.29 CHRONIC BILATERAL LOW BACK PAIN WITHOUT SCIATICA: ICD-10-CM

## 2023-03-30 DIAGNOSIS — M47.814 THORACIC SPONDYLOSIS: ICD-10-CM

## 2023-03-30 PROCEDURE — 1123F ACP DISCUSS/DSCN MKR DOCD: CPT | Performed by: STUDENT IN AN ORGANIZED HEALTH CARE EDUCATION/TRAINING PROGRAM

## 2023-03-30 PROCEDURE — G8427 DOCREV CUR MEDS BY ELIG CLIN: HCPCS | Performed by: STUDENT IN AN ORGANIZED HEALTH CARE EDUCATION/TRAINING PROGRAM

## 2023-03-30 PROCEDURE — G8417 CALC BMI ABV UP PARAM F/U: HCPCS | Performed by: STUDENT IN AN ORGANIZED HEALTH CARE EDUCATION/TRAINING PROGRAM

## 2023-03-30 PROCEDURE — 1036F TOBACCO NON-USER: CPT | Performed by: STUDENT IN AN ORGANIZED HEALTH CARE EDUCATION/TRAINING PROGRAM

## 2023-03-30 PROCEDURE — 99214 OFFICE O/P EST MOD 30 MIN: CPT | Performed by: STUDENT IN AN ORGANIZED HEALTH CARE EDUCATION/TRAINING PROGRAM

## 2023-03-30 PROCEDURE — 3017F COLORECTAL CA SCREEN DOC REV: CPT | Performed by: STUDENT IN AN ORGANIZED HEALTH CARE EDUCATION/TRAINING PROGRAM

## 2023-03-30 PROCEDURE — G8484 FLU IMMUNIZE NO ADMIN: HCPCS | Performed by: STUDENT IN AN ORGANIZED HEALTH CARE EDUCATION/TRAINING PROGRAM

## 2023-03-30 NOTE — PROGRESS NOTES
lumbar MRI with patient in room    Behavioral Therapies:  -Continue daily stretching and home exercise program    Referrals:  -None    Follow-up Plan:  -After procedure    Patient was offered intervention where appropriate. Multi-modal Pain Therapy: The patient was explicitly considered for multimodal and interdisciplinary therapy. Non-opioid and non-pharmacological opportunities to enhance analgesia and quality of life have been and will continue to be pursued.     Bella Raines, DO  Interventional Pain Management/PM&R   [facility]    Orders Placed This Encounter    FACET JOINT L/S     Standing Status:   Future     Standing Expiration Date:   3/30/2024     Scheduling Instructions:      Bilateral lumbar L3, L4, L5 MBB x 2    FACET JOINT L/S 2ND LEVEL     Standing Status:   Future     Standing Expiration Date:   3/30/2024

## 2023-04-27 ENCOUNTER — OFFICE VISIT (OUTPATIENT)
Dept: GASTROENTEROLOGY | Age: 68
End: 2023-04-27
Payer: MEDICARE

## 2023-04-27 VITALS
HEART RATE: 59 BPM | BODY MASS INDEX: 41.75 KG/M2 | WEIGHT: 315 LBS | HEIGHT: 73 IN | RESPIRATION RATE: 19 BRPM | OXYGEN SATURATION: 95 %

## 2023-04-27 DIAGNOSIS — K52.9 CHRONIC DIARRHEA: Primary | ICD-10-CM

## 2023-04-27 DIAGNOSIS — D12.6 TUBULAR ADENOMA OF COLON: ICD-10-CM

## 2023-04-27 PROCEDURE — 1036F TOBACCO NON-USER: CPT | Performed by: INTERNAL MEDICINE

## 2023-04-27 PROCEDURE — 3017F COLORECTAL CA SCREEN DOC REV: CPT | Performed by: INTERNAL MEDICINE

## 2023-04-27 PROCEDURE — 99202 OFFICE O/P NEW SF 15 MIN: CPT | Performed by: INTERNAL MEDICINE

## 2023-04-27 PROCEDURE — G8427 DOCREV CUR MEDS BY ELIG CLIN: HCPCS | Performed by: INTERNAL MEDICINE

## 2023-04-27 PROCEDURE — G8417 CALC BMI ABV UP PARAM F/U: HCPCS | Performed by: INTERNAL MEDICINE

## 2023-04-27 PROCEDURE — 1123F ACP DISCUSS/DSCN MKR DOCD: CPT | Performed by: INTERNAL MEDICINE

## 2023-04-27 ASSESSMENT — ENCOUNTER SYMPTOMS
BACK PAIN: 1
DIARRHEA: 1
APNEA: 1

## 2023-04-27 NOTE — PROGRESS NOTES
[address]    Chief Complaint   Patient presents with    Diarrhea     Patient states that his symptoms are mostly resolved. He takes Imodium and a probiotic everyday. His diabetes medications were also changes by PCP. Diarrhea Jodine Schirmer is a 72-year-old man with a past medical history of coronary artery disease, chronic obstructive pulmonary disease, diabetes mellitus type 2, hypertension who presents with a complaint of chronic diarrhea. The past 1 year, he has noticed that every thing he eats goes right through him - he states that due to gas, he does not drink a lot of milk. He denies any travel outside the country and [place] not live [place] a farm. He denies any family history of colon cancer. He states that he was on Metformin and was taken off it, but did not have enough time to see if the diarrhea had stopped from Metformin cessation. He states that his glucose was elevated and this led to Metformin being restarted. He states that he takes loepramide which stops it. Colonoscopy/EGD  PREOPERATIVE DIAGNOSES:  1. Left upper quadrant pain. 2.  Abdominal bloating. POSTOPERATIVE DIAGNOSES:  1. Diffuse gastritis with duodenitis and superficial ulcerations. 2.  Multiple small sessile polyps x6 (proximal transverse colon x1,  sigmoid x5). 3.  Ascending colon lipoma. OPERATION:  1. Esophagogastroduodenoscopy. 2.  Prepyloric antral biopsies. 3.  Colonoscopy, anus to cecum. 4.  Biopsy ascending colon lipoma. 5.  Sessile polypectomies x6 (proximal transverse x1, sigmoid x5). Colonoscopy/EGD pathology biopsies 03/05/2019  1. ANTRUM BIOPSIES:  NORMAL GASTRIC MUCOSA. 2.  ASCENDING COLON LIPOMA BIOPSY:  NORMAL COLONIC MUCOSA. MINIMAL       LIPOMATOUS SUBMUCOSA PRESENT. 3.  PROXIMAL TRANSVERSE COLON POLYP BIOPSY:  TUBULAR ADENOMA  4. SIGMOID COLON POLYP BIOPSY:  HYPERPLASTIC POLYP. 5.  LOWER SIGMOID COLON POLYP BIOPSY:  HYPERPLASTIC POLYP.      Family

## 2023-05-03 ENCOUNTER — HOSPITAL ENCOUNTER (OUTPATIENT)
Age: 68
Discharge: HOME | End: 2023-05-03
Payer: MEDICARE

## 2023-05-03 DIAGNOSIS — K52.9 CHRONIC DIARRHEA: ICD-10-CM

## 2023-05-03 DIAGNOSIS — D12.6 TUBULAR ADENOMA OF COLON: ICD-10-CM

## 2023-05-03 LAB — LIPASE SERPL-CCNC: 89 U/L (ref 13–60)

## 2023-05-03 PROCEDURE — 83690 ASSAY OF LIPASE: CPT

## 2023-05-03 PROCEDURE — 36415 COLL VENOUS BLD VENIPUNCTURE: CPT

## 2023-05-04 ENCOUNTER — HOSPITAL ENCOUNTER (OUTPATIENT)
Age: 68
Setting detail: SPECIMEN
Discharge: HOME | End: 2023-05-04
Payer: MEDICARE

## 2023-05-04 DIAGNOSIS — K52.9 CHRONIC DIARRHEA: ICD-10-CM

## 2023-05-04 DIAGNOSIS — D12.6 TUBULAR ADENOMA OF COLON: ICD-10-CM

## 2023-05-04 PROCEDURE — 87328 CRYPTOSPORIDIUM AG IA: CPT

## 2023-05-04 PROCEDURE — 87329 GIARDIA AG IA: CPT

## 2023-05-04 PROCEDURE — 83520 IMMUNOASSAY QUANT NOS NONAB: CPT

## 2023-05-05 LAB
C PARVUM AG STL QL IA: NEGATIVE
G LAMBLIA AG STL QL IA: NEGATIVE
SOURCE: NORMAL
SPECIMEN DESCRIPTION: NORMAL

## 2023-05-07 LAB — FECAL PANCREATIC ELASTASE-1: 508 UG/G

## 2023-05-08 ENCOUNTER — TELEPHONE (OUTPATIENT)
Dept: GASTROENTEROLOGY | Age: 68
End: 2023-05-08

## 2023-05-08 NOTE — PROGRESS NOTES
[facility]   Preadmission Testing    Name: Kelin Chris  : 1955  Patient Phone: [phone] (home)     Procedure: BILATERAL LUMBAR L3, L4, L5 MEDIAL BRANCH BLOCKS - Bilateral  Local  Date of Procedure: 2023  Surgeon: Nhung George DO    Ht:  6' 1\" (185.4 cm)  Wt: Weight - Scale: (!) 318 lb (144.2 kg)  Wt method: Allergies: No Known Allergies             There were no vitals filed for this visit. No LMP for male patient. Do you take blood thinners? [x] Yes    [] No         Instructed to stop blood thinners prior to procedure? [] Yes    [] No      [x] N/A    []Eliquis - 3 days  []Xarelto - 3 days  []Pradaxa - 5 days  []Coumadin - 5 days   []Plavix - 7 days  []ASA 325mg - 7 days  []Brillinta - 5 days  []Pletal - 2 days   Have you had a respiratory infection or sore throat in last 4 weeks before surgery?     [] Yes    [x] No     Patient instructed on: [x] NPO Status - if receiving sedation  [x] Meds to Take  [x] Ride Home - sedation/ epidurals  []No Jewelry/Contact Lenses/Nail [place]     DOS Patient Needs [] HCG   [x] Blood Sugar  [] PT/INR

## 2023-05-08 NOTE — TELEPHONE ENCOUNTER
----- Message from Taniya Alex MD sent at 5/5/2023  9:41 PM EDT -----  Please notify patient: Pancreatic enzyme is slightly elevated, but not at the level for clinic pancreatitis. Encourage him to avoid fatty foods.

## 2023-05-11 ENCOUNTER — APPOINTMENT (OUTPATIENT)
Dept: GENERAL RADIOLOGY | Age: 68
End: 2023-05-11
Attending: STUDENT IN AN ORGANIZED HEALTH CARE EDUCATION/TRAINING PROGRAM
Payer: MEDICARE

## 2023-05-11 ENCOUNTER — HOSPITAL ENCOUNTER (OUTPATIENT)
Age: 68
Setting detail: OUTPATIENT SURGERY
Discharge: HOME | End: 2023-05-11
Attending: STUDENT IN AN ORGANIZED HEALTH CARE EDUCATION/TRAINING PROGRAM | Admitting: STUDENT IN AN ORGANIZED HEALTH CARE EDUCATION/TRAINING PROGRAM
Payer: MEDICARE

## 2023-05-11 VITALS
WEIGHT: 315 LBS | HEIGHT: 73 IN | HEART RATE: 57 BPM | TEMPERATURE: 96.9 F | SYSTOLIC BLOOD PRESSURE: 120 MMHG | RESPIRATION RATE: 13 BRPM | BODY MASS INDEX: 41.75 KG/M2 | DIASTOLIC BLOOD PRESSURE: 65 MMHG | OXYGEN SATURATION: 94 %

## 2023-05-11 PROCEDURE — 2500000003 HC RX 250 WO HCPCS: Performed by: STUDENT IN AN ORGANIZED HEALTH CARE EDUCATION/TRAINING PROGRAM

## 2023-05-11 PROCEDURE — 76000 FLUOROSCOPY <1 HR PHYS/QHP: CPT

## 2023-05-11 PROCEDURE — 64493 INJ PARAVERT F JNT L/S 1 LEV: CPT | Performed by: STUDENT IN AN ORGANIZED HEALTH CARE EDUCATION/TRAINING PROGRAM

## 2023-05-11 PROCEDURE — 2709999900 HC NON-CHARGEABLE SUPPLY: Performed by: STUDENT IN AN ORGANIZED HEALTH CARE EDUCATION/TRAINING PROGRAM

## 2023-05-11 PROCEDURE — 64494 INJ PARAVERT F JNT L/S 2 LEV: CPT | Performed by: STUDENT IN AN ORGANIZED HEALTH CARE EDUCATION/TRAINING PROGRAM

## 2023-05-11 PROCEDURE — 3600000058 HC PAIN LEVEL 5 BASE: Performed by: STUDENT IN AN ORGANIZED HEALTH CARE EDUCATION/TRAINING PROGRAM

## 2023-05-11 PROCEDURE — 7100000010 HC PHASE II RECOVERY - FIRST 15 MIN: Performed by: STUDENT IN AN ORGANIZED HEALTH CARE EDUCATION/TRAINING PROGRAM

## 2023-05-11 RX ORDER — LIDOCAINE HYDROCHLORIDE 20 MG/ML
INJECTION, SOLUTION EPIDURAL; INFILTRATION; INTRACAUDAL; PERINEURAL PRN
Status: DISCONTINUED | OUTPATIENT
Start: 2023-05-11 | End: 2023-05-11 | Stop reason: ALTCHOICE

## 2023-05-11 ASSESSMENT — PAIN - FUNCTIONAL ASSESSMENT: PAIN_FUNCTIONAL_ASSESSMENT: 0-10

## 2023-05-11 NOTE — OP NOTE
PROCEDURE PERFORMED: Bilateral Lumbar medial branch block    PREOPERATIVE DIAGNOSIS: Lumbosacral spondylosis    INDICATIONS: Chronic low back pain    The patient's history and physical exam were reviewed. The risk, benefits, and alternatives of the procedure were discussed and all questions were answered to the patient's satisfaction. The patient agreed to proceed and written informed consent was obtained. POSTOPERATIVE DIAGNOSIS: Lumbar spondylosis    PHYSICIAN:  Dr. Alban Collet, DO    ANESTHESIA:  LOCAL    ASSISTANT:  NONE    PATHOLOGY:  NONE    ESTIMATED BLOOD LOSS:  N/A    IMPLANTS:  NONE    PROCEDURE DESCRIPTION: Diagnostic bilateral lumbar medial branch block using fluoroscopy    The patient was placed on the operative bed in prone position. The area was prepped with  Chlorhexidine. The area was then draped in a sterile fashion. Targeted levels: Bilateral lumbar L3, L4, L5 medial branch block    An AP  fluoroscopy image was used to identify and leslie Marie's point at the L3, L4 levels on the targeted side. Additionally, the junction of the SAP and sacral ala was also marked on the same side. A 25-gauge 3-1/2 inch Quincke spinal needle was then advanced toward each of these points under fluoroscopic guidance. Once bone was contacted, negative aspiration was confirmed and 0.5 mL of lidocaine 2% was injected each level. The needles were removed and the needle sites were dressed appropriately. The same procedure was performed on the opposite side. The patient was transferred to the postoperative care unit in stable condition. Written discharge instructions were given to the patient. The patient was given a pain diary upon discharge. COMPLICATIONS:  There were no apparent complications. The patient tolerated the procedure well.

## 2023-05-11 NOTE — H&P
Heidi Yanez discharge to home/self care.       low back surgeries. He was previously receiving lumbar medial branch radiofrequency ablation procedures for his low back pain which was very helpful. The patient's history and physical examination are consistent lumbosacral spondylosis as the patient has axial low back pain that is mechanical in nature. There is tenderness to palpation along the lumbar paraspinal musculature with positive lumbar facet loading bilaterally. The lumbar MRI on 2/2/2023 reveals multilevel degenerative changes with facet hypertrophy throughout the lumbar spine specifically at L4-5 and L5-S1 facet joints. I will plan for bilateral lumbar L3, L4, L5 medial branch blocks with progression to radiofrequency ablation if successful. Neurologically, it appears the patient has full strength and normal sensation. There is no evidence of radiculopathy or myelopathy on examination. There are no red flags in the patient's history. The patient has failed conservative measures including outpatient physical therapy, greater than 3 medications for pain relief, a self-directed therapy program, as well as activity modification all within the last 6 weeks over 3 months. The patient's pain has been causing worsening quality of life and function. PLAN:  Medications: For nonopioid therapy, the following medications were prescribed:    -Continue medication prescribed by primary care physician    Opioid therapy:  -Not indicated    Interventions:  -Plan for bilateral lumbar L3, L4, L5 medial branch blocks    Imaging:  -Reviewed thoracic and lumbar MRI with patient in room    Behavioral Therapies:  -Continue daily stretching and home exercise program    Referrals:  -None    Follow-up Plan:  -After procedure    Patient was offered intervention where appropriate. Multi-modal Pain Therapy: The patient was explicitly considered for multimodal and interdisciplinary therapy.  Non-opioid and non-pharmacological opportunities to enhance analgesia and

## 2023-05-11 NOTE — DISCHARGE INSTRUCTIONS
You have received an injection of local anesthetic and corticosteroids. The local anesthetic effect typically last 4-6 hours. It may take 3-7 days for the corticosteroids to reach optimal effect. Please pay close attention to the following information/instructions: You may not drive for 12 hours after your injection if you had sedation or epidural injection. It is common to experience mild soreness at the injection site(s) for 24-48 hours. Ice is the best remedy. You may apply ice for 20 minutes at a time several times a day as needed. Avoid heat to the injection area for 72 hours. No hot packs, saunas, or steam rooms during this time. A regular shower is OK. You may immediately restart your regular medication regimen, including pain medications, anti-inflammatory, and blood thinners. Please remove the sterile dressing/band-aid tonight or tomorrow morning. Do not leave it on after you have taken a shower or gotten it wet. Corticosteroid side effects can occur after this injection, but they usually resolve after several days. These side effects can include flushing, hot flashes, mild palpitations, insomnia, water retention, feeling anxious/restless, or headaches. While it is extremely rare to get an infection after a spinal procedure, please call the office if you develop any signs of infection. These signs include fevers/chills, severely increased pain, redness at the injections site, or any drainage from the injection site. Remember that the corticosteroid benefit (long-term pain relief) from an injection can take as long as 10 days to occur. You may have a period of slightly increased pain after your injection before the cortisone takes effect. You may resume all of your normal daily activities 24 hours after your injection. It is OK to restart your exercise or physical therapy program as soon as you feel comfortable doing so.   Please complete the pain diary given to you after your

## 2023-05-15 ENCOUNTER — TELEPHONE (OUTPATIENT)
Dept: PAIN MANAGEMENT | Age: 68
End: 2023-05-15

## 2023-05-15 NOTE — TELEPHONE ENCOUNTER
Patient denies any improvement with 1st injection. He denies any improvement even for a few hours. He would like to cancel 2nd MBB. Requests appt in office.      Pain before injection- 6/10  Pain after injection- 6/10

## 2023-06-01 ENCOUNTER — OFFICE VISIT (OUTPATIENT)
Dept: PAIN MANAGEMENT | Age: 68
End: 2023-06-01
Payer: MEDICARE

## 2023-06-01 VITALS
WEIGHT: 315 LBS | BODY MASS INDEX: 41.75 KG/M2 | OXYGEN SATURATION: 96 % | RESPIRATION RATE: 19 BRPM | HEART RATE: 60 BPM | HEIGHT: 73 IN

## 2023-06-01 DIAGNOSIS — M51.36 LUMBAR DEGENERATIVE DISC DISEASE: ICD-10-CM

## 2023-06-01 DIAGNOSIS — E66.01 OBESITY, CLASS III, BMI 40-49.9 (MORBID OBESITY) (HCC): ICD-10-CM

## 2023-06-01 DIAGNOSIS — M47.817 LUMBOSACRAL SPONDYLOSIS WITHOUT MYELOPATHY: Primary | ICD-10-CM

## 2023-06-01 PROCEDURE — 1123F ACP DISCUSS/DSCN MKR DOCD: CPT | Performed by: STUDENT IN AN ORGANIZED HEALTH CARE EDUCATION/TRAINING PROGRAM

## 2023-06-01 PROCEDURE — 1036F TOBACCO NON-USER: CPT | Performed by: STUDENT IN AN ORGANIZED HEALTH CARE EDUCATION/TRAINING PROGRAM

## 2023-06-01 PROCEDURE — G8427 DOCREV CUR MEDS BY ELIG CLIN: HCPCS | Performed by: STUDENT IN AN ORGANIZED HEALTH CARE EDUCATION/TRAINING PROGRAM

## 2023-06-01 PROCEDURE — G8417 CALC BMI ABV UP PARAM F/U: HCPCS | Performed by: STUDENT IN AN ORGANIZED HEALTH CARE EDUCATION/TRAINING PROGRAM

## 2023-06-01 PROCEDURE — 3017F COLORECTAL CA SCREEN DOC REV: CPT | Performed by: STUDENT IN AN ORGANIZED HEALTH CARE EDUCATION/TRAINING PROGRAM

## 2023-06-01 PROCEDURE — 99213 OFFICE O/P EST LOW 20 MIN: CPT | Performed by: STUDENT IN AN ORGANIZED HEALTH CARE EDUCATION/TRAINING PROGRAM

## 2023-06-05 ENCOUNTER — OFFICE VISIT (OUTPATIENT)
Dept: FAMILY MEDICINE CLINIC | Age: 68
End: 2023-06-05
Payer: MEDICARE

## 2023-06-05 VITALS
DIASTOLIC BLOOD PRESSURE: 64 MMHG | HEIGHT: 73 IN | BODY MASS INDEX: 41.75 KG/M2 | WEIGHT: 315 LBS | HEART RATE: 57 BPM | SYSTOLIC BLOOD PRESSURE: 108 MMHG | RESPIRATION RATE: 18 BRPM | OXYGEN SATURATION: 93 %

## 2023-06-05 DIAGNOSIS — Z13.6 ENCOUNTER FOR ABDOMINAL AORTIC ANEURYSM (AAA) SCREENING: ICD-10-CM

## 2023-06-05 DIAGNOSIS — Z00.00 MEDICARE ANNUAL WELLNESS VISIT, SUBSEQUENT: ICD-10-CM

## 2023-06-05 DIAGNOSIS — E11.9 TYPE 2 DIABETES MELLITUS WITHOUT COMPLICATION, WITHOUT LONG-TERM CURRENT USE OF INSULIN (HCC): Primary | ICD-10-CM

## 2023-06-05 DIAGNOSIS — Z87.891 PERSONAL HISTORY OF TOBACCO USE: ICD-10-CM

## 2023-06-05 DIAGNOSIS — Z12.5 PROSTATE CANCER SCREENING: ICD-10-CM

## 2023-06-05 LAB — HBA1C MFR BLD: 7.8 %

## 2023-06-05 PROCEDURE — 3051F HG A1C>EQUAL 7.0%<8.0%: CPT | Performed by: INTERNAL MEDICINE

## 2023-06-05 PROCEDURE — G0296 VISIT TO DETERM LDCT ELIG: HCPCS | Performed by: INTERNAL MEDICINE

## 2023-06-05 PROCEDURE — 3078F DIAST BP <80 MM HG: CPT | Performed by: INTERNAL MEDICINE

## 2023-06-05 PROCEDURE — 83036 HEMOGLOBIN GLYCOSYLATED A1C: CPT | Performed by: INTERNAL MEDICINE

## 2023-06-05 PROCEDURE — 1036F TOBACCO NON-USER: CPT | Performed by: INTERNAL MEDICINE

## 2023-06-05 PROCEDURE — G0439 PPPS, SUBSEQ VISIT: HCPCS | Performed by: INTERNAL MEDICINE

## 2023-06-05 PROCEDURE — G8417 CALC BMI ABV UP PARAM F/U: HCPCS | Performed by: INTERNAL MEDICINE

## 2023-06-05 PROCEDURE — 1123F ACP DISCUSS/DSCN MKR DOCD: CPT | Performed by: INTERNAL MEDICINE

## 2023-06-05 PROCEDURE — 3017F COLORECTAL CA SCREEN DOC REV: CPT | Performed by: INTERNAL MEDICINE

## 2023-06-05 PROCEDURE — 3074F SYST BP LT 130 MM HG: CPT | Performed by: INTERNAL MEDICINE

## 2023-06-05 PROCEDURE — 2022F DILAT RTA XM EVC RTNOPTHY: CPT | Performed by: INTERNAL MEDICINE

## 2023-06-05 PROCEDURE — G8427 DOCREV CUR MEDS BY ELIG CLIN: HCPCS | Performed by: INTERNAL MEDICINE

## 2023-06-05 PROCEDURE — 99213 OFFICE O/P EST LOW 20 MIN: CPT | Performed by: INTERNAL MEDICINE

## 2023-06-05 RX ORDER — LORATADINE 10 MG/1
10 TABLET ORAL DAILY
Qty: 30 TABLET | Refills: 0 | Status: SHIPPED | OUTPATIENT
Start: 2023-06-05

## 2023-06-05 ASSESSMENT — ENCOUNTER SYMPTOMS
WHEEZING: 0
NAUSEA: 0
COUGH: 0
SHORTNESS OF BREATH: 0
ALLERGIC/IMMUNOLOGIC NEGATIVE: 1
BLOOD IN STOOL: 0
BACK PAIN: 1
SORE THROAT: 0
ABDOMINAL PAIN: 0
BLURRED VISION: 0
CONSTIPATION: 0

## 2023-06-05 ASSESSMENT — PATIENT HEALTH QUESTIONNAIRE - PHQ9
SUM OF ALL RESPONSES TO PHQ9 QUESTIONS 1 & 2: 2
4. FEELING TIRED OR HAVING LITTLE ENERGY: 0
2. FEELING DOWN, DEPRESSED OR HOPELESS: 1
SUM OF ALL RESPONSES TO PHQ QUESTIONS 1-9: 3
10. IF YOU CHECKED OFF ANY PROBLEMS, HOW DIFFICULT HAVE THESE PROBLEMS MADE IT FOR YOU TO DO YOUR WORK, TAKE CARE OF THINGS AT HOME, OR GET ALONG WITH OTHER PEOPLE: 1
SUM OF ALL RESPONSES TO PHQ QUESTIONS 1-9: 3
6. FEELING BAD ABOUT YOURSELF - OR THAT YOU ARE A FAILURE OR HAVE LET YOURSELF OR YOUR FAMILY DOWN: 0
SUM OF ALL RESPONSES TO PHQ QUESTIONS 1-9: 3
7. TROUBLE CONCENTRATING ON THINGS, SUCH AS READING THE NEWSPAPER OR WATCHING TELEVISION: 0
8. MOVING OR SPEAKING SO SLOWLY THAT OTHER PEOPLE COULD HAVE NOTICED. OR THE OPPOSITE, BEING SO FIGETY OR RESTLESS THAT YOU HAVE BEEN MOVING AROUND A LOT MORE THAN USUAL: 0
3. TROUBLE FALLING OR STAYING ASLEEP: 1
SUM OF ALL RESPONSES TO PHQ QUESTIONS 1-9: 3
1. LITTLE INTEREST OR PLEASURE IN DOING THINGS: 1
5. POOR APPETITE OR OVEREATING: 0
9. THOUGHTS THAT YOU WOULD BE BETTER OFF DEAD, OR OF HURTING YOURSELF: 0

## 2023-06-05 ASSESSMENT — LIFESTYLE VARIABLES
HOW OFTEN DO YOU HAVE A DRINK CONTAINING ALCOHOL: NEVER
HOW MANY STANDARD DRINKS CONTAINING ALCOHOL DO YOU HAVE ON A TYPICAL DAY: PATIENT DOES NOT DRINK

## 2023-06-05 NOTE — PROGRESS NOTES
MD   metoprolol succinate (TOPROL XL) 50 MG extended release tablet TAKE 1 TABLET EVERY DAY Yes Balbir Pryor MD   losartan (COZAAR) 25 MG tablet TAKE 1 TABLET EVERY DAY Yes Balbir Pryor MD   Respiratory Therapy Supplies (NEBULIZER/TUBING/MOUTHPIECE) KIT 1 kit by Does not apply route every 4 hours as needed (SOB, wheezing) Patient needs new nebulizer tubing. Yes Balbir Pryor MD   amLODIPine (NORVASC) 2.5 MG tablet TAKE 1 TABLET DAILY Yes Balbir Pryor MD   fluocinolone (DERMOTIC) 0.01 % OIL oil Place 4 drops in ear(s) 2 times daily Yes Bimal Sahu MD   albuterol (PROVENTIL) (2.5 MG/3ML) 0.083% nebulizer solution INHALE THE CONTENTS OF 1 VIAL VIA NEBULIZER EVERY 4 HOURS AS NEEDED FOR WHEEZING OR SHORTNESS OF BREATH Yes Balbir Pryor MD   naproxen (NAPROSYN) 500 MG tablet Take 1 tablet by mouth 2 times daily Yes Dontrell Henry MD   ipratropium-albuterol (DUONEB) 0.5-2.5 (3) MG/3ML SOLN nebulizer solution Inhale 3 mLs into the lungs 4 times daily Yes Balbir Pryor MD   CPAP Machine MISC by Does not apply route Yes Balbir Pryor MD   fluticasone (FLONASE) 50 MCG/ACT nasal spray 2 sprays by Nasal route daily Yes Balbir Pryor MD   albuterol sulfate  (90 Base) MCG/ACT inhaler Inhale 2 puffs into the lungs every 6 hours as needed for Wheezing Yes Balbir Pryor MD   [address] [place] [address] by Does not apply route Yes Historical Provider, MD   ondansetron (ZOFRAN-ODT) 4 MG disintegrating tablet Take 1 tablet by mouth 3 times daily as needed for Nausea or Vomiting Yes Balbir Pryor MD   Respiratory Therapy Supplies MISC CPAP full face mask, hose, head gear and  filter Yes Balbir Pryor MD   blood glucose monitor strips Test 2 times a day & as needed for symptoms of irregular blood glucose. Yes Balbir Pryor MD   mometasone (ELOCON) 0.1 % cream Apply topically daily.  Yes Carmelina Vidal MD   aspirin 81 MG tablet Take 1 tablet by mouth daily Yes Historical Provider, MD

## 2023-06-05 NOTE — PATIENT INSTRUCTIONS
may be asked questions to check your memory and other mental skills. Your doctor may also talk to close friends and family members. This can help the doctor figure out how your memory and other mental skills have changed. You may get blood tests and tests that look at your brain. These questions and tests can make sure you don't have other conditions that can cause symptoms like MCI. These include depression, sleep problems, and side effects from medicines. How is it treated? There are no medicines to treat MCI or to keep it from progressing to dementia. But treating conditions like high blood pressure and diabetes may help. A person with MCI needs routine follow-up visits with their doctor to check on changes in the person's mental skills. How can you care for yourself at home? Keeping your body active can help slow MCI. Exercises like walking can help. Try to stay active mentally too. Read or do things like crossword puzzles if you enjoy doing them. If you need help coping with MCI, you may want to get support from family, friends, a support group, or a counselor who works with people who have [address] Though the future isn't always clear, it can be good to plan ahead with instructions for your care. These are called advanced directives. Having a plan can help make sure that you get the care you want. Current as of: August 25, 2022               Content Version: 13.6  © 2006-2023 [employer]. Care instructions adapted under license by [facility] ([facility]). If you have questions about a medical condition or this instruction, always ask your healthcare professional. [place] [address] any warranty or liability for your use of this information. Learning About Dental Care for Older Adults  Dental care for older adults: Overview  Dental care for older people is much the same as for younger adults. But older adults do have concerns that younger adults do not.  Older adults may have

## 2023-06-07 ENCOUNTER — TELEPHONE (OUTPATIENT)
Dept: ONCOLOGY | Age: 68
End: 2023-06-07

## 2023-06-22 ENCOUNTER — HOSPITAL ENCOUNTER (OUTPATIENT)
Dept: ULTRASOUND IMAGING | Age: 68
Discharge: HOME | End: 2023-06-24
Attending: INTERNAL MEDICINE
Payer: MEDICARE

## 2023-06-22 ENCOUNTER — APPOINTMENT (OUTPATIENT)
Dept: GENERAL RADIOLOGY | Age: 68
End: 2023-06-22
Attending: STUDENT IN AN ORGANIZED HEALTH CARE EDUCATION/TRAINING PROGRAM
Payer: MEDICARE

## 2023-06-22 ENCOUNTER — HOSPITAL ENCOUNTER (OUTPATIENT)
Age: 68
Setting detail: OUTPATIENT SURGERY
Discharge: HOME | End: 2023-06-22
Attending: STUDENT IN AN ORGANIZED HEALTH CARE EDUCATION/TRAINING PROGRAM | Admitting: STUDENT IN AN ORGANIZED HEALTH CARE EDUCATION/TRAINING PROGRAM
Payer: MEDICARE

## 2023-06-22 ENCOUNTER — OFFICE VISIT (OUTPATIENT)
Dept: GASTROENTEROLOGY | Age: 68
End: 2023-06-22

## 2023-06-22 ENCOUNTER — HOSPITAL ENCOUNTER (OUTPATIENT)
Dept: CT IMAGING | Age: 68
Discharge: HOME | End: 2023-06-24
Attending: INTERNAL MEDICINE
Payer: MEDICARE

## 2023-06-22 VITALS
SYSTOLIC BLOOD PRESSURE: 129 MMHG | WEIGHT: 315 LBS | DIASTOLIC BLOOD PRESSURE: 97 MMHG | HEART RATE: 60 BPM | HEIGHT: 73 IN | TEMPERATURE: 97.2 F | BODY MASS INDEX: 41.75 KG/M2 | OXYGEN SATURATION: 97 % | RESPIRATION RATE: 20 BRPM

## 2023-06-22 VITALS
WEIGHT: 315 LBS | HEIGHT: 73 IN | HEART RATE: 58 BPM | BODY MASS INDEX: 41.75 KG/M2 | RESPIRATION RATE: 19 BRPM | OXYGEN SATURATION: 98 %

## 2023-06-22 DIAGNOSIS — Z13.6 ENCOUNTER FOR ABDOMINAL AORTIC ANEURYSM (AAA) SCREENING: ICD-10-CM

## 2023-06-22 DIAGNOSIS — Z87.891 PERSONAL HISTORY OF TOBACCO USE: ICD-10-CM

## 2023-06-22 DIAGNOSIS — K52.1 DRUG-INDUCED DIARRHEA: Primary | ICD-10-CM

## 2023-06-22 LAB — GLUCOSE BLD-MCNC: 153 MG/DL (ref 65–99)

## 2023-06-22 PROCEDURE — 76706 US ABDL AORTA SCREEN AAA: CPT

## 2023-06-22 PROCEDURE — 76000 FLUOROSCOPY <1 HR PHYS/QHP: CPT

## 2023-06-22 PROCEDURE — 6360000002 HC RX W HCPCS: Performed by: STUDENT IN AN ORGANIZED HEALTH CARE EDUCATION/TRAINING PROGRAM

## 2023-06-22 PROCEDURE — 82947 ASSAY GLUCOSE BLOOD QUANT: CPT

## 2023-06-22 PROCEDURE — 71271 CT THORAX LUNG CANCER SCR C-: CPT

## 2023-06-22 PROCEDURE — 2500000003 HC RX 250 WO HCPCS: Performed by: STUDENT IN AN ORGANIZED HEALTH CARE EDUCATION/TRAINING PROGRAM

## 2023-06-22 PROCEDURE — 64493 INJ PARAVERT F JNT L/S 1 LEV: CPT | Performed by: STUDENT IN AN ORGANIZED HEALTH CARE EDUCATION/TRAINING PROGRAM

## 2023-06-22 PROCEDURE — 64494 INJ PARAVERT F JNT L/S 2 LEV: CPT | Performed by: STUDENT IN AN ORGANIZED HEALTH CARE EDUCATION/TRAINING PROGRAM

## 2023-06-22 RX ORDER — LIDOCAINE HYDROCHLORIDE 20 MG/ML
INJECTION, SOLUTION EPIDURAL; INFILTRATION; INTRACAUDAL; PERINEURAL PRN
Status: DISCONTINUED | OUTPATIENT
Start: 2023-06-22 | End: 2023-06-22 | Stop reason: ALTCHOICE

## 2023-06-22 RX ORDER — TRIAMCINOLONE ACETONIDE 40 MG/ML
INJECTION, SUSPENSION INTRA-ARTICULAR; INTRAMUSCULAR PRN
Status: DISCONTINUED | OUTPATIENT
Start: 2023-06-22 | End: 2023-06-22 | Stop reason: ALTCHOICE

## 2023-06-22 ASSESSMENT — PAIN - FUNCTIONAL ASSESSMENT: PAIN_FUNCTIONAL_ASSESSMENT: 0-10

## 2023-06-22 ASSESSMENT — ENCOUNTER SYMPTOMS: DIARRHEA: 1

## 2023-06-22 NOTE — INTERVAL H&P NOTE
Update History & Physical    The patient's History and Physical of June 1, 2023 was reviewed with the patient and I examined the patient. There was no change. The surgical site was confirmed by the patient and me. Plan: The risks, benefits, expected outcome, and alternative to the recommended procedure have been discussed with the patient. Patient understands and wants to proceed with the procedure.      Electronically signed by Jamia Beck DO on 6/22/2023 at 1:10 PM

## 2023-06-22 NOTE — PROGRESS NOTES
Plesiomonas Shigelloides PCR NEGATIVE: No Plesionomas shigelloides DNA Detected NEGATIVE: No Plesionomas shigelloides DNA Detected          Vibrio PCR NEGATIVE: No Vibrio (V. vulnificus, V, parahaemolyticus and NEGATIVE: No Vibrio (V. vulnificus, V, parahaemolyticus and V. cholerae) DNA Detected          E Coli Enterotoxigenic PCR NEGATIVE: No Enterotoxigenic E. coli (ETEC) Heat-labile and NEGATIVE: No Enterotoxigenic E. coli (ETEC) Heat-labile and heat-stable (LT/ST) DNA Detected          Yersinia Enterocolitica PCR NEGATIVE: No Yersinia enterocolitica DNA Detected NEGATIVE: No Yersinia enterocolitica DNA Detected          Resulting [address]             Component Ref Range & Units 1/19/23 1125 11/5/21 1041 3/5/19 0912 3/21/16 1302 3/20/16 2015   Specimen Description   . FECES  . FECES  . TISSUE, STOMACH BIOPSY  . CYST FLUID  .BLOOD    C DIFF AG + TOXIN NEGATIVE NEGATIVE            Comment: No C. difficile antigen and Toxin Detected. Resulting Agency   [id number] [facility] [address] [facility]            Assessment    Go Brock is a 63-year-old man with a past medical history of coronary artery disease, chronic obstructive pulmonary disease, diabetes mellitus type 2, hypertension whose chronic diarrhea has resolved since he stopped taking Metformin. Likely medication induced diarrhea. OK to stop Imodium by taking it every other day and then wean off as tolerated      Plan    1. Chronic diarrhea  - Medication side effect     2. Tubular adenoma of colon  - COLONOSCOPY in 08/2023    3. F/U based on colonoscopy findings. Spent 15 minutes with the patient with greater than 50 percent of the time was spent on face-to-face time in discussion with the patient regarding diagnostic options/results, treatment options, counseling, and follow-up plan.   Electronically signed by Pat Kemp MD on 6/22/23 at 2:21 PM EDT

## 2023-06-22 NOTE — OP NOTE
PROCEDURE PERFORMED: Bilateral Lumbar medial branch block    PREOPERATIVE DIAGNOSIS: Lumbosacral spondylosis    INDICATIONS: Chronic low back pain    The patient's history and physical exam were reviewed. The risk, benefits, and alternatives of the procedure were discussed and all questions were answered to the patient's satisfaction. The patient agreed to proceed and written informed consent was obtained. POSTOPERATIVE DIAGNOSIS: Lumbar spondylosis    PHYSICIAN:  Dr. Rob Correa DO    ANESTHESIA:  LOCAL    ASSISTANT:  NONE    PATHOLOGY:  NONE    ESTIMATED BLOOD LOSS:  N/A    IMPLANTS:  NONE    PROCEDURE DESCRIPTION: Diagnostic bilateral lumbar medial branch block using fluoroscopy    The patient was placed on the operative bed in prone position. The area was prepped with  Chlorhexidine. The area was then draped in a sterile fashion. Targeted levels: Bilateral lumbar L3, L4, L5 medial branch block    An AP  fluoroscopy image was used to identify and leslie Marie's point at the L3, L4 levels on the targeted side. Additionally, the junction of the SAP and sacral ala was also marked on the same side. A 22-gauge 5 inch Quincke spinal needle was then advanced toward each of these points under fluoroscopic guidance. Once bone was contacted, negative aspiration was confirmed and 0.5 mL of lidocaine 2% was injected each level. The needles were removed and the needle sites were dressed appropriately. The same procedure was performed on the opposite side. The patient was transferred to the postoperative care unit in stable condition. Written discharge instructions were given to the patient. The patient was given a pain diary upon discharge. COMPLICATIONS:  There were no apparent complications. The patient tolerated the procedure well.

## 2023-06-22 NOTE — DISCHARGE INSTRUCTIONS
injection. The information you provide on this diary is used to guide your treatment plan. You should schedule a follow-up visit in 2-3 weeks to review your response to this injection. Please bring your pain diary with you to this appointment. Education Materials Received: yes  Belongings Returned: yes    Resume all current outpatient medication(s). The discharge instructions have been reviewed with the patient and/or Guardian. Patient and/or Guardian signed and retained a printed copy. You have received an injection of local anesthetic and corticosteroids. The local anesthetic effect typically last 4-6 hours. It may take 3-7 days for the corticosteroids to reach optimal effect. Please pay close attention to the following information/instructions: You may not drive for 12 hours after your injection if you had sedation or epidural injection. It is common to experience mild soreness at the injection site(s) for 24-48 hours. Ice is the best remedy. You may apply ice for 20 minutes at a time several times a day as needed. Avoid heat to the injection area for 72 hours. No hot packs, saunas, or steam rooms during this time. A regular shower is OK. You may immediately restart your regular medication regimen, including pain medications, anti-inflammatory, and blood thinners. Please remove the sterile dressing/band-aid tonight or tomorrow morning. Do not leave it on after you have taken a shower or gotten it wet. Corticosteroid side effects can occur after this injection, but they usually resolve after several days. These side effects can include flushing, hot flashes, mild palpitations, insomnia, water retention, feeling anxious/restless, or headaches. While it is extremely rare to get an infection after a spinal procedure, please call the office if you develop any signs of infection.  These signs include fevers/chills, severely increased pain, redness at the injections site, or

## 2023-06-26 ENCOUNTER — TELEPHONE (OUTPATIENT)
Dept: FAMILY MEDICINE CLINIC | Age: 68
End: 2023-06-26

## 2023-07-05 ENCOUNTER — TELEPHONE (OUTPATIENT)
Dept: FAMILY MEDICINE CLINIC | Age: 68
End: 2023-07-05

## 2023-07-05 DIAGNOSIS — E78.2 MIXED HYPERLIPIDEMIA: ICD-10-CM

## 2023-07-05 RX ORDER — FENOFIBRATE 145 MG/1
TABLET, COATED ORAL
Qty: 30 TABLET | Refills: 3 | Status: SHIPPED | OUTPATIENT
Start: 2023-07-05

## 2023-07-05 NOTE — TELEPHONE ENCOUNTER
Patient is scheduled for appt on 07/20, and is requesting sooner appt due to worsening symptoms of depression. Paxil medication is not helping. Advised the provider will be out of the office next week and we currently have no openings for the remainder of this week. Please advise. Thank you.

## 2023-07-06 ENCOUNTER — OFFICE VISIT (OUTPATIENT)
Dept: FAMILY MEDICINE CLINIC | Age: 68
End: 2023-07-06
Payer: MEDICARE

## 2023-07-06 VITALS
SYSTOLIC BLOOD PRESSURE: 118 MMHG | BODY MASS INDEX: 40.95 KG/M2 | WEIGHT: 309 LBS | HEIGHT: 73 IN | OXYGEN SATURATION: 90 % | RESPIRATION RATE: 19 BRPM | DIASTOLIC BLOOD PRESSURE: 84 MMHG | HEART RATE: 73 BPM

## 2023-07-06 DIAGNOSIS — F41.9 ANXIETY: Primary | ICD-10-CM

## 2023-07-06 PROCEDURE — 1036F TOBACCO NON-USER: CPT | Performed by: INTERNAL MEDICINE

## 2023-07-06 PROCEDURE — 3079F DIAST BP 80-89 MM HG: CPT | Performed by: INTERNAL MEDICINE

## 2023-07-06 PROCEDURE — G8427 DOCREV CUR MEDS BY ELIG CLIN: HCPCS | Performed by: INTERNAL MEDICINE

## 2023-07-06 PROCEDURE — 3074F SYST BP LT 130 MM HG: CPT | Performed by: INTERNAL MEDICINE

## 2023-07-06 PROCEDURE — 1123F ACP DISCUSS/DSCN MKR DOCD: CPT | Performed by: INTERNAL MEDICINE

## 2023-07-06 PROCEDURE — G8417 CALC BMI ABV UP PARAM F/U: HCPCS | Performed by: INTERNAL MEDICINE

## 2023-07-06 PROCEDURE — 99213 OFFICE O/P EST LOW 20 MIN: CPT | Performed by: INTERNAL MEDICINE

## 2023-07-06 PROCEDURE — 3017F COLORECTAL CA SCREEN DOC REV: CPT | Performed by: INTERNAL MEDICINE

## 2023-07-06 RX ORDER — GLIPIZIDE 10 MG/1
10 TABLET ORAL 2 TIMES DAILY
Qty: 60 TABLET | Refills: 3 | Status: SHIPPED | OUTPATIENT
Start: 2023-07-06

## 2023-07-06 RX ORDER — BUPROPION HYDROCHLORIDE 150 MG/1
150 TABLET ORAL EVERY MORNING
Qty: 30 TABLET | Refills: 3 | Status: SHIPPED | OUTPATIENT
Start: 2023-07-06

## 2023-07-06 RX ORDER — HYDROXYZINE HYDROCHLORIDE 25 MG/1
25 TABLET, FILM COATED ORAL 3 TIMES DAILY PRN
Qty: 60 TABLET | Refills: 0 | Status: SHIPPED | OUTPATIENT
Start: 2023-07-06 | End: 2023-07-26

## 2023-07-06 ASSESSMENT — ENCOUNTER SYMPTOMS
WHEEZING: 0
SHORTNESS OF BREATH: 1
CONSTIPATION: 0
NAUSEA: 0
SORE THROAT: 0
CHEST TIGHTNESS: 0
ABDOMINAL PAIN: 0
COUGH: 0

## 2023-07-06 NOTE — PROGRESS NOTES
BUN 10 02/09/2023 02:14 PM    CREATININE 1.00 02/09/2023 02:14 PM    GLUCOSE 325 02/09/2023 02:14 PM    GLUCOSE 111 10/14/2011 08:30 AM    PROT 7.1 02/09/2023 02:14 PM    LABALBU 4.3 02/09/2023 02:14 PM    BILITOT 0.6 02/09/2023 02:14 PM    ALKPHOS 57 02/09/2023 02:14 PM    AST 43 02/09/2023 02:14 PM    ALT 39 02/09/2023 02:14 PM     Lab Results   Component Value Date/Time    WBC 10.5 02/09/2023 02:14 PM    RBC 4.73 02/09/2023 02:14 PM    HGB 14.7 02/09/2023 02:14 PM    HCT 43.8 02/09/2023 02:14 PM    MCV 92.6 02/09/2023 02:14 PM    MCH 31.1 02/09/2023 02:14 PM    MCHC 33.6 02/09/2023 02:14 PM    RDW 13.6 02/09/2023 02:14 PM     02/09/2023 02:14 PM    MPV NOT REPORTED 11/05/2021 09:24 AM     Lab Results   Component Value Date/Time    TSH 2.42 08/02/2022 08:06 AM     Lab Results   Component Value Date/Time    CHOL 130 10/25/2022 11:08 AM    HDL 33 10/25/2022 11:08 AM    PSA 0.35 12/17/2018 10:11 AM    LABA1C 7.8 06/05/2023 09:13 AM          Assessment & Plan        Diagnosis Orders   1. Anxiety   Patient with worsening symptoms of Anxiety. We decided to add Wellbutrin 150 mg/day and try Atarax 25 mg tid prn . Follow up in 2 weeks if needed  buPROPion (WELLBUTRIN XL) 150 MG extended release tablet    hydrOXYzine HCl (ATARAX) 25 MG tablet                      Completed Refills   Requested Prescriptions     Signed Prescriptions Disp Refills    buPROPion (WELLBUTRIN XL) 150 MG extended release tablet 30 tablet 3     Sig: Take 1 tablet by mouth every morning    hydrOXYzine HCl (ATARAX) 25 MG tablet 60 tablet 0     Sig: Take 1 tablet by mouth 3 times daily as needed for Anxiety    glipiZIDE (GLUCOTROL) 10 MG tablet 60 tablet 3     Sig: Take 1 tablet by mouth 2 times daily     Return in about 2 weeks (around 7/20/2023) for anxiety.      Orders Placed This Encounter   Medications    buPROPion (WELLBUTRIN XL) 150 MG extended release tablet     Sig: Take 1 tablet by mouth every morning     Dispense:  30 tablet

## 2023-07-10 ENCOUNTER — TELEPHONE (OUTPATIENT)
Dept: FAMILY MEDICINE CLINIC | Age: 68
End: 2023-07-10

## 2023-07-10 NOTE — TELEPHONE ENCOUNTER
Patient called back to the office and was informed to contact his insurance company regarding which medications are covered. Advised the patient to then contact the office to update PCP. He voiced understanding.

## 2023-07-12 DIAGNOSIS — E03.9 ACQUIRED HYPOTHYROIDISM: ICD-10-CM

## 2023-07-12 RX ORDER — LEVOTHYROXINE SODIUM 0.2 MG/1
TABLET ORAL
Qty: 90 TABLET | Refills: 0 | Status: SHIPPED | OUTPATIENT
Start: 2023-07-12

## 2023-07-18 RX ORDER — LOPERAMIDE HYDROCHLORIDE 2 MG/1
CAPSULE ORAL
Qty: 60 CAPSULE | Refills: 1 | Status: SHIPPED | OUTPATIENT
Start: 2023-07-18

## 2023-07-20 ENCOUNTER — OFFICE VISIT (OUTPATIENT)
Dept: FAMILY MEDICINE CLINIC | Age: 68
End: 2023-07-20
Payer: MEDICARE

## 2023-07-20 VITALS
HEIGHT: 73 IN | OXYGEN SATURATION: 97 % | HEART RATE: 72 BPM | SYSTOLIC BLOOD PRESSURE: 118 MMHG | RESPIRATION RATE: 18 BRPM | WEIGHT: 311.8 LBS | BODY MASS INDEX: 41.32 KG/M2 | DIASTOLIC BLOOD PRESSURE: 78 MMHG

## 2023-07-20 DIAGNOSIS — E11.9 TYPE 2 DIABETES MELLITUS WITHOUT COMPLICATION, WITHOUT LONG-TERM CURRENT USE OF INSULIN (HCC): ICD-10-CM

## 2023-07-20 DIAGNOSIS — F41.9 ANXIETY: Primary | ICD-10-CM

## 2023-07-20 PROCEDURE — 3051F HG A1C>EQUAL 7.0%<8.0%: CPT | Performed by: INTERNAL MEDICINE

## 2023-07-20 PROCEDURE — 3017F COLORECTAL CA SCREEN DOC REV: CPT | Performed by: INTERNAL MEDICINE

## 2023-07-20 PROCEDURE — 99213 OFFICE O/P EST LOW 20 MIN: CPT | Performed by: INTERNAL MEDICINE

## 2023-07-20 PROCEDURE — 3078F DIAST BP <80 MM HG: CPT | Performed by: INTERNAL MEDICINE

## 2023-07-20 PROCEDURE — 2022F DILAT RTA XM EVC RTNOPTHY: CPT | Performed by: INTERNAL MEDICINE

## 2023-07-20 PROCEDURE — 1036F TOBACCO NON-USER: CPT | Performed by: INTERNAL MEDICINE

## 2023-07-20 PROCEDURE — 3074F SYST BP LT 130 MM HG: CPT | Performed by: INTERNAL MEDICINE

## 2023-07-20 PROCEDURE — G8417 CALC BMI ABV UP PARAM F/U: HCPCS | Performed by: INTERNAL MEDICINE

## 2023-07-20 PROCEDURE — 1123F ACP DISCUSS/DSCN MKR DOCD: CPT | Performed by: INTERNAL MEDICINE

## 2023-07-20 PROCEDURE — G8427 DOCREV CUR MEDS BY ELIG CLIN: HCPCS | Performed by: INTERNAL MEDICINE

## 2023-07-20 ASSESSMENT — ENCOUNTER SYMPTOMS
CONSTIPATION: 0
WHEEZING: 0
BLOOD IN STOOL: 0
SHORTNESS OF BREATH: 0
ABDOMINAL PAIN: 0
COUGH: 0
NAUSEA: 0
SORE THROAT: 0
ALLERGIC/IMMUNOLOGIC NEGATIVE: 1

## 2023-07-27 NOTE — PROGRESS NOTES
Time-based billing (NON-critical care) considered for multimodal and interdisciplinary therapy. Non-opioid and non-pharmacological opportunities to enhance analgesia and quality of life have been and will continue to be pursued.     Kristine Dumas, DO  Interventional Pain Management/PM&R   [address]    Orders Placed This Encounter    FACET JOINT L/S     Standing Status:   Future     Standing Expiration Date:   6/1/2024    FACET JOINT L/S 2ND LEVEL     Standing Status:   Future     Standing Expiration Date:   6/1/2024     Scheduling Instructions:      Bilateral lumbar L3, L4, L5 MBB- confirmatory block

## 2023-08-02 DIAGNOSIS — E78.2 MIXED HYPERLIPIDEMIA: ICD-10-CM

## 2023-08-02 RX ORDER — FENOFIBRATE 145 MG/1
TABLET, COATED ORAL
Qty: 30 TABLET | Refills: 3 | Status: SHIPPED | OUTPATIENT
Start: 2023-08-02

## 2023-08-17 DIAGNOSIS — I10 ESSENTIAL HYPERTENSION: ICD-10-CM

## 2023-08-17 RX ORDER — AMLODIPINE BESYLATE 2.5 MG/1
TABLET ORAL
Qty: 90 TABLET | Refills: 1 | Status: SHIPPED | OUTPATIENT
Start: 2023-08-17

## 2023-08-17 NOTE — TELEPHONE ENCOUNTER
Health Maintenance   Topic Date Due    DTaP/Tdap/Td vaccine (1 - Tdap) Never done    Shingles vaccine (1 of 2) Never done    Diabetic Alb to Cr ratio (uACR) test  08/18/2017    Diabetic retinal exam  10/29/2020    Flu vaccine (1) 08/01/2023    Lipids  10/25/2023    GFR test (Diabetes, CKD 3-4, OR last GFR 15-59)  02/09/2024    Diabetic foot exam  06/05/2024    A1C test (Diabetic or Prediabetic)  06/05/2024    Depression Monitoring  06/05/2024    Annual Wellness Visit (AWV)  06/05/2024    Low dose CT lung screening &/or counseling  06/22/2024    Pneumococcal 65+ years Vaccine (3 - PPSV23 if available, else PCV20) 10/08/2024    Colorectal Cancer Screen  03/05/2029    COVID-19 Vaccine  Completed    AAA screen  Completed    Hepatitis C screen  Completed    Hepatitis A vaccine  Aged Out    Hib vaccine  Aged Out    Meningococcal (ACWY) vaccine  Aged Out    Pneumococcal 0-64 years Vaccine  Discontinued    HIV screen  Discontinued    Prostate Specific Antigen (PSA) Screening or Monitoring  Discontinued             (applicable per patient's age: Cancer Screenings, Depression Screening, Fall Risk Screening, Immunizations)    Hemoglobin A1C (%)   Date Value   06/05/2023 7.8   03/03/2023 10.0   12/01/2022 6.8     LDL Cholesterol (mg/dL)   Date Value   10/25/2022 49     AST (U/L)   Date Value   02/09/2023 43 (H)     ALT (U/L)   Date Value   02/09/2023 39     BUN (mg/dL)   Date Value   02/09/2023 10      (goal A1C is < 7)   (goal LDL is <100) need 30-50% reduction from baseline     BP Readings from Last 3 Encounters:   07/20/23 118/78   07/06/23 118/84   06/22/23 (!) 129/97    (goal /80)      All Future Testing planned in [facility]:  Lab Frequency Next Occurrence   CBC with Auto Differential Once 02/06/2024   Comprehensive Metabolic Panel Once 49/63/1693   Lipid Panel Once 02/06/2024   TSH with Reflex Once 02/06/2024   Magnesium Once 02/06/2024   EKG 12 Lead Once 02/06/2024   XR CHEST (2 VW) Once 02/06/2024   FACET JOINT L/S

## 2023-09-25 NOTE — PROGRESS NOTES
[facility]   [facility]    Name: Julieta Armstrong  : 1955  Patient Phone: [phone] (home)     Procedure: Colonoscopy  Date of Procedure: 10/5/2023  Surgeon: Yamileth Guadalupe MD    Ht:  6' 1\" (185.4 cm)  Wt: Weight - Scale: (!) 318 lb (144.2 kg)  Wt method: Stated    Allergies: No Known Allergies             There were no vitals filed for this visit. No LMP for male patient. Do you take blood thinners? [x] Yes    [] No         Instructed to stop blood thinners prior to procedure? [x] Yes    [] No      [] N/A   Do you have sleep apnea? [x] Yes    [] No     Do you have acid reflux ? [x] Yes    [] No     Do you have  hiatal hernia? [] Yes    [x] No    Do you ever experience motion sickness? [x] Yes    [] No     Have you had a respiratory infection or sore throat in last 4 weeks before surgery? [] Yes    [x] No     Do you have poorly controlled asthma or COPD? Difficulty with intubation in past? [] Yes    [x] No      [] Yes    [x] No       Do you have a history of angina in the last month or symptomatic arrhythmia? [] Yes    [x] No     Do you have significant central nervous system disease? [] Yes    [x] No     Have you had an EKG, labs, or chest xray in last 12 months? If yes provide copies to anesthesia   [x] Yes    [] No       [] Lab    [x] EKG    [] CXR     Have you had a stress test?     [x] Yes    [] No    When/where:    Was it normal?    [] Yes    [] No     Do you or your family have a history of Malignant Hyperthermia? [] Yes    [x] No           Do you smoke? [] Yes    [x] No      Please refrain from smoking on the day of surgery. Patient instructed on: [x] NPO Status   [x] Meds to Take  [x] Ride Home  [x]No Jewelry/Contact Lenses/Nail [place]  [x] Prep/Lax/Clear Liquids    [] Chlorhexidene     DOS Patient Needs [] HCG   [x] Blood Sugar  [] PT/INR    [] T&S       COVID Vaccinated?  [] Yes    [] No                     Patient instructed on the

## 2023-09-29 ENCOUNTER — ANESTHESIA EVENT (OUTPATIENT)
Dept: ENDOSCOPY | Age: 68
End: 2023-09-29
Payer: MEDICARE

## 2023-10-02 ENCOUNTER — TELEPHONE (OUTPATIENT)
Dept: GASTROENTEROLOGY | Age: 68
End: 2023-10-02

## 2023-10-02 ENCOUNTER — HOSPITAL ENCOUNTER (OUTPATIENT)
Age: 68
Discharge: HOME | End: 2023-10-02
Payer: MEDICARE

## 2023-10-02 DIAGNOSIS — Z01.818 PRE-OP TESTING: ICD-10-CM

## 2023-10-02 PROCEDURE — 93005 ELECTROCARDIOGRAM TRACING: CPT | Performed by: INTERNAL MEDICINE

## 2023-10-03 LAB
EKG ATRIAL RATE: 56 BPM
EKG P AXIS: 34 DEGREES
EKG P-R INTERVAL: 172 MS
EKG Q-T INTERVAL: 448 MS
EKG QRS DURATION: 98 MS
EKG QTC CALCULATION (BAZETT): 432 MS
EKG R AXIS: 39 DEGREES
EKG T AXIS: 96 DEGREES
EKG VENTRICULAR RATE: 56 BPM

## 2023-10-03 PROCEDURE — 93010 ELECTROCARDIOGRAM REPORT: CPT | Performed by: INTERNAL MEDICINE

## 2023-10-05 ENCOUNTER — ANESTHESIA (OUTPATIENT)
Dept: ENDOSCOPY | Age: 68
End: 2023-10-05
Payer: MEDICARE

## 2023-10-06 ENCOUNTER — TELEPHONE (OUTPATIENT)
Dept: GASTROENTEROLOGY | Age: 68
End: 2023-10-06

## 2023-10-06 DIAGNOSIS — Z12.11 COLON CANCER SCREENING: Primary | ICD-10-CM

## 2023-10-06 RX ORDER — POLYETHYLENE GLYCOL 3350, SODIUM CHLORIDE, POTASSIUM CHLORIDE, SODIUM BICARBONATE, AND SODIUM SULFATE 240; 5.84; 2.98; 6.72; 22.72 G/4L; G/4L; G/4L; G/4L; G/4L
4000 POWDER, FOR SOLUTION ORAL ONCE
Qty: 1 EACH | Refills: 0 | Status: SHIPPED | OUTPATIENT
Start: 2023-10-06 | End: 2023-10-06 | Stop reason: SDUPTHER

## 2023-10-06 RX ORDER — POLYETHYLENE GLYCOL 3350, SODIUM CHLORIDE, POTASSIUM CHLORIDE, SODIUM BICARBONATE, AND SODIUM SULFATE 240; 5.84; 2.98; 6.72; 22.72 G/4L; G/4L; G/4L; G/4L; G/4L
4000 POWDER, FOR SOLUTION ORAL ONCE
Qty: 1 EACH | Refills: 0 | Status: CANCELLED | OUTPATIENT
Start: 2023-10-06 | End: 2023-10-06

## 2023-10-06 RX ORDER — POLYETHYLENE GLYCOL 3350, SODIUM CHLORIDE, POTASSIUM CHLORIDE, SODIUM BICARBONATE, AND SODIUM SULFATE 240; 5.84; 2.98; 6.72; 22.72 G/4L; G/4L; G/4L; G/4L; G/4L
4000 POWDER, FOR SOLUTION ORAL ONCE
Qty: 1 EACH | Refills: 0 | Status: SHIPPED | OUTPATIENT
Start: 2023-10-06 | End: 2023-10-06

## 2023-10-06 NOTE — TELEPHONE ENCOUNTER
Call received from patient's caregiver. States patient is scheduled for colonoscopy on 10/09/23. CG states she called [facility] and they do not have Go Lytely Rx. On file. New Rx sent to requested pharmacy. Patient CG advised that she does have prep instructions on hand.

## 2023-10-06 NOTE — TELEPHONE ENCOUNTER
Patients wife called asking for a RX for patients prep for colonoscopy scheduled Monday  to be sent to [facility]

## 2023-10-08 NOTE — ANESTHESIA PRE PROCEDURE
Department of Anesthesiology  Preprocedure Note       Name:  Julieta Armstrong   Age:  79 y.o.  :  1955                                          MRN:  [id number]         Date:  10/9/2023      Surgeon: Lisa Anders):  Yamileth Guadalupe MD    Procedure: Procedure(s):  COLONOSCOPY    Medications prior to admission:   Prior to Admission medications    Medication Sig Start Date End Date Taking?  Authorizing Provider   amLODIPine (NORVASC) 2.5 MG tablet TAKE 1 TABLET DAILY 23   Shiloh Yap MD   fenofibrate (TRICOR) 145 MG tablet TAKE 1 TABLET DAILY 23   Shiloh Yap MD   loperamide (IMODIUM) 2 MG capsule TAKE 1 CAPSULE BY MOUTH FOUR TIMES DAILY AS NEEDED FOR DIARRHEA  Patient not taking: Reported on 2023   Shiloh Yap MD   levothyroxine (SYNTHROID) 200 MCG tablet TAKE 1 TABLET BY MOUTH DAILY 23   Shiloh Yap MD   buPROPion (WELLBUTRIN XL) 150 MG extended release tablet Take 1 tablet by mouth every morning 23   Shiloh Yap MD   glipiZIDE (GLUCOTROL) 10 MG tablet Take 1 tablet by mouth 2 times daily 23   Shiloh Yap MD   empagliflozin (JARDIANCE) 10 MG tablet Take 1 tablet by mouth daily 23   Shiloh Yap MD   SITagliptin (JANUVIA) 50 MG tablet Take 1 tablet by mouth daily 23   Shiloh Yap MD   magnesium oxide (MAG-OX) 400 (240 Mg) MG tablet Take 1 tablet by mouth daily 3/15/23   Shiloh Yap MD   PARoxetine (PAXIL) 20 MG tablet Take 1 tablet by mouth in the morning and at bedtime 3/3/23   Shiloh Yap MD   metoprolol succinate (TOPROL XL) 50 MG extended release tablet TAKE 1 TABLET EVERY DAY 23   Shiloh Yap MD   lovastatin (MEVACOR) 40 MG tablet Take 1 tablet by mouth 2 times daily 23  Shiloh Yap MD   losartan (COZAAR) 25 MG tablet TAKE 1 TABLET EVERY DAY 23   Shiloh Yap MD   Respiratory Therapy Supplies (NEBULIZER/TUBING/MOUTHPIECE) KIT 1 kit by Does not apply route every 4 hours as

## 2023-10-09 ENCOUNTER — HOSPITAL ENCOUNTER (OUTPATIENT)
Age: 68
Setting detail: OUTPATIENT SURGERY
Discharge: HOME | End: 2023-10-09
Attending: INTERNAL MEDICINE | Admitting: INTERNAL MEDICINE
Payer: MEDICARE

## 2023-10-09 VITALS
HEART RATE: 58 BPM | OXYGEN SATURATION: 90 % | SYSTOLIC BLOOD PRESSURE: 116 MMHG | BODY MASS INDEX: 41.51 KG/M2 | WEIGHT: 313.2 LBS | DIASTOLIC BLOOD PRESSURE: 61 MMHG | HEIGHT: 73 IN | RESPIRATION RATE: 17 BRPM | TEMPERATURE: 97.8 F

## 2023-10-09 DIAGNOSIS — K52.9 CHRONIC DIARRHEA: ICD-10-CM

## 2023-10-09 DIAGNOSIS — Z01.818 PRE-OP TESTING: Primary | ICD-10-CM

## 2023-10-09 LAB — GLUCOSE BLD-MCNC: 163 MG/DL (ref 65–99)

## 2023-10-09 PROCEDURE — 82947 ASSAY GLUCOSE BLOOD QUANT: CPT

## 2023-10-09 PROCEDURE — 3609009300 HC COLONOSCOPY BIOPSY/STOMA: Performed by: INTERNAL MEDICINE

## 2023-10-09 PROCEDURE — 3700000000 HC ANESTHESIA ATTENDED CARE: Performed by: INTERNAL MEDICINE

## 2023-10-09 PROCEDURE — 2709999900 HC NON-CHARGEABLE SUPPLY: Performed by: INTERNAL MEDICINE

## 2023-10-09 PROCEDURE — 2500000003 HC RX 250 WO HCPCS: Performed by: NURSE ANESTHETIST, CERTIFIED REGISTERED

## 2023-10-09 PROCEDURE — 2580000003 HC RX 258: Performed by: INTERNAL MEDICINE

## 2023-10-09 PROCEDURE — 6360000002 HC RX W HCPCS: Performed by: NURSE ANESTHETIST, CERTIFIED REGISTERED

## 2023-10-09 PROCEDURE — 3700000001 HC ADD 15 MINUTES (ANESTHESIA): Performed by: INTERNAL MEDICINE

## 2023-10-09 PROCEDURE — 88305 TISSUE EXAM BY PATHOLOGIST: CPT

## 2023-10-09 PROCEDURE — 2720000010 HC SURG SUPPLY STERILE: Performed by: INTERNAL MEDICINE

## 2023-10-09 PROCEDURE — 7100000011 HC PHASE II RECOVERY - ADDTL 15 MIN: Performed by: INTERNAL MEDICINE

## 2023-10-09 PROCEDURE — 7100000010 HC PHASE II RECOVERY - FIRST 15 MIN: Performed by: INTERNAL MEDICINE

## 2023-10-09 DEVICE — CLIP HEMO L235CM WRK CHN DIA OPN 17MM BRAID CATH ROT: Type: IMPLANTABLE DEVICE | Status: FUNCTIONAL

## 2023-10-09 RX ORDER — PROPOFOL 10 MG/ML
INJECTION, EMULSION INTRAVENOUS CONTINUOUS PRN
Status: DISCONTINUED | OUTPATIENT
Start: 2023-10-09 | End: 2023-10-09 | Stop reason: SDUPTHER

## 2023-10-09 RX ORDER — SODIUM CHLORIDE, SODIUM LACTATE, POTASSIUM CHLORIDE, CALCIUM CHLORIDE 600; 310; 30; 20 MG/100ML; MG/100ML; MG/100ML; MG/100ML
INJECTION, SOLUTION INTRAVENOUS CONTINUOUS
Status: DISCONTINUED | OUTPATIENT
Start: 2023-10-09 | End: 2023-10-09 | Stop reason: HOSPADM

## 2023-10-09 RX ORDER — PROPOFOL 10 MG/ML
INJECTION, EMULSION INTRAVENOUS PRN
Status: DISCONTINUED | OUTPATIENT
Start: 2023-10-09 | End: 2023-10-09 | Stop reason: SDUPTHER

## 2023-10-09 RX ORDER — LIDOCAINE HYDROCHLORIDE 10 MG/ML
INJECTION, SOLUTION EPIDURAL; INFILTRATION; INTRACAUDAL; PERINEURAL PRN
Status: DISCONTINUED | OUTPATIENT
Start: 2023-10-09 | End: 2023-10-09 | Stop reason: SDUPTHER

## 2023-10-09 RX ADMIN — PROPOFOL 100 MCG/KG/MIN: 10 INJECTION, EMULSION INTRAVENOUS at 08:31

## 2023-10-09 RX ADMIN — PROPOFOL 100 MG: 10 INJECTION, EMULSION INTRAVENOUS at 08:31

## 2023-10-09 RX ADMIN — LIDOCAINE HYDROCHLORIDE 50 MG: 10 INJECTION, SOLUTION EPIDURAL; INFILTRATION; INTRACAUDAL; PERINEURAL at 08:31

## 2023-10-09 RX ADMIN — SODIUM CHLORIDE, POTASSIUM CHLORIDE, SODIUM LACTATE AND CALCIUM CHLORIDE: 600; 310; 30; 20 INJECTION, SOLUTION INTRAVENOUS at 07:56

## 2023-10-09 ASSESSMENT — PAIN - FUNCTIONAL ASSESSMENT: PAIN_FUNCTIONAL_ASSESSMENT: 0-10

## 2023-10-09 NOTE — PROGRESS NOTES
Clips were placed in the Rectum as part of the polyp removal process; these clips are metal and can interfere with MRI imaging; the clips will pass as part of a bowel movement after several weeks. Until that time, the patient has been given an implant card stating when and where the clips are located. The patient was instructed to present the card to radiology prior to having any MRI imaging taken.

## 2023-10-09 NOTE — DISCHARGE INSTRUCTIONS
Discharge Instructions for Colonoscopy    Do not drive or operate machinery for 24 hours. Do not make important personal or business decisions for 24 hours. Do not drink alcoholic beverages for 24 hours. Do not smoke tobacco products for 24 hours. It is normal to have a feeling of fullness or mild cramping in your abdomen afterwards due to air that is put into your bowel during the procedure. Mild activities such as walking will help you pass the air. You may resume your regular diet. Results from a biopsy may take up to 2 weeks to return from pathology. Make a follow-up appointment with PCP to be seen in 2 weeks. SEEK MEDICAL ATTENTION IF:    Chest Pain or trouble breathing. Persistent and significant bleeding from your rectum, such as soaking through clothing and/or feeling dizzy and sweating. A fever above 101F or if you have chills. If symptoms are severe call 911 or go to the nearest Emergency Room.

## 2023-10-09 NOTE — H&P
BLOCKS performed by Camilla Hay DO at [address]  2016    I & D- cyst- posterior neck    PAIN MANAGEMENT PROCEDURE Bilateral 2023    BILATERAL LUMBAR L3, L4, L5 MEDIAL BRANCH BLOCKS performed by Caimlla Hay DO at [address] [facility] ENDOSCOPY N/A 2019    EGD BIOPSY performed by Marcelina Rojo MD at [facility] OR     Current Facility-Administered Medications   Medication Dose Route Frequency Provider Last Rate Last Admin    lactated ringers IV soln infusion   IntraVENous Continuous Brittaney Shaw  mL/hr at 10/09/23 0756 New Bag at 10/09/23 0756     No Known Allergies  Family History   Problem Relation Age of Onset    Diabetes Mother     Heart Disease Mother     Diabetes Father     Heart Disease Father     Heart Disease Brother      Social History     Socioeconomic History    Marital status:      Spouse name: Not on file    Number of children: Not on file    Years of education: Not on file    Highest education level: Not on file   Occupational History    Not on file   Tobacco Use    Smoking status: Former     Packs/day: 1.00     Years: 45.00     Additional pack years: 0.00     Total pack years: 45.00     Types: Cigarettes     Quit date: 2014     Years since quittin.8    Smokeless tobacco: Never   Vaping Use    Vaping Use: Never used   Substance and Sexual Activity    Alcohol use: No    Drug use: No    Sexual activity: Not on file   Other Topics Concern    Not on file   Social History Narrative    Not on file     Social Determinants of Health     Financial Resource Strain: Low Risk  (3/3/2023)    Overall Financial Resource Strain (CARDIA)     Difficulty of Paying Living Expenses: Not hard at all   Food Insecurity: No Food Insecurity (3/3/2023)    Hunger Vital Sign     Worried About Running Out of Food in the Last Year: Never true     Ran Out of Food in the Last Year: Never true   Transportation

## 2023-10-09 NOTE — OP NOTE
[place] ENDOSCOPY    COLONOSCOPY    PROCEDURE DATE: 10/09/23    REFERRING PHYSICIAN: No ref. provider found     PRIMARY CARE PROVIDER: Dante Ramos MD    ATTENDING PHYSICIAN: Adryan Lewis MD     HISTORY: Mr. Sis Norton is a 79 y.o. male who presents to the  endoscopy unit for colonoscopy. The patient's clinical history is remarkable for hypertension, diabetes mellitus II. He is currently medically stable and appropriate for the planned procedure. PREOPERATIVE DIAGNOSIS: screening for colon cancer. PROCEDURES:   Transanal Colonoscopy --screening. POSTPROCEDURE DIAGNOSIS:  Colon polyps  Internal hemorrhoids    MEDICATIONS:   MAC per anesthesia     EBL 2 ml      INSTRUMENT: Olympus CF-H190 AL Pediatric flexible Colonoscope. PREPARATION: The nature and character of the procedure as well as risks, benefits, and alternatives were discussed with the patient and informed consent was obtained. Complications were said to include, but were not limited to: medication allergy, medication reaction, cardiovascular and respiratory problems, bleeding, perforation, infection, and/or missed diagnosis. Following arrival in the endoscopy room, the patient was placed in the left lateral decubitus position and final time-out accomplished in the presence of the nursing staff. Baseline vital signs were obtained and reviewed, and IV sedation was subsequently initiated. FINDINGS: Rectal examination demonstrated no significant visible external abnormality and digital palpation was unremarkable. Following adequate conscious sedation the colonoscope was introduced and advanced under direct visualization to the cecum, which was identified by the ileocecal valve and appendiceal orifice. The bowel preparation was felt to be adequate. Cecal intubation time was 4 minutes. Once maximally inserted, the endoscope was withdrawn, fluid as well as gas was suctioned and the mucosa was carefully inspected.  The mucosal

## 2023-10-09 NOTE — ANESTHESIA POSTPROCEDURE EVALUATION
Department of Anesthesiology  Postprocedure Note    Patient: Roxann Beth  MRN: [id number]  YOB: 1955  Date of evaluation: 10/9/2023      Procedure Summary     Date: 10/09/23 Room / Location: [address] [place] [address] ENDOSCOPY    Anesthesia Start: 0824 Anesthesia Stop: 0900    Procedure: COLONOSCOPY BIOPSY (Abdomen) Diagnosis:       Chronic diarrhea      (Chronic diarrhea [K52.9])    Surgeons: Ghislaine White MD Responsible Provider: VERONIQUE Villalba CRNA    Anesthesia Type: MAC ASA Status: 3          Anesthesia Type: No value filed.     Ytesha Phase I: Tyesha Score: 10    Tyesha Phase II:        Anesthesia Post Evaluation    Patient location during evaluation: PACU  Patient participation: complete - patient participated  Level of consciousness: awake and lethargic  Pain score: 0  Airway patency: patent  Nausea & Vomiting: no nausea and no vomiting  Complications: no  Cardiovascular status: blood pressure returned to baseline and hemodynamically stable  Respiratory status: room air, spontaneous ventilation and acceptable  Hydration status: euvolemic  Multimodal analgesia pain management approach  Pain management: adequate and satisfactory to patient

## 2023-10-12 ENCOUNTER — OFFICE VISIT (OUTPATIENT)
Dept: PAIN MANAGEMENT | Age: 68
End: 2023-10-12
Payer: MEDICARE

## 2023-10-12 VITALS
BODY MASS INDEX: 41.3 KG/M2 | DIASTOLIC BLOOD PRESSURE: 78 MMHG | WEIGHT: 313 LBS | HEART RATE: 80 BPM | OXYGEN SATURATION: 98 % | RESPIRATION RATE: 19 BRPM | SYSTOLIC BLOOD PRESSURE: 120 MMHG

## 2023-10-12 DIAGNOSIS — M47.817 LUMBOSACRAL SPONDYLOSIS WITHOUT MYELOPATHY: Primary | ICD-10-CM

## 2023-10-12 DIAGNOSIS — E66.01 OBESITY, CLASS III, BMI 40-49.9 (MORBID OBESITY) (HCC): ICD-10-CM

## 2023-10-12 DIAGNOSIS — M47.814 THORACIC SPONDYLOSIS: ICD-10-CM

## 2023-10-12 DIAGNOSIS — M51.36 LUMBAR DEGENERATIVE DISC DISEASE: ICD-10-CM

## 2023-10-12 LAB — SURGICAL PATHOLOGY REPORT: NORMAL

## 2023-10-12 PROCEDURE — 99214 OFFICE O/P EST MOD 30 MIN: CPT | Performed by: STUDENT IN AN ORGANIZED HEALTH CARE EDUCATION/TRAINING PROGRAM

## 2023-10-12 PROCEDURE — 3074F SYST BP LT 130 MM HG: CPT | Performed by: STUDENT IN AN ORGANIZED HEALTH CARE EDUCATION/TRAINING PROGRAM

## 2023-10-12 PROCEDURE — 3078F DIAST BP <80 MM HG: CPT | Performed by: STUDENT IN AN ORGANIZED HEALTH CARE EDUCATION/TRAINING PROGRAM

## 2023-10-12 PROCEDURE — 1123F ACP DISCUSS/DSCN MKR DOCD: CPT | Performed by: STUDENT IN AN ORGANIZED HEALTH CARE EDUCATION/TRAINING PROGRAM

## 2023-10-12 NOTE — PROGRESS NOTES
JOINT L/S SINGLE     RADIOFREQUENCY L/S ADDITIONAL      2. Lumbar degenerative disc disease  M51.36       3. Obesity, Class III, BMI 40-49.9 (morbid obesity) ([facility])  E66.01       4. Thoracic spondylosis  M47.814         ASSESSMENT:    Tex Wolfe is a 79 y.o.male who presents with chronic thoracic and low back pain. To review, patient has had symptoms for over 5 years. He denies any thoracic or low back surgeries. He was previously receiving lumbar medial branch radiofrequency ablation procedures for his low back pain which was very helpful. The patient's history and physical examination are consistent lumbosacral spondylosis as the patient has axial low back pain that is mechanical in nature. There is tenderness to palpation along the lumbar paraspinal musculature with positive lumbar facet loading bilaterally. The lumbar MRI on 2/2/2023 reveals multilevel degenerative changes with facet hypertrophy throughout the lumbar spine specifically at L4-5 and L5-S1 facet joints. The patient underwent bilateral lumbar L3, L4, L5 medial branch blocks on 5/11/2023 and 6/22/2023 with 100% improvement in pain and function. The patient is now a candidate for the lumbar medial branch radiofrequency ablation procedure. I discussed risk, benefits and expectations of the procedure, and the patient agreed to proceed. Neurologically, it appears the patient has full strength and normal sensation. There is no evidence of radiculopathy or myelopathy on examination. There are no red flags in the patient's history. The patient has failed conservative measures including outpatient physical therapy, greater than 3 medications for pain relief, a self-directed therapy program, as well as activity modification all within the last 6 weeks over 3 months. The patient's pain has been causing worsening quality of life and function. PLAN:  Medications:     For nonopioid therapy, the following medications were prescribed:    -Continue

## 2023-10-12 NOTE — H&P (VIEW-ONLY)
Chronic Pain Clinic Note     Encounter Date: 10/12/2023     SUBJECTIVE:  Chief Complaint   Patient presents with    Back Pain       History of Present Illness:   Zoe Rascon is a 79 y.o. male who presents following MBB's. He states that he got 4 weeks of relief. The patient states that he has mild pain today. Medication Refill: N/A    Current Complaints of Pain:   Location: Low back  Radiation: None  Severity: moderate  Pain Numerical Score - 5-6   Average: 6    Highest: 10  Lowest: 4  Character/Quality: Complains of pain that is aching, dull  Timing: Constant  Associated symptoms: none  Numbness: no  Weakness: no  Exacerbating factors: laying on his back, deep breaths   Alleviating factors:   Length of time pain has been present: Started on chronic, years ago   Inciting event/injury: Fracture after a fall at home   Bowel/Bladder incontinence: no  Falls: none recent   Physical Therapy: yes    History of Interventions:   Surgery: No previous lumbar/cervical surgeries  Injections: Lumbar RFA    Imaging:    Thoracic and lumbar MRI 2/2/2023    FINDINGS:    Normal segmentation. Thoracic vertebral body heights are maintained with normal    alignment. Unchanged old compression deformity of L1 without retropulsion. Slight degenerative reactive marrow endplate changes from T5 to T7. Thoracic    spinal cord is normal. No acute prevertebral or paraspinal soft tissue    abnormalities. No acute abnormality in the visualized chest or abdomen. Similar right renal    cyst is better visualized on CT 1/25/2019. Mild multilevel spondylotic changes include varying degrees of disc    desiccation, intervertebral disc height loss, osteophytic ridging, and    facet/ligamentum flavum hypertrophy. These are more pronounced in the lumbar    spine. The lumbar pedicles are congenitally shortened with slight prominent    dorsal epidural lipomatosis In the thoracic spine, no disc bulge or herniation.     No high-grade spinal DO at [facility]      THYROID LOBECTOMY      UPPER GASTROINTESTINAL ENDOSCOPY N/A 2019    EGD BIOPSY performed by Yady Linares MD at [facility] OR       Family History   Problem Relation Age of Onset    Diabetes Mother     Heart Disease Mother     Diabetes Father     Heart Disease Father     Heart Disease Brother        Social History     Socioeconomic History    Marital status:      Spouse name: Not on file    Number of children: Not on file    Years of education: Not on file    Highest education level: Not on file   Occupational History    Not on file   Tobacco Use    Smoking status: Former     Packs/day: 1.00     Years: 45.00     Additional pack years: 0.00     Total pack years: 45.00     Types: Cigarettes     Quit date: 2014     Years since quittin.8    Smokeless tobacco: Never   Vaping Use    Vaping Use: Never used   Substance and Sexual Activity    Alcohol use: No    Drug use: No    Sexual activity: Not on file   Other Topics Concern    Not on file   Social History Narrative    Not on file     Social Determinants of Health     Financial Resource Strain: Low Risk  (3/3/2023)    Overall Financial Resource Strain (CARDIA)     Difficulty of Paying Living Expenses: Not hard at all   Food Insecurity: No Food Insecurity (3/3/2023)    Hunger Vital Sign     Worried About Running Out of Food in the Last Year: Never true     [address] in the Last Year: Never true   Transportation Needs: Unknown (3/3/2023)    PRAPARE - Transportation     Lack of Transportation (Medical): Not on file     Lack of Transportation (Non-Medical):  No   Physical Activity: Insufficiently Active (2023)    Exercise Vital Sign     Days of Exercise per Week: 3 days     Minutes of Exercise per Session: 20 min   Stress: Not on file   Social Connections: Not on file   Intimate Partner Violence: Not on file   Housing Stability: Unknown (3/3/2023)    Housing Stability Vital Sign     Unable to Pay for Housing in

## 2023-10-13 DIAGNOSIS — E03.9 ACQUIRED HYPOTHYROIDISM: ICD-10-CM

## 2023-10-13 RX ORDER — LEVOTHYROXINE SODIUM 0.2 MG/1
TABLET ORAL
Qty: 90 TABLET | Refills: 0 | OUTPATIENT
Start: 2023-10-13

## 2023-10-13 RX ORDER — LEVOTHYROXINE SODIUM 0.2 MG/1
TABLET ORAL
Qty: 90 TABLET | Refills: 0 | Status: SHIPPED | OUTPATIENT
Start: 2023-10-13

## 2023-10-13 NOTE — TELEPHONE ENCOUNTER
Health Maintenance   Topic Date Due    DTaP/Tdap/Td vaccine (1 - Tdap) Never done    Shingles vaccine (1 of 2) Never done    Hepatitis B vaccine (1 of 3 - Risk 3-dose series) Never done    Diabetic Alb to Cr ratio (uACR) test  08/18/2017    Diabetic retinal exam  10/29/2020    COVID-19 Vaccine (6 - Moderna series) 03/22/2023    Flu vaccine (1) 08/01/2023    Lipids  10/25/2023    GFR test (Diabetes, CKD 3-4, OR last GFR 15-59)  02/09/2024    Diabetic foot exam  06/05/2024    A1C test (Diabetic or Prediabetic)  06/05/2024    Depression Monitoring  06/05/2024    Annual Wellness Visit (AWV)  06/05/2024    Low dose CT lung screening &/or counseling  06/22/2024    Pneumococcal 65+ years Vaccine (3 - PPSV23 or PCV20) 10/08/2024    Colorectal Cancer Screen  10/09/2033    AAA screen  Completed    Hepatitis C screen  Completed    Hepatitis A vaccine  Aged Out    Hib vaccine  Aged Out    Meningococcal (ACWY) vaccine  Aged Out    Pneumococcal 0-64 years Vaccine  Discontinued    HIV screen  Discontinued    Prostate Specific Antigen (PSA) Screening or Monitoring  Discontinued             (applicable per patient's age: Cancer Screenings, Depression Screening, Fall Risk Screening, Immunizations)    Hemoglobin A1C (%)   Date Value   06/05/2023 7.8   03/03/2023 10.0   12/01/2022 6.8     LDL Cholesterol (mg/dL)   Date Value   10/25/2022 49     AST (U/L)   Date Value   02/09/2023 43 (H)     ALT (U/L)   Date Value   02/09/2023 39     BUN (mg/dL)   Date Value   02/09/2023 10      (goal A1C is < 7)   (goal LDL is <100) need 30-50% reduction from baseline     BP Readings from Last 3 Encounters:   10/12/23 120/78   10/09/23 116/61   07/20/23 118/78    (goal /80)      All Future Testing planned in [facility]:  Lab Frequency Next Occurrence   CBC with Auto Differential Once 02/06/2024   Comprehensive Metabolic Panel Once 05/85/7478   Lipid Panel Once 02/06/2024   TSH with Reflex Once 02/06/2024   Magnesium Once 02/06/2024   EKG 12 Lead

## 2023-10-24 ENCOUNTER — TELEPHONE (OUTPATIENT)
Dept: GASTROENTEROLOGY | Age: 68
End: 2023-10-24

## 2023-10-24 NOTE — TELEPHONE ENCOUNTER
----- Message from Daina Villegas MD sent at 10/24/2023 10:21 AM EDT -----  Please notify patients of normal labs: Tubular and sessile serrated adenomas are on a pre-cancerous spectrum. There is no evidence of inflammatory bowel disease or microscopic colitis. Repeat interval for colonoscopy is 5 years.

## 2023-10-26 DIAGNOSIS — I10 ESSENTIAL HYPERTENSION: ICD-10-CM

## 2023-10-26 RX ORDER — METOPROLOL SUCCINATE 50 MG/1
TABLET, EXTENDED RELEASE ORAL
Qty: 90 TABLET | Refills: 3 | Status: SHIPPED | OUTPATIENT
Start: 2023-10-26

## 2023-10-31 NOTE — PROGRESS NOTES
[facility]   Preadmission Testing    Name: Zoe Rascon  : 1955  Patient Phone: [phone] (home)     Procedure: RIGHT LUMBAR L3, L4, L5 MEDIAL BRANCH RADIOFREQUENCY ABLATION - Right  Local  Date of Procedure: 2023  Surgeon: Mesfin Lujan DO    Ht:    Wt:   Wt method: Allergies: Allergies   Allergen Reactions    Penicillins      Pt's daughter confirmed PCN should be added to allergy history                There were no vitals filed for this visit. No LMP for male patient. Do you take blood thinners? [x] Yes    [] No         Instructed to stop blood thinners prior to procedure? [] Yes    [x] No      [] N/A    []Eliquis - 3 days  []Xarelto - 3 days  []Pradaxa - 5 days  []Coumadin - 5 days   []Plavix - 7 days  []ASA 325mg - 7 days  []Brillinta - 5 days  []Pletal - 2 days   Have you had a respiratory infection or sore throat in last 4 weeks before surgery?     [] Yes    [x] No     Patient instructed on: [x] NPO Status - if receiving sedation  [x] Meds to Take  [x] Ride Home - sedation/ epidurals  []No Jewelry/Contact Lenses/Nail [place]     DOS Patient Needs [] HCG   [x] Blood Sugar  [] PT/INR

## 2023-11-03 ENCOUNTER — OFFICE VISIT (OUTPATIENT)
Dept: FAMILY MEDICINE CLINIC | Age: 68
End: 2023-11-03
Payer: MEDICARE

## 2023-11-03 VITALS
OXYGEN SATURATION: 95 % | HEIGHT: 73 IN | BODY MASS INDEX: 41.75 KG/M2 | SYSTOLIC BLOOD PRESSURE: 114 MMHG | WEIGHT: 315 LBS | HEART RATE: 51 BPM | DIASTOLIC BLOOD PRESSURE: 65 MMHG | RESPIRATION RATE: 18 BRPM

## 2023-11-03 DIAGNOSIS — R35.0 BENIGN PROSTATIC HYPERPLASIA WITH URINARY FREQUENCY: ICD-10-CM

## 2023-11-03 DIAGNOSIS — I10 ESSENTIAL HYPERTENSION: ICD-10-CM

## 2023-11-03 DIAGNOSIS — E11.9 TYPE 2 DIABETES MELLITUS WITHOUT COMPLICATION, WITHOUT LONG-TERM CURRENT USE OF INSULIN (HCC): Primary | ICD-10-CM

## 2023-11-03 DIAGNOSIS — Z12.5 PROSTATE CANCER SCREENING: ICD-10-CM

## 2023-11-03 DIAGNOSIS — L98.9 SKIN LESIONS: ICD-10-CM

## 2023-11-03 DIAGNOSIS — N40.1 BENIGN PROSTATIC HYPERPLASIA WITH URINARY FREQUENCY: ICD-10-CM

## 2023-11-03 DIAGNOSIS — Z23 NEED FOR INFLUENZA VACCINATION: ICD-10-CM

## 2023-11-03 DIAGNOSIS — E03.9 ACQUIRED HYPOTHYROIDISM: ICD-10-CM

## 2023-11-03 DIAGNOSIS — E78.2 MIXED HYPERLIPIDEMIA: ICD-10-CM

## 2023-11-03 DIAGNOSIS — J44.9 CHRONIC OBSTRUCTIVE PULMONARY DISEASE, UNSPECIFIED COPD TYPE (HCC): ICD-10-CM

## 2023-11-03 DIAGNOSIS — K21.9 GASTROESOPHAGEAL REFLUX DISEASE WITHOUT ESOPHAGITIS: ICD-10-CM

## 2023-11-03 LAB — HBA1C MFR BLD: 8.2 %

## 2023-11-03 PROCEDURE — 90694 VACC AIIV4 NO PRSRV 0.5ML IM: CPT | Performed by: INTERNAL MEDICINE

## 2023-11-03 PROCEDURE — G0008 ADMIN INFLUENZA VIRUS VAC: HCPCS | Performed by: INTERNAL MEDICINE

## 2023-11-03 PROCEDURE — 3078F DIAST BP <80 MM HG: CPT | Performed by: INTERNAL MEDICINE

## 2023-11-03 PROCEDURE — 3074F SYST BP LT 130 MM HG: CPT | Performed by: INTERNAL MEDICINE

## 2023-11-03 PROCEDURE — 99214 OFFICE O/P EST MOD 30 MIN: CPT | Performed by: INTERNAL MEDICINE

## 2023-11-03 PROCEDURE — 3052F HG A1C>EQUAL 8.0%<EQUAL 9.0%: CPT | Performed by: INTERNAL MEDICINE

## 2023-11-03 PROCEDURE — 1123F ACP DISCUSS/DSCN MKR DOCD: CPT | Performed by: INTERNAL MEDICINE

## 2023-11-03 PROCEDURE — 83036 HEMOGLOBIN GLYCOSYLATED A1C: CPT | Performed by: INTERNAL MEDICINE

## 2023-11-03 RX ORDER — TAMSULOSIN HYDROCHLORIDE 0.4 MG/1
0.4 CAPSULE ORAL DAILY
Qty: 90 CAPSULE | Refills: 1 | Status: SHIPPED | OUTPATIENT
Start: 2023-11-03

## 2023-11-03 RX ORDER — ORAL SEMAGLUTIDE 3 MG/1
3 TABLET ORAL
Qty: 30 TABLET | Refills: 1 | Status: SHIPPED | OUTPATIENT
Start: 2023-11-03

## 2023-11-03 ASSESSMENT — ENCOUNTER SYMPTOMS
BLOOD IN STOOL: 0
ABDOMINAL PAIN: 0
NAUSEA: 0
SORE THROAT: 0
COUGH: 0
ALLERGIC/IMMUNOLOGIC NEGATIVE: 1
SHORTNESS OF BREATH: 0
WHEEZING: 0
ORTHOPNEA: 0
CONSTIPATION: 0

## 2023-11-03 NOTE — PROGRESS NOTES
HPI Notes    Name: Lisa Espinosa  : 1955         Chief Complaint:     Chief Complaint   Patient presents with    Frequent/Recurrent UTI     Patient is having frequent urination and 6 years ago he was diagnosed with large prostate and never followed up     Diabetes     Patient states he is good       History of Present Illness:        Mr. Inga Aguiar presents to office to follow up for DM, HTN, Hypothyroidism,  Anxiety. Today he also c/o frequent urination. Says it's getting harder to hold urine and has a lot of leaking. Has no scrotal pain, no burnig or blood in urine. In the past he was following up with a urologist Dr. Andi Zelaya in [facility] for BPH. Does not recall taking meds. He did not follow up for 5-6 years. Jason Patton presents as a follow up on anxiety. Current medication for anxiety includes Paxil and Wellbutrin. Jason Patton has been on this medication for about 25 years . The medication is  being tolerated well. Since starting the medication the symptoms of anxiety have significantly improved. Jason Patton  is not in counciling. Jason Patton confirms that she is compliant on their thyroid medication. He  denies an increase in fatigue, constipation, increased skin dryness, or increased lower extremity edema. The last TSH was 2.42 on 22 . Has multiple skin lesions on both arms. They are different shapes, raised with rough whitish surface . Did not attempt to treat. Would like to be referred to dermatologist.     Diabetes  He presents for his follow-up diabetic visit. He has type 2 diabetes mellitus. There are no hypoglycemic associated symptoms. Pertinent negatives for hypoglycemia include no dizziness, headaches or nervousness/anxiousness. There are no diabetic associated symptoms. Pertinent negatives for diabetes include no chest pain, no polydipsia, no polyuria and no weakness. There are no hypoglycemic complications. Diabetic complications include heart disease.  Pertinent negatives for diabetic

## 2023-11-09 ENCOUNTER — APPOINTMENT (OUTPATIENT)
Dept: GENERAL RADIOLOGY | Age: 68
End: 2023-11-09
Attending: STUDENT IN AN ORGANIZED HEALTH CARE EDUCATION/TRAINING PROGRAM
Payer: MEDICARE

## 2023-11-09 ENCOUNTER — HOSPITAL ENCOUNTER (OUTPATIENT)
Age: 68
Setting detail: OUTPATIENT SURGERY
Discharge: HOME | End: 2023-11-09
Attending: STUDENT IN AN ORGANIZED HEALTH CARE EDUCATION/TRAINING PROGRAM | Admitting: STUDENT IN AN ORGANIZED HEALTH CARE EDUCATION/TRAINING PROGRAM
Payer: MEDICARE

## 2023-11-09 VITALS
BODY MASS INDEX: 41.75 KG/M2 | OXYGEN SATURATION: 94 % | WEIGHT: 315 LBS | RESPIRATION RATE: 14 BRPM | HEIGHT: 73 IN | HEART RATE: 56 BPM | TEMPERATURE: 97.8 F | SYSTOLIC BLOOD PRESSURE: 112 MMHG | DIASTOLIC BLOOD PRESSURE: 51 MMHG

## 2023-11-09 LAB — GLUCOSE BLD-MCNC: 105 MG/DL (ref 65–99)

## 2023-11-09 PROCEDURE — 2709999900 HC NON-CHARGEABLE SUPPLY: Performed by: STUDENT IN AN ORGANIZED HEALTH CARE EDUCATION/TRAINING PROGRAM

## 2023-11-09 PROCEDURE — 82947 ASSAY GLUCOSE BLOOD QUANT: CPT

## 2023-11-09 PROCEDURE — 3600000058 HC PAIN LEVEL 5 BASE: Performed by: STUDENT IN AN ORGANIZED HEALTH CARE EDUCATION/TRAINING PROGRAM

## 2023-11-09 PROCEDURE — 2500000003 HC RX 250 WO HCPCS: Performed by: STUDENT IN AN ORGANIZED HEALTH CARE EDUCATION/TRAINING PROGRAM

## 2023-11-09 PROCEDURE — 64635 DESTROY LUMB/SAC FACET JNT: CPT | Performed by: STUDENT IN AN ORGANIZED HEALTH CARE EDUCATION/TRAINING PROGRAM

## 2023-11-09 PROCEDURE — 7100000010 HC PHASE II RECOVERY - FIRST 15 MIN: Performed by: STUDENT IN AN ORGANIZED HEALTH CARE EDUCATION/TRAINING PROGRAM

## 2023-11-09 PROCEDURE — 64636 DESTROY L/S FACET JNT ADDL: CPT | Performed by: STUDENT IN AN ORGANIZED HEALTH CARE EDUCATION/TRAINING PROGRAM

## 2023-11-09 PROCEDURE — 76000 FLUOROSCOPY <1 HR PHYS/QHP: CPT

## 2023-11-09 PROCEDURE — 6360000002 HC RX W HCPCS: Performed by: STUDENT IN AN ORGANIZED HEALTH CARE EDUCATION/TRAINING PROGRAM

## 2023-11-09 RX ORDER — LIDOCAINE HYDROCHLORIDE 10 MG/ML
INJECTION, SOLUTION EPIDURAL; INFILTRATION; INTRACAUDAL; PERINEURAL PRN
Status: DISCONTINUED | OUTPATIENT
Start: 2023-11-09 | End: 2023-11-09 | Stop reason: ALTCHOICE

## 2023-11-09 RX ORDER — TRIAMCINOLONE ACETONIDE 40 MG/ML
INJECTION, SUSPENSION INTRA-ARTICULAR; INTRAMUSCULAR PRN
Status: DISCONTINUED | OUTPATIENT
Start: 2023-11-09 | End: 2023-11-09 | Stop reason: ALTCHOICE

## 2023-11-09 RX ORDER — LIDOCAINE HYDROCHLORIDE 20 MG/ML
INJECTION, SOLUTION EPIDURAL; INFILTRATION; INTRACAUDAL; PERINEURAL PRN
Status: DISCONTINUED | OUTPATIENT
Start: 2023-11-09 | End: 2023-11-09 | Stop reason: ALTCHOICE

## 2023-11-09 ASSESSMENT — PAIN - FUNCTIONAL ASSESSMENT: PAIN_FUNCTIONAL_ASSESSMENT: 0-10

## 2023-11-09 ASSESSMENT — PAIN DESCRIPTION - DESCRIPTORS: DESCRIPTORS: ACHING

## 2023-11-09 NOTE — OP NOTE
PROCEDURE PERFORMED: Right Lumbar Medial Branch Radiofrequency Ablation using Fluoroscopy    PREOPERATIVE DIAGNOSIS: Lumbosacral spondylosis    INDICATIONS: Chronic low back pain    The patient's history and physical exam were reviewed. The risk, benefits, and alternatives of the procedure were discussed and all questions were answered to the patient's satisfaction. The patient agreed to proceed and written informed consent was obtained. POSTOPERATIVE DIAGNOSIS: Lumbosacral spondylosis    PHYSICIAN:  Dr. Avni Melendez DO    ANESTHESIA:  LOCAL    ASSISTANT:  NONE    PATHOLOGY:  NONE    ESTIMATED BLOOD LOSS:  N/A    IMPLANTS:  NONE    PROCEDURE DESCRIPTION: Right lumbar medial branch radiofrequency ablation using fluoroscopy    The patient was placed on the operative bed in prone position. The area was prepped with  Chlorhexidine. The area was then draped in a sterile fashion. Targeted levels: Right lumbar L3, L4, L5 medial branch radiofrequency ablation    An AP  fluoroscopy image was used to identify and leslie Marie's point at the L3, L4 levels on the targeted side. Additionally, the junction of the SAP and sacral ala was also marked on the same side. The skin and subcutaneous tissues were then anesthetized with 1% lidocaine. At each lumbar medial branch, a 20-gauge, 100 mm curved with 10 mm active tip probe was advanced under AP fluoroscopic projections until bone was contacted along the medial aspect of the transverse process. Aspiration of each needle was negative for blood, CSF and paresthesia prior to injection. Motor stimulation at 2 Hz and 1.2 V was negative. Then, after negative aspiration, 1 mL lidocaine 2% was injected at each level prior to lesioning which was performed at [facility] for 90 seconds. Once the lesion was complete, injectate solution containing Kenalog 40 mg and 2 mL lidocaine 1% was injected at each site with a total of 1 mL. The probes were then removed.   The needle

## 2023-11-09 NOTE — PROGRESS NOTES
Discharge Criteria  Outpatients must meet criteria 1 through 7. Gait steady when up    1. Minimum 30 minutes after last dose of sedative medication, minimum 120 minutes after last dose of reversal agent. Yes    2. Systolic BP stable within 20 mmHg for 30 minutes & systolic BP between 90 & 429 or within 10 mmHg of baseline. Yes    3. Pulse between 60 and 100 or within 10 bpm of baseline. Yes    4. Spontaneous respiratory rate >/= 10 per minute. Yes    5. SaO2 >/= 95 or  >/= baseline. Yes    6. Able to cough and swallow or return to baseline function. Yes    7. Alert and oriented or return to baseline mental status. Yes    8. Demonstrates controlled, coordinated movements, ambulates with steady gait, or return to baseline activity function. Yes    9. Minimal or no pain or nausea, or at a level tolerable and acceptable to patient. Yes    10. Takes and retains oral fluids as allowed. Yes    11. Procedural / perioperative site stable. Minimal or no bleeding. Yes        12. If GI endoscopy procedure, minimal or no abdominal distention or passing flatus. Yes    13. Written discharge instructions and emergency telephone number provided. Yes    14. Accompanied by a responsible adult. Yes    Adult patient discharged from facility without responsible person meets above criteria plus the following:   a) remains awake without stimulus for 30 minutes     b) oriented appropriate for age      c) all vital signs stable   d) no significant risk of losing protective reflexes      e) able to maintain pre-procedure mobility without assistance   f) no nausea or dizziness      g) transportation arrangements that do not require patient to operate motor   Vehicle.   Yes

## 2023-11-09 NOTE — INTERVAL H&P NOTE
Update History & Physical    The patient's History and Physical of October 12, 2023 was reviewed with the patient and I examined the patient. There was no change. The surgical site was confirmed by the patient and me. Plan: The risks, benefits, expected outcome, and alternative to the recommended procedure have been discussed with the patient. Patient understands and wants to proceed with the procedure.      Electronically signed by Los Pacheco DO on 11/9/2023 at 1:53 PM

## 2023-11-27 ENCOUNTER — TELEPHONE (OUTPATIENT)
Dept: PAIN MANAGEMENT | Age: 68
End: 2023-11-27

## 2023-11-27 RX ORDER — GLIPIZIDE 10 MG/1
10 TABLET ORAL 2 TIMES DAILY
Qty: 60 TABLET | Refills: 3 | Status: SHIPPED | OUTPATIENT
Start: 2023-11-27

## 2023-11-27 NOTE — TELEPHONE ENCOUNTER
Once 02/06/2024   XR CHEST (2 VW) Once 02/06/2024   FACET JOINT L/S Once 03/30/2023   FACET JOINT L/S 2ND LEVEL Once 03/30/2023   COLONOSCOPY W/ OR W/O BIOPSY Once 05/04/2023   Hepatitis A Antibody, IgM Once 04/27/2023   FACET JOINT L/S Once 06/01/2023   FACET JOINT L/S 2ND LEVEL Once 06/01/2023   PSA Screening Once 06/05/2023   FACET JOINT L/S SINGLE Once 10/12/2023   RADIOFREQUENCY L/S ADDITIONAL Once 10/12/2023       Next Visit Date:  Future Appointments   Date Time Provider [address]   12/1/2023 11:30 AM SCHEDULE, [facility] UROLOGY GREEN PT SCHEDULE TIFF UROLOGY TPP   1/4/2024  9:15 AM Antonio Mujica, DO [facility]   1/5/2024 11:00 AM [facility]   2/5/2024 10:30 AM Inga Valencia MD [facility]   6/6/2024 10:00 [facility]            Patient Active Problem List:     COPD (chronic obstructive pulmonary disease) ([address])     GERD (gastroesophageal reflux disease)     S/P CABG x 3     CAD (coronary artery disease)     Essential hypertension     Mixed hyperlipidemia     Type 2 diabetes mellitus without complication, without long-term current use of insulin (HCC)     Dysthymia (or depressive neurosis)     Chronic gout involving toe     Acquired hypothyroidism     Vitamin D deficiency disease     Chronic low back pain with left-sided sciatica     Class 3 severe obesity due to excess calories without serious comorbidity with body mass index (BMI) of 40.0 to 44.9 in adult ([address])     MINAL treated with BiPAP

## 2023-11-27 NOTE — TELEPHONE ENCOUNTER
Patient contacted the office with questions in regards to upcoming appt with provider on 01/04/23 at 9:15 am. He would like to discuss a different type of treatment. Patient mentioned he was in a lot of pain at the previous visit. Please advise. Thank you.

## 2023-11-30 ENCOUNTER — HOSPITAL ENCOUNTER (OUTPATIENT)
Age: 68
Setting detail: SPECIMEN
Discharge: HOME | End: 2023-11-30
Payer: MEDICARE

## 2023-11-30 ENCOUNTER — OFFICE VISIT (OUTPATIENT)
Dept: FAMILY MEDICINE CLINIC | Age: 68
End: 2023-11-30
Payer: MEDICARE

## 2023-11-30 ENCOUNTER — TELEPHONE (OUTPATIENT)
Age: 68
End: 2023-11-30

## 2023-11-30 VITALS
OXYGEN SATURATION: 96 % | HEIGHT: 73 IN | WEIGHT: 313.4 LBS | SYSTOLIC BLOOD PRESSURE: 138 MMHG | DIASTOLIC BLOOD PRESSURE: 78 MMHG | BODY MASS INDEX: 41.54 KG/M2 | RESPIRATION RATE: 18 BRPM | HEART RATE: 100 BPM

## 2023-11-30 DIAGNOSIS — L72.9 INFECTED CYST OF SKIN: Primary | ICD-10-CM

## 2023-11-30 DIAGNOSIS — L08.9 INFECTED CYST OF SKIN: Primary | ICD-10-CM

## 2023-11-30 PROCEDURE — 87205 SMEAR GRAM STAIN: CPT

## 2023-11-30 PROCEDURE — 3078F DIAST BP <80 MM HG: CPT | Performed by: INTERNAL MEDICINE

## 2023-11-30 PROCEDURE — 1123F ACP DISCUSS/DSCN MKR DOCD: CPT | Performed by: INTERNAL MEDICINE

## 2023-11-30 PROCEDURE — 87070 CULTURE OTHR SPECIMN AEROBIC: CPT

## 2023-11-30 PROCEDURE — 3075F SYST BP GE 130 - 139MM HG: CPT | Performed by: INTERNAL MEDICINE

## 2023-11-30 PROCEDURE — 99214 OFFICE O/P EST MOD 30 MIN: CPT | Performed by: INTERNAL MEDICINE

## 2023-11-30 RX ORDER — DOXYCYCLINE HYCLATE 100 MG
100 TABLET ORAL 2 TIMES DAILY
Qty: 14 TABLET | Refills: 0 | Status: SHIPPED | OUTPATIENT
Start: 2023-11-30 | End: 2023-12-07

## 2023-11-30 ASSESSMENT — ENCOUNTER SYMPTOMS
BLOOD IN STOOL: 0
COUGH: 0
SORE THROAT: 0
ALLERGIC/IMMUNOLOGIC NEGATIVE: 1
NAUSEA: 0
WHEEZING: 0
ABDOMINAL PAIN: 0
CONSTIPATION: 0
SHORTNESS OF BREATH: 0

## 2023-11-30 NOTE — PROGRESS NOTES
Musculoskeletal:         General: Normal range of motion. Right lower leg: No edema. Left lower leg: No edema. Lymphadenopathy:      Cervical: No cervical adenopathy. Skin:     General: Skin is warm and dry. Coloration: Skin is not pale. Findings: No rash. Comments: Posterior neck area with a large red lump draining purulent material from multiple sites. The area is tender to touch. Neurological:      Mental Status: He is alert and oriented to person, place, and time. Psychiatric:         Behavior: Behavior normal.         Judgment: Judgment normal.               Data:     Lab Results   Component Value Date/Time     02/09/2023 02:14 PM    K 4.3 02/09/2023 02:14 PM    CL 94 02/09/2023 02:14 PM    CO2 24 02/09/2023 02:14 PM    BUN 10 02/09/2023 02:14 PM    CREATININE 1.00 02/09/2023 02:14 PM    GLUCOSE 325 02/09/2023 02:14 PM    GLUCOSE 111 10/14/2011 08:30 AM    PROT 7.1 02/09/2023 02:14 PM    LABALBU 4.3 02/09/2023 02:14 PM    BILITOT 0.6 02/09/2023 02:14 PM    ALKPHOS 57 02/09/2023 02:14 PM    AST 43 02/09/2023 02:14 PM    ALT 39 02/09/2023 02:14 PM     Lab Results   Component Value Date/Time    WBC 10.5 02/09/2023 02:14 PM    RBC 4.73 02/09/2023 02:14 PM    HGB 14.7 02/09/2023 02:14 PM    HCT 43.8 02/09/2023 02:14 PM    MCV 92.6 02/09/2023 02:14 PM    MCH 31.1 02/09/2023 02:14 PM    MCHC 33.6 02/09/2023 02:14 PM    RDW 13.6 02/09/2023 02:14 PM     02/09/2023 02:14 PM    MPV NOT REPORTED 11/05/2021 09:24 AM     Lab Results   Component Value Date/Time    TSH 2.42 08/02/2022 08:06 AM     Lab Results   Component Value Date/Time    CHOL 130 10/25/2022 11:08 AM    HDL 33 10/25/2022 11:08 AM    PSA 0.35 12/17/2018 10:11 AM    LABA1C 8.2 11/03/2023 01:38 PM          Assessment & Plan        Diagnosis Orders   1. Infected cyst of skin   Most likely infected sebaceous cyst.   Wound cultures obtained and sent to the lab. Prescribed Doxycycline.  Refer to general surgery for

## 2023-11-30 NOTE — TELEPHONE ENCOUNTER
Patient had to cancel his scheduled injections today due to an infection. Patient will need re scheduled.

## 2023-12-01 ENCOUNTER — OFFICE VISIT (OUTPATIENT)
Dept: UROLOGY | Age: 68
End: 2023-12-01

## 2023-12-01 VITALS
DIASTOLIC BLOOD PRESSURE: 66 MMHG | SYSTOLIC BLOOD PRESSURE: 104 MMHG | WEIGHT: 312 LBS | HEIGHT: 73 IN | HEART RATE: 53 BPM | TEMPERATURE: 98.7 F | BODY MASS INDEX: 41.35 KG/M2

## 2023-12-01 DIAGNOSIS — E78.2 MIXED HYPERLIPIDEMIA: ICD-10-CM

## 2023-12-01 DIAGNOSIS — R31.29 MICROHEMATURIA: ICD-10-CM

## 2023-12-01 DIAGNOSIS — F41.9 ANXIETY: ICD-10-CM

## 2023-12-01 DIAGNOSIS — N40.1 BPH WITH OBSTRUCTION/LOWER URINARY TRACT SYMPTOMS: Primary | ICD-10-CM

## 2023-12-01 DIAGNOSIS — N13.8 BPH WITH OBSTRUCTION/LOWER URINARY TRACT SYMPTOMS: Primary | ICD-10-CM

## 2023-12-01 RX ORDER — FENOFIBRATE 145 MG/1
TABLET, COATED ORAL
Qty: 30 TABLET | Refills: 3 | Status: SHIPPED | OUTPATIENT
Start: 2023-12-01

## 2023-12-01 RX ORDER — TAMSULOSIN HYDROCHLORIDE 0.4 MG/1
0.4 CAPSULE ORAL 2 TIMES DAILY
Qty: 60 CAPSULE | Refills: 6 | Status: SHIPPED | OUTPATIENT
Start: 2023-12-01

## 2023-12-01 RX ORDER — PAROXETINE HYDROCHLORIDE 20 MG/1
20 TABLET, FILM COATED ORAL 2 TIMES DAILY
Qty: 180 TABLET | Refills: 3 | Status: SHIPPED | OUTPATIENT
Start: 2023-12-01

## 2023-12-01 ASSESSMENT — ENCOUNTER SYMPTOMS
BACK PAIN: 0
VOMITING: 0
COLOR CHANGE: 0
COUGH: 0
SHORTNESS OF BREATH: 0
WHEEZING: 0
ABDOMINAL PAIN: 0
EYE REDNESS: 0
CONSTIPATION: 0
NAUSEA: 0

## 2023-12-01 NOTE — PATIENT INSTRUCTIONS
SURVEY:    You may be receiving a survey from [employer] regarding your visit today. Please complete the survey to enable us to provide the highest quality of care to you and your family. If you cannot score us a very good on any question, please call the office to discuss how we could have made your experience a very good one. Thank you.

## 2023-12-01 NOTE — PROGRESS NOTES
RANDOM  Bladderscan performed in office today:  Patient voided - voided 10 minutes ago mL, PVR - 179 mL

## 2023-12-01 NOTE — PROGRESS NOTES
HPI:          Patient is a 79 y.o. male in no acute distress. He is alert and oriented to person, place, and time. Pt here today as a new patient. Pt was referred by Dr. Maday Reardon, Pt did start flomax about 2 weeks ago. Patient has seen urology in the past.  This was for BPH. He has not seen urology in the past 5 years. He is unsure of the therapy that was offered to him the previous time he did see urology. He reports no pain today. Patient does not report that the Flomax has helped him at this point in time. He is still having issues with urgency and frequency. He does have issues with dribbling before he gets to the bathroom as well as when he is done going to the bathroom. He has no flank pain nausea vomiting today. No pain today    Past Medical History:   Diagnosis Date    Asthma     Bronchitis     CAD (coronary artery disease)     COPD (chronic obstructive pulmonary disease) ([address]) 8/12/2011    Depression 8/12/2011    Diabetes mellitus ([address])     GERD (gastroesophageal reflux disease) 8/12/2011    Hyperlipidemia 8/12/2011    Hypertension     Hypothyroid 8/12/2011    Lower back pain     Nicotine addiction 8/12/2011    Sleep apnea      Past Surgical History:   Procedure Laterality Date    CARDIAC CATHETERIZATION Left 01/21/2015    Severe left main trunk disease extending into the ostium of the left anterior descending of 80% involving the ostium of the intermediate & atrioventricular branch of the circumflex. 60% disease in the atrioventricular branch of the circumflex in the midportion. 40% disease in the right coronary artery. Normal left ventricular function, ejection fraction of 60%    CARDIAC CATHETERIZATION Left 12/20/2017    Dr. Alison Glez @ [facility]--70% left main trunck & ostial LAD & high lateral circumflex disease. Patent LIMA to the LAD. Patent vein graft to the high lateral circumflex.   60% disease in the AV branch of the circumflex that terminates in a large posterolateral

## 2023-12-02 LAB
MICROORGANISM SPEC CULT: NORMAL
MICROORGANISM/AGENT SPEC: NORMAL
MICROORGANISM/AGENT SPEC: NORMAL
SPECIMEN DESCRIPTION: NORMAL

## 2023-12-04 ENCOUNTER — HOSPITAL ENCOUNTER (OUTPATIENT)
Age: 68
Discharge: HOME | End: 2023-12-04
Payer: MEDICARE

## 2023-12-04 DIAGNOSIS — R31.29 MICROHEMATURIA: ICD-10-CM

## 2023-12-04 DIAGNOSIS — N13.8 BPH WITH OBSTRUCTION/LOWER URINARY TRACT SYMPTOMS: ICD-10-CM

## 2023-12-04 DIAGNOSIS — L08.9 INFECTED CYST OF SKIN: ICD-10-CM

## 2023-12-04 DIAGNOSIS — L72.9 INFECTED CYST OF SKIN: ICD-10-CM

## 2023-12-04 DIAGNOSIS — N40.1 BPH WITH OBSTRUCTION/LOWER URINARY TRACT SYMPTOMS: ICD-10-CM

## 2023-12-04 LAB
-: ABNORMAL
BILIRUB UR QL STRIP: NEGATIVE
CLARITY UR: CLEAR
COLOR UR: YELLOW
COMMENT: ABNORMAL
GLUCOSE UR STRIP-MCNC: ABNORMAL MG/DL
HGB UR QL STRIP.AUTO: NEGATIVE
KETONES UR STRIP-MCNC: NEGATIVE MG/DL
LEUKOCYTE ESTERASE UR QL STRIP: NEGATIVE
NITRITE UR QL STRIP: NEGATIVE
PH UR STRIP: 5 [PH] (ref 5–8)
PROT UR STRIP-MCNC: NEGATIVE MG/DL
RBC #/AREA URNS HPF: ABNORMAL /HPF (ref 0–2)
SP GR UR STRIP: 1.01 (ref 1–1.03)
UROBILINOGEN UR STRIP-ACNC: NORMAL EU/DL (ref 0–1)
WBC #/AREA URNS HPF: ABNORMAL /HPF

## 2023-12-04 PROCEDURE — 87086 URINE CULTURE/COLONY COUNT: CPT

## 2023-12-04 PROCEDURE — 81001 URINALYSIS AUTO W/SCOPE: CPT

## 2023-12-04 PROCEDURE — 36415 COLL VENOUS BLD VENIPUNCTURE: CPT

## 2023-12-04 NOTE — TELEPHONE ENCOUNTER
We will await his general surgery appt with Dr Dayo Salcido this week and go from there.  Patient advised of plan

## 2023-12-05 LAB
MICROORGANISM SPEC CULT: NO GROWTH
SPECIMEN DESCRIPTION: NORMAL

## 2023-12-06 ENCOUNTER — TELEPHONE (OUTPATIENT)
Dept: UROLOGY | Age: 68
End: 2023-12-06

## 2023-12-06 NOTE — TELEPHONE ENCOUNTER
----- Message from [address] Deng PA-C sent at 12/6/2023  9:01 AM EST -----  Please let him know that his urine culture was negative also there was no significant blood seen in his urine.

## 2023-12-06 NOTE — RESULT ENCOUNTER NOTE
Please let him know that his urine culture was negative also there was no significant blood seen in his urine.

## 2023-12-07 ENCOUNTER — OFFICE VISIT (OUTPATIENT)
Dept: SURGERY | Age: 68
End: 2023-12-07

## 2023-12-07 VITALS
SYSTOLIC BLOOD PRESSURE: 108 MMHG | DIASTOLIC BLOOD PRESSURE: 64 MMHG | WEIGHT: 314 LBS | BODY MASS INDEX: 41.43 KG/M2 | HEART RATE: 59 BPM

## 2023-12-07 DIAGNOSIS — L02.212 CUTANEOUS ABSCESS OF BACK (ANY PART, EXCEPT BUTTOCK): Primary | ICD-10-CM

## 2023-12-07 DIAGNOSIS — L72.0 EPIDERMAL CYST: ICD-10-CM

## 2023-12-08 DIAGNOSIS — E11.9 TYPE 2 DIABETES MELLITUS WITHOUT COMPLICATION, WITHOUT LONG-TERM CURRENT USE OF INSULIN (HCC): ICD-10-CM

## 2023-12-08 NOTE — TELEPHONE ENCOUNTER
Last OV 11/30/2023  Next OV 1/5/2024  Last RX 11/1/2023     Patient to call office back, requested pharmacy not in system. Patient will contact requested pharmacy for information.

## 2023-12-14 ENCOUNTER — OFFICE VISIT (OUTPATIENT)
Dept: SURGERY | Age: 68
End: 2023-12-14

## 2023-12-14 VITALS
SYSTOLIC BLOOD PRESSURE: 112 MMHG | HEART RATE: 62 BPM | DIASTOLIC BLOOD PRESSURE: 66 MMHG | WEIGHT: 315 LBS | BODY MASS INDEX: 41.82 KG/M2

## 2023-12-14 DIAGNOSIS — L72.0 EPIDERMAL CYST: Primary | ICD-10-CM

## 2023-12-14 DIAGNOSIS — L02.212 CUTANEOUS ABSCESS OF BACK (ANY PART, EXCEPT BUTTOCK): ICD-10-CM

## 2023-12-14 PROCEDURE — 99024 POSTOP FOLLOW-UP VISIT: CPT | Performed by: SURGERY

## 2023-12-14 NOTE — PROGRESS NOTES
Still has a little purulent drainage and fluctuance. The induration is much better, and it is not tender. Only a little erythema. Recheck in 3 - 4 weeks for possible excision.

## 2024-01-04 ENCOUNTER — OFFICE VISIT (OUTPATIENT)
Dept: PAIN MANAGEMENT | Age: 69
End: 2024-01-04
Payer: COMMERCIAL

## 2024-01-04 VITALS
BODY MASS INDEX: 41.96 KG/M2 | SYSTOLIC BLOOD PRESSURE: 112 MMHG | HEART RATE: 72 BPM | WEIGHT: 315 LBS | OXYGEN SATURATION: 95 % | DIASTOLIC BLOOD PRESSURE: 68 MMHG | RESPIRATION RATE: 19 BRPM

## 2024-01-04 DIAGNOSIS — E66.01 OBESITY, CLASS III, BMI 40-49.9 (MORBID OBESITY) (HCC): ICD-10-CM

## 2024-01-04 DIAGNOSIS — M47.817 LUMBOSACRAL SPONDYLOSIS WITHOUT MYELOPATHY: Primary | ICD-10-CM

## 2024-01-04 DIAGNOSIS — M51.36 LUMBAR DEGENERATIVE DISC DISEASE: ICD-10-CM

## 2024-01-04 PROCEDURE — 1123F ACP DISCUSS/DSCN MKR DOCD: CPT | Performed by: STUDENT IN AN ORGANIZED HEALTH CARE EDUCATION/TRAINING PROGRAM

## 2024-01-04 PROCEDURE — 3074F SYST BP LT 130 MM HG: CPT | Performed by: STUDENT IN AN ORGANIZED HEALTH CARE EDUCATION/TRAINING PROGRAM

## 2024-01-04 PROCEDURE — 3078F DIAST BP <80 MM HG: CPT | Performed by: STUDENT IN AN ORGANIZED HEALTH CARE EDUCATION/TRAINING PROGRAM

## 2024-01-04 PROCEDURE — 99213 OFFICE O/P EST LOW 20 MIN: CPT | Performed by: STUDENT IN AN ORGANIZED HEALTH CARE EDUCATION/TRAINING PROGRAM

## 2024-01-04 NOTE — PROGRESS NOTES
Chronic Pain Clinic Note     Encounter Date: 1/4/2024     SUBJECTIVE:  Chief Complaint   Patient presents with    Back Pain    1 Month Follow-Up       History of Present Illness:   Justice Torres is a 68 y.o. male who presents to follow up after RFA. He only was able to get 1 ablation due to a skin infection- patient had a cyst on the back of his neck. He states that he has not yet been cleared by surgery to get his second RFA. The patient states that he got good relief with his first RFA. He states great relief, 75-80%. Previously had radiating RLE which is now gone.     Medication Refill: N/A    Current Complaints of Pain:   Location: Low back  Radiation: None  Severity: mild   Pain Numerical Score -    Average: 1-2    Highest: 1-2  Lowest: 1  Character/Quality: Complains of pain that is dull  Timing: Constant  Associated symptoms: none  Numbness: no  Weakness: no  Exacerbating factors: laying on his back, deep breaths   Alleviating factors:   Length of time pain has been present: Started on chronic, years ago   Inciting event/injury: Fracture after a fall at home   Bowel/Bladder incontinence: no  Falls: none recent   Physical Therapy: yes    History of Interventions:   Surgery: No previous lumbar/cervical surgeries  Injections: Lumbar RFA    Imaging:    Thoracic and lumbar MRI 2/2/2023    FINDINGS:    Normal segmentation. Thoracic vertebral body heights are maintained with normal    alignment. Unchanged old compression deformity of L1 without retropulsion.    Slight degenerative reactive marrow endplate changes from T5 to T7. Thoracic    spinal cord is normal. No acute prevertebral or paraspinal soft tissue    abnormalities.       No acute abnormality in the visualized chest or abdomen. Similar right renal    cyst is better visualized on CT 1/25/2019.       Mild multilevel spondylotic changes include varying degrees of disc    desiccation, intervertebral disc height loss, osteophytic ridging, and

## 2024-01-05 ENCOUNTER — OFFICE VISIT (OUTPATIENT)
Dept: FAMILY MEDICINE CLINIC | Age: 69
End: 2024-01-05
Payer: COMMERCIAL

## 2024-01-05 VITALS
DIASTOLIC BLOOD PRESSURE: 62 MMHG | HEIGHT: 73 IN | BODY MASS INDEX: 41.75 KG/M2 | RESPIRATION RATE: 18 BRPM | OXYGEN SATURATION: 93 % | WEIGHT: 315 LBS | SYSTOLIC BLOOD PRESSURE: 98 MMHG | HEART RATE: 69 BPM

## 2024-01-05 DIAGNOSIS — J20.9 ACUTE BRONCHITIS, UNSPECIFIED ORGANISM: Primary | ICD-10-CM

## 2024-01-05 DIAGNOSIS — N18.1 OTHER SPECIFIED DIABETES MELLITUS WITH STAGE 1 CHRONIC KIDNEY DISEASE, UNSPECIFIED WHETHER LONG TERM INSULIN USE (HCC): ICD-10-CM

## 2024-01-05 DIAGNOSIS — E13.22 OTHER SPECIFIED DIABETES MELLITUS WITH STAGE 1 CHRONIC KIDNEY DISEASE, UNSPECIFIED WHETHER LONG TERM INSULIN USE (HCC): ICD-10-CM

## 2024-01-05 DIAGNOSIS — J44.9 CHRONIC OBSTRUCTIVE PULMONARY DISEASE, UNSPECIFIED COPD TYPE (HCC): ICD-10-CM

## 2024-01-05 PROCEDURE — 3074F SYST BP LT 130 MM HG: CPT | Performed by: INTERNAL MEDICINE

## 2024-01-05 PROCEDURE — 99213 OFFICE O/P EST LOW 20 MIN: CPT | Performed by: INTERNAL MEDICINE

## 2024-01-05 PROCEDURE — 1123F ACP DISCUSS/DSCN MKR DOCD: CPT | Performed by: INTERNAL MEDICINE

## 2024-01-05 PROCEDURE — 3078F DIAST BP <80 MM HG: CPT | Performed by: INTERNAL MEDICINE

## 2024-01-05 RX ORDER — PREDNISONE 20 MG/1
20 TABLET ORAL DAILY
Qty: 5 TABLET | Refills: 0 | Status: SHIPPED | OUTPATIENT
Start: 2024-01-05 | End: 2024-01-10

## 2024-01-05 RX ORDER — LEVOFLOXACIN 500 MG/1
500 TABLET, FILM COATED ORAL DAILY
Qty: 5 TABLET | Refills: 0 | Status: SHIPPED | OUTPATIENT
Start: 2024-01-05 | End: 2024-01-10

## 2024-01-05 ASSESSMENT — PATIENT HEALTH QUESTIONNAIRE - PHQ9
SUM OF ALL RESPONSES TO PHQ9 QUESTIONS 1 & 2: 0
7. TROUBLE CONCENTRATING ON THINGS, SUCH AS READING THE NEWSPAPER OR WATCHING TELEVISION: 0
SUM OF ALL RESPONSES TO PHQ QUESTIONS 1-9: 0
3. TROUBLE FALLING OR STAYING ASLEEP: 0
SUM OF ALL RESPONSES TO PHQ QUESTIONS 1-9: 0
10. IF YOU CHECKED OFF ANY PROBLEMS, HOW DIFFICULT HAVE THESE PROBLEMS MADE IT FOR YOU TO DO YOUR WORK, TAKE CARE OF THINGS AT HOME, OR GET ALONG WITH OTHER PEOPLE: 0
4. FEELING TIRED OR HAVING LITTLE ENERGY: 0
2. FEELING DOWN, DEPRESSED OR HOPELESS: 0
9. THOUGHTS THAT YOU WOULD BE BETTER OFF DEAD, OR OF HURTING YOURSELF: 0
SUM OF ALL RESPONSES TO PHQ QUESTIONS 1-9: 0
8. MOVING OR SPEAKING SO SLOWLY THAT OTHER PEOPLE COULD HAVE NOTICED. OR THE OPPOSITE, BEING SO FIGETY OR RESTLESS THAT YOU HAVE BEEN MOVING AROUND A LOT MORE THAN USUAL: 0
SUM OF ALL RESPONSES TO PHQ QUESTIONS 1-9: 0
6. FEELING BAD ABOUT YOURSELF - OR THAT YOU ARE A FAILURE OR HAVE LET YOURSELF OR YOUR FAMILY DOWN: 0
5. POOR APPETITE OR OVEREATING: 0
1. LITTLE INTEREST OR PLEASURE IN DOING THINGS: 0

## 2024-01-05 ASSESSMENT — ENCOUNTER SYMPTOMS
WHEEZING: 1
NAUSEA: 0
ABDOMINAL PAIN: 0
CONSTIPATION: 0
ALLERGIC/IMMUNOLOGIC NEGATIVE: 1
SHORTNESS OF BREATH: 1
SORE THROAT: 0
BLOOD IN STOOL: 0
COUGH: 1

## 2024-01-05 NOTE — PROGRESS NOTES
HPI Notes    Name: Justice Torres  : 1955         Chief Complaint:     Chief Complaint   Patient presents with    Follow-up     Patient states he is well, he has a cold. Patient states his new meds are doing well for him. His sugar readings have been fair he states.       History of Present Illness:        Justice presents to office for evaluation of cough, congestion.  Has a h/o COPD. He does not smoke , quit almost 10 years ago. Says his wife was sick with cold    Cough  This is a new problem. Episode onset: a week ago. The problem has been gradually worsening. The problem occurs hourly. The cough is Productive of sputum. Associated symptoms include chest pain, ear congestion, nasal congestion, shortness of breath and wheezing. Pertinent negatives include no chills, ear pain, fever, headaches, myalgias, rash, sore throat or weight loss. The symptoms are aggravated by lying down (walking). Treatments tried: Albuterol nebulizers, Tylenol, hot tea. The treatment provided mild relief. His past medical history is significant for COPD.           Past Medical History:     Past Medical History:   Diagnosis Date    Asthma     Bronchitis     CAD (coronary artery disease)     COPD (chronic obstructive pulmonary disease) (HCC) 2011    Depression 2011    Diabetes mellitus ([facility])     GERD (gastroesophageal reflux disease) 2011    Hyperlipidemia 2011    Hypertension     Hypothyroid 2011    Lower back pain     Nicotine addiction 2011    Sleep apnea       Reviewed all health maintenance requirements and orderedappropriate tests  Health Maintenance Due   Topic Date Due    DTaP/Tdap/Td vaccine (1 - Tdap) Never done    Shingles vaccine (1 of 2) Never done    Respiratory Syncytial Virus (RSV) Pregnant or age 60 yrs+ (1 - 1-dose 60+ series) Never done    Diabetic Alb to Cr ratio (uACR) test  2017    Diabetic retinal exam  10/29/2020    COVID-19 Vaccine ( season) 2023    Lipids

## 2024-01-11 ENCOUNTER — OFFICE VISIT (OUTPATIENT)
Dept: UROLOGY | Age: 69
End: 2024-01-11

## 2024-01-11 ENCOUNTER — OFFICE VISIT (OUTPATIENT)
Dept: SURGERY | Age: 69
End: 2024-01-11
Payer: COMMERCIAL

## 2024-01-11 VITALS
DIASTOLIC BLOOD PRESSURE: 78 MMHG | SYSTOLIC BLOOD PRESSURE: 122 MMHG | BODY MASS INDEX: 41.08 KG/M2 | HEIGHT: 73 IN | WEIGHT: 310 LBS

## 2024-01-11 VITALS
DIASTOLIC BLOOD PRESSURE: 67 MMHG | SYSTOLIC BLOOD PRESSURE: 107 MMHG | BODY MASS INDEX: 40.9 KG/M2 | HEART RATE: 56 BPM | WEIGHT: 310 LBS

## 2024-01-11 DIAGNOSIS — N40.1 BPH WITH OBSTRUCTION/LOWER URINARY TRACT SYMPTOMS: Primary | ICD-10-CM

## 2024-01-11 DIAGNOSIS — N32.81 OAB (OVERACTIVE BLADDER): ICD-10-CM

## 2024-01-11 DIAGNOSIS — N13.8 BPH WITH OBSTRUCTION/LOWER URINARY TRACT SYMPTOMS: Primary | ICD-10-CM

## 2024-01-11 DIAGNOSIS — L72.0 EPIDERMAL CYST: Primary | ICD-10-CM

## 2024-01-11 PROCEDURE — 3074F SYST BP LT 130 MM HG: CPT | Performed by: SURGERY

## 2024-01-11 PROCEDURE — 3078F DIAST BP <80 MM HG: CPT | Performed by: SURGERY

## 2024-01-11 PROCEDURE — 99211 OFF/OP EST MAY X REQ PHY/QHP: CPT | Performed by: SURGERY

## 2024-01-11 RX ORDER — OXYBUTYNIN CHLORIDE 10 MG/1
10 TABLET, EXTENDED RELEASE ORAL DAILY
Qty: 30 TABLET | Refills: 3 | Status: SHIPPED | OUTPATIENT
Start: 2024-01-11

## 2024-01-11 ASSESSMENT — ENCOUNTER SYMPTOMS
WHEEZING: 0
BACK PAIN: 0
NAUSEA: 0
COUGH: 0
SHORTNESS OF BREATH: 0
ABDOMINAL PAIN: 0
VOMITING: 0
EYE REDNESS: 0
COLOR CHANGE: 0
CONSTIPATION: 0

## 2024-01-11 NOTE — PROGRESS NOTES
His wound looks good.  I think he is ready for an excision. He also has noticed a new spot on the left side of his neck just below his occiput.       We will set him up to have both of these excised at his convenience.

## 2024-01-11 NOTE — PROGRESS NOTES
Bladderscan performed in office today:  Pt voided - 350 mL, PVR - 79 mL    
glucose. 100 strip 3    mometasone (ELOCON) 0.1 % cream Apply topically daily. 1 Tube 3    aspirin 81 MG tablet Take 1 tablet by mouth daily       No current facility-administered medications on file prior to visit.     Penicillins  Family History   Problem Relation Age of Onset    Diabetes Mother     Heart Disease Mother     Diabetes Father     Heart Disease Father     Heart Disease Brother      Social History     Tobacco Use   Smoking Status Former    Current packs/day: 0.00    Average packs/day: 1 pack/day for 45.0 years (45.0 ttl pk-yrs)    Types: Cigarettes    Start date: 1969    Quit date: 2014    Years since quittin.1   Smokeless Tobacco Never       Social History     Substance and Sexual Activity   Alcohol Use No       Review of Systems   Constitutional:  Negative for appetite change, chills and fever.   Eyes:  Negative for redness and visual disturbance.   Respiratory:  Negative for cough, shortness of breath and wheezing.    Cardiovascular:  Negative for chest pain and leg swelling.   Gastrointestinal:  Negative for abdominal pain, constipation, nausea and vomiting.   Genitourinary:  Positive for frequency. Negative for decreased urine volume, difficulty urinating, dysuria, enuresis, flank pain, hematuria, penile discharge, penile pain, scrotal swelling, testicular pain and urgency.        Positive for nocturia   Musculoskeletal:  Negative for back pain, joint swelling and myalgias.   Skin:  Negative for color change, rash and wound.   Neurological:  Negative for dizziness, tremors and numbness.   Hematological:  Negative for adenopathy. Does not bruise/bleed easily.       /78 (Site: Left Upper Arm, Position: Sitting, Cuff Size: Large Adult)   Ht 1.854 m (6' 0.99\")   Wt (!) 140.6 kg (310 lb)   BMI 40.91 kg/m²       PHYSICAL EXAM:  Constitutional: Patient in no acute distress;   Neuro: alert and oriented to person place and time.    Psych: Mood and affect normal.  Lungs: Respiratory

## 2024-01-15 DIAGNOSIS — E03.9 ACQUIRED HYPOTHYROIDISM: ICD-10-CM

## 2024-01-15 RX ORDER — LEVOTHYROXINE SODIUM 0.2 MG/1
TABLET ORAL
Qty: 90 TABLET | Refills: 0 | Status: SHIPPED | OUTPATIENT
Start: 2024-01-15

## 2024-01-15 NOTE — TELEPHONE ENCOUNTER
Health Maintenance   Topic Date Due    DTaP/Tdap/Td vaccine (1 - Tdap) Never done    Shingles vaccine (1 of 2) Never done    Respiratory Syncytial Virus (RSV) Pregnant or age 60 yrs+ (1 - 1-dose 60+ series) Never done    Diabetic Alb to Cr ratio (uACR) test  08/18/2017    Diabetic retinal exam  10/29/2020    COVID-19 Vaccine (6 - 2023-24 season) 09/01/2023    Lipids  10/25/2023    Annual Wellness Visit (Medicare Advantage)  01/01/2024    GFR test (Diabetes, CKD 3-4, OR last GFR 15-59)  02/09/2024    Diabetic foot exam  06/05/2024    Low dose CT lung screening &/or counseling  06/22/2024    Pneumococcal 65+ years Vaccine (3 - PPSV23 or PCV20) 10/08/2024    A1C test (Diabetic or Prediabetic)  11/03/2024    Depression Monitoring  01/05/2025    Colorectal Cancer Screen  10/09/2033    Flu vaccine  Completed    AAA screen  Completed    Hepatitis C screen  Completed    Hepatitis A vaccine  Aged Out    Hepatitis B vaccine  Aged Out    Hib vaccine  Aged Out    Polio vaccine  Aged Out    Meningococcal (ACWY) vaccine  Aged Out    Pneumococcal 0-64 years Vaccine  Discontinued    HIV screen  Discontinued    Prostate Specific Antigen (PSA) Screening or Monitoring  Discontinued             (applicable per patient's age: Cancer Screenings, Depression Screening, Fall Risk Screening, Immunizations)    Hemoglobin A1C (%)   Date Value   11/03/2023 8.2   06/05/2023 7.8   03/03/2023 10.0     LDL Cholesterol (mg/dL)   Date Value   10/25/2022 49     AST (U/L)   Date Value   02/09/2023 43 (H)     ALT (U/L)   Date Value   02/09/2023 39     BUN (mg/dL)   Date Value   02/09/2023 10      (goal A1C is < 7)   (goal LDL is <100) need 30-50% reduction from baseline     BP Readings from Last 3 Encounters:   01/11/24 122/78   01/11/24 107/67   01/05/24 98/62    (goal /80)      All Future Testing planned in [facility]:  Lab Frequency Next Occurrence   CBC with Auto Differential Once 02/06/2024   Comprehensive Metabolic Panel Once 02/06/2024

## 2024-01-18 ENCOUNTER — HOSPITAL ENCOUNTER (OUTPATIENT)
Age: 69
Setting detail: SPECIMEN
Discharge: HOME | End: 2024-01-18

## 2024-01-18 ENCOUNTER — OFFICE VISIT (OUTPATIENT)
Dept: SURGERY | Age: 69
End: 2024-01-18

## 2024-01-18 VITALS — WEIGHT: 314 LBS | DIASTOLIC BLOOD PRESSURE: 64 MMHG | SYSTOLIC BLOOD PRESSURE: 104 MMHG | BODY MASS INDEX: 41.44 KG/M2

## 2024-01-18 DIAGNOSIS — L72.0 EPIDERMAL CYST: ICD-10-CM

## 2024-01-18 DIAGNOSIS — L72.0 EPIDERMAL CYST: Primary | ICD-10-CM

## 2024-01-18 NOTE — PROGRESS NOTES
Patient here for excision of the 2 epidermal cyst on his posterior neck.  There is a fairly large on that was the site of an abscess at the based of his neck (about 5 cm across) and the smaller on on the left side just below is occiput about 2 cm in size.    Risks and benefits of lesion excision were discussed with Justice Torres and he does consent to proceed.  The nature of the procedure, along with the risks of bleeding, infection, scarring pain and recurrence were discussed with him.     Under local anesthetic both  lesions were  excised.  Hemostasis was obtained with direct pressure; electrocautery No; silver nitrate No; sutures with Yes and 3-0 vicryl. The wound was closed using using 2-0 nylon simple and vertical mattress for the larger cyst and simple 4-0 prolene of the smaller.  The excised lesions are about 4.8 and 1.6 cm in size.

## 2024-01-23 ENCOUNTER — TELEPHONE (OUTPATIENT)
Dept: SURGERY | Age: 69
End: 2024-01-23

## 2024-01-23 LAB — SURGICAL PATHOLOGY REPORT: NORMAL

## 2024-01-23 NOTE — TELEPHONE ENCOUNTER
----- Message from Anton Gallagher MD sent at 1/23/2024 11:11 AM EST -----  As expected.    LVM for patient to call for results msg.

## 2024-01-25 ENCOUNTER — APPOINTMENT (OUTPATIENT)
Dept: GENERAL RADIOLOGY | Age: 69
End: 2024-01-25
Payer: COMMERCIAL

## 2024-01-25 ENCOUNTER — HOSPITAL ENCOUNTER (EMERGENCY)
Age: 69
Discharge: HOME | End: 2024-01-25
Attending: EMERGENCY MEDICINE
Payer: COMMERCIAL

## 2024-01-25 VITALS
HEIGHT: 72 IN | HEART RATE: 61 BPM | OXYGEN SATURATION: 94 % | WEIGHT: 314 LBS | DIASTOLIC BLOOD PRESSURE: 71 MMHG | BODY MASS INDEX: 42.53 KG/M2 | RESPIRATION RATE: 18 BRPM | SYSTOLIC BLOOD PRESSURE: 129 MMHG | TEMPERATURE: 98.2 F

## 2024-01-25 DIAGNOSIS — R07.89 MUSCULOSKELETAL CHEST PAIN: Primary | ICD-10-CM

## 2024-01-25 PROCEDURE — 6360000002 HC RX W HCPCS: Performed by: EMERGENCY MEDICINE

## 2024-01-25 PROCEDURE — 96372 THER/PROPH/DIAG INJ SC/IM: CPT

## 2024-01-25 PROCEDURE — 71046 X-RAY EXAM CHEST 2 VIEWS: CPT

## 2024-01-25 PROCEDURE — 99284 EMERGENCY DEPT VISIT MOD MDM: CPT

## 2024-01-25 RX ORDER — KETOROLAC TROMETHAMINE 30 MG/ML
30 INJECTION, SOLUTION INTRAMUSCULAR; INTRAVENOUS ONCE
Status: COMPLETED | OUTPATIENT
Start: 2024-01-25 | End: 2024-01-25

## 2024-01-25 RX ADMIN — KETOROLAC TROMETHAMINE 30 MG: 30 INJECTION, SOLUTION INTRAMUSCULAR; INTRAVENOUS at 11:58

## 2024-01-25 ASSESSMENT — ENCOUNTER SYMPTOMS
ABDOMINAL DISTENTION: 0
CHEST TIGHTNESS: 0
ABDOMINAL PAIN: 0
SHORTNESS OF BREATH: 0
WHEEZING: 0

## 2024-01-25 ASSESSMENT — PAIN SCALES - GENERAL
PAINLEVEL_OUTOF10: 6
PAINLEVEL_OUTOF10: 8
PAINLEVEL_OUTOF10: 8

## 2024-01-25 ASSESSMENT — LIFESTYLE VARIABLES
HOW MANY STANDARD DRINKS CONTAINING ALCOHOL DO YOU HAVE ON A TYPICAL DAY: PATIENT DOES NOT DRINK
HOW OFTEN DO YOU HAVE A DRINK CONTAINING ALCOHOL: NEVER

## 2024-01-25 ASSESSMENT — PAIN - FUNCTIONAL ASSESSMENT
PAIN_FUNCTIONAL_ASSESSMENT: ACTIVITIES ARE NOT PREVENTED
PAIN_FUNCTIONAL_ASSESSMENT: ACTIVITIES ARE NOT PREVENTED
PAIN_FUNCTIONAL_ASSESSMENT: 0-10

## 2024-01-25 ASSESSMENT — PAIN DESCRIPTION - ORIENTATION: ORIENTATION: RIGHT;UPPER

## 2024-01-25 ASSESSMENT — PAIN DESCRIPTION - PAIN TYPE
TYPE: ACUTE PAIN
TYPE: ACUTE PAIN

## 2024-01-25 ASSESSMENT — PAIN DESCRIPTION - DESCRIPTORS
DESCRIPTORS: SHARP;STABBING
DESCRIPTORS: SHARP

## 2024-01-25 ASSESSMENT — PAIN DESCRIPTION - FREQUENCY
FREQUENCY: CONTINUOUS
FREQUENCY: CONTINUOUS

## 2024-01-25 ASSESSMENT — PAIN DESCRIPTION - LOCATION
LOCATION: BACK

## 2024-01-25 NOTE — TELEPHONE ENCOUNTER
Spoke to patient who stated pt who stated they received medication \"a few months ago\" and did not need refill at this time. Patient states he will let office know when refill is needed.

## 2024-01-25 NOTE — ED TRIAGE NOTES
Patient c/o right upper back pain that has been constant for the past 3 days, pain was sudden, denies injury/trauma to area, describes pain as sharp , rates pain 8/10, pain increases with different movements. Patient reports pain radiates to the right upper chest and rib cage area. Patient has been taking ibuprofen at home with no relief. Patient has significant heart history. Patient is able to ambulate to assigned room with independent, steady gait.

## 2024-01-25 NOTE — ED PROVIDER NOTES
and interpreted by me as:      See more data below for the lab and radiology tests and orders.    3)  Treatment and Disposition    Patient repeat assessment:      Disposition discussion with patient/family:      Case discussed with consulting clinician:      Social determinants of health impacting treatment or disposition:      Shared Decision Making:      Code Status Discussion:        REASSESSMENT            CRITICAL CARE TIME     Total Critical Care time was  minutes, excluding separately reportable procedures.  There was a high probability of clinically significant/life threatening deterioration in the patient's condition which required my urgent intervention.      PROCEDURES:  Unless otherwise noted below, none     Procedures    FINAL IMPRESSION      1. Musculoskeletal chest pain          DISPOSITION/PLAN     DISPOSITION Decision To Discharge 01/25/2024 12:55:18 PM      PATIENT REFERRED TO:  Uche Hawkins MD  [address] [place] [address]  [phone]    In 1 week        DISCHARGE MEDICATIONS:  Current Discharge Medication List          (Please note that portions of this note were completed with a voice recognition program.  Efforts were made to edit the dictations but occasionally words are mis-transcribed.)    Deejay Reich MD (electronically signed)  Attending Emergency Physician            Deejay Reich MD  01/25/24 1032

## 2024-02-03 ENCOUNTER — HOSPITAL ENCOUNTER (OUTPATIENT)
Age: 69
Discharge: HOME | End: 2024-02-03
Payer: COMMERCIAL

## 2024-02-03 ENCOUNTER — HOSPITAL ENCOUNTER (OUTPATIENT)
Age: 69
Discharge: HOME | End: 2024-02-05
Payer: COMMERCIAL

## 2024-02-03 DIAGNOSIS — E78.2 MIXED HYPERLIPIDEMIA: ICD-10-CM

## 2024-02-03 DIAGNOSIS — I10 ESSENTIAL HYPERTENSION: ICD-10-CM

## 2024-02-03 DIAGNOSIS — I25.10 CORONARY ARTERY DISEASE INVOLVING NATIVE CORONARY ARTERY OF NATIVE HEART WITHOUT ANGINA PECTORIS: ICD-10-CM

## 2024-02-03 DIAGNOSIS — E13.22 OTHER SPECIFIED DIABETES MELLITUS WITH STAGE 1 CHRONIC KIDNEY DISEASE, UNSPECIFIED WHETHER LONG TERM INSULIN USE (HCC): ICD-10-CM

## 2024-02-03 DIAGNOSIS — N18.1 OTHER SPECIFIED DIABETES MELLITUS WITH STAGE 1 CHRONIC KIDNEY DISEASE, UNSPECIFIED WHETHER LONG TERM INSULIN USE (HCC): ICD-10-CM

## 2024-02-03 LAB
ALBUMIN SERPL-MCNC: 4.2 G/DL (ref 3.5–5.2)
ALP SERPL-CCNC: 50 U/L (ref 40–129)
ALT SERPL-CCNC: 25 U/L (ref 5–41)
ANION GAP SERPL CALCULATED.3IONS-SCNC: 14 MMOL/L (ref 9–17)
AST SERPL-CCNC: 32 U/L
BASOPHILS # BLD: 0.04 K/UL (ref 0–0.2)
BASOPHILS NFR BLD: 1 % (ref 0–2)
BILIRUB SERPL-MCNC: 0.5 MG/DL (ref 0.3–1.2)
BUN SERPL-MCNC: 12 MG/DL (ref 8–23)
BUN/CREAT SERPL: 12 (ref 9–20)
CALCIUM SERPL-MCNC: 9.6 MG/DL (ref 8.6–10.4)
CHLORIDE SERPL-SCNC: 102 MMOL/L (ref 98–107)
CHOLEST SERPL-MCNC: 144 MG/DL
CHOLESTEROL/HDL RATIO: 4.4
CO2 SERPL-SCNC: 22 MMOL/L (ref 20–31)
CREAT SERPL-MCNC: 1 MG/DL (ref 0.7–1.2)
EKG ATRIAL RATE: 55 BPM
EKG P AXIS: 66 DEGREES
EKG P-R INTERVAL: 168 MS
EKG Q-T INTERVAL: 446 MS
EKG QRS DURATION: 98 MS
EKG QTC CALCULATION (BAZETT): 426 MS
EKG R AXIS: 49 DEGREES
EKG T AXIS: 74 DEGREES
EKG VENTRICULAR RATE: 55 BPM
EOSINOPHIL # BLD: 0.17 K/UL (ref 0–0.4)
EOSINOPHILS RELATIVE PERCENT: 2 % (ref 0–5)
ERYTHROCYTE [DISTWIDTH] IN BLOOD BY AUTOMATED COUNT: 13.4 % (ref 12.1–15.2)
GFR SERPL CREATININE-BSD FRML MDRD: >60 ML/MIN/1.73M2
GLUCOSE SERPL-MCNC: 150 MG/DL (ref 70–99)
HCT VFR BLD AUTO: 50.8 % (ref 41–53)
HDLC SERPL-MCNC: 33 MG/DL
HGB BLD-MCNC: 16.1 G/DL (ref 13.5–17.5)
IMM GRANULOCYTES # BLD AUTO: 0.01 K/UL (ref 0–0.3)
IMM GRANULOCYTES NFR BLD: 0 % (ref 0–5)
LDLC SERPL CALC-MCNC: 84 MG/DL (ref 0–130)
LYMPHOCYTES NFR BLD: 1.74 K/UL (ref 1–4.8)
LYMPHOCYTES RELATIVE PERCENT: 23 % (ref 13–44)
MAGNESIUM SERPL-MCNC: 2.3 MG/DL (ref 1.6–2.6)
MCH RBC QN AUTO: 30.6 PG (ref 26–34)
MCHC RBC AUTO-ENTMCNC: 31.7 G/DL (ref 31–37)
MCV RBC AUTO: 96.6 FL (ref 80–100)
MONOCYTES NFR BLD: 0.7 K/UL (ref 0–1)
MONOCYTES NFR BLD: 9 % (ref 5–9)
NEUTROPHILS NFR BLD: 65 % (ref 39–75)
NEUTS SEG NFR BLD: 4.98 K/UL (ref 2.1–6.5)
PLATELET # BLD AUTO: 211 K/UL (ref 140–450)
PMV BLD AUTO: 10.2 FL (ref 6–12)
POTASSIUM SERPL-SCNC: 4.2 MMOL/L (ref 3.7–5.3)
PROT SERPL-MCNC: 7.3 G/DL (ref 6.4–8.3)
RBC # BLD AUTO: 5.26 M/UL (ref 4.5–5.9)
SODIUM SERPL-SCNC: 138 MMOL/L (ref 135–144)
TRIGL SERPL-MCNC: 137 MG/DL
TSH SERPL DL<=0.05 MIU/L-ACNC: 1.89 UIU/ML (ref 0.3–5)
WBC OTHER # BLD: 7.6 K/UL (ref 3.5–11)

## 2024-02-03 PROCEDURE — 84443 ASSAY THYROID STIM HORMONE: CPT

## 2024-02-03 PROCEDURE — 83735 ASSAY OF MAGNESIUM: CPT

## 2024-02-03 PROCEDURE — 36415 COLL VENOUS BLD VENIPUNCTURE: CPT

## 2024-02-03 PROCEDURE — 80053 COMPREHEN METABOLIC PANEL: CPT

## 2024-02-03 PROCEDURE — 85025 COMPLETE CBC W/AUTO DIFF WBC: CPT

## 2024-02-03 PROCEDURE — 80061 LIPID PANEL: CPT

## 2024-02-05 ENCOUNTER — OFFICE VISIT (OUTPATIENT)
Dept: CARDIOLOGY CLINIC | Age: 69
End: 2024-02-05

## 2024-02-05 VITALS
HEART RATE: 68 BPM | WEIGHT: 308 LBS | BODY MASS INDEX: 41.77 KG/M2 | DIASTOLIC BLOOD PRESSURE: 70 MMHG | SYSTOLIC BLOOD PRESSURE: 110 MMHG | OXYGEN SATURATION: 94 %

## 2024-02-05 DIAGNOSIS — E13.22 OTHER SPECIFIED DIABETES MELLITUS WITH STAGE 1 CHRONIC KIDNEY DISEASE, UNSPECIFIED WHETHER LONG TERM INSULIN USE (HCC): ICD-10-CM

## 2024-02-05 DIAGNOSIS — I25.10 CORONARY ARTERY DISEASE INVOLVING NATIVE CORONARY ARTERY OF NATIVE HEART WITHOUT ANGINA PECTORIS: ICD-10-CM

## 2024-02-05 DIAGNOSIS — I10 ESSENTIAL HYPERTENSION: Primary | ICD-10-CM

## 2024-02-05 DIAGNOSIS — E55.9 VITAMIN D DEFICIENCY DISEASE: ICD-10-CM

## 2024-02-05 DIAGNOSIS — R06.02 SOB (SHORTNESS OF BREATH): ICD-10-CM

## 2024-02-05 DIAGNOSIS — E78.2 MIXED HYPERLIPIDEMIA: ICD-10-CM

## 2024-02-05 DIAGNOSIS — N18.1 OTHER SPECIFIED DIABETES MELLITUS WITH STAGE 1 CHRONIC KIDNEY DISEASE, UNSPECIFIED WHETHER LONG TERM INSULIN USE (HCC): ICD-10-CM

## 2024-02-05 LAB
EKG ATRIAL RATE: 55 BPM
EKG P AXIS: 66 DEGREES
EKG P-R INTERVAL: 168 MS
EKG Q-T INTERVAL: 446 MS
EKG QRS DURATION: 98 MS
EKG QTC CALCULATION (BAZETT): 426 MS
EKG R AXIS: 49 DEGREES
EKG T AXIS: 74 DEGREES
EKG VENTRICULAR RATE: 55 BPM

## 2024-02-05 NOTE — PROGRESS NOTES
Ov Dr. Whalen for one year follow up   Did not bring list/bottles of meds  \"Basically the same\"   Had 2 cyst removed from neck   Last week - Dr. Gallagher   Got set of hearing aids  No chest pain   No palpations   Occ sob - not new  Little dizziness when getting up   \"Every now and then-its not bad\"  Having skin changes  85 toma french         No changes    Follow up in 1 yr

## 2024-02-09 ENCOUNTER — OFFICE VISIT (OUTPATIENT)
Dept: FAMILY MEDICINE CLINIC | Age: 69
End: 2024-02-09
Payer: COMMERCIAL

## 2024-02-09 VITALS
HEART RATE: 57 BPM | BODY MASS INDEX: 41.72 KG/M2 | HEIGHT: 72 IN | SYSTOLIC BLOOD PRESSURE: 115 MMHG | WEIGHT: 308 LBS | DIASTOLIC BLOOD PRESSURE: 74 MMHG | OXYGEN SATURATION: 96 % | RESPIRATION RATE: 18 BRPM

## 2024-02-09 DIAGNOSIS — F34.1 DYSTHYMIA (OR DEPRESSIVE NEUROSIS): ICD-10-CM

## 2024-02-09 DIAGNOSIS — E11.9 TYPE 2 DIABETES MELLITUS WITHOUT COMPLICATION, WITHOUT LONG-TERM CURRENT USE OF INSULIN (HCC): Primary | ICD-10-CM

## 2024-02-09 DIAGNOSIS — F41.9 ANXIETY: ICD-10-CM

## 2024-02-09 DIAGNOSIS — I10 ESSENTIAL HYPERTENSION: ICD-10-CM

## 2024-02-09 LAB — HBA1C MFR BLD: 7.3 %

## 2024-02-09 PROCEDURE — 99214 OFFICE O/P EST MOD 30 MIN: CPT | Performed by: INTERNAL MEDICINE

## 2024-02-09 PROCEDURE — 3078F DIAST BP <80 MM HG: CPT | Performed by: INTERNAL MEDICINE

## 2024-02-09 PROCEDURE — 3051F HG A1C>EQUAL 7.0%<8.0%: CPT | Performed by: INTERNAL MEDICINE

## 2024-02-09 PROCEDURE — 1123F ACP DISCUSS/DSCN MKR DOCD: CPT | Performed by: INTERNAL MEDICINE

## 2024-02-09 PROCEDURE — 83036 HEMOGLOBIN GLYCOSYLATED A1C: CPT | Performed by: INTERNAL MEDICINE

## 2024-02-09 PROCEDURE — 3074F SYST BP LT 130 MM HG: CPT | Performed by: INTERNAL MEDICINE

## 2024-02-09 ASSESSMENT — ENCOUNTER SYMPTOMS
NAUSEA: 0
SORE THROAT: 0
COUGH: 0
BLURRED VISION: 0
ABDOMINAL PAIN: 0
CONSTIPATION: 0
SHORTNESS OF BREATH: 0
BLOOD IN STOOL: 0
WHEEZING: 0
ALLERGIC/IMMUNOLOGIC NEGATIVE: 1

## 2024-02-09 NOTE — PROGRESS NOTES
tenderness.   Musculoskeletal:         General: Normal range of motion.      Right lower leg: No edema.      Left lower leg: No edema.   Lymphadenopathy:      Cervical: No cervical adenopathy.   Skin:     General: Skin is warm and dry.      Coloration: Skin is not pale.      Findings: Rash (erythematous patches of the face) present.   Neurological:      General: No focal deficit present.      Mental Status: He is alert and oriented to person, place, and time.   Psychiatric:         Mood and Affect: Mood normal.         Behavior: Behavior normal.         Thought Content: Thought content normal.               Data:     Lab Results   Component Value Date/Time     02/03/2024 10:07 AM    K 4.2 02/03/2024 10:07 AM     02/03/2024 10:07 AM    CO2 22 02/03/2024 10:07 AM    BUN 12 02/03/2024 10:07 AM    CREATININE 1.0 02/03/2024 10:07 AM    GLUCOSE 150 02/03/2024 10:07 AM    GLUCOSE 111 10/14/2011 08:30 AM    PROT 7.3 02/03/2024 10:07 AM    LABALBU 4.2 02/03/2024 10:07 AM    BILITOT 0.5 02/03/2024 10:07 AM    ALKPHOS 50 02/03/2024 10:07 AM    AST 32 02/03/2024 10:07 AM    ALT 25 02/03/2024 10:07 AM     Lab Results   Component Value Date/Time    WBC 7.6 02/03/2024 10:07 AM    RBC 5.26 02/03/2024 10:07 AM    HGB 16.1 02/03/2024 10:07 AM    HCT 50.8 02/03/2024 10:07 AM    MCV 96.6 02/03/2024 10:07 AM    MCH 30.6 02/03/2024 10:07 AM    MCHC 31.7 02/03/2024 10:07 AM    RDW 13.4 02/03/2024 10:07 AM     02/03/2024 10:07 AM    MPV 10.2 02/03/2024 10:07 AM     Lab Results   Component Value Date/Time    TSH 1.89 02/03/2024 10:07 AM     Lab Results   Component Value Date/Time    CHOL 144 02/03/2024 10:07 AM    HDL 33 02/03/2024 10:07 AM    PSA 0.35 12/17/2018 10:11 AM    LABA1C 8.2 11/03/2023 01:38 PM          Assessment & Plan        Diagnosis Orders   1. Type 2 diabetes mellitus without complication, without long-term current use of insulin (HCC)   Course is improving, continue on Jardiance, Glipizide, low carb diet

## 2024-02-09 NOTE — PATIENT INSTRUCTIONS
SURVEY:    You may be receiving a survey from [facility] regarding your visit today.    Please complete the survey to enable us to provide the highest quality of care to you and your family.    If you cannot score us a very good on any question, please call the office to discuss how we could have made your experience a very good one.    Thank you.  [facility]  MD Jazmin Garrett, MD Kedar Ruiz, DO  Anton Gallagher, MD Jaz Finley, APRN-CNP  Mady Ernandez, Practice Manager  Bonnie, CMA  Judith, CCMA  Parviz, CMA  Brenden, PSC   Alia, LPN     Dear valued patient,    We hope this message finds you in good health. At [facility], we are committed to providing you with the best possible healthcare experience. To further enhance your convenience and streamline your access to our services, we would like to introduce you to the benefits of utilizing direct scheduling through the [employer] Patient Portal.    Direct scheduling is a feature within the [employer] Patient Portal that allows you to schedule appointments with your healthcare provider directly, without the need to make a phone call or wait for a callback. We understand that your time is gera, and we want to ensure that you have quick and easy access to our services whenever you need them.  Here are some reasons why you should consider utilizing direct scheduling through the [employer] Patient Portal:    1. Convenience: With direct scheduling, you can book appointments at any time that suits you best, 24 hours a day, 7 days a week. No more waiting on hold or playing phone tag with our office staff. It puts you in control of managing your healthcare appointments effortlessly.    2. Accessibility: The [employer] Patient Portal is accessible through your computer, smartphone, or tablet, allowing you to schedule appointments from the comfort of your home, office, or on the go. You can access your medical records,

## 2024-02-10 NOTE — PROGRESS NOTES
Pop Whalen M.D.  [facility]  [facility]  [address] [place] [address]  [phone]          2024          Uche Hawkins MD  [address] [place] [id number]    RE:   Justice Torres  :  1955    Dear Dr. Hawkins:    CHIEF COMPLAINT:  1.  Coronary artery disease.  2.  Status post open heart surgery by Dr. Aron Castro on 2015, with a LIMA to the LAD and a vein graft to the high lateral branch of the circumflex.  3.  Last cardiac catheterization on 2022, showing patent LIMA to the LAD and patent vein graft to the high lateral branch of the circumflex with the AV branch of the circumflex having 60% to 70% lesion with an iFR of 0.97 and an unremarkable right coronary artery with an EF of 55%, medical therapy recommended.  4.  Functional class I.    HISTORY OF PRESENT ILLNESS:  I had the pleasure of seeing MR. Justice Torres in our office on 2024.  He is a very pleasant 68-year-old gentleman who developed shortness of breath and chest pain in  and he had a stress test which was abnormal.    I did a catheterization on 2015, that showed 80% distal left main trunk and ostial LAD, with 60% circumflex, 40% RCA with an EF of 60%.  He had subsequent open heart surgery by Dr. Aron Castro on 2015, with a LIMA to the LAD and a vein graft to the high lateral branch of the circumflex.    A repeat catheterization on 2017, showed 70% disease in the ostium of the LAD with a patent LIMA to the LAD, patent vein graft to the high lateral circumflex, right coronary artery had 40% disease, EF was 50% to 55%.    He developed shortness of breath and chest pain in 2022 and had an abnormal stress test.  He continued to have symptoms and therefore, I did a catheterization on 2022, at [facility] in [place] that showed a patent LIMA to the LAD and a patent vein graft to the high lateral branch of the

## 2024-02-15 ENCOUNTER — OFFICE VISIT (OUTPATIENT)
Dept: SURGERY | Age: 69
End: 2024-02-15

## 2024-02-15 VITALS
DIASTOLIC BLOOD PRESSURE: 69 MMHG | HEART RATE: 59 BPM | WEIGHT: 308 LBS | SYSTOLIC BLOOD PRESSURE: 120 MMHG | BODY MASS INDEX: 41.77 KG/M2

## 2024-02-15 DIAGNOSIS — L72.0 EPIDERMAL CYST: Primary | ICD-10-CM

## 2024-02-15 PROCEDURE — 99024 POSTOP FOLLOW-UP VISIT: CPT | Performed by: SURGERY

## 2024-02-15 NOTE — PROGRESS NOTES
Apparently did not get a follow up appointment for suture removal    Wound looks good.    Suture out.    Follow up PRN

## 2024-02-23 DIAGNOSIS — I10 ESSENTIAL HYPERTENSION: ICD-10-CM

## 2024-02-23 RX ORDER — AMLODIPINE BESYLATE 2.5 MG/1
TABLET ORAL
Qty: 90 TABLET | Refills: 1 | Status: SHIPPED | OUTPATIENT
Start: 2024-02-23

## 2024-02-23 NOTE — TELEPHONE ENCOUNTER
Health Maintenance   Topic Date Due    DTaP/Tdap/Td vaccine (1 - Tdap) Never done    Shingles vaccine (1 of 2) Never done    Respiratory Syncytial Virus (RSV) Pregnant or age 60 yrs+ (1 - 1-dose 60+ series) Never done    Diabetic Alb to Cr ratio (uACR) test  08/18/2017    Diabetic retinal exam  10/29/2020    COVID-19 Vaccine (6 - 2023-24 season) 09/01/2023    Annual Wellness Visit (Medicare Advantage)  01/01/2024    Diabetic foot exam  06/05/2024    Low dose CT lung screening &/or counseling  06/22/2024    Pneumococcal 65+ years Vaccine (3 - PPSV23 or PCV20) 10/08/2024    Depression Monitoring  01/05/2025    Lipids  02/03/2025    GFR test (Diabetes, CKD 3-4, OR last GFR 15-59)  02/03/2025    A1C test (Diabetic or Prediabetic)  02/09/2025    Colorectal Cancer Screen  10/09/2033    Flu vaccine  Completed    AAA screen  Completed    Hepatitis C screen  Completed    Hepatitis A vaccine  Aged Out    Hepatitis B vaccine  Aged Out    Hib vaccine  Aged Out    Polio vaccine  Aged Out    Meningococcal (ACWY) vaccine  Aged Out    Pneumococcal 0-64 years Vaccine  Discontinued    HIV screen  Discontinued    Prostate Specific Antigen (PSA) Screening or Monitoring  Discontinued             (applicable per patient's age: Cancer Screenings, Depression Screening, Fall Risk Screening, Immunizations)    Hemoglobin A1C (%)   Date Value   02/09/2024 7.3   11/03/2023 8.2   06/05/2023 7.8     LDL Cholesterol (mg/dL)   Date Value   02/03/2024 84     AST (U/L)   Date Value   02/03/2024 32     ALT (U/L)   Date Value   02/03/2024 25     BUN (mg/dL)   Date Value   02/03/2024 12      (goal A1C is < 7)   (goal LDL is <100) need 30-50% reduction from baseline     BP Readings from Last 3 Encounters:   02/15/24 120/69   02/09/24 115/74   02/05/24 110/70    (goal /80)      All Future Testing planned in [facility]:  Lab Frequency Next Occurrence   FACET JOINT L/S Once 03/30/2023   FACET JOINT L/S 2ND LEVEL Once 03/30/2023   COLONOSCOPY W/ OR W/O

## 2024-02-29 ENCOUNTER — TELEPHONE (OUTPATIENT)
Dept: PAIN MANAGEMENT | Age: 69
End: 2024-02-29

## 2024-02-29 ENCOUNTER — OFFICE VISIT (OUTPATIENT)
Dept: PAIN MANAGEMENT | Age: 69
End: 2024-02-29
Payer: COMMERCIAL

## 2024-02-29 VITALS
RESPIRATION RATE: 18 BRPM | SYSTOLIC BLOOD PRESSURE: 120 MMHG | HEART RATE: 56 BPM | BODY MASS INDEX: 41.77 KG/M2 | DIASTOLIC BLOOD PRESSURE: 70 MMHG | WEIGHT: 308 LBS | OXYGEN SATURATION: 96 %

## 2024-02-29 DIAGNOSIS — E66.01 OBESITY, CLASS III, BMI 40-49.9 (MORBID OBESITY) (HCC): ICD-10-CM

## 2024-02-29 DIAGNOSIS — M51.36 LUMBAR DEGENERATIVE DISC DISEASE: ICD-10-CM

## 2024-02-29 DIAGNOSIS — M47.814 THORACIC SPONDYLOSIS: ICD-10-CM

## 2024-02-29 DIAGNOSIS — M47.817 LUMBOSACRAL SPONDYLOSIS WITHOUT MYELOPATHY: Primary | ICD-10-CM

## 2024-02-29 PROCEDURE — 99214 OFFICE O/P EST MOD 30 MIN: CPT | Performed by: STUDENT IN AN ORGANIZED HEALTH CARE EDUCATION/TRAINING PROGRAM

## 2024-02-29 PROCEDURE — 1123F ACP DISCUSS/DSCN MKR DOCD: CPT | Performed by: STUDENT IN AN ORGANIZED HEALTH CARE EDUCATION/TRAINING PROGRAM

## 2024-02-29 PROCEDURE — 3074F SYST BP LT 130 MM HG: CPT | Performed by: STUDENT IN AN ORGANIZED HEALTH CARE EDUCATION/TRAINING PROGRAM

## 2024-02-29 PROCEDURE — 3078F DIAST BP <80 MM HG: CPT | Performed by: STUDENT IN AN ORGANIZED HEALTH CARE EDUCATION/TRAINING PROGRAM

## 2024-02-29 NOTE — TELEPHONE ENCOUNTER
SURGERY SCHEDULING FORM  [facility]   [address] [place] [address]    Phone [phone] Fax [phone]      Justice Torres 1955 male    [address] [id number]  Marital Status:          Home Phone: [phone]      Cell Phone:    Telephone Information:   Mobile [phone]          Surgeon: Joseph   Surgery Date: 3/7/24     Time:     Procedure: Left lumbar L3, L4, L5 radiofrequency ablation    Diagnosis: M47.817     Important Medical History:  In Epic    Special Inst/Equip: Regular    CPT Codes:    78041, 71125  Latex Allergy: No     Cardiac Device:  No    Anesthesia:  Local          Admission Type:  Same Day                          Admit Prior to Day of Surgery: No    Case Location:  Ambulatory            Preadmission Testing:  Phone Call          PAT Date and Time:     Need Preop Cardiac Clearance: No    Does Patient have Cardiologist/physician?     Lluviaaa

## 2024-02-29 NOTE — H&P (VIEW-ONLY)
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 He states that his back pain is about a 5/6 out of 10. He states that it depends on activity level. He was never able to get the 2nd ablation due to an infected sebaceous cyst. This is now healed.     Medication Refill: N/A    Current Complaints of Pain:   Location: Low back  Radiation: None  Severity: mild   Pain Numerical Score - 5-6   Average: 5-6    Highest: 10  Lowest: 4  Character/Quality: Complains of pain that is dull, achy  Timing: Constant  Associated symptoms: none  Numbness: no  Weakness: no  Exacerbating factors: laying on his back, deep breaths   Alleviating factors:   Length of time pain has been present: Started on chronic, years ago   Inciting event/injury: Fracture after a fall at home   Bowel/Bladder incontinence: no  Falls: none recent   Physical Therapy: yes    History of Interventions:   Surgery: No previous lumbar/cervical surgeries  Injections: MBB's, Lumbar RFA    Imaging:    Thoracic and lumbar MRI 2/2/2023    FINDINGS:    Normal segmentation. Thoracic vertebral body heights are maintained with normal    alignment. Unchanged old compression deformity of L1 without retropulsion.    Slight degenerative reactive marrow endplate changes from T5 to T7. Thoracic    spinal cord is normal. No acute prevertebral or paraspinal soft tissue    abnormalities.       No acute abnormality in the visualized chest or abdomen. Similar right renal    cyst is better visualized on CT 1/25/2019.       Mild multilevel spondylotic changes include varying degrees of disc    desiccation, intervertebral disc height loss, osteophytic ridging, and    facet/ligamentum flavum hypertrophy. These are more pronounced in the lumbar    spine. The lumbar pedicles are congenitally shortened with slight     GERD (gastroesophageal reflux disease) 8/12/2011    Hyperlipidemia 8/12/2011    Hypertension     Hypothyroid 8/12/2011    Lower back pain     Nicotine addiction 8/12/2011    Sleep apnea        Past Surgical History:   Procedure Laterality Date    CARDIAC CATHETERIZATION Left 01/21/2015    Severe left main trunk disease extending into the ostium of the left anterior descending of 80% involving the ostium of the intermediate & atrioventricular branch of the circumflex.  60% disease in the atrioventricular branch of the circumflex in the midportion.  40% disease in the right coronary artery.  Normal left ventricular function, ejection fraction of 60%    CARDIAC CATHETERIZATION Left 12/20/2017    Dr. Whalen @ [facility]--70% left main trunck & ostial LAD & high lateral circumflex disease.  Patent LIMA to the LAD.  Patent vein graft to the high lateral circumflex.  60% disease in the AV branch of the circumflex that terminates in a large posterolateral branch with an FFR of 1.0, indicating no significant stenosis.  40% disease in the right coronary artery.  Ejection fraction 50-55%.    CARDIAC CATHETERIZATION Left 11/04/2022    Dr. Whalen @ [facility]--Continue medical therapy    COLONOSCOPY N/A 03/05/2019    COLONOSCOPY POLYPECTOMY HOT BIOPSY performed by Ashish Mauricio MD at [facility] OR    COLONOSCOPY N/A 10/9/2023    COLONOSCOPY BIOPSY performed by Jazmin Delcid MD at [facility] ENDOSCOPY    CORONARY ARTERY BYPASS GRAFT  02/04/2015    3 vessel CABG using left internal mammary artery to the left anterior descending coronary artery with a reverse saphenous vein aortocoronary sequential graft side to side to the ramus intermedius coronary artery & end to side to the first obtuse marginal coronary artery.    NERVE BLOCK Bilateral 6/22/2023    BILATERAL LUMBAR L3, L4, L5 MEDIAL BRANCH BLOCKS performed by Kedar Ruiz DO at [facility] OR    OTHER SURGICAL HISTORY  03/21/2016    I & D- cyst- posterior neck    PAIN MANAGEMENT

## 2024-02-29 NOTE — PROGRESS NOTES
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 He states that his back pain is about a 5/6 out of 10. He states that it depends on activity level. He was never able to get the 2nd ablation due to an infected sebaceous cyst. This is now healed.     Medication Refill: N/A    Current Complaints of Pain:   Location: Low back  Radiation: None  Severity: mild   Pain Numerical Score - 5-6   Average: 5-6    Highest: 10  Lowest: 4  Character/Quality: Complains of pain that is dull, achy  Timing: Constant  Associated symptoms: none  Numbness: no  Weakness: no  Exacerbating factors: laying on his back, deep breaths   Alleviating factors:   Length of time pain has been present: Started on chronic, years ago   Inciting event/injury: Fracture after a fall at home   Bowel/Bladder incontinence: no  Falls: none recent   Physical Therapy: yes    History of Interventions:   Surgery: No previous lumbar/cervical surgeries  Injections: MBB's, Lumbar RFA    Imaging:    Thoracic and lumbar MRI 2/2/2023    FINDINGS:    Normal segmentation. Thoracic vertebral body heights are maintained with normal    alignment. Unchanged old compression deformity of L1 without retropulsion.    Slight degenerative reactive marrow endplate changes from T5 to T7. Thoracic    spinal cord is normal. No acute prevertebral or paraspinal soft tissue    abnormalities.       No acute abnormality in the visualized chest or abdomen. Similar right renal    cyst is better visualized on CT 1/25/2019.       Mild multilevel spondylotic changes include varying degrees of disc    desiccation, intervertebral disc height loss, osteophytic ridging, and    facet/ligamentum flavum hypertrophy. These are more pronounced in the lumbar    spine. The lumbar pedicles are congenitally shortened with slight

## 2024-03-05 NOTE — PROGRESS NOTES
[facility]   Preadmission Testing    Name: Justice Torres  : 1955  Patient Phone: [phone] (home)     Procedure: LEFT LUMBAR L3, L4, L5 RADIOFREQUENCY ABLATION - Left   Date of Procedure: 2024    Surgeon: Kedar Ruiz DO    Ht:  185.4 cm (6' 1\")  Wt: Weight - Scale: (!) 139.7 kg (308 lb)  Wt method: Stated    Allergies:   Allergies   Allergen Reactions    Penicillins      Pt's daughter confirmed PCN should be added to allergy history                There were no vitals filed for this visit.    No LMP for male patient.    Do you take blood thinners?   [x] Yes    [] No         Instructed to stop blood thinners prior to procedure?    [] Yes    [] No      [x] N/A    []Eliquis - 3 days  []Xarelto - 3 days  []Pradaxa - 5 days  []Coumadin - 5 days   []Plavix - 7 days  []ASA 325mg - 7 days  []Brillinta - 5 days  []Pletal - 2 days   Have you had a respiratory infection or sore throat in last 4 weeks before surgery?    [] Yes    [x] No     Patient instructed on: [x] NPO Status - if receiving sedation  [x] Meds to Take  [x] Ride Home - sedation/ epidurals  []No Jewelry/Contact Lenses/Nail Polish     DOS Patient Needs [] HCG   [x] Blood Sugar  [] PT/INR

## 2024-03-07 ENCOUNTER — APPOINTMENT (OUTPATIENT)
Dept: GENERAL RADIOLOGY | Age: 69
End: 2024-03-07
Attending: STUDENT IN AN ORGANIZED HEALTH CARE EDUCATION/TRAINING PROGRAM
Payer: COMMERCIAL

## 2024-03-07 ENCOUNTER — HOSPITAL ENCOUNTER (OUTPATIENT)
Age: 69
Setting detail: OUTPATIENT SURGERY
Discharge: HOME | End: 2024-03-07
Attending: STUDENT IN AN ORGANIZED HEALTH CARE EDUCATION/TRAINING PROGRAM | Admitting: STUDENT IN AN ORGANIZED HEALTH CARE EDUCATION/TRAINING PROGRAM
Payer: COMMERCIAL

## 2024-03-07 VITALS
DIASTOLIC BLOOD PRESSURE: 62 MMHG | BODY MASS INDEX: 40.82 KG/M2 | WEIGHT: 308 LBS | OXYGEN SATURATION: 94 % | SYSTOLIC BLOOD PRESSURE: 112 MMHG | TEMPERATURE: 97 F | HEIGHT: 73 IN | HEART RATE: 60 BPM

## 2024-03-07 LAB — GLUCOSE BLD-MCNC: 89 MG/DL (ref 65–99)

## 2024-03-07 PROCEDURE — C1778 LEAD, NEUROSTIMULATOR: HCPCS | Performed by: STUDENT IN AN ORGANIZED HEALTH CARE EDUCATION/TRAINING PROGRAM

## 2024-03-07 PROCEDURE — 99152 MOD SED SAME PHYS/QHP 5/>YRS: CPT | Performed by: STUDENT IN AN ORGANIZED HEALTH CARE EDUCATION/TRAINING PROGRAM

## 2024-03-07 PROCEDURE — 7100000011 HC PHASE II RECOVERY - ADDTL 15 MIN: Performed by: STUDENT IN AN ORGANIZED HEALTH CARE EDUCATION/TRAINING PROGRAM

## 2024-03-07 PROCEDURE — 2500000003 HC RX 250 WO HCPCS: Performed by: STUDENT IN AN ORGANIZED HEALTH CARE EDUCATION/TRAINING PROGRAM

## 2024-03-07 PROCEDURE — 3600000059 HC PAIN LEVEL 5 ADDL 15 MIN: Performed by: STUDENT IN AN ORGANIZED HEALTH CARE EDUCATION/TRAINING PROGRAM

## 2024-03-07 PROCEDURE — 7100000010 HC PHASE II RECOVERY - FIRST 15 MIN: Performed by: STUDENT IN AN ORGANIZED HEALTH CARE EDUCATION/TRAINING PROGRAM

## 2024-03-07 PROCEDURE — 2709999900 HC NON-CHARGEABLE SUPPLY: Performed by: STUDENT IN AN ORGANIZED HEALTH CARE EDUCATION/TRAINING PROGRAM

## 2024-03-07 PROCEDURE — 64635 DESTROY LUMB/SAC FACET JNT: CPT | Performed by: STUDENT IN AN ORGANIZED HEALTH CARE EDUCATION/TRAINING PROGRAM

## 2024-03-07 PROCEDURE — 64636 DESTROY L/S FACET JNT ADDL: CPT | Performed by: STUDENT IN AN ORGANIZED HEALTH CARE EDUCATION/TRAINING PROGRAM

## 2024-03-07 PROCEDURE — 2580000003 HC RX 258: Performed by: STUDENT IN AN ORGANIZED HEALTH CARE EDUCATION/TRAINING PROGRAM

## 2024-03-07 PROCEDURE — 82947 ASSAY GLUCOSE BLOOD QUANT: CPT

## 2024-03-07 PROCEDURE — 3600000058 HC PAIN LEVEL 5 BASE: Performed by: STUDENT IN AN ORGANIZED HEALTH CARE EDUCATION/TRAINING PROGRAM

## 2024-03-07 PROCEDURE — 6360000002 HC RX W HCPCS: Performed by: STUDENT IN AN ORGANIZED HEALTH CARE EDUCATION/TRAINING PROGRAM

## 2024-03-07 DEVICE — STIMULATOR NERVE PERIPH ENDURA ELECTR PERC FOR PAIN REL: Type: IMPLANTABLE DEVICE | Status: FUNCTIONAL

## 2024-03-07 RX ORDER — LIDOCAINE HYDROCHLORIDE 20 MG/ML
INJECTION, SOLUTION EPIDURAL; INFILTRATION; INTRACAUDAL; PERINEURAL PRN
Status: DISCONTINUED | OUTPATIENT
Start: 2024-03-07 | End: 2024-03-07 | Stop reason: ALTCHOICE

## 2024-03-07 RX ORDER — MIDAZOLAM HYDROCHLORIDE 1 MG/ML
INJECTION INTRAMUSCULAR; INTRAVENOUS PRN
Status: DISCONTINUED | OUTPATIENT
Start: 2024-03-07 | End: 2024-03-07 | Stop reason: ALTCHOICE

## 2024-03-07 RX ORDER — SODIUM CHLORIDE, SODIUM LACTATE, POTASSIUM CHLORIDE, CALCIUM CHLORIDE 600; 310; 30; 20 MG/100ML; MG/100ML; MG/100ML; MG/100ML
INJECTION, SOLUTION INTRAVENOUS CONTINUOUS
Status: DISCONTINUED | OUTPATIENT
Start: 2024-03-07 | End: 2024-03-07 | Stop reason: HOSPADM

## 2024-03-07 RX ADMIN — SODIUM CHLORIDE, POTASSIUM CHLORIDE, SODIUM LACTATE AND CALCIUM CHLORIDE: 600; 310; 30; 20 INJECTION, SOLUTION INTRAVENOUS at 14:49

## 2024-03-07 ASSESSMENT — PAIN - FUNCTIONAL ASSESSMENT: PAIN_FUNCTIONAL_ASSESSMENT: 0-10

## 2024-03-07 ASSESSMENT — PAIN DESCRIPTION - DESCRIPTORS: DESCRIPTORS: ACHING;POUNDING

## 2024-03-07 NOTE — INTERVAL H&P NOTE
Update History & Physical    The patient's History and Physical of February 29, 2024 was reviewed with the patient and I examined the patient. There was no change. The surgical site was confirmed by the patient and me.     Plan: The risks, benefits, expected outcome, and alternative to the recommended procedure have been discussed with the patient. Patient understands and wants to proceed with the procedure.     ASA CLASSIFICATION  3  MP   CLASSIFICATION  3    Electronically signed by Kedar Ruiz DO on 3/7/2024 at 3:26 PM

## 2024-03-07 NOTE — OP NOTE
PROCEDURE PERFORMED: Left Lumbar Medial Branch Radiofrequency Ablation using Fluoroscopy    PREOPERATIVE DIAGNOSIS: Lumbosacral spondylosis    INDICATIONS: Chronic low back pain    The patient's history and physical exam were reviewed.  The risk, benefits, and alternatives of the procedure were discussed and all questions were answered to the patient's satisfaction.  The patient agreed to proceed and written informed consent was obtained.    POSTOPERATIVE DIAGNOSIS: Lumbosacral spondylosis    PHYSICIAN:  Dr. Kedar Ruiz DO    ANESTHESIA:  LOCAL    ASSISTANT:  NONE    PATHOLOGY:  NONE    ESTIMATED BLOOD LOSS:  N/A    IMPLANTS:  NONE    PROCEDURE DESCRIPTION: Left lumbar medial branch radiofrequency ablation using fluoroscopy    The patient was placed on the operative bed in prone position.  The area was prepped with  Chlorhexidine.  The area was then draped in a sterile fashion.    Targeted levels: Left lumbar L3, L4, L5 medial branch radiofrequency ablation    An AP  fluoroscopy image was used to identify and leslie Marie's point at the L3, L4 levels on the targeted side.  Additionally, the junction of the SAP and sacral ala was also marked on the same side.  The skin and subcutaneous tissues were then anesthetized with 1% lidocaine. At each lumbar medial branch, a 20-gauge, 100 mm curved with 10 mm active tip probe was advanced under AP fluoroscopic projections until bone was contacted along the medial aspect of the transverse process.  Aspiration of each needle was negative for blood, CSF and paresthesia prior to injection.   Motor stimulation at 2 Hz and 1.2 V was negative.  Then, after negative aspiration, 1 mL lidocaine 2% was injected at each level prior to lesioning which was performed at 80°C for 90 seconds.  Once the lesion was complete, injectate solution containing Kenalog 40 mg and 2 mL lidocaine 1% was injected at each site with a total of 1 mL.  The probes were then removed.  The needle sites

## 2024-03-08 ENCOUNTER — NURSE ONLY (OUTPATIENT)
Dept: FAMILY MEDICINE CLINIC | Age: 69
End: 2024-03-08

## 2024-03-08 VITALS — OXYGEN SATURATION: 98 % | RESPIRATION RATE: 18 BRPM | WEIGHT: 308 LBS | BODY MASS INDEX: 40.64 KG/M2

## 2024-03-08 DIAGNOSIS — Z48.02 VISIT FOR SUTURE REMOVAL: Primary | ICD-10-CM

## 2024-03-14 ENCOUNTER — OFFICE VISIT (OUTPATIENT)
Dept: UROLOGY | Age: 69
End: 2024-03-14
Payer: COMMERCIAL

## 2024-03-14 VITALS
SYSTOLIC BLOOD PRESSURE: 126 MMHG | DIASTOLIC BLOOD PRESSURE: 68 MMHG | HEIGHT: 73 IN | BODY MASS INDEX: 39.89 KG/M2 | WEIGHT: 301 LBS

## 2024-03-14 DIAGNOSIS — N40.1 BPH WITH OBSTRUCTION/LOWER URINARY TRACT SYMPTOMS: Primary | ICD-10-CM

## 2024-03-14 DIAGNOSIS — N13.8 BPH WITH OBSTRUCTION/LOWER URINARY TRACT SYMPTOMS: Primary | ICD-10-CM

## 2024-03-14 DIAGNOSIS — N32.81 OAB (OVERACTIVE BLADDER): ICD-10-CM

## 2024-03-14 PROCEDURE — 3074F SYST BP LT 130 MM HG: CPT | Performed by: PHYSICIAN ASSISTANT

## 2024-03-14 PROCEDURE — 51798 US URINE CAPACITY MEASURE: CPT | Performed by: PHYSICIAN ASSISTANT

## 2024-03-14 PROCEDURE — 3078F DIAST BP <80 MM HG: CPT | Performed by: PHYSICIAN ASSISTANT

## 2024-03-14 PROCEDURE — 99213 OFFICE O/P EST LOW 20 MIN: CPT | Performed by: PHYSICIAN ASSISTANT

## 2024-03-14 PROCEDURE — 1123F ACP DISCUSS/DSCN MKR DOCD: CPT | Performed by: PHYSICIAN ASSISTANT

## 2024-03-14 RX ORDER — OXYBUTYNIN CHLORIDE 10 MG/1
10 TABLET, EXTENDED RELEASE ORAL DAILY
Qty: 90 TABLET | Refills: 1 | Status: SHIPPED | OUTPATIENT
Start: 2024-03-14

## 2024-03-14 RX ORDER — TAMSULOSIN HYDROCHLORIDE 0.4 MG/1
0.4 CAPSULE ORAL 2 TIMES DAILY
Qty: 180 CAPSULE | Refills: 1 | Status: SHIPPED | OUTPATIENT
Start: 2024-03-14

## 2024-03-14 ASSESSMENT — ENCOUNTER SYMPTOMS
COUGH: 0
VOMITING: 0
ABDOMINAL PAIN: 0
COLOR CHANGE: 0
CONSTIPATION: 0
SHORTNESS OF BREATH: 0
EYE REDNESS: 0
WHEEZING: 0
BACK PAIN: 0
NAUSEA: 0

## 2024-03-14 NOTE — PROGRESS NOTES
Random bladderscan performed in office today:  Pt last voided 60 mins ago, scan = 223 mL    
tremors and numbness.   Hematological:  Negative for adenopathy. Does not bruise/bleed easily.       /68 (Site: Left Upper Arm, Position: Sitting, Cuff Size: Large Adult)   Ht 1.854 m (6' 0.99\")   Wt (!) 136.5 kg (301 lb)   BMI 39.72 kg/m²       PHYSICAL EXAM:  Constitutional: Patient in no acute distress;   Neuro: alert and oriented to person place and time.    Psych: Mood and affect normal.  Lungs: Respiratory effort normal  Abdomen: Soft, non-tender, non-distended   Rectal: deferred       Lab Results   Component Value Date    BUN 12 02/03/2024     Lab Results   Component Value Date    CREATININE 1.0 02/03/2024     Lab Results   Component Value Date    PSA 0.35 12/17/2018       ASSESSMENT:   Diagnosis Orders   1. BPH with obstruction/lower urinary tract symptoms  MD JESSICA POST-VOIDING RESIDUAL URINE&/BLADDER CAP    tamsulosin (FLOMAX) 0.4 MG capsule      2. OAB (overactive bladder)              PLAN:  Offered cystoscopy, declined, he states that he wants to avoid this.  He states that if he ever needs a cystoscopy done again he needs to have it done in the operating room.    Patient may be a candidate for PVP greenlight, he understands that he needs to have a cystoscopy before scheduling this    Gave him option of continuing current management versus changing to a different anticholinergic, he would like to continue current management    Recommended tight glucose control    Avoid bladder irritants    Increase water intake    Stop liquids 2 hours prior to bedtime    Continue Flomax twice daily    Continue Ditropan 10 mg XL    Follow-up in 4 months with PVR

## 2024-03-22 DIAGNOSIS — I10 ESSENTIAL HYPERTENSION: ICD-10-CM

## 2024-03-22 RX ORDER — LOSARTAN POTASSIUM 25 MG/1
TABLET ORAL
Qty: 90 TABLET | Refills: 3 | Status: SHIPPED | OUTPATIENT
Start: 2024-03-22

## 2024-03-22 RX ORDER — GLIPIZIDE 10 MG/1
10 TABLET ORAL 2 TIMES DAILY
Qty: 60 TABLET | Refills: 3 | Status: SHIPPED | OUTPATIENT
Start: 2024-03-22

## 2024-03-22 NOTE — TELEPHONE ENCOUNTER
W/O BIOPSY Once 05/04/2023   Hepatitis A Antibody, IgM Once 04/27/2023   FACET JOINT L/S Once 06/01/2023   FACET JOINT L/S 2ND LEVEL Once 06/01/2023   PSA Screening Once 06/05/2023   FACET JOINT L/S SINGLE Once 10/12/2023   RADIOFREQUENCY L/S ADDITIONAL Once 10/12/2023   Comprehensive Metabolic Panel Once 01/05/2025   CBC with Auto Differential Once 01/05/2025   Vitamin D 25 Hydroxy Once 01/05/2025   Lipid Panel Once 01/05/2025   TSH with Reflex Once 01/05/2025   Magnesium Once 01/05/2025   EKG 12 Lead Once 01/05/2025   XR CHEST (2 VW) Once 01/05/2025       Next Visit Date:  Future Appointments   Date Time Provider Department Center   5/2/2024 11:00 AM Kedar Ruiz DO [facility]   6/6/2024 10:00 AM Uche Hawkins MD [facility]   7/18/2024  1:30 PM Danny Sanchez, PAA[facility]   2/3/2025 10:30 AM Rios Whalen MD [facility]            Patient Active Problem List:     COPD (chronic obstructive pulmonary disease) ([facility])     GERD (gastroesophageal reflux disease)     S/P CABG x 3     CAD (coronary artery disease)     Essential hypertension     Mixed hyperlipidemia     Type 2 diabetes mellitus without complication, without long-term current use of insulin ([facility])     Dysthymia (or depressive neurosis)     Chronic gout involving toe     Acquired hypothyroidism     Vitamin D deficiency disease     Chronic low back pain with left-sided sciatica     Class 3 severe obesity due to excess calories without serious comorbidity with body mass index (BMI) of 40.0 to 44.9 in adult (HCC)     MINAL treated with BiPAP

## 2024-04-01 DIAGNOSIS — E78.2 MIXED HYPERLIPIDEMIA: ICD-10-CM

## 2024-04-01 RX ORDER — FENOFIBRATE 145 MG/1
TABLET, COATED ORAL
Qty: 30 TABLET | Refills: 3 | Status: SHIPPED | OUTPATIENT
Start: 2024-04-01

## 2024-04-29 DIAGNOSIS — E78.2 MIXED HYPERLIPIDEMIA: ICD-10-CM

## 2024-04-29 DIAGNOSIS — E03.9 ACQUIRED HYPOTHYROIDISM: ICD-10-CM

## 2024-04-29 RX ORDER — LOVASTATIN 40 MG/1
40 TABLET ORAL 2 TIMES DAILY
Qty: 180 TABLET | Refills: 3 | Status: SHIPPED | OUTPATIENT
Start: 2024-04-29

## 2024-04-29 RX ORDER — LEVOTHYROXINE SODIUM 0.2 MG/1
TABLET ORAL
Qty: 90 TABLET | Refills: 0 | Status: SHIPPED | OUTPATIENT
Start: 2024-04-29

## 2024-04-29 NOTE — TELEPHONE ENCOUNTER
06/05/2023   FACET JOINT L/S SINGLE Once 10/12/2023   RADIOFREQUENCY L/S ADDITIONAL Once 10/12/2023   Comprehensive Metabolic Panel Once 01/05/2025   CBC with Auto Differential Once 01/05/2025   Vitamin D 25 Hydroxy Once 01/05/2025   Lipid Panel Once 01/05/2025   TSH with Reflex Once 01/05/2025   Magnesium Once 01/05/2025   EKG 12 Lead Once 01/05/2025   XR CHEST (2 VW) Once 01/05/2025       Next Visit Date:  Future Appointments   Date Time Provider Department Center   5/2/2024 11:00 AM Kedar Ruiz DO [facility]   6/6/2024 10:00 AM Uche Hawkins MD [facility]   7/18/2024  1:30 PM Danny Sanchez, BLUE [facility]   2/3/2025 10:30 AM Rios Whalen MD [facility]            Patient Active Problem List:     COPD (chronic obstructive pulmonary disease) ([facility])     GERD (gastroesophageal reflux disease)     S/P CABG x 3     CAD (coronary artery disease)     Essential hypertension     Mixed hyperlipidemia     Type 2 diabetes mellitus without complication, without long-term current use of insulin ([facility])     Dysthymia (or depressive neurosis)     Chronic gout involving toe     Acquired hypothyroidism     Vitamin D deficiency disease     Chronic low back pain with left-sided sciatica     Class 3 severe obesity due to excess calories without serious comorbidity with body mass index (BMI) of 40.0 to 44.9 in adult ([facility])     MINAL treated with BiPAP

## 2024-04-30 ENCOUNTER — TELEPHONE (OUTPATIENT)
Dept: CARDIOLOGY CLINIC | Age: 69
End: 2024-04-30

## 2024-04-30 NOTE — TELEPHONE ENCOUNTER
Justice called to ask if Dr. Whalen would do a letter stating that he needs a 2 bedroom apartment at the [place] to have room for his treadmill and exercise equipment which would be beneficial to him and his cardiac condition.     He stated that he would pick the letter up if and when it is available.

## 2024-05-02 NOTE — TELEPHONE ENCOUNTER
Letter done stating would benefit from   Home exercise equipment d/t heart issues  Per Dr. Whalen

## 2024-05-09 ENCOUNTER — OFFICE VISIT (OUTPATIENT)
Dept: FAMILY MEDICINE CLINIC | Age: 69
End: 2024-05-09
Payer: COMMERCIAL

## 2024-05-09 VITALS
OXYGEN SATURATION: 99 % | HEART RATE: 82 BPM | WEIGHT: 312.2 LBS | RESPIRATION RATE: 18 BRPM | DIASTOLIC BLOOD PRESSURE: 88 MMHG | HEIGHT: 73 IN | BODY MASS INDEX: 41.38 KG/M2 | SYSTOLIC BLOOD PRESSURE: 118 MMHG

## 2024-05-09 DIAGNOSIS — L72.9 INFECTED CYST OF SKIN: Primary | ICD-10-CM

## 2024-05-09 DIAGNOSIS — L08.9 INFECTED CYST OF SKIN: Primary | ICD-10-CM

## 2024-05-09 PROCEDURE — 3074F SYST BP LT 130 MM HG: CPT | Performed by: INTERNAL MEDICINE

## 2024-05-09 PROCEDURE — 1123F ACP DISCUSS/DSCN MKR DOCD: CPT | Performed by: INTERNAL MEDICINE

## 2024-05-09 PROCEDURE — 3079F DIAST BP 80-89 MM HG: CPT | Performed by: INTERNAL MEDICINE

## 2024-05-09 PROCEDURE — 99213 OFFICE O/P EST LOW 20 MIN: CPT | Performed by: INTERNAL MEDICINE

## 2024-05-09 RX ORDER — DOXYCYCLINE HYCLATE 100 MG/1
100 CAPSULE ORAL 2 TIMES DAILY
Qty: 14 CAPSULE | Refills: 0 | Status: SHIPPED | OUTPATIENT
Start: 2024-05-09 | End: 2024-05-16

## 2024-05-09 SDOH — ECONOMIC STABILITY: FOOD INSECURITY: WITHIN THE PAST 12 MONTHS, THE FOOD YOU BOUGHT JUST DIDN'T LAST AND YOU DIDN'T HAVE MONEY TO GET MORE.: NEVER TRUE

## 2024-05-09 SDOH — ECONOMIC STABILITY: FOOD INSECURITY: WITHIN THE PAST 12 MONTHS, YOU WORRIED THAT YOUR FOOD WOULD RUN OUT BEFORE YOU GOT MONEY TO BUY MORE.: NEVER TRUE

## 2024-05-09 SDOH — ECONOMIC STABILITY: INCOME INSECURITY: HOW HARD IS IT FOR YOU TO PAY FOR THE VERY BASICS LIKE FOOD, HOUSING, MEDICAL CARE, AND HEATING?: NOT HARD AT ALL

## 2024-05-09 ASSESSMENT — ENCOUNTER SYMPTOMS
SORE THROAT: 0
NAUSEA: 0
ABDOMINAL PAIN: 0
SHORTNESS OF BREATH: 0
COUGH: 0

## 2024-05-09 NOTE — PROGRESS NOTES
UPPER GASTROINTESTINAL ENDOSCOPY N/A 03/05/2019    EGD BIOPSY performed by Ashish Mauricio MD at [facility] OR        Medications:       Prior to Admission medications    Medication Sig Start Date End Date Taking? Authorizing Provider   doxycycline hyclate (VIBRAMYCIN) 100 MG capsule Take 1 capsule by mouth 2 times daily for 7 days 5/9/24 5/16/24 Yes Uche Hawkins MD   levothyroxine (SYNTHROID) 200 MCG tablet TAKE 1 TABLET BY MOUTH DAILY 4/29/24  Yes Best Restrepo MD   lovastatin (MEVACOR) 40 MG tablet TAKE 1 TABLET BY MOUTH TWICE DAILY 4/29/24  Yes Best Restrepo MD   fenofibrate (TRICOR) 145 MG tablet TAKE 1 TABLET DAILY 4/1/24  Yes Uche Hawkins MD   glipiZIDE (GLUCOTROL) 10 MG tablet Take 1 tablet by mouth 2 times daily 3/22/24  Yes Uche Hawkins MD   losartan (COZAAR) 25 MG tablet TAKE 1 TABLET EVERY DAY 3/22/24  Yes Uche Hawkins MD   oxyBUTYnin (DITROPAN XL) 10 MG extended release tablet Take 1 tablet by mouth daily 3/14/24  Yes Danny Sanchez PA-C   tamsulosin (FLOMAX) 0.4 MG capsule Take 1 capsule by mouth in the morning and at bedtime 3/14/24  Yes Danny Sanchez PA-C   amLODIPine (NORVASC) 2.5 MG tablet TAKE 1 TABLET DAILY 2/23/24  Yes Uche Hawkins MD   Pseudoephedrine-DM-GG 60- MG TABS Take 1 tablet by mouth every 6 hours as needed (cough, congestion) 1/5/24  Yes Uche Hawkins MD   PARoxetine (PAXIL) 20 MG tablet Take 1 tablet by mouth in the morning and at bedtime 12/1/23  Yes Uche Hawkins MD   empagliflozin (JARDIANCE) 25 MG tablet Take 1 tablet by mouth daily 11/3/23  Yes Uche Hawkins MD   Semaglutide (RYBELSUS) 3 MG TABS Take 3 mg by mouth daily (with breakfast) 11/3/23  Yes Uche Hawkins MD   metoprolol succinate (TOPROL XL) 50 MG extended release tablet TAKE 1 TABLET EVERY DAY 10/26/23  Yes Uche Hawkins MD   loperamide (IMODIUM) 2 MG capsule TAKE 1 CAPSULE BY MOUTH FOUR TIMES DAILY AS NEEDED FOR DIARRHEA 7/18/23  Yes Uche Hawkins MD   buPROPion

## 2024-05-30 ENCOUNTER — OFFICE VISIT (OUTPATIENT)
Dept: SURGERY | Age: 69
End: 2024-05-30
Payer: COMMERCIAL

## 2024-05-30 VITALS
DIASTOLIC BLOOD PRESSURE: 72 MMHG | SYSTOLIC BLOOD PRESSURE: 126 MMHG | WEIGHT: 313 LBS | BODY MASS INDEX: 41.3 KG/M2 | HEART RATE: 48 BPM

## 2024-05-30 DIAGNOSIS — L72.0 EPIDERMAL CYST: Primary | ICD-10-CM

## 2024-05-30 PROCEDURE — 99212 OFFICE O/P EST SF 10 MIN: CPT | Performed by: SURGERY

## 2024-05-30 PROCEDURE — 3074F SYST BP LT 130 MM HG: CPT | Performed by: SURGERY

## 2024-05-30 PROCEDURE — 1123F ACP DISCUSS/DSCN MKR DOCD: CPT | Performed by: SURGERY

## 2024-05-30 PROCEDURE — 3078F DIAST BP <80 MM HG: CPT | Performed by: SURGERY

## 2024-05-30 NOTE — PROGRESS NOTES
Patient comes in with some intermittent drainage from the wound where a epidermoid cyst was removed 1/18/24.  It had been ruptured prior to that.     /72 (Site: Right Upper Arm, Position: Sitting, Cuff Size: Large Adult)   Pulse (!) 48   Wt (!) 142 kg (313 lb)   BMI 41.30 kg/m²         It is not draining today, and I was unable express any drainage.    IMP/PLAN  1) Likely persistent cyst.  Re-excise at his convenience.

## 2024-06-06 ENCOUNTER — OFFICE VISIT (OUTPATIENT)
Dept: FAMILY MEDICINE CLINIC | Age: 69
End: 2024-06-06
Payer: COMMERCIAL

## 2024-06-06 ENCOUNTER — PROCEDURE VISIT (OUTPATIENT)
Dept: SURGERY | Age: 69
End: 2024-06-06

## 2024-06-06 ENCOUNTER — HOSPITAL ENCOUNTER (OUTPATIENT)
Age: 69
Setting detail: SPECIMEN
Discharge: HOME | End: 2024-06-06

## 2024-06-06 VITALS
SYSTOLIC BLOOD PRESSURE: 108 MMHG | HEIGHT: 73 IN | BODY MASS INDEX: 41.75 KG/M2 | WEIGHT: 315 LBS | HEART RATE: 52 BPM | RESPIRATION RATE: 18 BRPM | OXYGEN SATURATION: 99 % | DIASTOLIC BLOOD PRESSURE: 62 MMHG

## 2024-06-06 VITALS
SYSTOLIC BLOOD PRESSURE: 109 MMHG | BODY MASS INDEX: 41.16 KG/M2 | HEART RATE: 49 BPM | WEIGHT: 312 LBS | DIASTOLIC BLOOD PRESSURE: 67 MMHG

## 2024-06-06 DIAGNOSIS — T81.89XD SUTURE GRANULOMA, SUBSEQUENT ENCOUNTER: Primary | ICD-10-CM

## 2024-06-06 DIAGNOSIS — F41.9 ANXIETY: ICD-10-CM

## 2024-06-06 DIAGNOSIS — E11.9 TYPE 2 DIABETES MELLITUS WITHOUT COMPLICATION, WITHOUT LONG-TERM CURRENT USE OF INSULIN (HCC): Primary | ICD-10-CM

## 2024-06-06 DIAGNOSIS — T81.89XD SUTURE GRANULOMA, SUBSEQUENT ENCOUNTER: ICD-10-CM

## 2024-06-06 DIAGNOSIS — Z87.891 PERSONAL HISTORY OF TOBACCO USE: ICD-10-CM

## 2024-06-06 DIAGNOSIS — L76.82 POSTOPERATIVE COMPLICATION OF SKIN INVOLVING DRAINAGE FROM SURGICAL WOUND: ICD-10-CM

## 2024-06-06 DIAGNOSIS — I10 ESSENTIAL HYPERTENSION: ICD-10-CM

## 2024-06-06 DIAGNOSIS — Z00.00 MEDICARE ANNUAL WELLNESS VISIT, SUBSEQUENT: ICD-10-CM

## 2024-06-06 DIAGNOSIS — E78.2 MIXED HYPERLIPIDEMIA: ICD-10-CM

## 2024-06-06 LAB — HBA1C MFR BLD: 6.6 %

## 2024-06-06 PROCEDURE — 3074F SYST BP LT 130 MM HG: CPT | Performed by: INTERNAL MEDICINE

## 2024-06-06 PROCEDURE — G0296 VISIT TO DETERM LDCT ELIG: HCPCS | Performed by: INTERNAL MEDICINE

## 2024-06-06 PROCEDURE — 3078F DIAST BP <80 MM HG: CPT | Performed by: INTERNAL MEDICINE

## 2024-06-06 PROCEDURE — G0439 PPPS, SUBSEQ VISIT: HCPCS | Performed by: INTERNAL MEDICINE

## 2024-06-06 PROCEDURE — 99214 OFFICE O/P EST MOD 30 MIN: CPT | Performed by: INTERNAL MEDICINE

## 2024-06-06 PROCEDURE — 1123F ACP DISCUSS/DSCN MKR DOCD: CPT | Performed by: INTERNAL MEDICINE

## 2024-06-06 PROCEDURE — 83036 HEMOGLOBIN GLYCOSYLATED A1C: CPT | Performed by: INTERNAL MEDICINE

## 2024-06-06 PROCEDURE — 3044F HG A1C LEVEL LT 7.0%: CPT | Performed by: INTERNAL MEDICINE

## 2024-06-06 PROCEDURE — 99024 POSTOP FOLLOW-UP VISIT: CPT | Performed by: SURGERY

## 2024-06-06 ASSESSMENT — ENCOUNTER SYMPTOMS
WHEEZING: 0
ALLERGIC/IMMUNOLOGIC NEGATIVE: 1
ABDOMINAL PAIN: 0
NAUSEA: 0
ORTHOPNEA: 0
BLOOD IN STOOL: 0
SHORTNESS OF BREATH: 0
CONSTIPATION: 0
COUGH: 0
BACK PAIN: 1
SORE THROAT: 0

## 2024-06-06 ASSESSMENT — PATIENT HEALTH QUESTIONNAIRE - PHQ9
1. LITTLE INTEREST OR PLEASURE IN DOING THINGS: NOT AT ALL
6. FEELING BAD ABOUT YOURSELF - OR THAT YOU ARE A FAILURE OR HAVE LET YOURSELF OR YOUR FAMILY DOWN: NOT AT ALL
7. TROUBLE CONCENTRATING ON THINGS, SUCH AS READING THE NEWSPAPER OR WATCHING TELEVISION: NOT AT ALL
2. FEELING DOWN, DEPRESSED OR HOPELESS: NOT AT ALL
10. IF YOU CHECKED OFF ANY PROBLEMS, HOW DIFFICULT HAVE THESE PROBLEMS MADE IT FOR YOU TO DO YOUR WORK, TAKE CARE OF THINGS AT HOME, OR GET ALONG WITH OTHER PEOPLE: NOT DIFFICULT AT ALL
SUM OF ALL RESPONSES TO PHQ QUESTIONS 1-9: 0
3. TROUBLE FALLING OR STAYING ASLEEP: NOT AT ALL
SUM OF ALL RESPONSES TO PHQ QUESTIONS 1-9: 0
SUM OF ALL RESPONSES TO PHQ QUESTIONS 1-9: 0
9. THOUGHTS THAT YOU WOULD BE BETTER OFF DEAD, OR OF HURTING YOURSELF: NOT AT ALL
4. FEELING TIRED OR HAVING LITTLE ENERGY: NOT AT ALL
5. POOR APPETITE OR OVEREATING: NOT AT ALL
SUM OF ALL RESPONSES TO PHQ9 QUESTIONS 1 & 2: 0
8. MOVING OR SPEAKING SO SLOWLY THAT OTHER PEOPLE COULD HAVE NOTICED. OR THE OPPOSITE, BEING SO FIGETY OR RESTLESS THAT YOU HAVE BEEN MOVING AROUND A LOT MORE THAN USUAL: NOT AT ALL
SUM OF ALL RESPONSES TO PHQ QUESTIONS 1-9: 0

## 2024-06-06 NOTE — PATIENT INSTRUCTIONS
helps keep your hearing from getting worse.  Always wear hearing protection around loud noises.  Wear a hearing aid as directed.  A professional can help you pick a hearing aid that will work best for you.  You can also get hearing aids over the counter for mild to moderate hearing loss.  Have hearing tests as your doctor suggests. They can show whether your hearing has changed. Your hearing aid may need to be adjusted.  Use other devices as needed. These may include:  Telephone amplifiers and hearing aids that can connect to a television, stereo, radio, or microphone.  Devices that use lights or vibrations. These alert you to the doorbell, a ringing telephone, or a baby monitor.  Television closed-captioning. This shows the words at the bottom of the screen. Most new TVs can do this.  TTY (text telephone). This lets you type messages back and forth on the telephone instead of talking or listening. These devices are also called TDD. When messages are typed on the keyboard, they are sent over the phone line to a receiving TTY. The message is shown on a monitor.  Use text messaging, social media, and email if it is hard for you to communicate by telephone.  Try to learn a listening technique called speechreading. It is not lipreading. You pay attention to people's gestures, expressions, posture, and tone of voice. These clues can help you understand what a person is saying. Face the person you are talking to, and have them face you. Make sure the lighting is good. You need to see the other person's face clearly.  Think about counseling if you need help to adjust to your hearing loss.  When should you call for help?  Watch closely for changes in your health, and be sure to contact your doctor if:    You think your hearing is getting worse.     You have new symptoms, such as dizziness or nausea.   Where can you learn more?  Go to https://www.healthwise.net/patientEd and enter V825 to learn more about \"Hearing Loss: Care

## 2024-06-06 NOTE — PROGRESS NOTES
Patient come in for removal of there persistent draining area at the right end of his incision.  I thought this was persistent epidermal cyst, but during excision I found a piece of suture material about 1.5 cm long, so this was a suture granuloma.     The area was prepped with Choraprep.  Anesthetized with 1% lidocaine,  An elliptical incision about 1 cm long was made around the area.  This expose the residual suture was removed. The wound was close with a simple interrupted 3-0 nylon.   Direct pressure was held in place for 10 minutes due to some persistent oozing form the skin edges.    Tissue submitted for pathology.

## 2024-06-06 NOTE — PROGRESS NOTES
MD   fluticasone (FLONASE) 50 MCG/ACT nasal spray 2 sprays by Nasal route daily  Patient taking differently: 2 sprays by Nasal route daily prn Yes Uche Hawkins MD   albuterol sulfate  (90 Base) MCG/ACT inhaler Inhale 2 puffs into the lungs every 6 hours as needed for Wheezing Yes Uche Hawkins MD   Nebulizer System All-In-One MISC by Does not apply route Yes ProviderTova MD   ondansetron (ZOFRAN-ODT) 4 MG disintegrating tablet Take 1 tablet by mouth 3 times daily as needed for Nausea or Vomiting Yes cUhe Hawkins MD   Respiratory Therapy Supplies MISC CPAP full face mask, hose, head gear and  filter Yes Uche Hawkins MD   blood glucose monitor strips Test 2 times a day & as needed for symptoms of irregular blood glucose. Yes Uche Hawkins MD   mometasone (ELOCON) 0.1 % cream Apply topically daily. Yes Colin Calloway MD   aspirin 81 MG tablet Take 1 tablet by mouth daily Yes Provider, MD Tova       CareTeam (Including outside providers/suppliers regularly involved in providing care):   Patient Care Team:  Uche Hawkins MD as PCP - General (Hospitalist)  Uche Hawkins MD as PCP - Empaneled Provider     Reviewed and updated this visit:  Tobacco  Allergies  Meds  Problems  Med Hx  Surg Hx  Soc Hx  Fam Hx             Discussed with the patient the current USPSTF guidelines released March 9, 2021 for screening for lung cancer.    For adults aged 50 to 80 years who have a 20 pack-year smoking history and currently smoke or have quit within the past 15 years the grade B recommendation is to:  Screen for lung cancer with low-dose computed tomography (LDCT) every year.  Stop screening once a person has not smoked for 15 years or has a health problem that limits life expectancy or the ability to have lung surgery.    The patient  reports that he quit smoking about 9 years ago. His smoking use included cigarettes. He started smoking about 54 years ago. He has a 45.0

## 2024-06-10 LAB — SURGICAL PATHOLOGY REPORT: NORMAL

## 2024-06-13 ENCOUNTER — OFFICE VISIT (OUTPATIENT)
Dept: SURGERY | Age: 69
End: 2024-06-13

## 2024-06-13 VITALS — SYSTOLIC BLOOD PRESSURE: 104 MMHG | DIASTOLIC BLOOD PRESSURE: 66 MMHG | HEART RATE: 50 BPM

## 2024-06-13 DIAGNOSIS — T81.89XD SUTURE GRANULOMA, SUBSEQUENT ENCOUNTER: Primary | ICD-10-CM

## 2024-06-13 PROCEDURE — 99024 POSTOP FOLLOW-UP VISIT: CPT | Performed by: SURGERY

## 2024-06-13 NOTE — PROGRESS NOTES
Single suture removed from posterior right shoulder. Patient tolerated procedure will with minimal discomfort

## 2024-06-13 NOTE — PROGRESS NOTES
Justicelilliam Torres is here today for a post-operative wound check.  The wound appears clean, dry, intact, and nontender.    Sutures were removed today.  Steri-strips were applied   Pathology SKIN, BACK, EXCISION:   - CHRONIC ACTIVE ABSCESS WITH FOREIGN BODY REACTION AND DERMAL   FIBROSIS,    CLINICAL SUTURE GRANULOMA.  SEE COMMENT.     COMMENT: PER THE CLINICAL NOTES, A SMALL SUTURE WAS IDENTIFIED AT THIS   SITE AND THIS IS BELIEVED TO BE CONSISTENT WITH A SUTURE GRANULOMA.    Follow-up prn

## 2024-06-18 ENCOUNTER — TELEPHONE (OUTPATIENT)
Dept: FAMILY MEDICINE CLINIC | Age: 69
End: 2024-06-18

## 2024-07-15 RX ORDER — NEBULIZER ACCESSORIES
1 KIT MISCELLANEOUS EVERY 4 HOURS PRN
Qty: 1 KIT | Refills: 0 | Status: SHIPPED | OUTPATIENT
Start: 2024-07-15

## 2024-07-15 RX ORDER — GLIPIZIDE 10 MG/1
10 TABLET ORAL 2 TIMES DAILY
Qty: 60 TABLET | Refills: 3 | Status: SHIPPED | OUTPATIENT
Start: 2024-07-15

## 2024-07-15 NOTE — TELEPHONE ENCOUNTER
Health Maintenance   Topic Date Due    DTaP/Tdap/Td vaccine (1 - Tdap) Never done    Shingles vaccine (1 of 2) Never done    Respiratory Syncytial Virus (RSV) Pregnant or age 60 yrs+ (1 - 1-dose 60+ series) Never done    Diabetic Alb to Cr ratio (uACR) test  08/18/2017    Diabetic retinal exam  10/29/2020    COVID-19 Vaccine (6 - 2023-24 season) 09/01/2023    Diabetic foot exam  06/05/2024    Lung Cancer Screening &/or Counseling  06/22/2024    Flu vaccine (1) 08/01/2024    Pneumococcal 65+ years Vaccine (3 of 3 - PPSV23 or PCV20) 10/08/2024    Lipids  02/03/2025    GFR test (Diabetes, CKD 3-4, OR last GFR 15-59)  02/03/2025    A1C test (Diabetic or Prediabetic)  06/06/2025    Depression Monitoring  06/06/2025    Colorectal Cancer Screen  10/09/2033    Annual Wellness Visit (Medicare Advantage)  Completed    AAA screen  Completed    Hepatitis C screen  Completed    Hepatitis A vaccine  Aged Out    Hepatitis B vaccine  Aged Out    Hib vaccine  Aged Out    Polio vaccine  Aged Out    Meningococcal (ACWY) vaccine  Aged Out    Pneumococcal 0-64 years Vaccine  Discontinued    HIV screen  Discontinued    Prostate Specific Antigen (PSA) Screening or Monitoring  Discontinued             (applicable per patient's age: Cancer Screenings, Depression Screening, Fall Risk Screening, Immunizations)    Hemoglobin A1C (%)   Date Value   06/06/2024 6.6   02/09/2024 7.3   11/03/2023 8.2     AST (U/L)   Date Value   02/03/2024 32     ALT (U/L)   Date Value   02/03/2024 25     BUN (mg/dL)   Date Value   02/03/2024 12      (goal A1C is < 7)   (goal LDL is <100) need 30-50% reduction from baseline     BP Readings from Last 3 Encounters:   06/13/24 104/66   06/06/24 109/67   06/06/24 108/62    (goal /80)      All Future Testing planned in [facility]:  Lab Frequency Next Occurrence   FACET JOINT L/S SINGLE Once 10/12/2023   RADIOFREQUENCY L/S ADDITIONAL Once 10/12/2023   Comprehensive Metabolic Panel Once 01/05/2025   CBC with Auto

## 2024-07-18 ENCOUNTER — OFFICE VISIT (OUTPATIENT)
Dept: UROLOGY | Age: 69
End: 2024-07-18

## 2024-07-18 VITALS
BODY MASS INDEX: 41.35 KG/M2 | HEIGHT: 73 IN | SYSTOLIC BLOOD PRESSURE: 114 MMHG | DIASTOLIC BLOOD PRESSURE: 66 MMHG | WEIGHT: 312 LBS

## 2024-07-18 DIAGNOSIS — N32.81 OAB (OVERACTIVE BLADDER): ICD-10-CM

## 2024-07-18 DIAGNOSIS — N40.1 BPH WITH OBSTRUCTION/LOWER URINARY TRACT SYMPTOMS: Primary | ICD-10-CM

## 2024-07-18 DIAGNOSIS — N13.8 BPH WITH OBSTRUCTION/LOWER URINARY TRACT SYMPTOMS: Primary | ICD-10-CM

## 2024-07-18 RX ORDER — TAMSULOSIN HYDROCHLORIDE 0.4 MG/1
0.4 CAPSULE ORAL 2 TIMES DAILY
Qty: 180 CAPSULE | Refills: 1 | Status: SHIPPED | OUTPATIENT
Start: 2024-07-18

## 2024-07-18 RX ORDER — OXYBUTYNIN CHLORIDE 10 MG/1
10 TABLET, EXTENDED RELEASE ORAL DAILY
Qty: 90 TABLET | Refills: 1 | Status: SHIPPED | OUTPATIENT
Start: 2024-07-18

## 2024-07-18 ASSESSMENT — ENCOUNTER SYMPTOMS
BACK PAIN: 0
ABDOMINAL PAIN: 0
COUGH: 0
COLOR CHANGE: 0
SHORTNESS OF BREATH: 0
VOMITING: 0
CONSTIPATION: 0
WHEEZING: 0
EYE REDNESS: 0
NAUSEA: 0

## 2024-07-18 NOTE — PROGRESS NOTES
Random bladderscan performed in office today:  Pt last voided 30 mins ago, scan = 27 mL   
nocturia   Musculoskeletal:  Negative for back pain, joint swelling and myalgias.   Skin:  Negative for color change, rash and wound.   Neurological:  Negative for dizziness, tremors and numbness.   Hematological:  Negative for adenopathy. Does not bruise/bleed easily.       /66   Ht 1.854 m (6' 1\")   Wt (!) 141.5 kg (312 lb)   BMI 41.16 kg/m²       PHYSICAL EXAM:  Constitutional: Patient in no acute distress;   Neuro: alert and oriented to person place and time.    Psych: Mood and affect normal.  Lungs: Respiratory effort normal  Abdomen: Soft, non-tender, non-distended      Lab Results   Component Value Date    BUN 12 02/03/2024     Lab Results   Component Value Date    CREATININE 1.0 02/03/2024     Lab Results   Component Value Date    PSA 0.35 12/17/2018       ASSESSMENT:   Diagnosis Orders   1. BPH with obstruction/lower urinary tract symptoms  HI JESSICA POST-VOIDING RESIDUAL URINE&/BLADDER CAP    tamsulosin (FLOMAX) 0.4 MG capsule      2. OAB (overactive bladder)  HI JESSICA POST-VOIDING RESIDUAL URINE&/BLADDER CAP            PLAN:  Offered to change him to a different anticholinergic, at this time he wishes to continue his current medications    Offered cystoscopy to direct visualize lower neck tract as he was a candidate for surgery.  He does not wish to proceed with this    Continue Ditropan 10 mg XL    Continue Flomax twice a day    Stop liquids 2 hours prior to bedtime    Avoid bladder irritants    Follow-up in 3 to 4 months for reevaluation, if patient decides that he wants to change medications he can call our office.  If he decides he wants a cystoscopy and call our office.

## 2024-07-29 DIAGNOSIS — E03.9 ACQUIRED HYPOTHYROIDISM: ICD-10-CM

## 2024-07-29 RX ORDER — LEVOTHYROXINE SODIUM 0.2 MG/1
TABLET ORAL
Qty: 90 TABLET | Refills: 0 | Status: SHIPPED | OUTPATIENT
Start: 2024-07-29

## 2024-08-05 DIAGNOSIS — E11.9 TYPE 2 DIABETES MELLITUS WITHOUT COMPLICATION, WITHOUT LONG-TERM CURRENT USE OF INSULIN (HCC): ICD-10-CM

## 2024-08-07 DIAGNOSIS — E78.2 MIXED HYPERLIPIDEMIA: ICD-10-CM

## 2024-08-07 RX ORDER — FENOFIBRATE 145 MG/1
TABLET, COATED ORAL
Qty: 30 TABLET | Refills: 3 | Status: SHIPPED | OUTPATIENT
Start: 2024-08-07

## 2024-08-07 NOTE — TELEPHONE ENCOUNTER
Health Maintenance   Topic Date Due    DTaP/Tdap/Td vaccine (1 - Tdap) Never done    Shingles vaccine (1 of 2) Never done    Respiratory Syncytial Virus (RSV) Pregnant or age 60 yrs+ (1 - 1-dose 60+ series) Never done    Diabetic Alb to Cr ratio (uACR) test  08/18/2017    Diabetic retinal exam  10/29/2020    COVID-19 Vaccine (6 - 2023-24 season) 09/01/2023    Diabetic foot exam  06/05/2024    Lung Cancer Screening &/or Counseling  06/22/2024    Flu vaccine (1) 08/01/2024    Pneumococcal 65+ years Vaccine (3 of 3 - PPSV23 or PCV20) 10/08/2024    Lipids  02/03/2025    GFR test (Diabetes, CKD 3-4, OR last GFR 15-59)  02/03/2025    A1C test (Diabetic or Prediabetic)  06/06/2025    Depression Monitoring  06/06/2025    Colorectal Cancer Screen  10/09/2033    Annual Wellness Visit (Medicare Advantage)  Completed    AAA screen  Completed    Hepatitis C screen  Completed    Hepatitis A vaccine  Aged Out    Hepatitis B vaccine  Aged Out    Hib vaccine  Aged Out    Polio vaccine  Aged Out    Meningococcal (ACWY) vaccine  Aged Out    Pneumococcal 0-64 years Vaccine  Discontinued    HIV screen  Discontinued    Prostate Specific Antigen (PSA) Screening or Monitoring  Discontinued             (applicable per patient's age: Cancer Screenings, Depression Screening, Fall Risk Screening, Immunizations)    Hemoglobin A1C (%)   Date Value   06/06/2024 6.6   02/09/2024 7.3   11/03/2023 8.2     AST (U/L)   Date Value   02/03/2024 32     ALT (U/L)   Date Value   02/03/2024 25     BUN (mg/dL)   Date Value   02/03/2024 12      (goal A1C is < 7)   (goal LDL is <100) need 30-50% reduction from baseline     BP Readings from Last 3 Encounters:   07/18/24 114/66   06/13/24 104/66   06/06/24 109/67    (goal /80)      All Future Testing planned in [facility]:  Lab Frequency Next Occurrence   FACET JOINT L/S SINGLE Once 10/12/2023   RADIOFREQUENCY L/S ADDITIONAL Once 10/12/2023   Comprehensive Metabolic Panel Once 01/05/2025   CBC with Auto

## 2024-08-19 DIAGNOSIS — I10 ESSENTIAL HYPERTENSION: ICD-10-CM

## 2024-08-19 RX ORDER — AMLODIPINE BESYLATE 2.5 MG/1
TABLET ORAL
Qty: 90 TABLET | Refills: 1 | Status: SHIPPED | OUTPATIENT
Start: 2024-08-19

## 2024-08-19 NOTE — TELEPHONE ENCOUNTER
Differential Once 01/05/2025   Vitamin D 25 Hydroxy Once 01/05/2025   Lipid Panel Once 01/05/2025   TSH with Reflex Once 01/05/2025   Magnesium Once 01/05/2025   EKG 12 Lead Once 01/05/2025   XR CHEST (2 VW) Once 01/05/2025   CT Lung Screen (Initial/Annual/Baseline) Once 06/06/2024       Next Visit Date:  Future Appointments   Date Time Provider Department Center   11/21/2024 11:00 AM Danny Sanchez PA-C [facility]   12/6/2024  9:00 AM Uche Hawkins MD [facility]   2/3/2025 10:30 AM Rios Whalen MD [facility]   6/10/2025 10:30 AM Uche Hawkins MD [facility]            Patient Active Problem List:     COPD (chronic obstructive pulmonary disease) ([facility])     GERD (gastroesophageal reflux disease)     S/P CABG x 3     CAD (coronary artery disease)     Essential hypertension     Mixed hyperlipidemia     Type 2 diabetes mellitus without complication, without long-term current use of insulin ([facility])     Dysthymia (or depressive neurosis)     Chronic gout involving toe     Acquired hypothyroidism     Vitamin D deficiency disease     Chronic low back pain with left-sided sciatica     Class 3 severe obesity due to excess calories without serious comorbidity with body mass index (BMI) of 40.0 to 44.9 in adult ([facility])     MINAL treated with BiPAP

## 2024-08-30 ENCOUNTER — OFFICE VISIT (OUTPATIENT)
Dept: FAMILY MEDICINE CLINIC | Age: 69
End: 2024-08-30
Payer: COMMERCIAL

## 2024-08-30 VITALS
OXYGEN SATURATION: 93 % | SYSTOLIC BLOOD PRESSURE: 112 MMHG | HEART RATE: 83 BPM | WEIGHT: 313.4 LBS | DIASTOLIC BLOOD PRESSURE: 72 MMHG | RESPIRATION RATE: 18 BRPM | BODY MASS INDEX: 41.54 KG/M2 | HEIGHT: 73 IN

## 2024-08-30 DIAGNOSIS — J44.1 COPD EXACERBATION (HCC): Primary | ICD-10-CM

## 2024-08-30 PROCEDURE — 99214 OFFICE O/P EST MOD 30 MIN: CPT | Performed by: INTERNAL MEDICINE

## 2024-08-30 PROCEDURE — 1123F ACP DISCUSS/DSCN MKR DOCD: CPT | Performed by: INTERNAL MEDICINE

## 2024-08-30 PROCEDURE — 3078F DIAST BP <80 MM HG: CPT | Performed by: INTERNAL MEDICINE

## 2024-08-30 PROCEDURE — 3074F SYST BP LT 130 MM HG: CPT | Performed by: INTERNAL MEDICINE

## 2024-08-30 RX ORDER — AZITHROMYCIN 250 MG/1
TABLET, FILM COATED ORAL
Qty: 1 PACKET | Refills: 0 | Status: SHIPPED | OUTPATIENT
Start: 2024-08-30

## 2024-08-30 RX ORDER — PREDNISONE 20 MG/1
TABLET ORAL
Qty: 15 TABLET | Refills: 0 | Status: SHIPPED | OUTPATIENT
Start: 2024-08-30

## 2024-08-30 RX ORDER — FLUTICASONE PROPIONATE AND SALMETEROL 250; 50 UG/1; UG/1
1 POWDER RESPIRATORY (INHALATION) EVERY 12 HOURS
Qty: 60 EACH | Refills: 3 | Status: SHIPPED | OUTPATIENT
Start: 2024-08-30

## 2024-08-30 ASSESSMENT — ENCOUNTER SYMPTOMS
VOMITING: 0
SINUS PAIN: 0
ABDOMINAL PAIN: 0
RHINORRHEA: 1
VOICE CHANGE: 1
BLOOD IN STOOL: 0
CHEST TIGHTNESS: 1
ALLERGIC/IMMUNOLOGIC NEGATIVE: 1
SINUS PRESSURE: 0
SORE THROAT: 0
COUGH: 1
CHANGE IN BOWEL HABIT: 0
WHEEZING: 1
CONSTIPATION: 0
SHORTNESS OF BREATH: 1
NAUSEA: 0

## 2024-08-30 NOTE — PROGRESS NOTES
change in bowel habit, constipation, nausea and vomiting.   Endocrine: Negative for cold intolerance, heat intolerance, polydipsia and polyuria.   Genitourinary:  Negative for difficulty urinating and dysuria.   Musculoskeletal: Negative.  Negative for arthralgias and neck pain.   Skin:  Negative for rash.   Allergic/Immunologic: Negative.    Neurological:  Positive for numbness. Negative for weakness and headaches.   Psychiatric/Behavioral:  Negative for behavioral problems and dysphoric mood. The patient is not nervous/anxious.          Physical Exam:     Vitals:  /72 (Site: Right Upper Arm, Position: Sitting, Cuff Size: Large Adult)   Pulse 83   Resp 18   Ht 1.854 m (6' 1\")   Wt (!) 142.2 kg (313 lb 6.4 oz)   SpO2 93%   BMI 41.35 kg/m²       Physical Exam  Vitals reviewed.   Constitutional:       General: He is not in acute distress.     Appearance: He is well-developed. He is obese. He is not ill-appearing.   HENT:      Head: Normocephalic and atraumatic.   Neck:      Thyroid: No thyromegaly.   Cardiovascular:      Rate and Rhythm: Normal rate and regular rhythm.      Heart sounds: Normal heart sounds. No murmur heard.  Pulmonary:      Effort: Pulmonary effort is normal.      Breath sounds: Wheezing present. No rhonchi or rales.   Abdominal:      General: Bowel sounds are normal. There is no distension.      Palpations: Abdomen is soft. There is no mass.      Tenderness: There is no abdominal tenderness.   Musculoskeletal:         General: Normal range of motion.      Right lower leg: No edema.      Left lower leg: No edema.   Lymphadenopathy:      Cervical: No cervical adenopathy.   Skin:     General: Skin is warm and dry.      Coloration: Skin is not jaundiced or pale.      Findings: No rash.   Neurological:      General: No focal deficit present.      Mental Status: He is alert and oriented to person, place, and time. Mental status is at baseline.   Psychiatric:         Mood and Affect: Mood  normal.         Behavior: Behavior normal.         Thought Content: Thought content normal.         Judgment: Judgment normal.               Data:     Lab Results   Component Value Date/Time     02/03/2024 10:07 AM    K 4.2 02/03/2024 10:07 AM     02/03/2024 10:07 AM    CO2 22 02/03/2024 10:07 AM    BUN 12 02/03/2024 10:07 AM    CREATININE 1.0 02/03/2024 10:07 AM    GLUCOSE 150 02/03/2024 10:07 AM    GLUCOSE 111 10/14/2011 08:30 AM    BILITOT 0.5 02/03/2024 10:07 AM    ALKPHOS 50 02/03/2024 10:07 AM    AST 32 02/03/2024 10:07 AM    ALT 25 02/03/2024 10:07 AM     Lab Results   Component Value Date/Time    WBC 7.6 02/03/2024 10:07 AM    RBC 5.26 02/03/2024 10:07 AM    HGB 16.1 02/03/2024 10:07 AM    HCT 50.8 02/03/2024 10:07 AM    MCV 96.6 02/03/2024 10:07 AM    MCH 30.6 02/03/2024 10:07 AM    MCHC 31.7 02/03/2024 10:07 AM    RDW 13.4 02/03/2024 10:07 AM     02/03/2024 10:07 AM    MPV 10.2 02/03/2024 10:07 AM     Lab Results   Component Value Date/Time    TSH 1.89 02/03/2024 10:07 AM     Lab Results   Component Value Date/Time    CHOL 144 02/03/2024 10:07 AM    LDL 84 02/03/2024 10:07 AM    HDL 33 02/03/2024 10:07 AM    PSA 0.35 12/17/2018 10:11 AM    LABA1C 6.6 06/06/2024 10:15 AM          Assessment & Plan        Diagnosis Orders   1. COPD exacerbation (HCC)   Prescribed steroid course, Zithromax, Capmist. Advised to continue using nebulizer treatments 4-6 times per day.   Also prescribed Advair , steroid inhaler.   Follow up as needed if no improvement  azithromycin (ZITHROMAX Z-SALVATORE) 250 MG tablet    predniSONE (DELTASONE) 20 MG tablet    Pseudoephedrine-DM-GG 60- MG TABS    fluticasone-salmeterol (ADVAIR) 250-50 MCG/ACT AEPB diskus inhaler                      Completed Refills   Requested Prescriptions     Signed Prescriptions Disp Refills    azithromycin (ZITHROMAX Z-SALVATORE) 250 MG tablet 1 packet 0     Sig: Take 1 z-salvatore as directed.    predniSONE (DELTASONE) 20 MG tablet 15 tablet 0

## 2024-09-06 ENCOUNTER — TELEPHONE (OUTPATIENT)
Dept: FAMILY MEDICINE CLINIC | Age: 69
End: 2024-09-06

## 2024-09-06 RX ORDER — CIPROFLOXACIN 500 MG/1
500 TABLET, FILM COATED ORAL 2 TIMES DAILY
Qty: 10 TABLET | Refills: 0 | Status: SHIPPED | OUTPATIENT
Start: 2024-09-06 | End: 2024-09-11

## 2024-09-06 RX ORDER — PREDNISONE 20 MG/1
40 TABLET ORAL DAILY
Qty: 14 TABLET | Refills: 0 | Status: SHIPPED | OUTPATIENT
Start: 2024-09-06 | End: 2024-09-13

## 2024-09-17 ENCOUNTER — TELEPHONE (OUTPATIENT)
Dept: CARDIOLOGY CLINIC | Age: 69
End: 2024-09-17

## 2024-10-21 DIAGNOSIS — E03.9 ACQUIRED HYPOTHYROIDISM: ICD-10-CM

## 2024-10-21 DIAGNOSIS — I10 ESSENTIAL HYPERTENSION: ICD-10-CM

## 2024-10-21 RX ORDER — METOPROLOL SUCCINATE 50 MG/1
TABLET, EXTENDED RELEASE ORAL
Qty: 90 TABLET | Refills: 3 | Status: SHIPPED | OUTPATIENT
Start: 2024-10-21

## 2024-10-21 RX ORDER — GLIPIZIDE 10 MG/1
10 TABLET ORAL 2 TIMES DAILY
Qty: 60 TABLET | Refills: 3 | Status: SHIPPED | OUTPATIENT
Start: 2024-10-21

## 2024-10-21 RX ORDER — LEVOTHYROXINE SODIUM 200 UG/1
TABLET ORAL
Qty: 90 TABLET | Refills: 0 | Status: SHIPPED | OUTPATIENT
Start: 2024-10-21

## 2024-10-21 NOTE — TELEPHONE ENCOUNTER
Health Maintenance   Topic Date Due    DTaP/Tdap/Td vaccine (1 - Tdap) Never done    Shingles vaccine (1 of 2) Never done    Respiratory Syncytial Virus (RSV) Pregnant or age 60 yrs+ (1 - 1-dose 60+ series) Never done    Diabetic Alb to Cr ratio (uACR) test  08/18/2017    Diabetic retinal exam  10/29/2020    Diabetic foot exam  06/05/2024    Lung Cancer Screening &/or Counseling  06/22/2024    Flu vaccine (1) 08/01/2024    COVID-19 Vaccine (6 - 2023-24 season) 09/01/2024    Pneumococcal 65+ years Vaccine (3 of 3 - PPSV23 or PCV20) 10/08/2024    Lipids  02/03/2025    GFR test (Diabetes, CKD 3-4, OR last GFR 15-59)  02/03/2025    A1C test (Diabetic or Prediabetic)  06/06/2025    Depression Monitoring  06/06/2025    Colorectal Cancer Screen  10/09/2033    Annual Wellness Visit (Medicare Advantage)  Completed    AAA screen  Completed    Hepatitis C screen  Completed    Hepatitis A vaccine  Aged Out    Hepatitis B vaccine  Aged Out    Hib vaccine  Aged Out    Polio vaccine  Aged Out    Meningococcal (ACWY) vaccine  Aged Out    Pneumococcal 0-64 years Vaccine  Discontinued    HIV screen  Discontinued    Prostate Specific Antigen (PSA) Screening or Monitoring  Discontinued             (applicable per patient's age: Cancer Screenings, Depression Screening, Fall Risk Screening, Immunizations)    Hemoglobin A1C (%)   Date Value   06/06/2024 6.6   02/09/2024 7.3   11/03/2023 8.2     AST (U/L)   Date Value   02/03/2024 32     ALT (U/L)   Date Value   02/03/2024 25     BUN (mg/dL)   Date Value   02/03/2024 12      (goal A1C is < 7)   (goal LDL is <100) need 30-50% reduction from baseline     BP Readings from Last 3 Encounters:   08/30/24 112/72   07/18/24 114/66   06/13/24 104/66    (goal /80)      All Future Testing planned in [facility]:  Lab Frequency Next Occurrence   Comprehensive Metabolic Panel Once 01/05/2025   CBC with Auto Differential Once 01/05/2025   Vitamin D 25 Hydroxy Once 01/05/2025   Lipid Panel Once

## 2024-10-24 ENCOUNTER — TELEPHONE (OUTPATIENT)
Dept: PAIN MANAGEMENT | Age: 69
End: 2024-10-24

## 2024-10-24 ENCOUNTER — OFFICE VISIT (OUTPATIENT)
Dept: PAIN MANAGEMENT | Age: 69
End: 2024-10-24

## 2024-10-24 VITALS
HEART RATE: 59 BPM | BODY MASS INDEX: 41.01 KG/M2 | SYSTOLIC BLOOD PRESSURE: 110 MMHG | DIASTOLIC BLOOD PRESSURE: 62 MMHG | OXYGEN SATURATION: 93 % | WEIGHT: 310.8 LBS

## 2024-10-24 DIAGNOSIS — M47.817 LUMBOSACRAL SPONDYLOSIS WITHOUT MYELOPATHY: Primary | ICD-10-CM

## 2024-10-24 DIAGNOSIS — M51.369 DEGENERATION OF INTERVERTEBRAL DISC OF LUMBAR REGION, UNSPECIFIED WHETHER PAIN PRESENT: ICD-10-CM

## 2024-10-24 DIAGNOSIS — E66.01 OBESITY, CLASS III, BMI 40-49.9 (MORBID OBESITY): ICD-10-CM

## 2024-10-24 NOTE — H&P (VIEW-ONLY)
Chronic Pain Clinic Note     Encounter Date: 10/24/2024     SUBJECTIVE:  Chief Complaint   Patient presents with    Back Pain     History of Present Illness:   Justice Torres is a 69 y.o. male who presents for follow up/ treatment plan.    Medication Refill: N/A    Current Complaints of Pain:   Location: Low back  Radiation: None  Severity: moderate   Pain Numerical Score - 7  Average: 7   Highest: 10  Lowest: 5  Character/Quality: Complains of pain that is dull, achy, hurting   Timing: Constant  Associated symptoms: none  Numbness: no  Weakness: no  Exacerbating factors: laying on his back, deep breaths   Alleviating factors: Sitting up / limited activity   Length of time pain has been present: Started on chronic, years ago   Inciting event/injury: Fracture after a fall at home   Bowel/Bladder incontinence: no  Falls: none recent   Physical Therapy: yes    History of Interventions:   Surgery: No previous lumbar/cervical surgeries  Injections: MBB's, Lumbar RFA    Imaging:    Thoracic and lumbar MRI 2/2/2023    FINDINGS:    Normal segmentation. Thoracic vertebral body heights are maintained with normal    alignment. Unchanged old compression deformity of L1 without retropulsion.    Slight degenerative reactive marrow endplate changes from T5 to T7. Thoracic    spinal cord is normal. No acute prevertebral or paraspinal soft tissue    abnormalities.       No acute abnormality in the visualized chest or abdomen. Similar right renal    cyst is better visualized on CT 1/25/2019.       Mild multilevel spondylotic changes include varying degrees of disc    desiccation, intervertebral disc height loss, osteophytic ridging, and    facet/ligamentum flavum hypertrophy. These are more pronounced in the lumbar    spine. The lumbar pedicles are congenitally shortened with slight prominent    dorsal epidural lipomatosis In the thoracic spine, no disc bulge or herniation.    No high-grade spinal canal or foraminal narrowing.      modification all within the last 6 weeks over 3 months. The patient's pain has been causing worsening quality of life and function.    PLAN:  Medications:    For nonopioid therapy, the following medications were prescribed:    -Continue medication prescribed by primary care physician    Opioid therapy:  -Not indicated    Interventions:  -Plan for bilateral lumbar L3, L4, L5 medial branch radiofrequency ablation    Imaging:  -No new imaging    Behavioral Therapies:  -Continue daily stretching and home exercise program    Referrals:  -None    Follow-up Plan:  -After procedure    Patient was offered intervention where appropriate.     Multi-modal Pain Therapy:  The patient was explicitly considered for multimodal and interdisciplinary therapy. Non-opioid and non-pharmacological opportunities to enhance analgesia and quality of life have been and will continue to be pursued.    Kedar Ruiz,   Interventional Pain Management/PM&R   [facility] [place]    Orders Placed This Encounter    FACET JOINT L/S SINGLE     Standing Status:   Future     Standing Expiration Date:   10/24/2025     Scheduling Instructions:      Bilateral lumbar L3, L4, L5 RFA    RADIOFREQUENCY L/S ADDITIONAL     Standing Status:   Future     Standing Expiration Date:   10/24/2025

## 2024-10-24 NOTE — PROGRESS NOTES
Chronic Pain Clinic Note     Encounter Date: 10/24/2024     SUBJECTIVE:  Chief Complaint   Patient presents with    Back Pain     History of Present Illness:   Justice Torres is a 69 y.o. male who presents for follow up/ treatment plan.    Medication Refill: N/A    Current Complaints of Pain:   Location: Low back  Radiation: None  Severity: moderate   Pain Numerical Score - 7  Average: 7   Highest: 10  Lowest: 5  Character/Quality: Complains of pain that is dull, achy, hurting   Timing: Constant  Associated symptoms: none  Numbness: no  Weakness: no  Exacerbating factors: laying on his back, deep breaths   Alleviating factors: Sitting up / limited activity   Length of time pain has been present: Started on chronic, years ago   Inciting event/injury: Fracture after a fall at home   Bowel/Bladder incontinence: no  Falls: none recent   Physical Therapy: yes    History of Interventions:   Surgery: No previous lumbar/cervical surgeries  Injections: MBB's, Lumbar RFA    Imaging:    Thoracic and lumbar MRI 2/2/2023    FINDINGS:    Normal segmentation. Thoracic vertebral body heights are maintained with normal    alignment. Unchanged old compression deformity of L1 without retropulsion.    Slight degenerative reactive marrow endplate changes from T5 to T7. Thoracic    spinal cord is normal. No acute prevertebral or paraspinal soft tissue    abnormalities.       No acute abnormality in the visualized chest or abdomen. Similar right renal    cyst is better visualized on CT 1/25/2019.       Mild multilevel spondylotic changes include varying degrees of disc    desiccation, intervertebral disc height loss, osteophytic ridging, and    facet/ligamentum flavum hypertrophy. These are more pronounced in the lumbar    spine. The lumbar pedicles are congenitally shortened with slight prominent    dorsal epidural lipomatosis In the thoracic spine, no disc bulge or herniation.    No high-grade spinal canal or foraminal narrowing.

## 2024-11-06 ENCOUNTER — HOSPITAL ENCOUNTER (EMERGENCY)
Age: 69
Discharge: HOME | End: 2024-11-07
Attending: EMERGENCY MEDICINE
Payer: MEDICARE

## 2024-11-06 ENCOUNTER — APPOINTMENT (OUTPATIENT)
Dept: GENERAL RADIOLOGY | Age: 69
End: 2024-11-06
Payer: MEDICARE

## 2024-11-06 ENCOUNTER — TELEPHONE (OUTPATIENT)
Dept: FAMILY MEDICINE CLINIC | Age: 69
End: 2024-11-06

## 2024-11-06 VITALS
HEIGHT: 73 IN | OXYGEN SATURATION: 98 % | BODY MASS INDEX: 41.22 KG/M2 | HEART RATE: 63 BPM | DIASTOLIC BLOOD PRESSURE: 65 MMHG | TEMPERATURE: 98 F | RESPIRATION RATE: 18 BRPM | SYSTOLIC BLOOD PRESSURE: 158 MMHG | WEIGHT: 311 LBS

## 2024-11-06 DIAGNOSIS — S20.211A RIB CONTUSION, RIGHT, INITIAL ENCOUNTER: Primary | ICD-10-CM

## 2024-11-06 PROCEDURE — 71101 X-RAY EXAM UNILAT RIBS/CHEST: CPT

## 2024-11-06 PROCEDURE — 99283 EMERGENCY DEPT VISIT LOW MDM: CPT

## 2024-11-06 ASSESSMENT — LIFESTYLE VARIABLES
HOW MANY STANDARD DRINKS CONTAINING ALCOHOL DO YOU HAVE ON A TYPICAL DAY: 1 OR 2
HOW MANY STANDARD DRINKS CONTAINING ALCOHOL DO YOU HAVE ON A TYPICAL DAY: 1 OR 2
HOW OFTEN DO YOU HAVE A DRINK CONTAINING ALCOHOL: MONTHLY OR LESS

## 2024-11-06 ASSESSMENT — PAIN SCALES - GENERAL: PAINLEVEL_OUTOF10: 7

## 2024-11-06 ASSESSMENT — PAIN - FUNCTIONAL ASSESSMENT: PAIN_FUNCTIONAL_ASSESSMENT: 0-10

## 2024-11-06 ASSESSMENT — PAIN DESCRIPTION - LOCATION: LOCATION: BACK;CHEST;RIB CAGE

## 2024-11-06 ASSESSMENT — PAIN DESCRIPTION - ORIENTATION: ORIENTATION: RIGHT

## 2024-11-06 NOTE — TELEPHONE ENCOUNTER
Patient will need metformin ordered until the beginning of the year as his insurance will not cover Jardiance, patient has loose stools on metformin prior but is hoping this temp will be ok.  Please advise

## 2024-11-07 ENCOUNTER — APPOINTMENT (OUTPATIENT)
Dept: GENERAL RADIOLOGY | Age: 69
End: 2024-11-07
Attending: STUDENT IN AN ORGANIZED HEALTH CARE EDUCATION/TRAINING PROGRAM
Payer: MEDICARE

## 2024-11-07 ENCOUNTER — HOSPITAL ENCOUNTER (OUTPATIENT)
Age: 69
Setting detail: OUTPATIENT SURGERY
Discharge: HOME | End: 2024-11-07
Attending: STUDENT IN AN ORGANIZED HEALTH CARE EDUCATION/TRAINING PROGRAM | Admitting: STUDENT IN AN ORGANIZED HEALTH CARE EDUCATION/TRAINING PROGRAM
Payer: MEDICARE

## 2024-11-07 VITALS
RESPIRATION RATE: 13 BRPM | OXYGEN SATURATION: 94 % | DIASTOLIC BLOOD PRESSURE: 70 MMHG | SYSTOLIC BLOOD PRESSURE: 114 MMHG | TEMPERATURE: 97 F | HEART RATE: 59 BPM

## 2024-11-07 LAB — GLUCOSE BLD-MCNC: 160 MG/DL (ref 65–99)

## 2024-11-07 PROCEDURE — 3600000059 HC PAIN LEVEL 5 ADDL 15 MIN: Performed by: STUDENT IN AN ORGANIZED HEALTH CARE EDUCATION/TRAINING PROGRAM

## 2024-11-07 PROCEDURE — 3600000058 HC PAIN LEVEL 5 BASE: Performed by: STUDENT IN AN ORGANIZED HEALTH CARE EDUCATION/TRAINING PROGRAM

## 2024-11-07 PROCEDURE — 2709999900 HC NON-CHARGEABLE SUPPLY: Performed by: STUDENT IN AN ORGANIZED HEALTH CARE EDUCATION/TRAINING PROGRAM

## 2024-11-07 PROCEDURE — 7100000010 HC PHASE II RECOVERY - FIRST 15 MIN: Performed by: STUDENT IN AN ORGANIZED HEALTH CARE EDUCATION/TRAINING PROGRAM

## 2024-11-07 PROCEDURE — 64635 DESTROY LUMB/SAC FACET JNT: CPT | Performed by: STUDENT IN AN ORGANIZED HEALTH CARE EDUCATION/TRAINING PROGRAM

## 2024-11-07 PROCEDURE — 6370000000 HC RX 637 (ALT 250 FOR IP): Performed by: EMERGENCY MEDICINE

## 2024-11-07 PROCEDURE — 6360000002 HC RX W HCPCS: Performed by: STUDENT IN AN ORGANIZED HEALTH CARE EDUCATION/TRAINING PROGRAM

## 2024-11-07 PROCEDURE — 2500000003 HC RX 250 WO HCPCS: Performed by: STUDENT IN AN ORGANIZED HEALTH CARE EDUCATION/TRAINING PROGRAM

## 2024-11-07 PROCEDURE — 7100000011 HC PHASE II RECOVERY - ADDTL 15 MIN: Performed by: STUDENT IN AN ORGANIZED HEALTH CARE EDUCATION/TRAINING PROGRAM

## 2024-11-07 PROCEDURE — 99152 MOD SED SAME PHYS/QHP 5/>YRS: CPT | Performed by: STUDENT IN AN ORGANIZED HEALTH CARE EDUCATION/TRAINING PROGRAM

## 2024-11-07 PROCEDURE — 82947 ASSAY GLUCOSE BLOOD QUANT: CPT

## 2024-11-07 PROCEDURE — 64636 DESTROY L/S FACET JNT ADDL: CPT | Performed by: STUDENT IN AN ORGANIZED HEALTH CARE EDUCATION/TRAINING PROGRAM

## 2024-11-07 RX ORDER — TRIAMCINOLONE ACETONIDE 40 MG/ML
INJECTION, SUSPENSION INTRA-ARTICULAR; INTRAMUSCULAR PRN
Status: DISCONTINUED | OUTPATIENT
Start: 2024-11-07 | End: 2024-11-07 | Stop reason: ALTCHOICE

## 2024-11-07 RX ORDER — MIDAZOLAM HYDROCHLORIDE 1 MG/ML
INJECTION, SOLUTION INTRAMUSCULAR; INTRAVENOUS PRN
Status: DISCONTINUED | OUTPATIENT
Start: 2024-11-07 | End: 2024-11-07 | Stop reason: ALTCHOICE

## 2024-11-07 RX ORDER — LIDOCAINE HYDROCHLORIDE 20 MG/ML
INJECTION, SOLUTION EPIDURAL; INFILTRATION; INTRACAUDAL; PERINEURAL PRN
Status: DISCONTINUED | OUTPATIENT
Start: 2024-11-07 | End: 2024-11-07 | Stop reason: ALTCHOICE

## 2024-11-07 RX ORDER — HYDROCODONE BITARTRATE AND ACETAMINOPHEN 5; 325 MG/1; MG/1
2 TABLET ORAL ONCE
Status: COMPLETED | OUTPATIENT
Start: 2024-11-07 | End: 2024-11-07

## 2024-11-07 RX ORDER — LIDOCAINE HYDROCHLORIDE 10 MG/ML
INJECTION, SOLUTION EPIDURAL; INFILTRATION; INTRACAUDAL; PERINEURAL PRN
Status: DISCONTINUED | OUTPATIENT
Start: 2024-11-07 | End: 2024-11-07 | Stop reason: ALTCHOICE

## 2024-11-07 RX ADMIN — HYDROCODONE BITARTRATE AND ACETAMINOPHEN 2 TABLET: 5; 325 TABLET ORAL at 00:23

## 2024-11-07 ASSESSMENT — PAIN DESCRIPTION - LOCATION
LOCATION: RIB CAGE
LOCATION: BACK;LEG

## 2024-11-07 ASSESSMENT — PAIN SCALES - GENERAL
PAINLEVEL_OUTOF10: 0
PAINLEVEL_OUTOF10: 6
PAINLEVEL_OUTOF10: 6

## 2024-11-07 ASSESSMENT — PAIN DESCRIPTION - ORIENTATION
ORIENTATION: RIGHT
ORIENTATION: LOWER;RIGHT;LEFT

## 2024-11-07 NOTE — ED PROVIDER NOTES
lungs 4 times daily 360 mL 1    CPAP Machine MISC by Does not apply route 1 each 0    fluticasone (FLONASE) 50 MCG/ACT nasal spray 2 sprays by Nasal route daily (Patient taking differently: 2 sprays by Nasal route daily prn) 1 Bottle 3    albuterol sulfate  (90 Base) MCG/ACT inhaler Inhale 2 puffs into the lungs every 6 hours as needed for Wheezing 1 Inhaler 5    Nebulizer System All-In-One MISC by Does not apply route      ondansetron (ZOFRAN-ODT) 4 MG disintegrating tablet Take 1 tablet by mouth 3 times daily as needed for Nausea or Vomiting 21 tablet 0    Respiratory Therapy Supplies MISC CPAP full face mask, hose, head gear and  filter 1 each 0    blood glucose monitor strips Test 2 times a day & as needed for symptoms of irregular blood glucose. 100 strip 3    mometasone (ELOCON) 0.1 % cream Apply topically daily. 1 Tube 3    aspirin 81 MG tablet Take 1 tablet by mouth daily         ALLERGIES    Allergies   Allergen Reactions    Penicillins      Pt's daughter confirmed PCN should be added to allergy history       FAMILY HISTORY    Family History   Problem Relation Age of Onset    Diabetes Mother     Heart Disease Mother     Diabetes Father     Heart Disease Father     Heart Disease Brother        SOCIAL HISTORY    Social History     Socioeconomic History    Marital status:    Tobacco Use    Smoking status: Former     Current packs/day: 0.00     Average packs/day: 1 pack/day for 45.0 years (45.0 ttl pk-yrs)     Types: Cigarettes     Start date: 1969     Quit date: 2014     Years since quittin.9    Smokeless tobacco: Never   Vaping Use    Vaping status: Never Used   Substance and Sexual Activity    Alcohol use: No    Drug use: No     Social Determinants of Health     Financial Resource Strain: Low Risk  (2024)    Overall Financial Resource Strain (CARDIA)     Difficulty of Paying Living Expenses: Not hard at all   Food Insecurity: No Food Insecurity (2024)    Hunger Vital Sign      Worried About Running Out of Food in the Last Year: Never true     Ran Out of Food in the Last Year: Never true   Transportation Needs: Unknown (5/9/2024)    PRAPARE - Transportation     Lack of Transportation (Non-Medical): No   Physical Activity: Insufficiently Active (6/6/2024)    Exercise Vital Sign     Days of Exercise per Week: 3 days     Minutes of Exercise per Session: 20 min   Housing Stability: Unknown (5/9/2024)    Housing Stability Vital Sign     Unstable Housing in the Last Year: No           Review of Systems:  Constitutional:  Denies fever, chills, weight loss or weakness   Eyes:  Denies photophobia or discharge   HENT:  Denies sore throat or ear pain   Respiratory:  Denies cough or shortness of breath   Cardiovascular:  Denies chest pain, palpitations or swelling   GI:  Denies abdominal pain, nausea, vomiting, or diarrhea   Musculoskeletal:  Denies back pain   Skin:  Denies rash   Neurologic:  Denies headache, focal weakness or sensory changes   Endocrine:  Denies polyuria or polydypsia   Lymphatic:  Denies swollen glands   Psychiatric:  Denies depression, suicidal ideation or homicidal ideation   All systems negative except as marked.     PHYSICAL EXAM    VITAL SIGNS: BP (!) 158/65   Pulse 63   Temp 98 °F (36.7 °C) (Oral)   Resp 18   Ht 1.854 m (6' 1\")   Wt (!) 141.1 kg (311 lb)   SpO2 98%   BMI 41.03 kg/m²    Constitutional:  Well developed, Well nourished, No acute distress, Non-toxic appearance.   HENT:  Normocephalic, Atraumatic, Bilateral external ears normal, Oropharynx moist, No oral exudates, Nose normal. Neck- Normal range of motion, No tenderness, Supple, No stridor.   Eyes:  PERRL, EOMI, Conjunctiva normal, No discharge.   Respiratory:  Normal breath sounds, No respiratory distress, No wheezing, No chest upper lateral tenderness no deformity.  No crepitus.  Cardiovascular:  Normal heart rate, Normal rhythm, No murmurs, No rubs, No gallops.   GI:  Bowel sounds normal, Soft, No

## 2024-11-07 NOTE — INTERVAL H&P NOTE
Update History & Physical    The patient's History and Physical of October 24, 2024 was reviewed with the patient and I examined the patient. There was no change. The surgical site was confirmed by the patient and me.     Plan: The risks, benefits, expected outcome, and alternative to the recommended procedure have been discussed with the patient. Patient understands and wants to proceed with the procedure.     ASA CLASSIFICATION  2  MP   CLASSIFICATION  2    Electronically signed by Kedar Ruiz DO on 11/7/2024 at 2:21 PM

## 2024-11-07 NOTE — OP NOTE
PROCEDURE PERFORMED: Bilateral Lumbar Medial Branch Radiofrequency Ablation using Fluoroscopy    PREOPERATIVE DIAGNOSIS: Lumbosacral spondylosis    INDICATIONS: Chronic low back pain    The patient's history and physical exam were reviewed.  The risk, benefits, and alternatives of the procedure were discussed and all questions were answered to the patient's satisfaction.  The patient agreed to proceed and written informed consent was obtained.    POSTOPERATIVE DIAGNOSIS: Lumbosacral spondylosis    PHYSICIAN:  Dr. Kedar Ruiz DO    ANESTHESIA:  LOCAL    ASSISTANT:  NONE    PATHOLOGY:  NONE    ESTIMATED BLOOD LOSS:  N/A    IMPLANTS:  NONE    PROCEDURE DESCRIPTION: Bilateral lumbar medial branch radiofrequency ablation using fluoroscopy    The patient was placed on the operative bed in prone position.  The area was prepped with  Chlorhexidine.  The area was then draped in a sterile fashion.    Targeted levels: Bilateral lumbar L3, L4, L5 medial branch radiofrequency ablation    An AP  fluoroscopy image was used to identify and leslie Marie's point at the L3, L4 levels on the targeted side.  Additionally, the junction of the SAP and sacral ala was also marked on the same side.  The skin and subcutaneous tissues were then anesthetized with 1% lidocaine. At each lumbar medial branch, a 20-gauge, 100 mm curved with 10 mm active tip probe was advanced under AP fluoroscopic projections until bone was contacted along the medial aspect of the transverse process.  Aspiration of each needle was negative for blood, CSF and paresthesia prior to injection.   Motor stimulation at 2 Hz and 1.2 V was negative.  Then, after negative aspiration, 1 mL lidocaine 2% was injected at each level prior to lesioning which was performed at 80°C for 90 seconds.  Once the lesion was complete, injectate solution containing Kenalog 40 mg and 2 mL lidocaine 1% was injected at each site with a total of 1 mL.  The probes were then removed.  The

## 2024-11-24 DIAGNOSIS — E78.2 MIXED HYPERLIPIDEMIA: ICD-10-CM

## 2024-11-25 RX ORDER — FENOFIBRATE 145 MG/1
TABLET, COATED ORAL
Qty: 30 TABLET | Refills: 3 | Status: SHIPPED | OUTPATIENT
Start: 2024-11-25

## 2024-12-06 ENCOUNTER — OFFICE VISIT (OUTPATIENT)
Dept: FAMILY MEDICINE CLINIC | Age: 69
End: 2024-12-06

## 2024-12-06 VITALS
SYSTOLIC BLOOD PRESSURE: 112 MMHG | DIASTOLIC BLOOD PRESSURE: 68 MMHG | HEART RATE: 61 BPM | OXYGEN SATURATION: 95 % | WEIGHT: 311 LBS | BODY MASS INDEX: 41.03 KG/M2

## 2024-12-06 DIAGNOSIS — E78.2 MIXED HYPERLIPIDEMIA: ICD-10-CM

## 2024-12-06 DIAGNOSIS — E11.9 TYPE 2 DIABETES MELLITUS WITHOUT COMPLICATION, WITHOUT LONG-TERM CURRENT USE OF INSULIN (HCC): ICD-10-CM

## 2024-12-06 DIAGNOSIS — E03.9 ACQUIRED HYPOTHYROIDISM: ICD-10-CM

## 2024-12-06 DIAGNOSIS — J44.9 CHRONIC OBSTRUCTIVE PULMONARY DISEASE, UNSPECIFIED COPD TYPE (HCC): Primary | ICD-10-CM

## 2024-12-06 DIAGNOSIS — I10 ESSENTIAL HYPERTENSION: ICD-10-CM

## 2024-12-06 ASSESSMENT — ENCOUNTER SYMPTOMS
COUGH: 1
BLURRED VISION: 0
ALLERGIC/IMMUNOLOGIC NEGATIVE: 1
SORE THROAT: 0
WHEEZING: 0
BLOOD IN STOOL: 0
CHEST TIGHTNESS: 0
SHORTNESS OF BREATH: 1
CONSTIPATION: 0
TROUBLE SWALLOWING: 0
ABDOMINAL PAIN: 0
NAUSEA: 0
FREQUENT THROAT CLEARING: 0
DIFFICULTY BREATHING: 0

## 2024-12-06 ASSESSMENT — COPD QUESTIONNAIRES: COPD: 1

## 2024-12-06 NOTE — PROGRESS NOTES
magnesium oxide (MAG-OX) 400 (240 Mg) MG tablet Take 1 tablet by mouth daily 3/15/23  Yes Uche Hawkins MD   albuterol (PROVENTIL) (2.5 MG/3ML) 0.083% nebulizer solution INHALE THE CONTENTS OF 1 VIAL VIA NEBULIZER EVERY 4 HOURS AS NEEDED FOR WHEEZING OR SHORTNESS OF BREATH 1/11/22  Yes Uche Hawkins MD   naproxen (NAPROSYN) 500 MG tablet Take 1 tablet by mouth 2 times daily 12/9/21  Yes Jaime Evans MD   ipratropium-albuterol (DUONEB) 0.5-2.5 (3) MG/3ML SOLN nebulizer solution Inhale 3 mLs into the lungs 4 times daily 4/30/21  Yes Uche Hawkins MD   CPAP Machine MISC by Does not apply route 4/19/21  Yes Uche Hawkins MD   fluticasone (FLONASE) 50 MCG/ACT nasal spray 2 sprays by Nasal route daily  Patient taking differently: 2 sprays by Nasal route daily prn 2/19/21  Yes Uche Hawkins MD   albuterol sulfate  (90 Base) MCG/ACT inhaler Inhale 2 puffs into the lungs every 6 hours as needed for Wheezing 2/19/21  Yes Uche Hawkins MD   Nebulizer System All-In-One MISC by Does not apply route   Yes ProviderTova MD   ondansetron (ZOFRAN-ODT) 4 MG disintegrating tablet Take 1 tablet by mouth 3 times daily as needed for Nausea or Vomiting 11/17/20  Yes Uche Hawkins MD   Respiratory Therapy Supplies MISC CPAP full face mask, hose, head gear and  filter 2/7/20  Yes Uche Hawkins MD   blood glucose monitor strips Test 2 times a day & as needed for symptoms of irregular blood glucose. 11/11/19  Yes Uche Hawkins MD   mometasone (ELOCON) 0.1 % cream Apply topically daily. 2/20/17  Yes Colin Calloway MD   aspirin 81 MG tablet Take 1 tablet by mouth daily   Yes Provider, MD Tova        Allergies:       Penicillins    Social History:     Tobacco: reports that he quit smoking about 10 years ago. His smoking use included cigarettes. He started smoking about 55 years ago. He has a 45 pack-year smoking history. He has never used smokeless tobacco.  Alcohol:      reports no

## 2024-12-06 NOTE — PATIENT INSTRUCTIONS
SURVEY:    You may be receiving a survey from [facility] regarding your visit today.    Please complete the survey to enable us to provide the highest quality of care to you and your family.    If you cannot score us a very good on any question, please call the office to discuss how we could have made your experience a very good one.    Thank you.  [facility]  MD Jazmin Garrett, MD Kedar Ruiz, DO  Anton Gallagher, MD Jaz Finley, APRN-CNP  Mady Ernandez, Practice Manager  Joy, CMA  Judith, CCMA  Parviz, CMA  Radha, CMA  Brenden, PSC   Alia, LPN  Ronda, CMA

## 2024-12-09 DIAGNOSIS — E78.2 MIXED HYPERLIPIDEMIA: ICD-10-CM

## 2024-12-09 RX ORDER — FENOFIBRATE 145 MG/1
TABLET, COATED ORAL
Qty: 30 TABLET | Refills: 3 | Status: SHIPPED | OUTPATIENT
Start: 2024-12-09

## 2024-12-09 NOTE — TELEPHONE ENCOUNTER
Health Maintenance   Topic Date Due    DTaP/Tdap/Td vaccine (1 - Tdap) Never done    Shingles vaccine (1 of 2) Never done    Respiratory Syncytial Virus (RSV) Pregnant or age 60 yrs+ (1 - Risk 60-74 years 1-dose series) Never done    Diabetic Alb to Cr ratio (uACR) test  08/18/2017    Diabetic retinal exam  10/29/2020    Lung Cancer Screening &/or Counseling  06/22/2024    COVID-19 Vaccine (6 - 2023-24 season) 09/01/2024    Pneumococcal 65+ years Vaccine (3 of 3 - PPSV23 or PCV20) 10/08/2024    Lipids  02/03/2025    GFR test (Diabetes, CKD 3-4, OR last GFR 15-59)  02/03/2025    A1C test (Diabetic or Prediabetic)  06/06/2025    Depression Monitoring  06/06/2025    Diabetic foot exam  12/06/2025    Colorectal Cancer Screen  10/09/2033    Annual Wellness Visit (Medicare Advantage)  Completed    Flu vaccine  Completed    AAA screen  Completed    Hepatitis C screen  Completed    Hepatitis A vaccine  Aged Out    Hepatitis B vaccine  Aged Out    Hib vaccine  Aged Out    Polio vaccine  Aged Out    Meningococcal (ACWY) vaccine  Aged Out    Pneumococcal 0-64 years Vaccine  Discontinued    HIV screen  Discontinued    Prostate Specific Antigen (PSA) Screening or Monitoring  Discontinued             (applicable per patient's age: Cancer Screenings, Depression Screening, Fall Risk Screening, Immunizations)    Hemoglobin A1C (%)   Date Value   06/06/2024 6.6   02/09/2024 7.3   11/03/2023 8.2     AST (U/L)   Date Value   02/03/2024 32     ALT (U/L)   Date Value   02/03/2024 25     BUN (mg/dL)   Date Value   02/03/2024 12      (goal A1C is < 7)   (goal LDL is <100) need 30-50% reduction from baseline     BP Readings from Last 3 Encounters:   12/06/24 112/68   11/07/24 114/70   11/06/24 (!) 158/65    (goal /80)      All Future Testing planned in [facility]:  Lab Frequency Next Occurrence   Comprehensive Metabolic Panel Once 01/05/2025   CBC with Auto Differential Once 01/05/2025   Vitamin D 25 Hydroxy Once 01/05/2025   Lipid

## 2025-01-02 ENCOUNTER — OFFICE VISIT (OUTPATIENT)
Dept: UROLOGY | Age: 70
End: 2025-01-02

## 2025-01-02 VITALS
SYSTOLIC BLOOD PRESSURE: 130 MMHG | WEIGHT: 315 LBS | BODY MASS INDEX: 41.75 KG/M2 | DIASTOLIC BLOOD PRESSURE: 78 MMHG | HEIGHT: 73 IN

## 2025-01-02 DIAGNOSIS — N40.1 BPH WITH OBSTRUCTION/LOWER URINARY TRACT SYMPTOMS: Primary | ICD-10-CM

## 2025-01-02 DIAGNOSIS — N13.8 BPH WITH OBSTRUCTION/LOWER URINARY TRACT SYMPTOMS: Primary | ICD-10-CM

## 2025-01-02 DIAGNOSIS — N32.81 OAB (OVERACTIVE BLADDER): ICD-10-CM

## 2025-01-02 RX ORDER — TROSPIUM CHLORIDE ER 60 MG/1
60 CAPSULE ORAL DAILY
Qty: 30 CAPSULE | Refills: 2 | Status: SHIPPED | OUTPATIENT
Start: 2025-01-02

## 2025-01-02 NOTE — PROGRESS NOTES
Random bladderscan performed in office today:  Pt last voided 3 hours ago, scan = 41 mL    
Vomiting 21 tablet 0    Respiratory Therapy Supplies [facility] [place] full face mask, hose, head gear and  filter 1 each 0    blood glucose monitor strips Test 2 times a day & as needed for symptoms of irregular blood glucose. 100 strip 3    mometasone (ELOCON) 0.1 % cream Apply topically daily. 1 Tube 3    aspirin 81 MG tablet Take 1 tablet by mouth daily       No current facility-administered medications on file prior to visit.     Penicillins  Family History   Problem Relation Age of Onset    Diabetes Mother     Heart Disease Mother     Diabetes Father     Heart Disease Father     Heart Disease Brother      Social History     Tobacco Use   Smoking Status Former    Current packs/day: 0.00    Average packs/day: 1 pack/day for 45.0 years (45.0 ttl pk-yrs)    Types: Cigarettes    Start date: 12/1/1969    Quit date: 12/1/2014    Years since quitting: 10.0   Smokeless Tobacco Never       Social History     Substance and Sexual Activity   Alcohol Use No       Review of Systems    /78 (Site: Right Lower Arm, Position: Sitting, Cuff Size: Medium Adult)   Ht 1.854 m (6' 0.99\")   Wt (!) 143.8 kg (317 lb)   BMI 41.83 kg/m²       PHYSICAL EXAM:  Constitutional: Patient in no acute distress;   Neuro: alert and oriented to person place and time.    Psych: Mood and affect normal.  Lungs: Respiratory effort normal      Lab Results   Component Value Date    BUN 12 02/03/2024     Lab Results   Component Value Date    CREATININE 1.0 02/03/2024     Lab Results   Component Value Date    PSA 0.35 12/17/2018       ASSESSMENT:   Diagnosis Orders   1. BPH with obstruction/lower urinary tract symptoms  JESSICA,POST-VOID RES,US,NON-IMAGING      2. OAB (overactive bladder)              PLAN:  Continue Flomax twice a day    Stop liquids 2 hours prior to bedtime    Stop Ditropan    Start long-acting trospium, discussed possible side effects including dry eye, dry mouth, constipation, urinary retention, cognitive issues.    Follow-up in 6 to 8

## 2025-01-06 ENCOUNTER — OFFICE VISIT (OUTPATIENT)
Dept: FAMILY MEDICINE CLINIC | Age: 70
End: 2025-01-06
Payer: MEDICARE

## 2025-01-06 VITALS
DIASTOLIC BLOOD PRESSURE: 72 MMHG | WEIGHT: 315 LBS | OXYGEN SATURATION: 95 % | SYSTOLIC BLOOD PRESSURE: 118 MMHG | RESPIRATION RATE: 18 BRPM | HEIGHT: 72 IN | BODY MASS INDEX: 42.66 KG/M2 | HEART RATE: 64 BPM

## 2025-01-06 DIAGNOSIS — Z00.00 MEDICARE ANNUAL WELLNESS VISIT, SUBSEQUENT: Primary | ICD-10-CM

## 2025-01-06 DIAGNOSIS — E78.2 MIXED HYPERLIPIDEMIA: ICD-10-CM

## 2025-01-06 DIAGNOSIS — E11.9 TYPE 2 DIABETES MELLITUS WITHOUT COMPLICATION, WITHOUT LONG-TERM CURRENT USE OF INSULIN (HCC): ICD-10-CM

## 2025-01-06 DIAGNOSIS — I10 ESSENTIAL HYPERTENSION: ICD-10-CM

## 2025-01-06 DIAGNOSIS — E13.22 OTHER SPECIFIED DIABETES MELLITUS WITH STAGE 1 CHRONIC KIDNEY DISEASE, UNSPECIFIED WHETHER LONG TERM INSULIN USE (HCC): ICD-10-CM

## 2025-01-06 DIAGNOSIS — J44.9 CHRONIC OBSTRUCTIVE PULMONARY DISEASE, UNSPECIFIED COPD TYPE (HCC): ICD-10-CM

## 2025-01-06 DIAGNOSIS — Z87.891 PERSONAL HISTORY OF TOBACCO USE: ICD-10-CM

## 2025-01-06 DIAGNOSIS — E03.9 ACQUIRED HYPOTHYROIDISM: ICD-10-CM

## 2025-01-06 DIAGNOSIS — N18.1 OTHER SPECIFIED DIABETES MELLITUS WITH STAGE 1 CHRONIC KIDNEY DISEASE, UNSPECIFIED WHETHER LONG TERM INSULIN USE (HCC): ICD-10-CM

## 2025-01-06 PROCEDURE — 1159F MED LIST DOCD IN RCRD: CPT | Performed by: INTERNAL MEDICINE

## 2025-01-06 PROCEDURE — G0296 VISIT TO DETERM LDCT ELIG: HCPCS | Performed by: INTERNAL MEDICINE

## 2025-01-06 PROCEDURE — 3078F DIAST BP <80 MM HG: CPT | Performed by: INTERNAL MEDICINE

## 2025-01-06 PROCEDURE — 3074F SYST BP LT 130 MM HG: CPT | Performed by: INTERNAL MEDICINE

## 2025-01-06 PROCEDURE — G0439 PPPS, SUBSEQ VISIT: HCPCS | Performed by: INTERNAL MEDICINE

## 2025-01-06 PROCEDURE — 1123F ACP DISCUSS/DSCN MKR DOCD: CPT | Performed by: INTERNAL MEDICINE

## 2025-01-06 RX ORDER — LOVASTATIN 40 MG/1
40 TABLET ORAL 2 TIMES DAILY
Qty: 180 TABLET | Refills: 3 | Status: SHIPPED | OUTPATIENT
Start: 2025-01-06

## 2025-01-06 RX ORDER — ALBUTEROL SULFATE 90 UG/1
2 INHALANT RESPIRATORY (INHALATION) EVERY 6 HOURS PRN
Qty: 1 EACH | Refills: 5 | Status: SHIPPED | OUTPATIENT
Start: 2025-01-06

## 2025-01-06 RX ORDER — ALBUTEROL SULFATE 0.83 MG/ML
2.5 SOLUTION RESPIRATORY (INHALATION) EVERY 4 HOURS PRN
Qty: 90 ML | Refills: 1 | Status: SHIPPED | OUTPATIENT
Start: 2025-01-06

## 2025-01-06 RX ORDER — LEVOTHYROXINE SODIUM 200 UG/1
TABLET ORAL
Qty: 90 TABLET | Refills: 0 | Status: SHIPPED | OUTPATIENT
Start: 2025-01-06

## 2025-01-06 RX ORDER — AMLODIPINE BESYLATE 2.5 MG/1
TABLET ORAL
Qty: 90 TABLET | Refills: 1 | Status: SHIPPED | OUTPATIENT
Start: 2025-01-06

## 2025-01-06 RX ORDER — LOSARTAN POTASSIUM 25 MG/1
TABLET ORAL
Qty: 90 TABLET | Refills: 3 | Status: SHIPPED | OUTPATIENT
Start: 2025-01-06

## 2025-01-06 ASSESSMENT — PATIENT HEALTH QUESTIONNAIRE - PHQ9
8. MOVING OR SPEAKING SO SLOWLY THAT OTHER PEOPLE COULD HAVE NOTICED. OR THE OPPOSITE, BEING SO FIGETY OR RESTLESS THAT YOU HAVE BEEN MOVING AROUND A LOT MORE THAN USUAL: NOT AT ALL
5. POOR APPETITE OR OVEREATING: NOT AT ALL
SUM OF ALL RESPONSES TO PHQ QUESTIONS 1-9: 0
SUM OF ALL RESPONSES TO PHQ QUESTIONS 1-9: 0
3. TROUBLE FALLING OR STAYING ASLEEP: NOT AT ALL
1. LITTLE INTEREST OR PLEASURE IN DOING THINGS: NOT AT ALL
6. FEELING BAD ABOUT YOURSELF - OR THAT YOU ARE A FAILURE OR HAVE LET YOURSELF OR YOUR FAMILY DOWN: NOT AT ALL
10. IF YOU CHECKED OFF ANY PROBLEMS, HOW DIFFICULT HAVE THESE PROBLEMS MADE IT FOR YOU TO DO YOUR WORK, TAKE CARE OF THINGS AT HOME, OR GET ALONG WITH OTHER PEOPLE: NOT DIFFICULT AT ALL
SUM OF ALL RESPONSES TO PHQ QUESTIONS 1-9: 0
9. THOUGHTS THAT YOU WOULD BE BETTER OFF DEAD, OR OF HURTING YOURSELF: NOT AT ALL
7. TROUBLE CONCENTRATING ON THINGS, SUCH AS READING THE NEWSPAPER OR WATCHING TELEVISION: NOT AT ALL
4. FEELING TIRED OR HAVING LITTLE ENERGY: NOT AT ALL
SUM OF ALL RESPONSES TO PHQ QUESTIONS 1-9: 0
SUM OF ALL RESPONSES TO PHQ9 QUESTIONS 1 & 2: 0
2. FEELING DOWN, DEPRESSED OR HOPELESS: NOT AT ALL

## 2025-01-06 NOTE — PROGRESS NOTES
disintegrating tablet Take 1 tablet by mouth 3 times daily as needed for Nausea or Vomiting Yes Uche Hawkins MD   [facility] full face mask, hose, head gear and  filter Yes Uche Hawkins MD   blood glucose monitor strips Test 2 times a day & as needed for symptoms of irregular blood glucose. Yes Uche Hawkins MD   mometasone (ELOCON) 0.1 % cream Apply topically daily. Yes Colin Calloway MD   aspirin 81 MG tablet Take 1 tablet by mouth daily Yes ProviderTova MD   tamsulosin (FLOMAX) 0.4 MG capsule Take 1 capsule by mouth in the morning and at bedtime  Patient not taking: Reported on 1/6/2025  Danny Sanchez PA-C   fluticasone (FLONASE) 50 MCG/ACT nasal spray 2 sprays by Nasal route daily  Patient not taking: Reported on 1/6/2025  Uche Hawkins MD       [facility] (Including outside providers/suppliers regularly involved in providing care):   Patient Care Team:  Uche Hawkins MD as PCP - General (Hospitalist)  Uche Hawkins MD as PCP - Empaneled Provider     Recommendations for Preventive Services Due: see orders and patient instructions/AVS.  Recommended screening schedule for the next 5-10 years is provided to the patient in written form: see Patient Instructions/AVS.     Reviewed and updated this visit:  Allergies  Meds

## 2025-01-06 NOTE — PATIENT INSTRUCTIONS
SURVEY:    You may be receiving a survey from [facility] regarding your visit today.    Please complete the survey to enable us to provide the highest quality of care to you and your family.    If you cannot score us a very good on any question, please call the office to discuss how we could have made your experience a very good one.    Thank you.  [facility]  MD Jazmin Garrett, MD Kedar Ruiz, DO  Anton Gallagher, MD Jaz Finley, APRN-CNP  Mady Ernandez, Practice Manager  Joy, CMA  Judith, [facility]  Parviz, CMA  Radha, CMA  Brenden, [facility]   Alia, N  Ronda, [facility]          Learning About Dental Care for Older Adults  Dental care for older adults: Overview  Dental care for older people is much the same as for younger adults. But older adults do have concerns that younger adults do not. Older adults may have problems with gum disease and decay on the roots of their teeth. They may need missing teeth replaced or broken fillings fixed. Or they may have dentures that need to be cared for. Some older adults may have trouble holding a toothbrush.  You can help remind the person you are caring for to brush and floss their teeth or to clean their dentures. In some cases, you may need to do the brushing and other dental care tasks. People who have trouble using their hands or who have dementia may need this extra help.  How can you help with dental care?  Normal dental care  To keep the teeth and gums healthy:  Brush the teeth with fluoride toothpaste twice a day--in the morning and at night--and floss at least once a day. Plaque can quickly build up on the teeth of older adults.  Watch for the signs of gum disease. These signs include gums that bleed after brushing or after eating hard foods, such as apples.  See a dentist regularly. Many experts recommend checkups every 6 months.  Keep the dentist up to date on any new medications the person is taking.  Encourage a

## 2025-01-30 ENCOUNTER — HOSPITAL ENCOUNTER (OUTPATIENT)
Age: 70
Discharge: HOME | End: 2025-01-30
Payer: MEDICARE

## 2025-01-30 ENCOUNTER — HOSPITAL ENCOUNTER (OUTPATIENT)
Age: 70
Discharge: HOME | End: 2025-02-01
Payer: MEDICARE

## 2025-01-30 ENCOUNTER — HOSPITAL ENCOUNTER (OUTPATIENT)
Dept: GENERAL RADIOLOGY | Age: 70
Discharge: HOME | End: 2025-02-01
Payer: MEDICARE

## 2025-01-30 DIAGNOSIS — E13.22 OTHER SPECIFIED DIABETES MELLITUS WITH STAGE 1 CHRONIC KIDNEY DISEASE, UNSPECIFIED WHETHER LONG TERM INSULIN USE (HCC): ICD-10-CM

## 2025-01-30 DIAGNOSIS — E78.2 MIXED HYPERLIPIDEMIA: ICD-10-CM

## 2025-01-30 DIAGNOSIS — E55.9 VITAMIN D DEFICIENCY DISEASE: ICD-10-CM

## 2025-01-30 DIAGNOSIS — R06.02 SOB (SHORTNESS OF BREATH): ICD-10-CM

## 2025-01-30 DIAGNOSIS — N18.1 OTHER SPECIFIED DIABETES MELLITUS WITH STAGE 1 CHRONIC KIDNEY DISEASE, UNSPECIFIED WHETHER LONG TERM INSULIN USE (HCC): ICD-10-CM

## 2025-01-30 DIAGNOSIS — I25.10 CORONARY ARTERY DISEASE INVOLVING NATIVE CORONARY ARTERY OF NATIVE HEART WITHOUT ANGINA PECTORIS: ICD-10-CM

## 2025-01-30 DIAGNOSIS — I10 ESSENTIAL HYPERTENSION: ICD-10-CM

## 2025-01-30 LAB
25(OH)D3 SERPL-MCNC: 15.6 NG/ML (ref 30–100)
ALBUMIN SERPL-MCNC: 4.2 G/DL (ref 3.5–5.2)
ALP SERPL-CCNC: 55 U/L (ref 40–129)
ALT SERPL-CCNC: 33 U/L (ref 5–41)
ANION GAP SERPL CALCULATED.3IONS-SCNC: 12 MMOL/L (ref 9–17)
AST SERPL-CCNC: 35 U/L
BASOPHILS # BLD: 0.05 K/UL (ref 0–0.2)
BASOPHILS NFR BLD: 1 % (ref 0–2)
BILIRUB SERPL-MCNC: 0.4 MG/DL (ref 0.3–1.2)
BUN SERPL-MCNC: 13 MG/DL (ref 8–23)
BUN/CREAT SERPL: 12 (ref 9–20)
CALCIUM SERPL-MCNC: 9.4 MG/DL (ref 8.6–10.4)
CHLORIDE SERPL-SCNC: 103 MMOL/L (ref 98–107)
CHOLEST SERPL-MCNC: 125 MG/DL (ref 0–199)
CHOLESTEROL/HDL RATIO: 4.6
CO2 SERPL-SCNC: 24 MMOL/L (ref 20–31)
CREAT SERPL-MCNC: 1.1 MG/DL (ref 0.7–1.2)
EOSINOPHIL # BLD: 0.24 K/UL (ref 0–0.4)
EOSINOPHILS RELATIVE PERCENT: 3 % (ref 0–5)
ERYTHROCYTE [DISTWIDTH] IN BLOOD BY AUTOMATED COUNT: 13 % (ref 12.1–15.2)
GFR, ESTIMATED: 74 ML/MIN/1.73M2
GLUCOSE SERPL-MCNC: 193 MG/DL (ref 70–99)
HCT VFR BLD AUTO: 49 % (ref 41–53)
HDLC SERPL-MCNC: 27 MG/DL
HGB BLD-MCNC: 16.1 G/DL (ref 13.5–17.5)
IMM GRANULOCYTES # BLD AUTO: 0.04 K/UL (ref 0–0.3)
IMM GRANULOCYTES NFR BLD: 1 % (ref 0–5)
LDLC SERPL CALC-MCNC: 57 MG/DL (ref 0–100)
LYMPHOCYTES NFR BLD: 2.04 K/UL (ref 1–4.8)
LYMPHOCYTES RELATIVE PERCENT: 26 % (ref 13–44)
MAGNESIUM SERPL-MCNC: 2.3 MG/DL (ref 1.6–2.6)
MCH RBC QN AUTO: 31 PG (ref 26–34)
MCHC RBC AUTO-ENTMCNC: 32.9 G/DL (ref 31–37)
MCV RBC AUTO: 94.4 FL (ref 80–100)
MONOCYTES NFR BLD: 0.74 K/UL (ref 0–1)
MONOCYTES NFR BLD: 10 % (ref 5–9)
NEUTROPHILS NFR BLD: 59 % (ref 39–75)
NEUTS SEG NFR BLD: 4.7 K/UL (ref 2.1–6.5)
PLATELET # BLD AUTO: 205 K/UL (ref 140–450)
PMV BLD AUTO: 10.6 FL (ref 6–12)
POTASSIUM SERPL-SCNC: 4.4 MMOL/L (ref 3.7–5.3)
PROT SERPL-MCNC: 7 G/DL (ref 6.4–8.3)
RBC # BLD AUTO: 5.19 M/UL (ref 4.5–5.9)
SODIUM SERPL-SCNC: 139 MMOL/L (ref 135–144)
TRIGL SERPL-MCNC: 206 MG/DL
TSH SERPL DL<=0.05 MIU/L-ACNC: 0.89 UIU/ML (ref 0.3–5)
VLDLC SERPL CALC-MCNC: 41 MG/DL (ref 1–30)
WBC OTHER # BLD: 7.8 K/UL (ref 3.5–11)

## 2025-01-30 PROCEDURE — 93005 ELECTROCARDIOGRAM TRACING: CPT

## 2025-01-30 PROCEDURE — 80053 COMPREHEN METABOLIC PANEL: CPT

## 2025-01-30 PROCEDURE — 84443 ASSAY THYROID STIM HORMONE: CPT

## 2025-01-30 PROCEDURE — 82306 VITAMIN D 25 HYDROXY: CPT

## 2025-01-30 PROCEDURE — 83735 ASSAY OF MAGNESIUM: CPT

## 2025-01-30 PROCEDURE — 71046 X-RAY EXAM CHEST 2 VIEWS: CPT

## 2025-01-30 PROCEDURE — 85025 COMPLETE CBC W/AUTO DIFF WBC: CPT

## 2025-01-30 PROCEDURE — 36415 COLL VENOUS BLD VENIPUNCTURE: CPT

## 2025-01-30 PROCEDURE — 80061 LIPID PANEL: CPT

## 2025-01-31 DIAGNOSIS — F41.9 ANXIETY: ICD-10-CM

## 2025-01-31 DIAGNOSIS — J44.9 CHRONIC OBSTRUCTIVE PULMONARY DISEASE, UNSPECIFIED COPD TYPE (HCC): ICD-10-CM

## 2025-01-31 DIAGNOSIS — I10 ESSENTIAL HYPERTENSION: ICD-10-CM

## 2025-01-31 LAB
EKG ATRIAL RATE: 54 BPM
EKG P AXIS: 64 DEGREES
EKG P-R INTERVAL: 174 MS
EKG Q-T INTERVAL: 452 MS
EKG QRS DURATION: 96 MS
EKG QTC CALCULATION (BAZETT): 428 MS
EKG R AXIS: 46 DEGREES
EKG T AXIS: 73 DEGREES
EKG VENTRICULAR RATE: 54 BPM

## 2025-01-31 RX ORDER — AMLODIPINE BESYLATE 2.5 MG/1
TABLET ORAL
Qty: 30 TABLET | Refills: 10 | Status: SHIPPED | OUTPATIENT
Start: 2025-01-31

## 2025-01-31 RX ORDER — PAROXETINE 20 MG/1
TABLET, FILM COATED ORAL
Qty: 60 TABLET | Refills: 10 | Status: SHIPPED | OUTPATIENT
Start: 2025-01-31

## 2025-02-03 DIAGNOSIS — I25.10 CORONARY ARTERY DISEASE INVOLVING NATIVE CORONARY ARTERY OF NATIVE HEART WITHOUT ANGINA PECTORIS: ICD-10-CM

## 2025-02-03 DIAGNOSIS — I10 ESSENTIAL HYPERTENSION: Primary | ICD-10-CM

## 2025-02-03 DIAGNOSIS — E55.9 VITAMIN D DEFICIENCY DISEASE: ICD-10-CM

## 2025-02-03 DIAGNOSIS — E78.2 MIXED HYPERLIPIDEMIA: ICD-10-CM

## 2025-02-03 RX ORDER — ALBUTEROL SULFATE 0.83 MG/ML
SOLUTION RESPIRATORY (INHALATION)
Qty: 90 ML | Refills: 10 | Status: SHIPPED | OUTPATIENT
Start: 2025-02-03

## 2025-02-06 DIAGNOSIS — E11.9 TYPE 2 DIABETES MELLITUS WITHOUT COMPLICATION, WITHOUT LONG-TERM CURRENT USE OF INSULIN (HCC): Primary | ICD-10-CM

## 2025-02-10 RX ORDER — GLIPIZIDE 10 MG/1
10 TABLET ORAL 2 TIMES DAILY
Qty: 60 TABLET | Refills: 10 | Status: SHIPPED | OUTPATIENT
Start: 2025-02-10

## 2025-02-12 ENCOUNTER — OFFICE VISIT (OUTPATIENT)
Dept: FAMILY MEDICINE CLINIC | Age: 70
End: 2025-02-12
Payer: MEDICARE

## 2025-02-12 VITALS
DIASTOLIC BLOOD PRESSURE: 82 MMHG | HEART RATE: 66 BPM | WEIGHT: 315 LBS | OXYGEN SATURATION: 93 % | RESPIRATION RATE: 18 BRPM | HEIGHT: 72 IN | BODY MASS INDEX: 42.66 KG/M2 | SYSTOLIC BLOOD PRESSURE: 122 MMHG

## 2025-02-12 DIAGNOSIS — I10 ESSENTIAL HYPERTENSION: ICD-10-CM

## 2025-02-12 DIAGNOSIS — E03.9 ACQUIRED HYPOTHYROIDISM: ICD-10-CM

## 2025-02-12 DIAGNOSIS — F41.9 ANXIETY: ICD-10-CM

## 2025-02-12 DIAGNOSIS — E78.2 MIXED HYPERLIPIDEMIA: ICD-10-CM

## 2025-02-12 DIAGNOSIS — E11.9 TYPE 2 DIABETES MELLITUS WITHOUT COMPLICATION, WITHOUT LONG-TERM CURRENT USE OF INSULIN (HCC): Primary | ICD-10-CM

## 2025-02-12 DIAGNOSIS — E55.9 VITAMIN D DEFICIENCY: ICD-10-CM

## 2025-02-12 DIAGNOSIS — L30.9 ECZEMA OF FACE: ICD-10-CM

## 2025-02-12 LAB — HBA1C MFR BLD: 7.5 %

## 2025-02-12 PROCEDURE — 1159F MED LIST DOCD IN RCRD: CPT | Performed by: INTERNAL MEDICINE

## 2025-02-12 PROCEDURE — 83036 HEMOGLOBIN GLYCOSYLATED A1C: CPT | Performed by: INTERNAL MEDICINE

## 2025-02-12 PROCEDURE — 99214 OFFICE O/P EST MOD 30 MIN: CPT | Performed by: INTERNAL MEDICINE

## 2025-02-12 PROCEDURE — 3074F SYST BP LT 130 MM HG: CPT | Performed by: INTERNAL MEDICINE

## 2025-02-12 PROCEDURE — 1123F ACP DISCUSS/DSCN MKR DOCD: CPT | Performed by: INTERNAL MEDICINE

## 2025-02-12 PROCEDURE — 3051F HG A1C>EQUAL 7.0%<8.0%: CPT | Performed by: INTERNAL MEDICINE

## 2025-02-12 PROCEDURE — 3079F DIAST BP 80-89 MM HG: CPT | Performed by: INTERNAL MEDICINE

## 2025-02-12 RX ORDER — MOMETASONE FUROATE 1 MG/G
CREAM TOPICAL
Qty: 1 EACH | Refills: 3 | Status: SHIPPED | OUTPATIENT
Start: 2025-02-12

## 2025-02-12 RX ORDER — CHOLECALCIFEROL (VITAMIN D3) 25 MCG
1000 TABLET ORAL DAILY
Qty: 30 TABLET | Refills: 0 | Status: SHIPPED | OUTPATIENT
Start: 2025-02-12

## 2025-02-12 RX ORDER — MOMETASONE FUROATE 1 MG/G
CREAM TOPICAL
Qty: 1 EACH | Refills: 3 | Status: SHIPPED | OUTPATIENT
Start: 2025-02-12 | End: 2025-02-12

## 2025-02-12 SDOH — ECONOMIC STABILITY: FOOD INSECURITY: WITHIN THE PAST 12 MONTHS, YOU WORRIED THAT YOUR FOOD WOULD RUN OUT BEFORE YOU GOT MONEY TO BUY MORE.: NEVER TRUE

## 2025-02-12 SDOH — ECONOMIC STABILITY: FOOD INSECURITY: WITHIN THE PAST 12 MONTHS, THE FOOD YOU BOUGHT JUST DIDN'T LAST AND YOU DIDN'T HAVE MONEY TO GET MORE.: NEVER TRUE

## 2025-02-12 ASSESSMENT — ENCOUNTER SYMPTOMS
COUGH: 0
SORE THROAT: 0
ALLERGIC/IMMUNOLOGIC NEGATIVE: 1
SHORTNESS OF BREATH: 0
CONSTIPATION: 0
BLOOD IN STOOL: 0
NAUSEA: 0
ABDOMINAL PAIN: 0
WHEEZING: 0

## 2025-02-12 NOTE — PROGRESS NOTES
sounds: Normal heart sounds. No murmur heard.  Pulmonary:      Effort: Pulmonary effort is normal.      Breath sounds: Wheezing present. No rales.   Abdominal:      General: Bowel sounds are normal. There is no distension.      Palpations: Abdomen is soft. There is no mass.      Tenderness: There is no abdominal tenderness.   Musculoskeletal:         General: Normal range of motion.      Right lower leg: No edema.      Left lower leg: No edema.   Lymphadenopathy:      Cervical: No cervical adenopathy.   Skin:     General: Skin is warm and dry.      Coloration: Skin is not jaundiced or pale.      Findings: No rash.   Neurological:      General: No focal deficit present.      Mental Status: He is alert and oriented to person, place, and time. Mental status is at baseline.   Psychiatric:         Mood and Affect: Mood normal.         Behavior: Behavior normal.               Data:     Lab Results   Component Value Date/Time     01/30/2025 08:39 AM    K 4.4 01/30/2025 08:39 AM     01/30/2025 08:39 AM    CO2 24 01/30/2025 08:39 AM    BUN 13 01/30/2025 08:39 AM    CREATININE 1.1 01/30/2025 08:39 AM    GLUCOSE 193 01/30/2025 08:39 AM    GLUCOSE 111 10/14/2011 08:30 AM    BILITOT 0.4 01/30/2025 08:39 AM    ALKPHOS 55 01/30/2025 08:39 AM    AST 35 01/30/2025 08:39 AM    ALT 33 01/30/2025 08:39 AM     Lab Results   Component Value Date/Time    WBC 7.8 01/30/2025 08:39 AM    RBC 5.19 01/30/2025 08:39 AM    HGB 16.1 01/30/2025 08:39 AM    HCT 49.0 01/30/2025 08:39 AM    MCV 94.4 01/30/2025 08:39 AM    MCH 31.0 01/30/2025 08:39 AM    MCHC 32.9 01/30/2025 08:39 AM    RDW 13.0 01/30/2025 08:39 AM     01/30/2025 08:39 AM    MPV 10.6 01/30/2025 08:39 AM     Lab Results   Component Value Date/Time    TSH 0.89 01/30/2025 08:39 AM     Lab Results   Component Value Date/Time    CHOL 125 01/30/2025 08:39 AM    LDL 57 01/30/2025 08:39 AM    HDL 27 01/30/2025 08:39 AM    PSA 0.35 12/17/2018 10:11 AM    LABA1C 7.5

## 2025-02-12 NOTE — PATIENT INSTRUCTIONS
SURVEY:    You may be receiving a survey from [facility] regarding your visit today.    Please complete the survey to enable us to provide the highest quality of care to you and your family.    If you cannot score us a very good on any question, please call the office to discuss how we could have made your experience a very good one.    Thank you.  [facility]  MD Jazmin Garrett, MD eKdar Ruiz, DO  Anton Gallagher, MD Jaz Finley, APRN-CNP  Mady Ernandez, Practice Manager  Joy, CMA  Judith, CCMA  Parviz, CMA  Radha, CMA  Brenden, PSC   Alia, LPN  Ronda, CMA

## 2025-02-13 ENCOUNTER — HOSPITAL ENCOUNTER (OUTPATIENT)
Age: 70
Setting detail: SPECIMEN
Discharge: HOME | End: 2025-02-13
Payer: MEDICARE

## 2025-02-13 DIAGNOSIS — E11.9 TYPE 2 DIABETES MELLITUS WITHOUT COMPLICATION, WITHOUT LONG-TERM CURRENT USE OF INSULIN (HCC): ICD-10-CM

## 2025-02-13 LAB
CREAT UR-MCNC: 56.8 MG/DL (ref 39–259)
MICROALBUMIN UR-MCNC: <12 MG/L (ref 0–20)
MICROALBUMIN/CREAT UR-RTO: NORMAL MCG/MG CREAT (ref 0–17)

## 2025-02-13 PROCEDURE — 82043 UR ALBUMIN QUANTITATIVE: CPT

## 2025-02-13 PROCEDURE — 82570 ASSAY OF URINE CREATININE: CPT

## 2025-04-03 ENCOUNTER — TELEPHONE (OUTPATIENT)
Dept: FAMILY MEDICINE CLINIC | Age: 70
End: 2025-04-03

## 2025-04-03 ENCOUNTER — OFFICE VISIT (OUTPATIENT)
Dept: FAMILY MEDICINE CLINIC | Age: 70
End: 2025-04-03
Payer: MEDICARE

## 2025-04-03 VITALS
BODY MASS INDEX: 42.66 KG/M2 | OXYGEN SATURATION: 95 % | HEIGHT: 72 IN | SYSTOLIC BLOOD PRESSURE: 138 MMHG | HEART RATE: 53 BPM | WEIGHT: 315 LBS | DIASTOLIC BLOOD PRESSURE: 74 MMHG | RESPIRATION RATE: 18 BRPM

## 2025-04-03 DIAGNOSIS — J44.1 COPD EXACERBATION (HCC): ICD-10-CM

## 2025-04-03 DIAGNOSIS — R10.11 RIGHT UPPER QUADRANT ABDOMINAL PAIN: Primary | ICD-10-CM

## 2025-04-03 PROCEDURE — 3075F SYST BP GE 130 - 139MM HG: CPT | Performed by: INTERNAL MEDICINE

## 2025-04-03 PROCEDURE — 1123F ACP DISCUSS/DSCN MKR DOCD: CPT | Performed by: INTERNAL MEDICINE

## 2025-04-03 PROCEDURE — 1159F MED LIST DOCD IN RCRD: CPT | Performed by: INTERNAL MEDICINE

## 2025-04-03 PROCEDURE — 99214 OFFICE O/P EST MOD 30 MIN: CPT | Performed by: INTERNAL MEDICINE

## 2025-04-03 PROCEDURE — 3078F DIAST BP <80 MM HG: CPT | Performed by: INTERNAL MEDICINE

## 2025-04-03 RX ORDER — DOXYCYCLINE 100 MG/1
100 CAPSULE ORAL 2 TIMES DAILY
Qty: 14 CAPSULE | Refills: 0 | Status: SHIPPED | OUTPATIENT
Start: 2025-04-03 | End: 2025-04-10

## 2025-04-03 RX ORDER — PREDNISONE 20 MG/1
40 TABLET ORAL DAILY
Qty: 14 TABLET | Refills: 0 | Status: SHIPPED | OUTPATIENT
Start: 2025-04-03 | End: 2025-04-10

## 2025-04-03 RX ORDER — FLUTICASONE PROPIONATE AND SALMETEROL XINAFOATE 230; 21 UG/1; UG/1
2 AEROSOL, METERED RESPIRATORY (INHALATION) 2 TIMES DAILY
Qty: 12 G | Refills: 3 | Status: SHIPPED | OUTPATIENT
Start: 2025-04-03

## 2025-04-03 ASSESSMENT — ENCOUNTER SYMPTOMS
CONSTIPATION: 0
ABDOMINAL PAIN: 1
COUGH: 0
ALLERGIC/IMMUNOLOGIC NEGATIVE: 1
SHORTNESS OF BREATH: 1
BLOOD IN STOOL: 0
WHEEZING: 1
VOMITING: 0
SORE THROAT: 0
NAUSEA: 0
DIFFICULTY BREATHING: 1
DIARRHEA: 0

## 2025-04-03 ASSESSMENT — COPD QUESTIONNAIRES: COPD: 1

## 2025-04-03 NOTE — PATIENT INSTRUCTIONS
SURVEY:    You may be receiving a survey from [facility] regarding your visit today.    Please complete the survey to enable us to provide the highest quality of care to you and your family.    If you cannot score us a very good on any question, please call the office to discuss how we could have made your experience a very good one.    Thank you.    [facility]  MD Kedar Garrett, DO Demetrice Márquez, CNP  Anton Gallagher, MD Jaz Finley, APRN-CNP  Mady Ernandez, Practice Manager  Joy, YULIA Wong, CCMSHAY Jj, CMA  Radha, CMA  Brenden, PSC   Alia, LPN  Ronda, CMA

## 2025-04-03 NOTE — PROGRESS NOTES
HPI Notes    Name: Justice Torres  : 1955         Chief Complaint:     Chief Complaint   Patient presents with    Medication Refill    Other     Pain in the right side       History of Present Illness:        Justice presents to office for evaluation of pain in the right side of his abdomen  Feels like has no energy. Denies having CP, SOB is at baseline. Has been wheezing more for about 2 weeks   Has no nausea or vomiting  Has a h/o gallstones in the past     States his Insurance won't pay for Paroxetine anymore. States was recommended alternative meds. He does not remember the names of suggested meds      Abdominal Pain  This is a new problem. Episode onset: about 2 weeks ago. The onset quality is gradual. The problem occurs constantly. The problem has been unchanged. The pain is located in the RUQ and RLQ. The pain is moderate. The quality of the pain is aching and colicky. The abdominal pain does not radiate. Associated symptoms include arthralgias. Pertinent negatives include no anorexia, constipation, diarrhea, dysuria, frequency, headaches, nausea or vomiting. The pain is aggravated by movement. The pain is relieved by Nothing. He has tried acetaminophen for the symptoms. The treatment provided no relief. His past medical history is significant for gallstones.   COPD  He complains of difficulty breathing, shortness of breath and wheezing. There is no cough. This is a chronic problem. The current episode started more than 1 year ago. The problem has been gradually worsening. Associated symptoms include dyspnea on exertion. Pertinent negatives include no appetite change, chest pain, ear pain, headaches, orthopnea, sneezing or sore throat. His symptoms are aggravated by any activity, strenuous activity, URI, change in weather and climbing stairs. His symptoms are alleviated by anxiolytics and steroid inhaler. He reports significant improvement on treatment. His past medical history is significant for

## 2025-04-04 ENCOUNTER — HOSPITAL ENCOUNTER (OUTPATIENT)
Age: 70
Discharge: HOME | End: 2025-04-04
Payer: MEDICARE

## 2025-04-04 DIAGNOSIS — I25.10 CORONARY ARTERY DISEASE INVOLVING NATIVE CORONARY ARTERY OF NATIVE HEART WITHOUT ANGINA PECTORIS: ICD-10-CM

## 2025-04-04 DIAGNOSIS — I10 ESSENTIAL HYPERTENSION: ICD-10-CM

## 2025-04-04 DIAGNOSIS — E55.9 VITAMIN D DEFICIENCY DISEASE: ICD-10-CM

## 2025-04-04 DIAGNOSIS — R10.11 RIGHT UPPER QUADRANT ABDOMINAL PAIN: ICD-10-CM

## 2025-04-04 DIAGNOSIS — E78.2 MIXED HYPERLIPIDEMIA: ICD-10-CM

## 2025-04-04 LAB
25(OH)D3 SERPL-MCNC: 25.1 NG/ML (ref 30–100)
ALBUMIN SERPL-MCNC: 4.3 G/DL (ref 3.5–5.2)
ALBUMIN SERPL-MCNC: 4.3 G/DL (ref 3.5–5.2)
ALBUMIN/GLOB SERPL: 1.4 {RATIO} (ref 1–2.5)
ALBUMIN/GLOB SERPL: 1.5 {RATIO} (ref 1–2.5)
ALP SERPL-CCNC: 52 U/L (ref 40–129)
ALP SERPL-CCNC: 53 U/L (ref 40–129)
ALT SERPL-CCNC: 31 U/L (ref 5–41)
ALT SERPL-CCNC: 31 U/L (ref 5–41)
ANION GAP SERPL CALCULATED.3IONS-SCNC: 12 MMOL/L (ref 9–17)
ANION GAP SERPL CALCULATED.3IONS-SCNC: 13 MMOL/L (ref 9–17)
AST SERPL-CCNC: 31 U/L
AST SERPL-CCNC: 34 U/L
BASOPHILS # BLD: 0.05 K/UL (ref 0–0.2)
BASOPHILS # BLD: 0.05 K/UL (ref 0–0.2)
BASOPHILS NFR BLD: 0 % (ref 0–2)
BASOPHILS NFR BLD: 0 % (ref 0–2)
BILIRUB DIRECT SERPL-MCNC: 0.1 MG/DL
BILIRUB INDIRECT SERPL-MCNC: 0.2 MG/DL (ref 0–1)
BILIRUB SERPL-MCNC: 0.3 MG/DL (ref 0.3–1.2)
BILIRUB SERPL-MCNC: 0.3 MG/DL (ref 0.3–1.2)
BUN SERPL-MCNC: 16 MG/DL (ref 8–23)
BUN SERPL-MCNC: 16 MG/DL (ref 8–23)
CALCIUM SERPL-MCNC: 9.6 MG/DL (ref 8.6–10.4)
CALCIUM SERPL-MCNC: 9.7 MG/DL (ref 8.6–10.4)
CHLORIDE SERPL-SCNC: 101 MMOL/L (ref 98–107)
CHLORIDE SERPL-SCNC: 101 MMOL/L (ref 98–107)
CHOLEST SERPL-MCNC: 161 MG/DL (ref 0–199)
CHOLESTEROL/HDL RATIO: 4.6
CO2 SERPL-SCNC: 23 MMOL/L (ref 20–31)
CO2 SERPL-SCNC: 24 MMOL/L (ref 20–31)
CREAT SERPL-MCNC: 1 MG/DL (ref 0.7–1.2)
CREAT SERPL-MCNC: 1.1 MG/DL (ref 0.7–1.2)
EOSINOPHIL # BLD: 0.04 K/UL (ref 0–0.4)
EOSINOPHIL # BLD: 0.04 K/UL (ref 0–0.4)
EOSINOPHILS RELATIVE PERCENT: 0 % (ref 0–5)
EOSINOPHILS RELATIVE PERCENT: 0 % (ref 0–5)
ERYTHROCYTE [DISTWIDTH] IN BLOOD BY AUTOMATED COUNT: 12.6 % (ref 12.1–15.2)
ERYTHROCYTE [DISTWIDTH] IN BLOOD BY AUTOMATED COUNT: 12.6 % (ref 12.1–15.2)
GFR, ESTIMATED: 74 ML/MIN/1.73M2
GFR, ESTIMATED: 82 ML/MIN/1.73M2
GLOBULIN SER CALC-MCNC: 3 G/DL (ref 1.5–3.8)
GLUCOSE SERPL-MCNC: 218 MG/DL (ref 70–99)
GLUCOSE SERPL-MCNC: 220 MG/DL (ref 70–99)
HCT VFR BLD AUTO: 49.5 % (ref 41–53)
HCT VFR BLD AUTO: 49.5 % (ref 41–53)
HDLC SERPL-MCNC: 35 MG/DL
HGB BLD-MCNC: 16 G/DL (ref 13.5–17.5)
HGB BLD-MCNC: 16 G/DL (ref 13.5–17.5)
IMM GRANULOCYTES # BLD AUTO: 0.02 K/UL (ref 0–0.3)
IMM GRANULOCYTES # BLD AUTO: 0.02 K/UL (ref 0–0.3)
IMM GRANULOCYTES NFR BLD: 0 % (ref 0–5)
IMM GRANULOCYTES NFR BLD: 0 % (ref 0–5)
LDLC SERPL CALC-MCNC: 83 MG/DL (ref 0–100)
LIPASE SERPL-CCNC: 56 U/L (ref 13–60)
LYMPHOCYTES NFR BLD: 1.7 K/UL (ref 1–4.8)
LYMPHOCYTES NFR BLD: 1.7 K/UL (ref 1–4.8)
LYMPHOCYTES RELATIVE PERCENT: 15 % (ref 13–44)
LYMPHOCYTES RELATIVE PERCENT: 15 % (ref 13–44)
MAGNESIUM SERPL-MCNC: 2.4 MG/DL (ref 1.6–2.6)
MCH RBC QN AUTO: 30.5 PG (ref 26–34)
MCH RBC QN AUTO: 30.5 PG (ref 26–34)
MCHC RBC AUTO-ENTMCNC: 32.3 G/DL (ref 31–37)
MCHC RBC AUTO-ENTMCNC: 32.3 G/DL (ref 31–37)
MCV RBC AUTO: 94.3 FL (ref 80–100)
MCV RBC AUTO: 94.3 FL (ref 80–100)
MONOCYTES NFR BLD: 0.92 K/UL (ref 0–1)
MONOCYTES NFR BLD: 0.92 K/UL (ref 0–1)
MONOCYTES NFR BLD: 8 % (ref 5–9)
MONOCYTES NFR BLD: 8 % (ref 5–9)
NEUTROPHILS NFR BLD: 77 % (ref 39–75)
NEUTROPHILS NFR BLD: 77 % (ref 39–75)
NEUTS SEG NFR BLD: 8.87 K/UL (ref 2.1–6.5)
NEUTS SEG NFR BLD: 8.87 K/UL (ref 2.1–6.5)
PLATELET # BLD AUTO: 208 K/UL (ref 140–450)
PLATELET # BLD AUTO: 208 K/UL (ref 140–450)
PMV BLD AUTO: 10.7 FL (ref 6–12)
PMV BLD AUTO: 10.7 FL (ref 6–12)
POTASSIUM SERPL-SCNC: 4.4 MMOL/L (ref 3.7–5.3)
POTASSIUM SERPL-SCNC: 4.4 MMOL/L (ref 3.7–5.3)
PROT SERPL-MCNC: 7.2 G/DL (ref 6.4–8.3)
PROT SERPL-MCNC: 7.3 G/DL (ref 6.4–8.3)
RBC # BLD AUTO: 5.25 M/UL (ref 4.5–5.9)
RBC # BLD AUTO: 5.25 M/UL (ref 4.5–5.9)
SODIUM SERPL-SCNC: 137 MMOL/L (ref 135–144)
SODIUM SERPL-SCNC: 137 MMOL/L (ref 135–144)
TRIGL SERPL-MCNC: 217 MG/DL
TSH SERPL DL<=0.05 MIU/L-ACNC: 2.53 UIU/ML (ref 0.27–4.2)
VLDLC SERPL CALC-MCNC: 43 MG/DL (ref 1–30)
WBC OTHER # BLD: 11.6 K/UL (ref 3.5–11)
WBC OTHER # BLD: 11.6 K/UL (ref 3.5–11)

## 2025-04-04 PROCEDURE — 36415 COLL VENOUS BLD VENIPUNCTURE: CPT

## 2025-04-04 PROCEDURE — 80076 HEPATIC FUNCTION PANEL: CPT

## 2025-04-04 PROCEDURE — 83735 ASSAY OF MAGNESIUM: CPT

## 2025-04-04 PROCEDURE — 83690 ASSAY OF LIPASE: CPT

## 2025-04-04 PROCEDURE — 84443 ASSAY THYROID STIM HORMONE: CPT

## 2025-04-04 PROCEDURE — 80061 LIPID PANEL: CPT

## 2025-04-04 PROCEDURE — 82306 VITAMIN D 25 HYDROXY: CPT

## 2025-04-04 PROCEDURE — 85025 COMPLETE CBC W/AUTO DIFF WBC: CPT

## 2025-04-04 PROCEDURE — 80048 BASIC METABOLIC PNL TOTAL CA: CPT

## 2025-04-04 PROCEDURE — 80053 COMPREHEN METABOLIC PANEL: CPT

## 2025-04-06 LAB
EKG ATRIAL RATE: 54 BPM
EKG P AXIS: 43 DEGREES
EKG P-R INTERVAL: 172 MS
EKG Q-T INTERVAL: 428 MS
EKG QRS DURATION: 98 MS
EKG QTC CALCULATION (BAZETT): 405 MS
EKG R AXIS: 29 DEGREES
EKG T AXIS: 73 DEGREES
EKG VENTRICULAR RATE: 54 BPM

## 2025-04-07 ENCOUNTER — RESULTS FOLLOW-UP (OUTPATIENT)
Dept: FAMILY MEDICINE CLINIC | Age: 70
End: 2025-04-07

## 2025-04-07 ENCOUNTER — OFFICE VISIT (OUTPATIENT)
Dept: CARDIOLOGY CLINIC | Age: 70
End: 2025-04-07
Payer: MEDICARE

## 2025-04-07 VITALS
HEART RATE: 54 BPM | SYSTOLIC BLOOD PRESSURE: 128 MMHG | OXYGEN SATURATION: 91 % | DIASTOLIC BLOOD PRESSURE: 77 MMHG | BODY MASS INDEX: 42.31 KG/M2 | RESPIRATION RATE: 18 BRPM | WEIGHT: 312 LBS

## 2025-04-07 DIAGNOSIS — E13.22 OTHER SPECIFIED DIABETES MELLITUS WITH STAGE 1 CHRONIC KIDNEY DISEASE, UNSPECIFIED WHETHER LONG TERM INSULIN USE (HCC): ICD-10-CM

## 2025-04-07 DIAGNOSIS — I10 ESSENTIAL HYPERTENSION: Primary | ICD-10-CM

## 2025-04-07 DIAGNOSIS — E78.2 MIXED HYPERLIPIDEMIA: ICD-10-CM

## 2025-04-07 DIAGNOSIS — R06.02 SOB (SHORTNESS OF BREATH): ICD-10-CM

## 2025-04-07 DIAGNOSIS — N18.1 OTHER SPECIFIED DIABETES MELLITUS WITH STAGE 1 CHRONIC KIDNEY DISEASE, UNSPECIFIED WHETHER LONG TERM INSULIN USE (HCC): ICD-10-CM

## 2025-04-07 DIAGNOSIS — I25.10 CORONARY ARTERY DISEASE INVOLVING NATIVE CORONARY ARTERY OF NATIVE HEART WITHOUT ANGINA PECTORIS: ICD-10-CM

## 2025-04-07 DIAGNOSIS — E55.9 VITAMIN D DEFICIENCY DISEASE: ICD-10-CM

## 2025-04-07 PROCEDURE — 1160F RVW MEDS BY RX/DR IN RCRD: CPT | Performed by: NURSE PRACTITIONER

## 2025-04-07 PROCEDURE — 3078F DIAST BP <80 MM HG: CPT | Performed by: NURSE PRACTITIONER

## 2025-04-07 PROCEDURE — 1123F ACP DISCUSS/DSCN MKR DOCD: CPT | Performed by: NURSE PRACTITIONER

## 2025-04-07 PROCEDURE — 99214 OFFICE O/P EST MOD 30 MIN: CPT | Performed by: NURSE PRACTITIONER

## 2025-04-07 PROCEDURE — 1159F MED LIST DOCD IN RCRD: CPT | Performed by: NURSE PRACTITIONER

## 2025-04-07 PROCEDURE — 3074F SYST BP LT 130 MM HG: CPT | Performed by: NURSE PRACTITIONER

## 2025-04-07 NOTE — PROGRESS NOTES
Ov with Nabila for one year follow up   Pt brought med bottles - list updated  On antibiotic and prednisone   Now d/t sob/copd by pcp   No chest pain   No palpations  Occ dizziness   No edema        No changes  Follow up in one year   
to 70% disease in the AV branch of the circumflex which fed a large posterolateral branch of the circumflex with an iFR of 0.97, with unremarkable right coronary artery, EF of 55%, medical therapy.  6.  COPD.  7.  Normal LV function.  8.  Chronic back pain, which limits his activity.  9.  Hyperlipidemia - Well controlled.  10. Hypertension - Well controlled.      PLAN:        Coronary Artery Disease:  Antiplatelet Agent: Continue Aspirin 81 mg daily.   Beta Blocker: Continue Metoprolol succinate (Toprol XL) 50 mg daily.   Anti-anginal medications: Continue amlodipine (Norvasc) 5 mg 1/2 tab once daily.  Cholesterol Reduction Therapy: Continue lovastatin (Mevacor) 40 mg, 2 times daily. I discussed the potential benefits of statin therapy as well as the potential risks including myalgia as well as the rare but potentially serious complication of liver or kidney damage. Although rare, I told them that this could be serious and therefore told them to stop the medication immediately and call if they developed any severe muscle aches or pains and they agreed to do so.  Additional counseling: I advised them to call our office or go to the emergency room if they developed worsening or persistent chest pain or increased shortness of breath as this could be life threatening.   2.   No changes in medications.  3.   Will follow up in 1 year.    Finally, I recommended that he continue his current medications and follow up with you as previously scheduled.     DISCUSSION:        Reviewed findings with Dr. Whalen and then Mr. Torres was also evaluated by Dr. Whalen.      Overall, from a cardiac standpoint, he is doing very well.   He is exercising 3 days per week and has had no chest pain, chest discomfort, unusual shortness of breath or change in energy level.   He will continue his medications as ordered.   His risk factors are nicely modified.   He has no chest pain or chest discomfort to indicate need to do further ischemia

## 2025-04-07 NOTE — TELEPHONE ENCOUNTER
----- Message from Dr. Uche Hawkins MD sent at 4/7/2025  9:20 AM EDT -----  Please advise the patient that his lab results look okay, vitamin D level is still low.  Please ask him to make sure he is taking his supplement every day  Thanks  ----- Message -----  From: Samra Olmos Incoming Lab Results From [facility]  Sent: 4/4/2025  10:16 AM EDT  To: Uche Hawkins MD

## 2025-04-07 NOTE — TELEPHONE ENCOUNTER
----- Message from Dr. Uche Hawkins MD sent at 4/7/2025 11:37 AM EDT -----  Please ask him to double the amount of vitamin D he is taking since the level was low  Thank you  ----- Message -----  From: Judith Dee MA  Sent: 4/7/2025  11:27 AM EDT  To: Uche Hawkins MD

## 2025-04-08 ENCOUNTER — HOSPITAL ENCOUNTER (OUTPATIENT)
Dept: ULTRASOUND IMAGING | Age: 70
Discharge: HOME | End: 2025-04-10
Attending: INTERNAL MEDICINE
Payer: MEDICARE

## 2025-04-08 ENCOUNTER — HOSPITAL ENCOUNTER (OUTPATIENT)
Dept: CT IMAGING | Age: 70
Discharge: HOME | End: 2025-04-10
Attending: INTERNAL MEDICINE
Payer: MEDICARE

## 2025-04-08 VITALS — BODY MASS INDEX: 42.26 KG/M2 | WEIGHT: 312 LBS | HEIGHT: 72 IN

## 2025-04-08 DIAGNOSIS — R10.11 RIGHT UPPER QUADRANT ABDOMINAL PAIN: ICD-10-CM

## 2025-04-08 DIAGNOSIS — Z87.891 PERSONAL HISTORY OF TOBACCO USE: ICD-10-CM

## 2025-04-08 PROCEDURE — 6360000004 HC RX CONTRAST MEDICATION: Performed by: INTERNAL MEDICINE

## 2025-04-08 PROCEDURE — 76705 ECHO EXAM OF ABDOMEN: CPT

## 2025-04-08 PROCEDURE — 74177 CT ABD & PELVIS W/CONTRAST: CPT

## 2025-04-08 PROCEDURE — 71271 CT THORAX LUNG CANCER SCR C-: CPT

## 2025-04-08 RX ORDER — IOPAMIDOL 755 MG/ML
75 INJECTION, SOLUTION INTRAVASCULAR
Status: COMPLETED | OUTPATIENT
Start: 2025-04-08 | End: 2025-04-08

## 2025-04-08 RX ADMIN — IOPAMIDOL 75 ML: 755 INJECTION, SOLUTION INTRAVENOUS at 09:27

## 2025-04-11 DIAGNOSIS — R10.11 RIGHT UPPER QUADRANT ABDOMINAL PAIN: ICD-10-CM

## 2025-04-11 DIAGNOSIS — R16.0 HEPATOMEGALY: Primary | ICD-10-CM

## 2025-04-24 DIAGNOSIS — E03.9 ACQUIRED HYPOTHYROIDISM: ICD-10-CM

## 2025-04-24 RX ORDER — LEVOTHYROXINE SODIUM 200 UG/1
TABLET ORAL
Qty: 90 TABLET | Refills: 0 | Status: SHIPPED | OUTPATIENT
Start: 2025-04-24

## 2025-04-24 NOTE — TELEPHONE ENCOUNTER
Health Maintenance   Topic Date Due    DTaP/Tdap/Td vaccine (1 - Tdap) Never done    Shingles vaccine (1 of 2) Never done    Respiratory Syncytial Virus (RSV) Pregnant or age 60 yrs+ (1 - Risk 60-74 years 1-dose series) Never done    Diabetic retinal exam  10/29/2020    COVID-19 Vaccine (6 - 2024-25 season) 09/01/2024    Pneumococcal 50+ years Vaccine (3 of 3 - PCV20 or PCV21) 10/08/2024    Diabetic foot exam  12/06/2025    Depression Monitoring  01/06/2026    A1C test (Diabetic or Prediabetic)  02/12/2026    Diabetic Alb to Cr ratio (uACR) test  02/13/2026    Lipids  04/04/2026    GFR test (Diabetes, CKD 3-4, OR last GFR 15-59)  04/04/2026    Lung Cancer Screening &/or Counseling  04/08/2026    Colorectal Cancer Screen  10/09/2033    Annual Wellness Visit (Medicare Advantage)  Completed    Flu vaccine  Completed    AAA screen  Completed    Hepatitis C screen  Completed    Hepatitis A vaccine  Aged Out    Hepatitis B vaccine  Aged Out    Hib vaccine  Aged Out    Polio vaccine  Aged Out    Meningococcal (ACWY) vaccine  Aged Out    Meningococcal B vaccine  Aged Out    Pneumococcal 0-49 years Vaccine  Discontinued    HIV screen  Discontinued    Prostate Specific Antigen (PSA) Screening or Monitoring  Discontinued             (applicable per patient's age: Cancer Screenings, Depression Screening, Fall Risk Screening, Immunizations)    Hemoglobin A1C (%)   Date Value   02/12/2025 7.5   06/06/2024 6.6   02/09/2024 7.3     AST (U/L)   Date Value   04/04/2025 31     ALT (U/L)   Date Value   04/04/2025 31     BUN (mg/dL)   Date Value   04/04/2025 16      (goal A1C is < 7)   (goal LDL is <100) need 30-50% reduction from baseline     BP Readings from Last 3 Encounters:   04/07/25 128/77   04/03/25 138/74   02/12/25 122/82    (goal /80)      All Future Testing planned in [facility]:  Lab Frequency Next Occurrence   CT Lung Screen (Initial/Annual/Baseline) Once 06/06/2024   FACET JOINT L/S SINGLE Once 10/24/2024

## 2025-04-30 DIAGNOSIS — L30.9 ECZEMA OF FACE: ICD-10-CM

## 2025-05-01 RX ORDER — MOMETASONE FUROATE 1 MG/G
CREAM TOPICAL DAILY
Qty: 15 G | Refills: 11 | Status: SHIPPED | OUTPATIENT
Start: 2025-05-01

## 2025-05-06 DIAGNOSIS — E78.2 MIXED HYPERLIPIDEMIA: ICD-10-CM

## 2025-05-08 RX ORDER — FENOFIBRATE 145 MG/1
145 TABLET, FILM COATED ORAL DAILY
Qty: 30 TABLET | Refills: 11 | Status: SHIPPED | OUTPATIENT
Start: 2025-05-08

## 2025-05-15 ENCOUNTER — OFFICE VISIT (OUTPATIENT)
Dept: UROLOGY | Age: 70
End: 2025-05-15
Payer: MEDICARE

## 2025-05-15 VITALS
SYSTOLIC BLOOD PRESSURE: 118 MMHG | RESPIRATION RATE: 22 BRPM | WEIGHT: 313 LBS | BODY MASS INDEX: 42.45 KG/M2 | DIASTOLIC BLOOD PRESSURE: 64 MMHG

## 2025-05-15 DIAGNOSIS — N32.81 OAB (OVERACTIVE BLADDER): ICD-10-CM

## 2025-05-15 DIAGNOSIS — N40.1 BPH WITH OBSTRUCTION/LOWER URINARY TRACT SYMPTOMS: Primary | ICD-10-CM

## 2025-05-15 DIAGNOSIS — N13.8 BPH WITH OBSTRUCTION/LOWER URINARY TRACT SYMPTOMS: Primary | ICD-10-CM

## 2025-05-15 PROCEDURE — 99214 OFFICE O/P EST MOD 30 MIN: CPT | Performed by: PHYSICIAN ASSISTANT

## 2025-05-15 PROCEDURE — 1123F ACP DISCUSS/DSCN MKR DOCD: CPT | Performed by: PHYSICIAN ASSISTANT

## 2025-05-15 PROCEDURE — 3074F SYST BP LT 130 MM HG: CPT | Performed by: PHYSICIAN ASSISTANT

## 2025-05-15 PROCEDURE — 1159F MED LIST DOCD IN RCRD: CPT | Performed by: PHYSICIAN ASSISTANT

## 2025-05-15 PROCEDURE — 3078F DIAST BP <80 MM HG: CPT | Performed by: PHYSICIAN ASSISTANT

## 2025-05-15 RX ORDER — TAMSULOSIN HYDROCHLORIDE 0.4 MG/1
0.4 CAPSULE ORAL 2 TIMES DAILY
Qty: 180 CAPSULE | Refills: 3 | Status: SHIPPED | OUTPATIENT
Start: 2025-06-12

## 2025-05-15 RX ORDER — TAMSULOSIN HYDROCHLORIDE 0.4 MG/1
0.4 CAPSULE ORAL 2 TIMES DAILY
Qty: 60 CAPSULE | Refills: 0 | Status: SHIPPED | OUTPATIENT
Start: 2025-05-15

## 2025-05-15 RX ORDER — TROSPIUM CHLORIDE 20 MG/1
20 TABLET, FILM COATED ORAL 2 TIMES DAILY
Qty: 60 TABLET | Refills: 3 | Status: SHIPPED | OUTPATIENT
Start: 2025-05-15

## 2025-05-15 NOTE — PROGRESS NOTES
HPI:      Patient is a 69 y.o. male in no acute distress.  He is alert and oriented to person, place, and time.       2/2023 - Pt here today as a new patient. Pt was referred by Dr. Hawkins, Pt did start flomax about 2 weeks ago.  Patient has seen urology in the past.  This was for BPH.  He has not seen urology in the past 5 years.  He is unsure of the therapy that was offered to him the previous time he did see urology.  He reports no pain today.  Patient does not report that the Flomax has helped him at this point in time.  He is still having issues with urgency and frequency.  He does have issues with dribbling before he gets to the bathroom as well as when he is done going to the bathroom.  He has no flank pain nausea vomiting today.  No pain today   Increased Flomax to twice a day    1/2024 - Ditropan XL 10 mg start    1/2025 -Ditropan stopped, long-acting trospium started    Long-acting trospium too expensive for patient, patient stopped medication without notifying our office    Patient also stopped his Flomax twice a day by accident    Today:  Patient is here today for medication follow-up.  At last visit we wanted him to continue his Flomax twice a day.  We stopped Ditropan and started him on long-acting trospium.  He inadvertently stopped his Flomax.  He took long-acting trospium for 1 month and did feel it did help his symptoms.  He states that he went to refill but it was too expensive and therefore did not refill.  He has not been on Flomax or trospium for several months now.  He states he still has postvoid dribbling and nocturia.  He states that he just had gallbladder surgery yesterday.  He typically has bowel movements daily.  He would like more from his urinary symptoms.  He is not wearing any pads and has rare incontinence.  He did notice that his stream became weaker when he stopped taking the Flomax.  He refused PVR today secondary to not wanting to be in worsening pain after his gallbladder

## 2025-05-16 DIAGNOSIS — F41.9 ANXIETY: ICD-10-CM

## 2025-05-16 RX ORDER — PAROXETINE 20 MG/1
20 TABLET, FILM COATED ORAL EVERY MORNING
Qty: 90 TABLET | Refills: 1 | Status: SHIPPED | OUTPATIENT
Start: 2025-05-16

## 2025-05-22 ENCOUNTER — OFFICE VISIT (OUTPATIENT)
Dept: SURGERY | Age: 70
End: 2025-05-22

## 2025-05-22 ENCOUNTER — HOSPITAL ENCOUNTER (OUTPATIENT)
Age: 70
Setting detail: SPECIMEN
Discharge: HOME | End: 2025-05-22

## 2025-05-22 VITALS
HEART RATE: 66 BPM | WEIGHT: 305 LBS | BODY MASS INDEX: 41.37 KG/M2 | DIASTOLIC BLOOD PRESSURE: 67 MMHG | SYSTOLIC BLOOD PRESSURE: 103 MMHG

## 2025-05-22 DIAGNOSIS — L72.0 EPIDERMAL CYST: ICD-10-CM

## 2025-05-22 DIAGNOSIS — L72.0 EPIDERMAL CYST: Primary | ICD-10-CM

## 2025-05-22 RX ORDER — CEPHALEXIN 500 MG/1
1000 CAPSULE ORAL 2 TIMES DAILY
Qty: 28 CAPSULE | Refills: 0 | Status: SHIPPED | OUTPATIENT
Start: 2025-05-22 | End: 2025-05-29

## 2025-05-22 NOTE — PROGRESS NOTES
Patient has 4 x 3 cm lump on his left posterior neck, consistent with a ruptured epidermal inclusion cyst. It is slightly fluctuant in the center.  Indurated, erythematous and tender.    He would like it removed.    He is not anticoagulated, but is on low dose aspirin.  He is about 1 week out from a lap cricket.     Risks and benefits of lesion excision were discussed with Justice Torres and he does consent to proceed.  The nature of the procedure, along with the risks of bleeding, infection, scarring pain and recurrence were discussed with him.      His neck was prepped and draped after I marked out the area of the lump.  Local anesthetic 1% lidocaine was generously infiltrated around the area of the mass.  Elliptical incision was made encompassing the area of the lump making an incision just over 4 cm long.  The subcutaneous mass was excised and does appear to be fairly inflamed epidermal cyst.  Initially left a little of the posterior wall in place and went back and took that out as well.  He had a lot of subcutaneous bleeding from multiple small bleeders.  I put in a half dozen or so suture ligatures with 3-0 Vicryl.  The skin was then closed with a running vertical mattress suture using 3-0 nylon.  At the end the procedure I personally held 5 minutes of pressure on his wound and then the nurse held another 5 minutes of pressure on his wound by the clock.    I am going to start him an antibiotic this was somewhat inflamed and it was difficult to maintain good sterile technique in this location.

## 2025-05-22 NOTE — PATIENT INSTRUCTIONS
SURVEY:    You may be receiving a survey from [facility] regarding your visit today.    You may get this in the mail, through your MyChart, or in your email.     Please complete the survey to enable us to provide the highest quality of care to you and your family.    If you cannot score us a very good (5 Stars) on any question, please call the office to discuss how we could of made your experience exceptional.    Thank you!    General Surgery    MD Dr. Li Feldman, DO  Dr. Dawson Noland, DO  Jaz Finley, VERONIQUE-CNP    Pain Mgmt.  Dr. Kedar Ruiz, DO  JOSE Pelaez, JULIANNE Cheney LPN Jena Adams, MA Emily Akers, [place]    Phone: [phone]  Fax: [phone]    Office Hours:   M-TH 8-5, F: 8-12

## 2025-05-22 NOTE — PROGRESS NOTES
Lidocaine 1%  10 ml injection used during office procedure administered by Dr. Gallagher.    Lot number [id number]    Expiration date 2025-SEP-01     [id number]    No adverse reaction noted from medication

## 2025-05-27 LAB — SURGICAL PATHOLOGY REPORT: NORMAL

## 2025-06-01 ENCOUNTER — RESULTS FOLLOW-UP (OUTPATIENT)
Dept: SURGERY | Age: 70
End: 2025-06-01

## 2025-06-02 ENCOUNTER — TELEPHONE (OUTPATIENT)
Dept: FAMILY MEDICINE CLINIC | Age: 70
End: 2025-06-02

## 2025-06-02 DIAGNOSIS — E11.9 TYPE 2 DIABETES MELLITUS WITHOUT COMPLICATION, WITHOUT LONG-TERM CURRENT USE OF INSULIN (HCC): ICD-10-CM

## 2025-06-02 NOTE — TELEPHONE ENCOUNTER
Patient contacted the office requesting for PCP to place order to [facility] for Jardiance medication. Please advise. Thank you.

## 2025-06-03 RX ORDER — ALBUTEROL SULFATE 90 UG/1
2 INHALANT RESPIRATORY (INHALATION) EVERY 6 HOURS PRN
Qty: 8.5 G | Refills: 11 | Status: SHIPPED | OUTPATIENT
Start: 2025-06-03

## 2025-06-05 ENCOUNTER — OFFICE VISIT (OUTPATIENT)
Dept: SURGERY | Age: 70
End: 2025-06-05

## 2025-06-05 VITALS
SYSTOLIC BLOOD PRESSURE: 106 MMHG | WEIGHT: 309 LBS | HEART RATE: 64 BPM | BODY MASS INDEX: 41.91 KG/M2 | DIASTOLIC BLOOD PRESSURE: 64 MMHG

## 2025-06-05 DIAGNOSIS — L72.0 EPIDERMAL CYST: Primary | ICD-10-CM

## 2025-06-05 PROCEDURE — 99024 POSTOP FOLLOW-UP VISIT: CPT | Performed by: SURGERY

## 2025-06-05 NOTE — PROGRESS NOTES
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  The wound appears clean, dry, intact, nontender, and the was a thick dry exudate on the surface, but that cleaned off easily.    Sutures were removed today.  Steri-strips were applied   Pathology A.  CYST, NECK, EXCISION:   - RUPTURED EPIDERMAL INCLUSION CYST.   B.  CYST, NECK, EXCISION:   - FIBROFATTY AND ADIPOSE TISSUE WITH ACUTE AND CHRONIC INFLAMMATION   WITH SLIGHT GRANULATION TISSUE FORMATION AND FOCAL FAT NECROSIS.    Follow-up prn

## 2025-06-05 NOTE — PATIENT INSTRUCTIONS
SURVEY:    You may be receiving a survey from [facility] regarding your visit today.    You may get this in the mail, through your MyChart, or in your email.     Please complete the survey to enable us to provide the highest quality of care to you and your family.    If you cannot score us a very good (5 Stars) on any question, please call the office to discuss how we could of made your experience exceptional.    Thank you!    General Surgery    MD Dr. Li Feldman, DO  Dr. Dawson Noland, DO  Jaz Finley, VERONIQUE-CNP    Pain Mgmt.  Dr. Kedar Ruiz, DO  JOSE Pelaez, JULIANNE Cheney LPN Jena Adams, MA Emily Akers, [place]    Phone: [phone]  Fax: [phone]    Office Hours:   M-TH 8-5, F: 8-12

## 2025-06-10 ENCOUNTER — OFFICE VISIT (OUTPATIENT)
Dept: FAMILY MEDICINE CLINIC | Age: 70
End: 2025-06-10
Payer: MEDICARE

## 2025-06-10 VITALS
HEIGHT: 72 IN | DIASTOLIC BLOOD PRESSURE: 68 MMHG | HEART RATE: 60 BPM | BODY MASS INDEX: 41.45 KG/M2 | SYSTOLIC BLOOD PRESSURE: 108 MMHG | OXYGEN SATURATION: 94 % | RESPIRATION RATE: 18 BRPM | WEIGHT: 306 LBS

## 2025-06-10 DIAGNOSIS — Z00.00 MEDICARE ANNUAL WELLNESS VISIT, SUBSEQUENT: ICD-10-CM

## 2025-06-10 DIAGNOSIS — J44.1 COPD EXACERBATION (HCC): Primary | ICD-10-CM

## 2025-06-10 DIAGNOSIS — J44.9 CHRONIC OBSTRUCTIVE PULMONARY DISEASE, UNSPECIFIED COPD TYPE (HCC): ICD-10-CM

## 2025-06-10 PROCEDURE — 3078F DIAST BP <80 MM HG: CPT | Performed by: INTERNAL MEDICINE

## 2025-06-10 PROCEDURE — 99214 OFFICE O/P EST MOD 30 MIN: CPT | Performed by: INTERNAL MEDICINE

## 2025-06-10 PROCEDURE — G0439 PPPS, SUBSEQ VISIT: HCPCS | Performed by: INTERNAL MEDICINE

## 2025-06-10 PROCEDURE — 3074F SYST BP LT 130 MM HG: CPT | Performed by: INTERNAL MEDICINE

## 2025-06-10 PROCEDURE — 1123F ACP DISCUSS/DSCN MKR DOCD: CPT | Performed by: INTERNAL MEDICINE

## 2025-06-10 PROCEDURE — 1159F MED LIST DOCD IN RCRD: CPT | Performed by: INTERNAL MEDICINE

## 2025-06-10 RX ORDER — PREDNISONE 20 MG/1
40 TABLET ORAL DAILY
Qty: 20 TABLET | Refills: 0 | Status: SHIPPED | OUTPATIENT
Start: 2025-06-10 | End: 2025-06-20

## 2025-06-10 RX ORDER — AZITHROMYCIN 500 MG/1
500 TABLET, FILM COATED ORAL DAILY
Qty: 5 TABLET | Refills: 0 | Status: SHIPPED | OUTPATIENT
Start: 2025-06-10 | End: 2025-06-15

## 2025-06-10 ASSESSMENT — PATIENT HEALTH QUESTIONNAIRE - PHQ9
10. IF YOU CHECKED OFF ANY PROBLEMS, HOW DIFFICULT HAVE THESE PROBLEMS MADE IT FOR YOU TO DO YOUR WORK, TAKE CARE OF THINGS AT HOME, OR GET ALONG WITH OTHER PEOPLE: NOT DIFFICULT AT ALL
SUM OF ALL RESPONSES TO PHQ QUESTIONS 1-9: 0
1. LITTLE INTEREST OR PLEASURE IN DOING THINGS: NOT AT ALL
9. THOUGHTS THAT YOU WOULD BE BETTER OFF DEAD, OR OF HURTING YOURSELF: NOT AT ALL
SUM OF ALL RESPONSES TO PHQ QUESTIONS 1-9: 0
5. POOR APPETITE OR OVEREATING: NOT AT ALL
SUM OF ALL RESPONSES TO PHQ QUESTIONS 1-9: 0
4. FEELING TIRED OR HAVING LITTLE ENERGY: NOT AT ALL
2. FEELING DOWN, DEPRESSED OR HOPELESS: NOT AT ALL
7. TROUBLE CONCENTRATING ON THINGS, SUCH AS READING THE NEWSPAPER OR WATCHING TELEVISION: NOT AT ALL
6. FEELING BAD ABOUT YOURSELF - OR THAT YOU ARE A FAILURE OR HAVE LET YOURSELF OR YOUR FAMILY DOWN: NOT AT ALL
8. MOVING OR SPEAKING SO SLOWLY THAT OTHER PEOPLE COULD HAVE NOTICED. OR THE OPPOSITE, BEING SO FIGETY OR RESTLESS THAT YOU HAVE BEEN MOVING AROUND A LOT MORE THAN USUAL: NOT AT ALL
3. TROUBLE FALLING OR STAYING ASLEEP: NOT AT ALL
SUM OF ALL RESPONSES TO PHQ QUESTIONS 1-9: 0

## 2025-06-10 ASSESSMENT — ENCOUNTER SYMPTOMS
SHORTNESS OF BREATH: 1
SORE THROAT: 1
COUGH: 1
WHEEZING: 1

## 2025-06-10 NOTE — PATIENT INSTRUCTIONS
warranty or liability for your use of this information.    Personalized Preventive Plan for Justice Torres - 6/10/2025  Medicare offers a range of preventive health benefits. Some of the tests and screenings are paid in full while other may be subject to a deductible, co-insurance, and/or copay.  Some of these benefits include a comprehensive review of your medical history including lifestyle, illnesses that may run in your family, and various assessments and screenings as appropriate.  After reviewing your medical record and screening and assessments performed today your provider may have ordered immunizations, labs, imaging, and/or referrals for you.  A list of these orders (if applicable) as well as your Preventive Care list are included within your After Visit Summary for your review.

## 2025-06-10 NOTE — PROGRESS NOTES
HPI Notes    Name: Justice Torres  : 1955         Chief Complaint:     Chief Complaint   Patient presents with    Medicare AWV       History of Present Illness:        Patient presents to office for AWV.   Today he also c/o cough and congestion started about 4 days ago.   Has a h/o COPD.     Cough  This is a new problem. The current episode started in the past 7 days. The problem has been unchanged. The problem occurs constantly. The cough is Productive of sputum. Associated symptoms include nasal congestion, a sore throat, shortness of breath and wheezing. Pertinent negatives include no chills, ear pain, fever, headaches or myalgias. Exacerbated by: activities, hot weather. He has tried a beta-agonist inhaler and OTC cough suppressant for the symptoms. The treatment provided mild relief. His past medical history is significant for COPD.           Past Medical History:     Past Medical History:   Diagnosis Date    Asthma     Bronchitis     CAD (coronary artery disease)     COPD (chronic obstructive pulmonary disease) ([facility]) 2011    Depression 2011    Diabetes mellitus ([facility])     GERD (gastroesophageal reflux disease) 2011    Hyperlipidemia 2011    Hypertension     Hypothyroid 2011    Lower back pain     Nicotine addiction 2011    Sleep apnea       Reviewed all health maintenance requirements and orderedappropriate tests  Health Maintenance Due   Topic Date Due    DTaP/Tdap/Td vaccine (1 - Tdap) Never done    Shingles vaccine (1 of 2) Never done    Respiratory Syncytial Virus (RSV) Pregnant or age 60 yrs+ (1 - Risk 60-74 years 1-dose series) Never done    Diabetic retinal exam  10/29/2020    COVID-19 Vaccine (6 - - season) 2024    Pneumococcal 50+ years Vaccine (3 of 3 - PCV20 or PCV21) 10/08/2024       Past Surgical History:     Past Surgical History:   Procedure Laterality Date    CARDIAC CATHETERIZATION Left 2015    Severe left main trunk disease extending into

## 2025-06-18 ENCOUNTER — TELEPHONE (OUTPATIENT)
Dept: FAMILY MEDICINE CLINIC | Age: 70
End: 2025-06-18

## 2025-06-18 DIAGNOSIS — G47.33 OSA (OBSTRUCTIVE SLEEP APNEA): Primary | ICD-10-CM

## 2025-06-18 NOTE — TELEPHONE ENCOUNTER
Patient is requesting new orders for CPAP sup[lies and I will fax to [facility]    Thanks

## 2025-07-28 DIAGNOSIS — E03.9 ACQUIRED HYPOTHYROIDISM: ICD-10-CM

## 2025-07-28 RX ORDER — LEVOTHYROXINE SODIUM 200 UG/1
TABLET ORAL
Qty: 90 TABLET | Refills: 0 | Status: SHIPPED | OUTPATIENT
Start: 2025-07-28

## 2025-08-07 ENCOUNTER — OFFICE VISIT (OUTPATIENT)
Dept: UROLOGY | Age: 70
End: 2025-08-07
Payer: MEDICARE

## 2025-08-07 VITALS
SYSTOLIC BLOOD PRESSURE: 104 MMHG | BODY MASS INDEX: 41.85 KG/M2 | HEART RATE: 57 BPM | WEIGHT: 309 LBS | HEIGHT: 72 IN | DIASTOLIC BLOOD PRESSURE: 63 MMHG

## 2025-08-07 DIAGNOSIS — R35.0 FREQUENCY OF MICTURITION: ICD-10-CM

## 2025-08-07 DIAGNOSIS — N32.81 OAB (OVERACTIVE BLADDER): ICD-10-CM

## 2025-08-07 DIAGNOSIS — N13.8 BPH WITH OBSTRUCTION/LOWER URINARY TRACT SYMPTOMS: Primary | ICD-10-CM

## 2025-08-07 DIAGNOSIS — N40.1 BPH WITH OBSTRUCTION/LOWER URINARY TRACT SYMPTOMS: Primary | ICD-10-CM

## 2025-08-07 PROCEDURE — 3078F DIAST BP <80 MM HG: CPT | Performed by: PHYSICIAN ASSISTANT

## 2025-08-07 PROCEDURE — 1123F ACP DISCUSS/DSCN MKR DOCD: CPT | Performed by: PHYSICIAN ASSISTANT

## 2025-08-07 PROCEDURE — 1126F AMNT PAIN NOTED NONE PRSNT: CPT | Performed by: PHYSICIAN ASSISTANT

## 2025-08-07 PROCEDURE — 51798 US URINE CAPACITY MEASURE: CPT | Performed by: PHYSICIAN ASSISTANT

## 2025-08-07 PROCEDURE — 3074F SYST BP LT 130 MM HG: CPT | Performed by: PHYSICIAN ASSISTANT

## 2025-08-07 PROCEDURE — 99214 OFFICE O/P EST MOD 30 MIN: CPT | Performed by: PHYSICIAN ASSISTANT

## 2025-08-07 PROCEDURE — 1159F MED LIST DOCD IN RCRD: CPT | Performed by: PHYSICIAN ASSISTANT

## 2025-08-07 RX ORDER — TROSPIUM CHLORIDE 20 MG/1
20 TABLET, FILM COATED ORAL 2 TIMES DAILY
Qty: 180 TABLET | Refills: 3 | Status: SHIPPED | OUTPATIENT
Start: 2025-08-07

## 2025-08-07 RX ORDER — TAMSULOSIN HYDROCHLORIDE 0.4 MG/1
0.4 CAPSULE ORAL 2 TIMES DAILY
Qty: 180 CAPSULE | Refills: 3 | Status: SHIPPED | OUTPATIENT
Start: 2025-08-07

## 2025-08-07 RX ORDER — MIRABEGRON 50 MG/1
50 TABLET, FILM COATED, EXTENDED RELEASE ORAL DAILY
Qty: 30 TABLET | Refills: 3 | Status: SHIPPED | OUTPATIENT
Start: 2025-08-07

## 2025-08-09 DIAGNOSIS — I10 ESSENTIAL HYPERTENSION: ICD-10-CM

## 2025-08-11 ENCOUNTER — TELEPHONE (OUTPATIENT)
Dept: UROLOGY | Age: 70
End: 2025-08-11

## 2025-08-11 RX ORDER — METOPROLOL SUCCINATE 50 MG/1
50 TABLET, EXTENDED RELEASE ORAL DAILY
Qty: 90 TABLET | Refills: 11 | Status: SHIPPED | OUTPATIENT
Start: 2025-08-11

## 2025-08-12 ENCOUNTER — OFFICE VISIT (OUTPATIENT)
Dept: FAMILY MEDICINE CLINIC | Age: 70
End: 2025-08-12

## 2025-08-12 VITALS
HEART RATE: 56 BPM | WEIGHT: 308 LBS | BODY MASS INDEX: 41.72 KG/M2 | SYSTOLIC BLOOD PRESSURE: 112 MMHG | DIASTOLIC BLOOD PRESSURE: 82 MMHG | HEIGHT: 72 IN | OXYGEN SATURATION: 95 % | RESPIRATION RATE: 18 BRPM

## 2025-08-12 DIAGNOSIS — E78.2 MIXED HYPERLIPIDEMIA: ICD-10-CM

## 2025-08-12 DIAGNOSIS — I10 ESSENTIAL HYPERTENSION: ICD-10-CM

## 2025-08-12 DIAGNOSIS — F41.9 ANXIETY: ICD-10-CM

## 2025-08-12 DIAGNOSIS — E66.813 OBESITY, CLASS 3 (HCC): ICD-10-CM

## 2025-08-12 DIAGNOSIS — J44.9 CHRONIC OBSTRUCTIVE PULMONARY DISEASE, UNSPECIFIED COPD TYPE (HCC): ICD-10-CM

## 2025-08-12 DIAGNOSIS — E11.9 TYPE 2 DIABETES MELLITUS WITHOUT COMPLICATION, WITHOUT LONG-TERM CURRENT USE OF INSULIN (HCC): Primary | ICD-10-CM

## 2025-08-12 LAB — HBA1C MFR BLD: 7.4 %

## 2025-08-12 RX ORDER — CLOTRIMAZOLE 1 %
CREAM (GRAM) TOPICAL
Qty: 60 G | Refills: 1 | Status: SHIPPED | OUTPATIENT
Start: 2025-08-12 | End: 2025-08-19

## 2025-08-12 ASSESSMENT — ENCOUNTER SYMPTOMS
NAUSEA: 0
WHEEZING: 0
SORE THROAT: 0
BLOOD IN STOOL: 0
CONSTIPATION: 0
ABDOMINAL PAIN: 0
SHORTNESS OF BREATH: 0
COUGH: 0
ALLERGIC/IMMUNOLOGIC NEGATIVE: 1

## 2025-08-12 ASSESSMENT — COPD QUESTIONNAIRES: COPD: 1

## 2025-09-03 ENCOUNTER — TELEPHONE (OUTPATIENT)
Dept: UROLOGY | Age: 70
End: 2025-09-03

## (undated) DEVICE — BLOCK BITE AD OPN SZ 48FR MOUTHPC ENDOSCP STURDY W/ FOAM

## (undated) DEVICE — GLOVE SURG SZ 8 CRM LTX FREE POLYISOPRENE POLYMER BEAD ANTI

## (undated) DEVICE — SYRINGE, LUER LOCK, 10ML: Brand: MEDLINE

## (undated) DEVICE — BANDAGE ADH W1XL3IN NAT FAB WVN FLX DURABLE N ADH PD SEAL

## (undated) DEVICE — REAGENT TEST UREASE RAPD CLOTEST F/

## (undated) DEVICE — NERVE BLOCK TRAY: Brand: MEDLINE INDUSTRIES, INC.

## (undated) DEVICE — ELECTRODE PT RET AD L9FT HI MOIST COND ADH HYDRGEL CORDED

## (undated) DEVICE — GLOVE SURG SZ 75 L12IN FNGR THK79MIL GRN LTX FREE

## (undated) DEVICE — ENDO KIT W/SYRINGE: Brand: MEDLINE INDUSTRIES, INC.

## (undated) DEVICE — SOLUTION IRRIG 1000ML STRL H2O USP PLAS POUR BTL

## (undated) DEVICE — 4-PORT MANIFOLD: Brand: NEPTUNE 2

## (undated) DEVICE — TRAP SUCT POLYPECTOMY ST 4 CHMBR

## (undated) DEVICE — APPLICATOR MEDICATED 10.5 CC SOLUTION HI LT ORNG CHLORAPREP

## (undated) DEVICE — NEEDLE SPNL 25GA L3.5IN BLU HUB S STL REG WALL FIT STYL W/

## (undated) DEVICE — CURVED SHARP RF CANNULA, RADIOPAQUE MARKER: Brand: RADIOPAQUE RADIOFREQUENCY CANNULA

## (undated) DEVICE — FORCEP SPEC RETRV BX AD 2 MMX155 CM 5 MM GI OVL CUP W/ NDL

## (undated) DEVICE — SYRINGE MED 5ML STD CLR PLAS LUERLOCK TIP N CTRL DISP

## (undated) DEVICE — NEEDLE HYPO 18GA L1IN PNK POLYPR HUB S STL REG BVL STR W/O

## (undated) DEVICE — SYRINGE MEDICAL 3ML CLEAR PLASTIC STANDARD NON CONTROL LUERLOCK TIP DISPOSABLE

## (undated) DEVICE — SHEET,DRAPE,53X77,STERILE: Brand: MEDLINE

## (undated) DEVICE — Device: Brand: DISPOSABLE ELECTROSURGICAL SNARE

## (undated) DEVICE — GLOVE SURG SZ 75 CRM LTX FREE POLYISOPRENE POLYMER BEAD ANTI

## (undated) DEVICE — FORCEPS BX L240CM JAW DIA2.2MM RAD JAW 4 HOT DISP

## (undated) DEVICE — Device: Brand: SINGLE USE INJECTOR

## (undated) DEVICE — TOWEL,OR,DSP,ST,NATURAL,DLX,4/PK,20PK/CS: Brand: MEDLINE

## (undated) DEVICE — JELLY LUBRICATING 4OZ FLIP TOP TB E Z

## (undated) DEVICE — GOWN,AURORA,NONRNF,XL,30/CS: Brand: MEDLINE

## (undated) DEVICE — MEDI-VAC NON-CONDUCTIVE SUCTION TUBING 6MM X 6.1M (20 FT.) L: Brand: CARDINAL HEALTH

## (undated) DEVICE — ELECTRODE ES AD CRD L15FT DISP FOR PT BELOW 30LB REM

## (undated) DEVICE — 1200CC SUCTION CANISTER WITH HYDROPHOBIC FILTER AND RED LID: Brand: BEMIS

## (undated) DEVICE — HYBRID TUBING WITH CO2 CONNECTION FOR OLYMPUS 140/160/180/190 SERIES GI ENDOSCOPES WITH OLYMPUS AFU-100 , OLYMPUS OFP: Brand: ENDOGATOR HYBRID IRRIGATION TUBING

## (undated) DEVICE — Device: Brand: DEFENDO VALVE AND CONNECTOR KIT

## (undated) DEVICE — TRAP SPEC POLYP REM STRNR CLN DSGN MAGNIFYING WIND DISP

## (undated) DEVICE — SINGLE-USE BIOPSY FORCEPS: Brand: RADIAL JAW 4

## (undated) DEVICE — CANNULA NSL AD TUBE L7FT END TDL CO2 SAMP STD CONN DISP